# Patient Record
Sex: FEMALE | Race: BLACK OR AFRICAN AMERICAN | NOT HISPANIC OR LATINO | Employment: FULL TIME | URBAN - METROPOLITAN AREA
[De-identification: names, ages, dates, MRNs, and addresses within clinical notes are randomized per-mention and may not be internally consistent; named-entity substitution may affect disease eponyms.]

---

## 2017-04-05 DIAGNOSIS — I47.10 PSVT (PAROXYSMAL SUPRAVENTRICULAR TACHYCARDIA): Primary | ICD-10-CM

## 2017-04-06 ENCOUNTER — OFFICE VISIT (OUTPATIENT)
Dept: CARDIOLOGY | Facility: CLINIC | Age: 67
End: 2017-04-06
Payer: COMMERCIAL

## 2017-04-06 ENCOUNTER — CLINICAL SUPPORT (OUTPATIENT)
Dept: CARDIOLOGY | Facility: CLINIC | Age: 67
End: 2017-04-06
Payer: COMMERCIAL

## 2017-04-06 VITALS
SYSTOLIC BLOOD PRESSURE: 128 MMHG | WEIGHT: 165.56 LBS | DIASTOLIC BLOOD PRESSURE: 74 MMHG | HEART RATE: 89 BPM | BODY MASS INDEX: 28.27 KG/M2 | HEIGHT: 64 IN

## 2017-04-06 DIAGNOSIS — R07.9 CHEST PAIN, UNSPECIFIED TYPE: ICD-10-CM

## 2017-04-06 DIAGNOSIS — I47.10 SVT (SUPRAVENTRICULAR TACHYCARDIA): ICD-10-CM

## 2017-04-06 DIAGNOSIS — I47.10 PSVT (PAROXYSMAL SUPRAVENTRICULAR TACHYCARDIA): ICD-10-CM

## 2017-04-06 PROCEDURE — 99999 PR PBB SHADOW E&M-EST. PATIENT-LVL III: CPT | Mod: PBBFAC,,, | Performed by: INTERNAL MEDICINE

## 2017-04-06 PROCEDURE — 99204 OFFICE O/P NEW MOD 45 MIN: CPT | Mod: S$GLB,,, | Performed by: INTERNAL MEDICINE

## 2017-04-06 PROCEDURE — 93000 ELECTROCARDIOGRAM COMPLETE: CPT | Mod: S$GLB,,, | Performed by: NUCLEAR MEDICINE

## 2017-04-06 RX ORDER — METOPROLOL SUCCINATE 50 MG/1
50 TABLET, EXTENDED RELEASE ORAL DAILY
COMMUNITY
End: 2018-10-31

## 2017-04-06 RX ORDER — DIGOXIN 0.12 MG/1
0.12 TABLET ORAL DAILY
Qty: 30 TABLET | Refills: 11 | Status: SHIPPED | OUTPATIENT
Start: 2017-04-06 | End: 2018-05-17 | Stop reason: SDUPTHER

## 2017-04-06 NOTE — PROGRESS NOTES
Subjective:    Patient ID:  Liat Gilmore is a 66 y.o. female who presents for evaluation of SVT      HPI Comments: 66 yoF here for evaluation of SVT. She has had SVT since she was a teenager. She had rapid palpitations at the time. She was diagnosed with SVT and was placed on phenobarbitol. She has episodes of SVT rarely, last ER visit 10 years. Her SVT is terminated by adenosine. She understands how to perform vagal maneuvers and break her arrhythmia. She has been on digoxin for SVT by Dr Tijerina. She saw Dr Holden, who advised metoprolol and stopping digoxin. She is taking metoprolol as needed. She has noticed that her heart rate is lower and she has side effects.     Past Medical History:  No date: Supraventricular tachycardia    History reviewed. No pertinent surgical history.    Social History    Marital status:             Spouse name:                       Years of education:                 Number of children:               Occupational History    None on file    Social History Main Topics    Smoking status: Unknown If Ever Smoked                                                         Smokeless status: Not on file                       Alcohol use: Not on file     Drug use: Not on file     Sexual activity: Not on file          Other Topics            Concern    None on file    Social History Narrative    None on file    History reviewed.  No pertinent family history.        Review of Systems   Constitution: Negative.   HENT: Negative.    Eyes: Negative.    Cardiovascular: Positive for irregular heartbeat and palpitations. Negative for chest pain, dyspnea on exertion, leg swelling, near-syncope and syncope.   Respiratory: Negative.  Negative for shortness of breath.    Endocrine: Negative.    Hematologic/Lymphatic: Negative.    Skin: Negative.    Musculoskeletal: Negative.    Gastrointestinal: Negative.    Genitourinary: Negative.    Neurological: Negative.  Negative for dizziness and  light-headedness.   Psychiatric/Behavioral: Negative.    Allergic/Immunologic: Negative.         Objective:    Physical Exam   Constitutional: She is oriented to person, place, and time. She appears well-developed and well-nourished. No distress.   HENT:   Head: Normocephalic and atraumatic.   Eyes: Conjunctivae and EOM are normal. Pupils are equal, round, and reactive to light. Right eye exhibits no discharge. Left eye exhibits no discharge.   Neck: Normal range of motion. No JVD present. No thyromegaly present.   Cardiovascular: Normal rate, regular rhythm, S1 normal, S2 normal and normal heart sounds.  PMI is not displaced.  Exam reveals no gallop and no friction rub.    No murmur heard.  Pulmonary/Chest: Effort normal and breath sounds normal. No respiratory distress. She has no wheezes. She has no rales.   Abdominal: Soft. Bowel sounds are normal. She exhibits no distension. There is no tenderness. There is no rebound and no guarding.   Musculoskeletal: Normal range of motion. She exhibits no edema or tenderness.   Neurological: She is alert and oriented to person, place, and time. No cranial nerve deficit.   Skin: Skin is warm and dry. No rash noted. She is not diaphoretic. No erythema.   Psychiatric: She has a normal mood and affect. Her behavior is normal. Judgment and thought content normal.     ECG: NSR nl DE, QRS, QTc        Assessment:       1. SVT (supraventricular tachycardia)         Plan:       66 yoF SVT here for evaluation. She has stable, controlled SVT on digoxin. I discussed options including ablation, medical therapy. Extensive discussion regarding risks, benefits, and alternative approaches to the procedure was had with the patient.  The patient voices understanding and all questions have been answered.  The patient would like to consider her options. If she has recurrence, I would recommend EPS/RFA. RTC 1y or as needed

## 2018-05-17 ENCOUNTER — TELEPHONE (OUTPATIENT)
Dept: ELECTROPHYSIOLOGY | Facility: CLINIC | Age: 68
End: 2018-05-17

## 2018-05-17 DIAGNOSIS — I47.10 SVT (SUPRAVENTRICULAR TACHYCARDIA): ICD-10-CM

## 2018-05-17 RX ORDER — DIGOXIN 0.12 MG/1
0.12 TABLET ORAL DAILY
Qty: 30 TABLET | Refills: 11 | Status: SHIPPED | OUTPATIENT
Start: 2018-05-17 | End: 2018-05-18

## 2018-05-17 NOTE — TELEPHONE ENCOUNTER
----- Message from Marlo Donovan sent at 5/17/2018  1:58 PM CDT -----  Contact: patient  Please call pt at [phone] if any questions. Refill request for Digoxin 0.125 mg called into [facility] at [phone]. Out of medication    Thank you

## 2018-05-17 NOTE — TELEPHONE ENCOUNTER
----- Message from Court León RN sent at 5/16/2018  3:45 PM CDT -----  Contact: Patient  Please send refill for pt  ----- Message -----  From: Shabana Adhikari  Sent: 5/16/2018  11:01 AM  To: Court León RN    The Pt is calling for a refill on her LANOXIN 125 mcg tablet she is out of medications. Please call her back @ [phone]. Thanks, Shabana

## 2018-05-18 ENCOUNTER — TELEPHONE (OUTPATIENT)
Dept: ELECTROPHYSIOLOGY | Facility: CLINIC | Age: 68
End: 2018-05-18

## 2018-05-18 DIAGNOSIS — I47.10 SVT (SUPRAVENTRICULAR TACHYCARDIA): Primary | ICD-10-CM

## 2018-05-18 RX ORDER — DIGOXIN 125 MCG
125 TABLET ORAL DAILY
Qty: 30 TABLET | Refills: 6 | Status: SHIPPED | OUTPATIENT
Start: 2018-05-18 | End: 2018-10-31

## 2018-05-18 NOTE — TELEPHONE ENCOUNTER
Returned call to Pt. She nneds digoxin called to [facility] because its much cheaper than lanoxin for her. Rx sent.    ----- Message from Rosiat Jacobo sent at 5/18/2018  4:49 PM CDT -----  Contact: pt  Pls call pt at [phone].  She say a rx for LANOXIN 125 mcg tablet was called in to her pharmacy and it should have been for Digoxin.  They did give her a emergency 3 day supply.    Thank you

## 2018-06-27 ENCOUNTER — TELEPHONE (OUTPATIENT)
Dept: ELECTROPHYSIOLOGY | Facility: CLINIC | Age: 68
End: 2018-06-27

## 2018-06-27 NOTE — TELEPHONE ENCOUNTER
Called pt back regarding her concerns with clinic appointment 7/24/18. Pt states she has been experiencing mild ankle edema and SOB/CRUZ for a few days. She takes HCTZ 25mg daily, admits to taking additional 12.5mg PRN. Fluid intake approx. 32oz daily and reports low sodium intake ~800mg. Advised pt try to drink additional 8-16oz of fluid daily and keep salt intake below 1500mg daily to maintain healthy balance -- will check in with pt on 6/28. Instructed pt to go to ER for eval if BLE edema does not resolve overnight / with elevating her feet and if SOB worsens / happens at rest. Pt verbalized understanding of all discussed.

## 2018-06-28 ENCOUNTER — TELEPHONE (OUTPATIENT)
Dept: ELECTROPHYSIOLOGY | Facility: CLINIC | Age: 68
End: 2018-06-28

## 2018-06-28 NOTE — TELEPHONE ENCOUNTER
Called patient to check on status on BLE edema. Pt reports improvement, denies current BLE edema. She increased her fluid intake today and is aiming for 1200mg salt intake. Advised she reach out to PCP for further instruction if symptoms return / worsen. Pt verbalized understanding of all discussed.

## 2018-10-27 ENCOUNTER — OFFICE VISIT (OUTPATIENT)
Dept: URGENT CARE | Facility: CLINIC | Age: 68
End: 2018-10-27
Payer: COMMERCIAL

## 2018-10-27 VITALS
WEIGHT: 165 LBS | SYSTOLIC BLOOD PRESSURE: 149 MMHG | RESPIRATION RATE: 18 BRPM | BODY MASS INDEX: 28.17 KG/M2 | HEART RATE: 104 BPM | DIASTOLIC BLOOD PRESSURE: 72 MMHG | TEMPERATURE: 98 F | OXYGEN SATURATION: 96 % | HEIGHT: 64 IN

## 2018-10-27 DIAGNOSIS — J32.9 VIRAL SINUSITIS: Primary | ICD-10-CM

## 2018-10-27 DIAGNOSIS — M25.50 ARTHRALGIA, UNSPECIFIED JOINT: ICD-10-CM

## 2018-10-27 DIAGNOSIS — B97.89 VIRAL SINUSITIS: Primary | ICD-10-CM

## 2018-10-27 PROCEDURE — 99203 OFFICE O/P NEW LOW 30 MIN: CPT | Mod: S$GLB,,, | Performed by: NURSE PRACTITIONER

## 2018-10-27 RX ORDER — FLUTICASONE PROPIONATE 50 MCG
2 SPRAY, SUSPENSION (ML) NASAL DAILY
Qty: 1 BOTTLE | Refills: 0 | Status: ON HOLD | OUTPATIENT
Start: 2018-10-27 | End: 2023-04-12 | Stop reason: CLARIF

## 2018-10-27 RX ORDER — PREDNISONE 20 MG/1
40 TABLET ORAL DAILY
Qty: 6 TABLET | Refills: 0 | Status: SHIPPED | OUTPATIENT
Start: 2018-10-27 | End: 2018-10-30

## 2018-10-27 NOTE — PATIENT INSTRUCTIONS
Continue with 3 more days of Prednisone.  Zyrtec or Allegra or Claritin as directed.  Flonase.  Nasal saline rinses or spray.  Follow up closely with your PCP.  Go to the ER for any worsening symptoms.  Self-Care for Sinusitis     Drinking plenty of water can help sinuses drain.   Sinusitis can often be managed with self-care. Self-care can keep sinuses moist and make you feel more comfortable. Remember to follow your doctor's instructions closely. This can make a big difference in getting your sinus problem under control.  Drink fluids  Drinking extra fluids helps thin your mucus. This lets it drain from your sinuses more easily. Have a glass of water every hour or two. A humidifier helps in much the same way. Fluids can also offset the drying effects of certain medicines. If you use a humidifier, follow the product maker's instructions on how to use it. Clean it on a regular schedule.  Use saltwater rinses  Rinses help keep your sinuses and nose moist. Mix a teaspoon of salt in 8 ounces of fresh, warm water. Use a bulb syringe to gently squirt the water into your nose a few times a day. You can also buy ready-made saline nasal sprays.  Apply hot or cold packs  Applying heat to the area surrounding your sinuses may make you feel more comfortable. Use a hot water bottle or a hand towel dipped in hot water. Some people also find ice packs effective for relieving pain.  Medicines  Your doctor may prescribe medications to help treat your sinusitis. If you have an infection, antibiotics can help clear it up. If you are prescribed antibiotics, take all pills on schedule until they are gone, even if you feel better. Decongestants help relieve swelling. Use decongestant sprays for short periods only under the direction of your doctor. If you have allergies, your doctor may prescribe medications to help relieve them.   Date Last Reviewed: 10/1/2016  © 9194-6766 [employer]. [address] [place] [address], [place]  [address]. All rights reserved. This information is not intended as a substitute for professional medical care. Always follow your healthcare professional's instructions.        Sinusitis (No Antibiotics)    The sinuses are air-filled spaces within the bones of the face. They connect to the inside of the nose. Sinusitis is an inflammation of the tissue lining the sinus cavity. Sinus inflammation can occur during a cold. It can also be due to allergies to pollens and other particles in the air. It can cause symptoms such as sinus congestion, headache, sore throat, facial swelling and fullness. It may also cause a low-grade fever. No infection is present, and no antibiotic treatment is needed.  Home care  · Drink plenty of water, hot tea, and other liquids. This may help thin mucus. It also may promote sinus drainage.  · Heat may help soothe painful areas of the face. Use a towel soaked in hot water. Or,  the shower and direct the hot spray onto your face. Using a vaporizer along with a menthol rub at night may also help.   · An expectorant containing guaifenesin may help thin the mucus and promote drainage from the sinuses.  · Over-the-counter decongestants may be used unless a similar medicine was prescribed. Nasal sprays work the fastest. Use one that contains phenylephrine or oxymetazoline. First blow the nose gently. Then use the spray. Do not use these medicines more often than directed on the label or symptoms may get worse. You may also use tablets containing pseudoephedrine. Avoid products that combine ingredients, because side effects may be increased. Read labels. You can also ask the pharmacist for help. (NOTE: Persons with high blood pressure should not use decongestants. They can raise blood pressure.)  · Over-the-counter antihistamines may help if allergies contributed to your sinusitis.    · Use acetaminophen or ibuprofen to control pain, unless another pain medicine was prescribed. (If you have  chronic liver or kidney disease or ever had a stomach ulcer, talk with your doctor before using these medicines. Aspirin should never be used in anyone under 18 years of age who is ill with a fever. It may cause severe liver damage.)  · Use nasal rinses or irrigation as instructed by your health care provider.  · Don't smoke. This can worsen symptoms.  Follow-up care  Follow up with your healthcare provider or our staff if you are not improving within the next week.  When to seek medical advice  Call your healthcare provider if any of these occur:  · Green or yellow discharge from the nose or into the throat  · Facial pain or headache becoming more severe  · Stiff neck  · Unusual drowsiness or confusion  · Swelling of the forehead or eyelids  · Vision problems, including blurred or double vision  · Fever of 100.4ºF (38ºC) or higher, or as directed by your healthcare provider  · Seizure  · Breathing problems  · Symptoms not resolving within 10 days  Date Last Reviewed: 4/13/2015  © 7891-6117 [employer]. [address] [place] [address]. All rights reserved. This information is not intended as a substitute for professional medical care. Always follow your healthcare professional's instructions.        Arthralgia    Arthralgia is the term for pain in or around the joint. It is a symptom, not a disease. This pain may involve one or more joints. In some cases, the pain moves from joint to joint.  There are many causes for joint pain. These include:  · Injury  · Osteoarthritis (wearing out of the joint surface)  · Gout (inflammation of the joint due to crystals in the joint fluid)  · Infection inside the joint    · Bursitis (inflammation of the fluid-filled sacs around the joint)  · Autoimmune disorders such as rheumatoid arthritis or lupus  · Tendonitis (inflammation of chords that attach muscle to bone)  Home care  · Rest the involved joint(s) until your symptoms improve.   · You may be  prescribed pain medicine. If none is prescribed, you may use acetaminophen or ibuprofen to control pain and inflammation.  Follow-up care  Follow up with your healthcare provider or as advised.  When to seek medical advice  Contact your healthcare provider right away if any of the following occurs:  · Pain, swelling, or redness of joint increases  · Pain worsens or recurs after a period of improvement  · Pain moves to other joints  · You cannot bear weight on the affected joint   · You cannot move the affected joint  · Joint appears deformed  · New rash appears  · Fever of 100.4ºF (38ºC) or higher, or as directed by your healthcare provider  Date Last Reviewed: 3/1/2017  © 5477-2965 [employer]. [address] [place] [address], [place] [address]. All rights reserved. This information is not intended as a substitute for professional medical care. Always follow your healthcare professional's instructions.

## 2018-10-27 NOTE — PROGRESS NOTES
"Subjective:       Patient ID: Liat Gilmore is a 68 y.o. female.    Vitals:  height is 5' 4" (1.626 m) and weight is 74.8 kg (165 lb). Her temperature is 97.9 °F (36.6 °C). Her blood pressure is 149/72 (abnormal) and her pulse is 104. Her respiration is 18 and oxygen saturation is 96%.     Chief Complaint: Sinus Problem and Knee Pain    Patient presents with yellow drainage from her sinuses when sneezing x3 days.  No fever.  She is having joint aches for "all the time" without any swelling or redness or warmth.  She has a history of Fibromyalgia.  She states that more so recently her knees are hurting her in the mornings.  She also suspects Arthritis.  She states that she has appointment with her PCP in November.  She takes Zyrtec daily for allergies but states that she must have sinus infection due to color.  She does not have facial pain or sinus pain.  She has no injury.  She reports that joint pain is mainly in the morning.  She is also having some associated stiffness.  She has Prednisone at home 40mg x2 days.  She has tried this with relief of symptoms, but only has two days.  She has a chronic cough that she associates with her Asthma.  No increased inhaler use at present.      Sinus Problem   This is a new problem. The current episode started in the past 7 days. The problem is unchanged. There has been no fever. Her pain is at a severity of 0/10. She is experiencing no pain. Associated symptoms include congestion, coughing and sneezing. Pertinent negatives include no chills, diaphoresis, ear pain, headaches, hoarse voice, neck pain, shortness of breath, sinus pressure, sore throat or swollen glands. Treatments tried: prednisone 20mg. The treatment provided mild relief.   Knee Pain    The incident occurred more than 1 week ago. The incident occurred at home. There was no injury mechanism. The pain is present in the left knee, right knee, left ankle and right ankle (left shoulder, right shoulder, left elbow, " right elbow, left wrist, right wrist). The quality of the pain is described as aching and shooting. The pain is at a severity of 8/10. The pain is moderate. The pain has been constant since onset. Pertinent negatives include no inability to bear weight, loss of motion, loss of sensation, muscle weakness, numbness or tingling. She reports no foreign bodies present. The symptoms are aggravated by movement and weight bearing. Treatments tried: prednisone  The treatment provided mild relief.     Review of Systems   Constitution: Negative for chills, diaphoresis and fever.   HENT: Positive for congestion and sneezing. Negative for ear pain, hoarse voice, sinus pressure and sore throat.    Eyes: Negative for blurred vision.   Cardiovascular: Negative for chest pain.   Respiratory: Positive for cough. Negative for shortness of breath.    Skin: Negative for rash.   Musculoskeletal: Positive for joint pain. Negative for back pain and neck pain.   Gastrointestinal: Negative for abdominal pain, diarrhea, nausea and vomiting.   Neurological: Negative for headaches, numbness and tingling.   Psychiatric/Behavioral: The patient is not nervous/anxious.        Objective:      Physical Exam   Constitutional: She is oriented to person, place, and time. She appears well-developed and well-nourished. She is cooperative.  Non-toxic appearance. She does not appear ill. No distress.   HENT:   Head: Normocephalic and atraumatic.   Right Ear: Hearing, tympanic membrane, external ear and ear canal normal.   Left Ear: Hearing, tympanic membrane, external ear and ear canal normal.   Nose: Mucosal edema and rhinorrhea present. No sinus tenderness or nasal deformity. No epistaxis. Right sinus exhibits no maxillary sinus tenderness and no frontal sinus tenderness. Left sinus exhibits no maxillary sinus tenderness and no frontal sinus tenderness.   Mouth/Throat: Uvula is midline, oropharynx is clear and moist and mucous membranes are normal. No  trismus in the jaw. Normal dentition. No uvula swelling. No oropharyngeal exudate, posterior oropharyngeal edema or posterior oropharyngeal erythema.   Eyes: Conjunctivae and lids are normal. No scleral icterus.   Sclera clear bilat   Neck: Trachea normal, full passive range of motion without pain and phonation normal. Neck supple.   Cardiovascular: Normal rate, regular rhythm, normal heart sounds, intact distal pulses and normal pulses.   Pulmonary/Chest: Effort normal and breath sounds normal. No respiratory distress. She has no wheezes.   Abdominal: Soft. Normal appearance and bowel sounds are normal. She exhibits no distension. There is no tenderness.   Musculoskeletal: Normal range of motion. She exhibits no edema or deformity.        Right shoulder: Normal.        Left shoulder: Normal.        Right elbow: Normal.       Left elbow: Normal.        Right wrist: Normal.        Left wrist: Normal.        Right hip: Normal.        Left hip: Normal.        Right knee: Normal.        Left knee: Normal.        Right ankle: Normal.        Left ankle: Normal.   No warmth, redness, or tenderness over joints.  Full ROM.   Lymphadenopathy:     She has no cervical adenopathy.   Neurological: She is alert and oriented to person, place, and time. She exhibits normal muscle tone. Coordination normal.   Skin: Skin is warm, dry and intact. She is not diaphoretic. No pallor.   Psychiatric: She has a normal mood and affect. Her speech is normal and behavior is normal. Judgment and thought content normal. Cognition and memory are normal.   Nursing note and vitals reviewed.      Assessment:       1. Viral sinusitis    2. Arthralgia, unspecified joint        Plan:       Patient non tender over her sinuses with clear drainage noted on exam.  No wheezing at present.  Will extend Prednisone with history of Asthma.  Offered patient a referral to Rheumatology for chronic joint pain.  She deferred this and states that she likes to let her  PCP make her referrals.  Advised to keep follow up and to follow up sooner if symptoms persist or change.  She agrees to this.  Symptomatic care of viral illness discussed.  Viral sinusitis  -     fluticasone (FLONASE) 50 mcg/actuation nasal spray; 2 sprays (100 mcg total) by Each Nare route once daily.  Dispense: 1 Bottle; Refill: 0  -     predniSONE (DELTASONE) 20 MG tablet; Take 2 tablets (40 mg total) by mouth once daily. for 3 days  Dispense: 6 tablet; Refill: 0    Arthralgia, unspecified joint      Patient Instructions     Continue with 3 more days of Prednisone.  Zyrtec or Allegra or Claritin as directed.  Flonase.  Nasal saline rinses or spray.  Follow up closely with your PCP.  Go to the ER for any worsening symptoms.  Self-Care for Sinusitis     Drinking plenty of water can help sinuses drain.   Sinusitis can often be managed with self-care. Self-care can keep sinuses moist and make you feel more comfortable. Remember to follow your doctor's instructions closely. This can make a big difference in getting your sinus problem under control.  Drink fluids  Drinking extra fluids helps thin your mucus. This lets it drain from your sinuses more easily. Have a glass of water every hour or two. A humidifier helps in much the same way. Fluids can also offset the drying effects of certain medicines. If you use a humidifier, follow the product maker's instructions on how to use it. Clean it on a regular schedule.  Use saltwater rinses  Rinses help keep your sinuses and nose moist. Mix a teaspoon of salt in 8 ounces of fresh, warm water. Use a bulb syringe to gently squirt the water into your nose a few times a day. You can also buy ready-made saline nasal sprays.  Apply hot or cold packs  Applying heat to the area surrounding your sinuses may make you feel more comfortable. Use a hot water bottle or a hand towel dipped in hot water. Some people also find ice packs effective for relieving pain.  Medicines  Your doctor  may prescribe medications to help treat your sinusitis. If you have an infection, antibiotics can help clear it up. If you are prescribed antibiotics, take all pills on schedule until they are gone, even if you feel better. Decongestants help relieve swelling. Use decongestant sprays for short periods only under the direction of your doctor. If you have allergies, your doctor may prescribe medications to help relieve them.   Date Last Reviewed: 10/1/2016  © 6910-6310 [employer]. [address] [place] [address]. All rights reserved. This information is not intended as a substitute for professional medical care. Always follow your healthcare professional's instructions.        Sinusitis (No Antibiotics)    The sinuses are air-filled spaces within the bones of the face. They connect to the inside of the nose. Sinusitis is an inflammation of the tissue lining the sinus cavity. Sinus inflammation can occur during a cold. It can also be due to allergies to pollens and other particles in the air. It can cause symptoms such as sinus congestion, headache, sore throat, facial swelling and fullness. It may also cause a low-grade fever. No infection is present, and no antibiotic treatment is needed.  Home care  · Drink plenty of water, hot tea, and other liquids. This may help thin mucus. It also may promote sinus drainage.  · Heat may help soothe painful areas of the face. Use a towel soaked in hot water. Or,  the shower and direct the hot spray onto your face. Using a vaporizer along with a menthol rub at night may also help.   · An expectorant containing guaifenesin may help thin the mucus and promote drainage from the sinuses.  · Over-the-counter decongestants may be used unless a similar medicine was prescribed. Nasal sprays work the fastest. Use one that contains phenylephrine or oxymetazoline. First blow the nose gently. Then use the spray. Do not use these medicines more often than  directed on the label or symptoms may get worse. You may also use tablets containing pseudoephedrine. Avoid products that combine ingredients, because side effects may be increased. Read labels. You can also ask the pharmacist for help. (NOTE: Persons with high blood pressure should not use decongestants. They can raise blood pressure.)  · Over-the-counter antihistamines may help if allergies contributed to your sinusitis.    · Use acetaminophen or ibuprofen to control pain, unless another pain medicine was prescribed. (If you have chronic liver or kidney disease or ever had a stomach ulcer, talk with your doctor before using these medicines. Aspirin should never be used in anyone under 18 years of age who is ill with a fever. It may cause severe liver damage.)  · Use nasal rinses or irrigation as instructed by your health care provider.  · Don't smoke. This can worsen symptoms.  Follow-up care  Follow up with your healthcare provider or our staff if you are not improving within the next week.  When to seek medical advice  Call your healthcare provider if any of these occur:  · Green or yellow discharge from the nose or into the throat  · Facial pain or headache becoming more severe  · Stiff neck  · Unusual drowsiness or confusion  · Swelling of the forehead or eyelids  · Vision problems, including blurred or double vision  · Fever of 100.4ºF (38ºC) or higher, or as directed by your healthcare provider  · Seizure  · Breathing problems  · Symptoms not resolving within 10 days  Date Last Reviewed: 4/13/2015  © 5269-8644 [employer]. [address] [place] [address], [place] [address]. All rights reserved. This information is not intended as a substitute for professional medical care. Always follow your healthcare professional's instructions.        Arthralgia    Arthralgia is the term for pain in or around the joint. It is a symptom, not a disease. This pain may involve one or more joints. In some cases, the pain  moves from joint to joint.  There are many causes for joint pain. These include:  · Injury  · Osteoarthritis (wearing out of the joint surface)  · Gout (inflammation of the joint due to crystals in the joint fluid)  · Infection inside the joint    · Bursitis (inflammation of the fluid-filled sacs around the joint)  · Autoimmune disorders such as rheumatoid arthritis or lupus  · Tendonitis (inflammation of chords that attach muscle to bone)  Home care  · Rest the involved joint(s) until your symptoms improve.   · You may be prescribed pain medicine. If none is prescribed, you may use acetaminophen or ibuprofen to control pain and inflammation.  Follow-up care  Follow up with your healthcare provider or as advised.  When to seek medical advice  Contact your healthcare provider right away if any of the following occurs:  · Pain, swelling, or redness of joint increases  · Pain worsens or recurs after a period of improvement  · Pain moves to other joints  · You cannot bear weight on the affected joint   · You cannot move the affected joint  · Joint appears deformed  · New rash appears  · Fever of 100.4ºF (38ºC) or higher, or as directed by your healthcare provider  Date Last Reviewed: 3/1/2017  © 0809-3089 [employer]. [address] [place] [address], [place] [address]. All rights reserved. This information is not intended as a substitute for professional medical care. Always follow your healthcare professional's instructions.

## 2018-10-31 ENCOUNTER — OFFICE VISIT (OUTPATIENT)
Dept: CARDIOLOGY | Facility: CLINIC | Age: 68
End: 2018-10-31
Attending: INTERNAL MEDICINE
Payer: COMMERCIAL

## 2018-10-31 VITALS
HEART RATE: 70 BPM | DIASTOLIC BLOOD PRESSURE: 65 MMHG | BODY MASS INDEX: 29.37 KG/M2 | SYSTOLIC BLOOD PRESSURE: 118 MMHG | WEIGHT: 172 LBS | HEIGHT: 64 IN

## 2018-10-31 DIAGNOSIS — E78.00 HYPERCHOLESTEROLEMIA: ICD-10-CM

## 2018-10-31 DIAGNOSIS — I10 ESSENTIAL HYPERTENSION: ICD-10-CM

## 2018-10-31 DIAGNOSIS — J45.20 MILD INTERMITTENT ASTHMA WITHOUT COMPLICATION: ICD-10-CM

## 2018-10-31 DIAGNOSIS — I47.10 SUPRAVENTRICULAR TACHYCARDIA: ICD-10-CM

## 2018-10-31 DIAGNOSIS — E66.3 OVERWEIGHT: ICD-10-CM

## 2018-10-31 DIAGNOSIS — M79.7 FIBROMYALGIA: ICD-10-CM

## 2018-10-31 PROBLEM — J45.909 ASTHMA: Status: ACTIVE | Noted: 2018-10-31

## 2018-10-31 PROCEDURE — 93000 ELECTROCARDIOGRAM COMPLETE: CPT | Mod: S$GLB,,, | Performed by: INTERNAL MEDICINE

## 2018-10-31 PROCEDURE — 99205 OFFICE O/P NEW HI 60 MIN: CPT | Mod: S$GLB,,, | Performed by: INTERNAL MEDICINE

## 2018-10-31 RX ORDER — DILTIAZEM HYDROCHLORIDE 240 MG/1
240 CAPSULE, EXTENDED RELEASE ORAL DAILY
Qty: 90 CAPSULE | Refills: 3 | Status: SHIPPED | OUTPATIENT
Start: 2018-10-31 | End: 2019-10-25 | Stop reason: SDUPTHER

## 2018-10-31 NOTE — PROGRESS NOTES
Subjective:     Liat Gilmore is a 68 y.o. female with hypertension and hypercholesterolemia. She is overweight. She has had issues with supraventricular tachycardia since age 16. She last had to go to an ER with SVT in about 2003. In addition she has fibromyalgia and mild asthma. No exertional chest pain or exertional dyspnea. Occasional palpitations that responds to vagal maneuvers. No weak spells. Feeling well overall.       Palpitations    This is a chronic problem. The current episode started more than 1 year ago. The problem occurs rarely. The problem has been unchanged. Pertinent negatives include no anxiety, chest fullness, chest pain, coughing, diaphoresis, dizziness, fever, irregular heartbeat, malaise/fatigue, nausea, near-syncope, numbness, shortness of breath, syncope, vomiting or weakness.   Hypertension   This is a chronic problem. The current episode started more than 1 year ago. The problem is unchanged. The problem is controlled (usually 120-125/60-70 mmHg at home). Associated symptoms include palpitations. Pertinent negatives include no anxiety, blurred vision, chest pain, headaches, malaise/fatigue, neck pain, orthopnea, peripheral edema, PND, shortness of breath or sweats. There is no history of chronic renal disease.   Hyperlipidemia   This is a chronic problem. The current episode started more than 1 year ago. The problem is controlled. Recent lipid tests were reviewed and are normal. She has no history of chronic renal disease, diabetes, hypothyroidism, liver disease, obesity or nephrotic syndrome. Pertinent negatives include no chest pain, focal sensory loss, focal weakness, leg pain, myalgias or shortness of breath.       Review of Systems   Constitution: Negative for chills, diaphoresis, fever, weakness and malaise/fatigue.   HENT: Negative for nosebleeds.    Eyes: Negative for blurred vision, double vision, vision loss in left eye and vision loss in right eye.   Cardiovascular: Positive  for palpitations. Negative for chest pain, claudication, dyspnea on exertion, irregular heartbeat, leg swelling, near-syncope, orthopnea, paroxysmal nocturnal dyspnea and syncope.   Respiratory: Negative for cough, hemoptysis, shortness of breath and wheezing.    Endocrine: Negative for cold intolerance and heat intolerance.   Hematologic/Lymphatic: Negative for bleeding problem. Does not bruise/bleed easily.   Skin: Negative for color change and rash.   Musculoskeletal: Positive for arthritis. Negative for back pain, falls, muscle weakness, myalgias and neck pain.   Gastrointestinal: Negative for heartburn, hematemesis, hematochezia, hemorrhoids, jaundice, melena, nausea and vomiting.   Genitourinary: Negative for dysuria and hematuria.   Neurological: Negative for dizziness, focal weakness, headaches, light-headedness, loss of balance, numbness and vertigo.   Psychiatric/Behavioral: Negative for altered mental status, depression and memory loss. The patient is not nervous/anxious.    Allergic/Immunologic: Negative for hives and persistent infections.       Current Outpatient Medications on File Prior to Visit   Medication Sig Dispense Refill    amlodipine (NORVASC) 10 MG tablet Take 1 tablet (10 mg total) by mouth once daily. 30 tablet 11    digoxin (LANOXIN) 125 mcg tablet Take 1 tablet (125 mcg total) by mouth once daily. 30 tablet 6    fish oil-omega-3 fatty acids 300-1,000 mg capsule Take 2 g by mouth.      hydrochlorothiazide (HYDRODIURIL) 25 MG tablet Take 25 mg by mouth once daily.      metoprolol succinate (TOPROL-XL) 50 MG 24 hr tablet Take 50 mg by mouth once daily. Takes one-half tablet by mouth once a day as needed      pitavastatin (LIVALO) 2 mg Tab tablet Take 2 mg by mouth.      zafirlukast (ACCOLATE) 20 MG tablet Take 20 mg by mouth once daily.       albuterol 90 mcg/actuation inhaler INHALE 2 PUFFS INTO THE LUNGS EVERY 6 (SIX) HOURS AS NEEDED FOR WHEEZING.      ALBUTEROL INHL Inhale into  "the lungs.      alprazolam (XANAX) 0.25 MG tablet Take 0.25 mg by mouth 2 (two) times daily.  0    ascorbic acid (VITAMIN C) 1000 MG tablet Take 1,000 mg by mouth.      b complex vitamins capsule Take 1 capsule by mouth.      calcium-vitamin D3 (CALCIUM 500 + D) 500 mg(1,250mg) -200 unit per tablet Take 1 tablet by mouth.      clotrimazole-betamethasone 1-0.05% (LOTRISONE) cream   3    cycloSPORINE (RESTASIS) 0.05 % ophthalmic emulsion       flunisolide 80 mcg/actuation HFAA Inhale 160 mcg into the lungs.      fluticasone (FLONASE) 50 mcg/actuation nasal spray 1 SPRAY BY NASAL ROUTE DAILY.      fluticasone (FLONASE) 50 mcg/actuation nasal spray 2 sprays (100 mcg total) by Each Nare route once daily. 1 Bottle 0    fluticasone (FLOVENT HFA) 220 mcg/actuation inhaler Inhale 2 puffs into the lungs.      hydrocodone-acetaminophen 10-325mg (NORCO)  mg Tab   0    hydrocortisone valerate (WESTCORT) 0.2 % ointment Apply topically.      paroxetine (PAXIL) 10 mg/5 mL Susp Take by mouth once daily.      pirbuterol (MAXAIR) 200 mcg/Inhalation inhaler Inhale 2 puffs into the lungs.      [] predniSONE (DELTASONE) 20 MG tablet Take 2 tablets (40 mg total) by mouth once daily. for 3 days 6 tablet 0    valacyclovir (VALTREX) 1000 MG tablet 1,000 mg once daily.       vitamin E 1000 UNIT capsule Take 1,000 Units by mouth.      [DISCONTINUED] lisinopril 10 MG tablet Take 10 mg by mouth once daily.       No current facility-administered medications on file prior to visit.        /65   Pulse 70   Ht 5' 4" (1.626 m)   Wt 78 kg (172 lb)   BMI 29.52 kg/m²       Objective:     Physical Exam   Constitutional: She is oriented to person, place, and time. She appears well-developed and well-nourished.  Non-toxic appearance. No distress.   HENT:   Head: Normocephalic and atraumatic.   Nose: Nose normal.   Eyes: Right eye exhibits no discharge. Left eye exhibits no discharge. Right conjunctiva is not " injected. Left conjunctiva is not injected. Right pupil is round. Left pupil is round. Pupils are equal.   Neck: Neck supple. No JVD present. Carotid bruit is not present. No thyromegaly present.   Cardiovascular: Normal rate, regular rhythm, S1 normal and S2 normal.  No extrasystoles are present. PMI is not displaced. Exam reveals gallop and S4.   No murmur heard.  Pulses:       Radial pulses are 2+ on the right side, and 2+ on the left side.        Femoral pulses are 2+ on the right side, and 2+ on the left side.       Dorsalis pedis pulses are 2+ on the right side, and 2+ on the left side.        Posterior tibial pulses are 2+ on the right side, and 2+ on the left side.   Pulmonary/Chest: Effort normal and breath sounds normal.   Abdominal: Soft. Normal appearance. There is no hepatosplenomegaly. There is no tenderness.   Musculoskeletal:        Right ankle: She exhibits no swelling, no ecchymosis and no deformity.        Left ankle: She exhibits no swelling, no ecchymosis and no deformity.   Lymphadenopathy:        Head (right side): No submandibular adenopathy present.        Head (left side): No submandibular adenopathy present.     She has no cervical adenopathy.   Neurological: She is alert and oriented to person, place, and time. She is not disoriented. No cranial nerve deficit.   Skin: Skin is warm, dry and intact. No rash noted. She is not diaphoretic. Nails show no clubbing.   Psychiatric: She has a normal mood and affect. Her speech is normal and behavior is normal. Judgment and thought content normal. Cognition and memory are normal.       Assessment:     1. Supraventricular tachycardia    2. Essential hypertension    3. Hypercholesterolemia    4. Overweight    5. Fibromyalgia    6. Mild intermittent asthma without complication        Plan:     1. Supraventricular Tachycardia   1966: First episode.   2003: To ER with SVT.   Mild palpitations a few times per year that responds to vagal maneuvers.   On  "metoprolol 25 mg Q24 and digoxin 0.125 mg Q24.   Reasonably well controlled.   10/31/2018: Change to diltiazem 240 mg Q24.    2. Hypertension   2005: Diagnosed.   On amlodipine 10 mg Q24, metoprolol 25 mg Q24 and hctz 25 mg Q24.   Keeping log at home.   Well controlled.   10/31/2018: As above.   Plan ARB instead of hctz longterm.     3. Hypercholesterolemia   2010: Began statin.   Tried several "regular statins" and had muscle pains.   On pitavastatin 2 mg Q24.     4. Overweight   10/31/2018: Weight 78 kg. BMI 29.   Encouraged to lose weight.    5. Fibromyalgia   2006: Diagnosed.    6. Asthma   1985: Diagnosed.   On zafirlukast and inhalers.    7. Primary Care   Dr. Shabana Dunbar.    F/u 6 weeks.    Morelia Ponce M.D.              "

## 2018-11-16 ENCOUNTER — HOSPITAL ENCOUNTER (EMERGENCY)
Facility: OTHER | Age: 68
Discharge: HOME | End: 2018-11-17
Attending: EMERGENCY MEDICINE
Payer: COMMERCIAL

## 2018-11-16 DIAGNOSIS — F41.9 ANXIETY: ICD-10-CM

## 2018-11-16 DIAGNOSIS — R00.2 PALPITATION: ICD-10-CM

## 2018-11-16 DIAGNOSIS — R00.2 PALPITATIONS: Primary | ICD-10-CM

## 2018-11-16 PROCEDURE — 99284 EMERGENCY DEPT VISIT MOD MDM: CPT | Mod: 25

## 2018-11-16 PROCEDURE — 93010 ELECTROCARDIOGRAM REPORT: CPT | Mod: ,,, | Performed by: INTERNAL MEDICINE

## 2018-11-16 PROCEDURE — 85025 COMPLETE CBC W/AUTO DIFF WBC: CPT

## 2018-11-16 PROCEDURE — 80048 BASIC METABOLIC PNL TOTAL CA: CPT

## 2018-11-16 PROCEDURE — 93005 ELECTROCARDIOGRAM TRACING: CPT

## 2018-11-16 PROCEDURE — 96360 HYDRATION IV INFUSION INIT: CPT

## 2018-11-16 RX ORDER — HYDROCODONE BITARTRATE AND ACETAMINOPHEN 5; 325 MG/1; MG/1
1 TABLET ORAL EVERY 6 HOURS PRN
COMMUNITY
End: 2020-09-02 | Stop reason: SDUPTHER

## 2018-11-17 VITALS
HEART RATE: 74 BPM | WEIGHT: 177.25 LBS | TEMPERATURE: 98 F | SYSTOLIC BLOOD PRESSURE: 146 MMHG | BODY MASS INDEX: 30.26 KG/M2 | HEIGHT: 64 IN | OXYGEN SATURATION: 97 % | RESPIRATION RATE: 18 BRPM | DIASTOLIC BLOOD PRESSURE: 78 MMHG

## 2018-11-17 LAB
ANION GAP SERPL CALC-SCNC: 12 MMOL/L
BASOPHILS # BLD AUTO: 0.06 K/UL
BASOPHILS NFR BLD: 1 %
BUN SERPL-MCNC: 18 MG/DL
CALCIUM SERPL-MCNC: 10.2 MG/DL
CHLORIDE SERPL-SCNC: 102 MMOL/L
CO2 SERPL-SCNC: 28 MMOL/L
CREAT SERPL-MCNC: 1.1 MG/DL
DIFFERENTIAL METHOD: ABNORMAL
EOSINOPHIL # BLD AUTO: 0.3 K/UL
EOSINOPHIL NFR BLD: 5.3 %
ERYTHROCYTE [DISTWIDTH] IN BLOOD BY AUTOMATED COUNT: 14.6 %
EST. GFR  (AFRICAN AMERICAN): >60 ML/MIN/1.73 M^2
EST. GFR  (NON AFRICAN AMERICAN): 52 ML/MIN/1.73 M^2
GLUCOSE SERPL-MCNC: 99 MG/DL
HCT VFR BLD AUTO: 39.2 %
HGB BLD-MCNC: 12.7 G/DL
LYMPHOCYTES # BLD AUTO: 1.8 K/UL
LYMPHOCYTES NFR BLD: 31 %
MCH RBC QN AUTO: 28.7 PG
MCHC RBC AUTO-ENTMCNC: 32.4 G/DL
MCV RBC AUTO: 89 FL
MONOCYTES # BLD AUTO: 0.3 K/UL
MONOCYTES NFR BLD: 5.3 %
NEUTROPHILS # BLD AUTO: 3.4 K/UL
NEUTROPHILS NFR BLD: 57.2 %
PLATELET # BLD AUTO: 296 K/UL
PMV BLD AUTO: 9.2 FL
POTASSIUM SERPL-SCNC: 3.7 MMOL/L
RBC # BLD AUTO: 4.43 M/UL
SODIUM SERPL-SCNC: 142 MMOL/L
WBC # BLD AUTO: 5.88 K/UL

## 2018-11-17 PROCEDURE — 25000003 PHARM REV CODE 250: Performed by: EMERGENCY MEDICINE

## 2018-11-17 RX ORDER — ALPRAZOLAM 0.25 MG/1
0.25 TABLET ORAL 2 TIMES DAILY PRN
Qty: 10 TABLET | Refills: 0 | Status: SHIPPED | OUTPATIENT
Start: 2018-11-17 | End: 2020-08-07 | Stop reason: SDUPTHER

## 2018-11-17 RX ADMIN — SODIUM CHLORIDE 1000 ML: 0.9 INJECTION, SOLUTION INTRAVENOUS at 12:11

## 2018-11-17 NOTE — ED NOTES
Pt complains of irregular heart beat today while at work. Pt complains that it is different than when she had it before. Denies N/V/D, chest pain, shortness of breath.  LOC: Pt is awake alert and aware of environment, oriented X3 and speaking appropriately  Appearance: Pt is in no acute distress, Pt is well groomed and clean  Skin: skin is warm and dry with normal turgor, mucus membranes are moist and pink, skin is intact with no bruising or breakdown  Muskuloskeletal: Pt moves all extremities well, there is no obvious swelling or deformities noted, pulses are intact.  Respiratory: Airway is open and patent, respirations are spontaneous and even.  Cardiac:no edema and cap refill is <3sec  Abdomen: soft, non-tender and non-distended  Neuro: Pt follows commands easily and has no obvious deficits

## 2018-11-17 NOTE — ED PROVIDER NOTES
"Encounter Date: 2018    SCRIBE #1 NOTE: I, Betty Raymond, am scribing for, and in the presence of, Dr. Bradford.       History     Chief Complaint   Patient presents with    irregular heartbeats     since this AM, Dr. Ponce changed her med 2 wks      Time seen by provider: 11:24 PM    This is a 68 y.o. Female, with history of PSVT, who presents with complaint of palpitations that began today. The patient states "it feels like my heart is skipping beats." She reports her cardiologist, Dr. Ponce, recently changed her medications two weeks ago. She also notes she recently started a new job in April and has been under a lot of pressure to do well. Patient states onset is inconsistent with previous PSVT episodes. She denies chest pain, nausea, vomiting, SOB, or abdominal pain. Patient reports history of HTN, and states it is well controlled. She denies history of heart blockages. She states it has been a long time since she had a stress test. She admits to occasional use of alcohol, but denies use of tobacco.       The history is provided by the patient.     Review of patient's allergies indicates:   Allergen Reactions    Erythromycin     Macrolide antibiotics Other (See Comments)     Stomach Cramps     Past Medical History:   Diagnosis Date    Asthma 10/31/2018    1985: Diagnosed.    Fibromyalgia 10/31/2018    2006: Diagnosed.    Hypercholesterolemia 10/31/2018    2010: Began statin.    Hyperlipidemia     Hypertension     Overweight 10/31/2018    10/31/2018: Weight 75 kg. BMI 28.    Supraventricular tachycardia     Tachycardia      Past Surgical History:   Procedure Laterality Date     SECTION      TONSILLECTOMY      TUMOR REMOVAL       Family History   Problem Relation Age of Onset    Angina Mother     Stroke Father     Colon cancer Sister     Colon cancer Brother     Heart disease Brother      Social History     Tobacco Use    Smoking status: Never Smoker    Smokeless tobacco: " Never Used   Substance Use Topics    Alcohol use: No     Frequency: Never    Drug use: No     Review of Systems   Constitutional: Negative for chills and fever.   HENT: Negative for congestion, rhinorrhea, sore throat and trouble swallowing.    Eyes: Negative for visual disturbance.   Respiratory: Negative for shortness of breath.    Cardiovascular: Positive for palpitations. Negative for chest pain.   Gastrointestinal: Negative for abdominal pain, nausea and vomiting.   Endocrine: Negative.    Genitourinary: Negative for dysuria.   Musculoskeletal: Negative for back pain and neck pain.   Skin: Negative for rash and wound.   Neurological: Negative for headaches.   Psychiatric/Behavioral: Negative for confusion.       Physical Exam     Initial Vitals [11/16/18 2301]   BP Pulse Resp Temp SpO2   (!) 191/86 91 20 97.9 °F (36.6 °C) 96 %      MAP       --         Physical Exam    Nursing note and vitals reviewed.  Constitutional: She appears well-developed and well-nourished. She is not diaphoretic. No distress.   Anxious appearing.   HENT:   Head: Normocephalic and atraumatic.   Right Ear: External ear normal.   Left Ear: External ear normal.   Eyes: EOM are normal. Pupils are equal, round, and reactive to light. Right eye exhibits no discharge. Left eye exhibits no discharge.   Neck: Normal range of motion.   Cardiovascular: Normal rate, regular rhythm and normal heart sounds. Exam reveals no gallop and no friction rub.    No murmur heard.  Single PAC seen on cardiac monitor during interview, no other arrhythmias.    Pulmonary/Chest: Breath sounds normal. No respiratory distress. She has no wheezes. She has no rhonchi. She has no rales.   Abdominal: Soft. There is no tenderness. There is no rebound and no guarding.   Musculoskeletal: Normal range of motion. She exhibits no edema or tenderness.   Neurological: She is alert and oriented to person, place, and time. She has normal strength. No sensory deficit.   Skin:  Skin is warm and dry. No rash and no abscess noted. No erythema. No pallor.   Psychiatric: She has a normal mood and affect. Her behavior is normal. Judgment and thought content normal.         ED Course   Procedures  Labs Reviewed   CBC W/ AUTO DIFFERENTIAL - Abnormal; Notable for the following components:       Result Value    RDW 14.6 (*)     All other components within normal limits   BASIC METABOLIC PANEL - Abnormal; Notable for the following components:    eGFR if non  52 (*)     All other components within normal limits     EKG Readings: (Independently Interpreted)   Initial Reading: No STEMI.   Normal sinus rhythm at a rate of 90 bpm. No irregular beats. No ischemia.       Imaging Results    None          Medical Decision Making:   ED Management:  1:01 AM- Patient feels better after fluids. Palpitations resolved. Pt request small prescription for Xanax due to increased stress and anxiety.             Scribe Attestation:   Scribe #1: I performed the above scribed service and the documentation accurately describes the services I performed. I attest to the accuracy of the note.    Attending Attestation:           Physician Attestation for Scribe:  Physician Attestation Statement for Scribe #1: I, Dr. Bardford, reviewed documentation, as scribed by Betty Raymond in my presence, and it is both accurate and complete.          Patient with a history of PSVT, presents with palpitations over the past few days, worse today.  She denies caffeine other than some chocolate.  She does relate being under lot of stress due to upcoming projects at her new job. the palpitations seem to be frequent but only 1 or 2 beats.  She has not had persistent runs of fast heart rate that seem similar to for PSVT she is compliant with her new cardiac medications, started 2 weeks ago by her cardiologist during her observation here I saw nothing more than occasional PACs which may well be the cause of her palpitations she was  given fluids, observed and is feeling better notes the palpitations are much less frequent.  I discussed with the patient adequate fluids, avoidance of chocolate, caffeine, other stimulants she reports a lot of stress, anxiety and is requesting a limited number of Xanax which she has taken in the past.  Will be provided prescription for small amount of these.  Discussed return precautions.  Encouraged follow-up with her cardiologist, especially symptoms persist           Clinical Impression:     1. Palpitations    2. Palpitation    3. Anxiety                                 Patrick Bradford II, MD  11/17/18 4469

## 2018-11-21 ENCOUNTER — TELEPHONE (OUTPATIENT)
Dept: CARDIOLOGY | Facility: CLINIC | Age: 68
End: 2018-11-21

## 2018-11-21 NOTE — TELEPHONE ENCOUNTER
----- Message from Jesus Akers sent at 11/21/2018  9:55 AM CST -----  Calling to let dr spence know was in [facility] on Friday for palpatations [phone]

## 2019-08-01 ENCOUNTER — HOSPITAL ENCOUNTER (EMERGENCY)
Facility: OTHER | Age: 69
Discharge: HOME | End: 2019-08-01
Attending: EMERGENCY MEDICINE
Payer: COMMERCIAL

## 2019-08-01 VITALS
HEIGHT: 64 IN | WEIGHT: 183 LBS | SYSTOLIC BLOOD PRESSURE: 125 MMHG | OXYGEN SATURATION: 96 % | BODY MASS INDEX: 31.24 KG/M2 | TEMPERATURE: 98 F | DIASTOLIC BLOOD PRESSURE: 67 MMHG | HEART RATE: 90 BPM | RESPIRATION RATE: 18 BRPM

## 2019-08-01 DIAGNOSIS — E87.6 HYPOKALEMIA: ICD-10-CM

## 2019-08-01 DIAGNOSIS — R19.7 DIARRHEA, UNSPECIFIED TYPE: ICD-10-CM

## 2019-08-01 DIAGNOSIS — E83.52 HYPERCALCEMIA: ICD-10-CM

## 2019-08-01 DIAGNOSIS — R10.32 ABDOMINAL PAIN, LEFT LOWER QUADRANT: Primary | ICD-10-CM

## 2019-08-01 DIAGNOSIS — N28.9 RENAL INSUFFICIENCY: ICD-10-CM

## 2019-08-01 LAB
ALBUMIN SERPL BCP-MCNC: 4.1 G/DL (ref 3.5–5.2)
ALP SERPL-CCNC: 89 U/L (ref 55–135)
ALT SERPL W/O P-5'-P-CCNC: 17 U/L (ref 10–44)
ANION GAP SERPL CALC-SCNC: 12 MMOL/L (ref 8–16)
AST SERPL-CCNC: 19 U/L (ref 10–40)
BASOPHILS # BLD AUTO: 0.04 K/UL (ref 0–0.2)
BASOPHILS NFR BLD: 0.7 % (ref 0–1.9)
BILIRUB SERPL-MCNC: 0.3 MG/DL (ref 0.1–1)
BUN SERPL-MCNC: 23 MG/DL (ref 8–23)
CALCIUM SERPL-MCNC: 10.6 MG/DL (ref 8.7–10.5)
CHLORIDE SERPL-SCNC: 100 MMOL/L (ref 95–110)
CO2 SERPL-SCNC: 29 MMOL/L (ref 23–29)
CREAT SERPL-MCNC: 1.2 MG/DL (ref 0.5–1.4)
DIFFERENTIAL METHOD: ABNORMAL
EOSINOPHIL # BLD AUTO: 0.1 K/UL (ref 0–0.5)
EOSINOPHIL NFR BLD: 1.5 % (ref 0–8)
ERYTHROCYTE [DISTWIDTH] IN BLOOD BY AUTOMATED COUNT: 13.4 % (ref 11.5–14.5)
EST. GFR  (AFRICAN AMERICAN): 54 ML/MIN/1.73 M^2
EST. GFR  (NON AFRICAN AMERICAN): 47 ML/MIN/1.73 M^2
GLUCOSE SERPL-MCNC: 125 MG/DL (ref 70–110)
HCT VFR BLD AUTO: 44.6 % (ref 37–48.5)
HGB BLD-MCNC: 14.2 G/DL (ref 12–16)
IMM GRANULOCYTES # BLD AUTO: 0.02 K/UL (ref 0–0.04)
IMM GRANULOCYTES NFR BLD AUTO: 0.3 % (ref 0–0.5)
LIPASE SERPL-CCNC: 16 U/L (ref 4–60)
LYMPHOCYTES # BLD AUTO: 0.9 K/UL (ref 1–4.8)
LYMPHOCYTES NFR BLD: 15.6 % (ref 18–48)
MCH RBC QN AUTO: 28.7 PG (ref 27–31)
MCHC RBC AUTO-ENTMCNC: 31.8 G/DL (ref 32–36)
MCV RBC AUTO: 90 FL (ref 82–98)
MONOCYTES # BLD AUTO: 0.1 K/UL (ref 0.3–1)
MONOCYTES NFR BLD: 1.7 % (ref 4–15)
NEUTROPHILS # BLD AUTO: 4.7 K/UL (ref 1.8–7.7)
NEUTROPHILS NFR BLD: 80.2 % (ref 38–73)
NRBC BLD-RTO: 0 /100 WBC
PLATELET # BLD AUTO: 295 K/UL (ref 150–350)
PMV BLD AUTO: 9.4 FL (ref 9.2–12.9)
POTASSIUM SERPL-SCNC: 3.4 MMOL/L (ref 3.5–5.1)
PROT SERPL-MCNC: 7.9 G/DL (ref 6–8.4)
RBC # BLD AUTO: 4.94 M/UL (ref 4–5.4)
SODIUM SERPL-SCNC: 141 MMOL/L (ref 136–145)
WBC # BLD AUTO: 5.82 K/UL (ref 3.9–12.7)

## 2019-08-01 PROCEDURE — 85025 COMPLETE CBC W/AUTO DIFF WBC: CPT

## 2019-08-01 PROCEDURE — 83690 ASSAY OF LIPASE: CPT

## 2019-08-01 PROCEDURE — 36415 COLL VENOUS BLD VENIPUNCTURE: CPT

## 2019-08-01 PROCEDURE — 80053 COMPREHEN METABOLIC PANEL: CPT

## 2019-08-01 PROCEDURE — 99284 EMERGENCY DEPT VISIT MOD MDM: CPT | Mod: 25

## 2019-08-01 PROCEDURE — 63600175 PHARM REV CODE 636 W HCPCS: Performed by: EMERGENCY MEDICINE

## 2019-08-01 PROCEDURE — 96372 THER/PROPH/DIAG INJ SC/IM: CPT | Mod: 59

## 2019-08-01 PROCEDURE — 96374 THER/PROPH/DIAG INJ IV PUSH: CPT

## 2019-08-01 RX ORDER — MORPHINE SULFATE 2 MG/ML
2 INJECTION, SOLUTION INTRAMUSCULAR; INTRAVENOUS
Status: COMPLETED | OUTPATIENT
Start: 2019-08-01 | End: 2019-08-01

## 2019-08-01 RX ORDER — DICYCLOMINE HYDROCHLORIDE 20 MG/1
20 TABLET ORAL 2 TIMES DAILY PRN
Qty: 10 TABLET | Refills: 0 | Status: SHIPPED | OUTPATIENT
Start: 2019-08-01 | End: 2019-08-31

## 2019-08-01 RX ORDER — DICYCLOMINE HYDROCHLORIDE 10 MG/ML
20 INJECTION INTRAMUSCULAR
Status: COMPLETED | OUTPATIENT
Start: 2019-08-01 | End: 2019-08-01

## 2019-08-01 RX ADMIN — DICYCLOMINE HYDROCHLORIDE 20 MG: 20 INJECTION, SOLUTION INTRAMUSCULAR at 05:08

## 2019-08-01 RX ADMIN — SODIUM CHLORIDE 1000 ML: 0.9 INJECTION, SOLUTION INTRAVENOUS at 05:08

## 2019-08-01 RX ADMIN — MORPHINE SULFATE 2 MG: 2 INJECTION, SOLUTION INTRAMUSCULAR; INTRAVENOUS at 05:08

## 2019-08-01 NOTE — ED TRIAGE NOTES
Assumed care of pt from CIRO Neal. Pt resting in stretcher in NAD with even, unlabored respirations. Reports 2/10 pain at this time. NS infusing to right AC. VSS at this time. Pt still refusing CT. Pt also reports episode of loose diarrhea.

## 2019-08-01 NOTE — ED PROVIDER NOTES
Encounter Date: 2019       History     Chief Complaint   Patient presents with    Abdominal Pain     starting at 0230 this morning      Patient is a 69-year-old female who presents to the emergency department with chief complaint of left lower quadrant abdominal pain and cramping with associated loose stool that began a few hours prior to arrival to the emergency department.  No known sick contacts.  No recent travel outside the country.  Patient denies any nausea, vomiting, constipation, burning on urination, urinary frequency or urgency.  There are no other complaints.        Review of patient's allergies indicates:   Allergen Reactions    Erythromycin     Macrolide antibiotics Other (See Comments)     Stomach Cramps     Past Medical History:   Diagnosis Date    Asthma 10/31/2018    1985: Diagnosed.    Fibromyalgia 10/31/2018    2006: Diagnosed.    Hypercholesterolemia 10/31/2018    2010: Began statin.    Hyperlipidemia     Hypertension     Overweight 10/31/2018    10/31/2018: Weight 75 kg. BMI 28.    Supraventricular tachycardia     Tachycardia      Past Surgical History:   Procedure Laterality Date     SECTION      TONSILLECTOMY      TUMOR REMOVAL       Family History   Problem Relation Age of Onset    Angina Mother     Stroke Father     Colon cancer Sister     Colon cancer Brother     Heart disease Brother      Social History     Tobacco Use    Smoking status: Never Smoker    Smokeless tobacco: Never Used   Substance Use Topics    Alcohol use: No     Frequency: Never    Drug use: No     Review of Systems   Constitutional: Negative for activity change, chills, fatigue and fever.   HENT: Negative for tinnitus.    Eyes: Negative for redness.   Respiratory: Negative for cough, chest tightness, shortness of breath, wheezing and stridor.    Cardiovascular: Negative for chest pain, palpitations and leg swelling.   Gastrointestinal: Positive for abdominal pain. Negative for  constipation, nausea and vomiting.        Positive loose stool.   Genitourinary: Negative for flank pain, frequency, hematuria and urgency.   Musculoskeletal: Negative for arthralgias, back pain, joint swelling, neck pain and neck stiffness.   Skin: Negative for color change, pallor and rash.   Neurological: Negative for weakness, light-headedness, numbness and headaches.   Hematological: Negative for adenopathy. Does not bruise/bleed easily.   Psychiatric/Behavioral: Negative for agitation, behavioral problems and confusion.       Physical Exam     Initial Vitals   BP Pulse Resp Temp SpO2   08/01/19 0448 08/01/19 0448 08/01/19 0448 08/01/19 0448 08/01/19 0520   133/66 95 18 97.5 °F (36.4 °C) 98 %      MAP       --                Physical Exam    Constitutional: She appears well-developed and well-nourished. She is not diaphoretic. No distress.   HENT:   Head: Normocephalic and atraumatic.   Mouth/Throat: Oropharynx is clear and moist.   Eyes: Conjunctivae and EOM are normal. Pupils are equal, round, and reactive to light.   Neck: Normal range of motion. Neck supple.   Cardiovascular: Normal rate, regular rhythm and normal heart sounds. Exam reveals no gallop and no friction rub.    No murmur heard.  Pulmonary/Chest: Breath sounds normal. No respiratory distress. She has no wheezes.   Abdominal: Soft. Bowel sounds are normal. She exhibits no distension and no mass. There is no rebound and no guarding.   Positive left lower quadrant tenderness to palpation. No flank tenderness to palpation.   Musculoskeletal: Normal range of motion. She exhibits no edema.   Lymphadenopathy:     She has no cervical adenopathy.   Neurological: She is alert and oriented to person, place, and time. She has normal strength and normal reflexes.   Skin: Skin is warm and dry. No rash noted.   Psychiatric: She has a normal mood and affect.         ED Course   Procedures  Labs Reviewed   CBC W/ AUTO DIFFERENTIAL - Abnormal; Notable for the  following components:       Result Value    Mean Corpuscular Hemoglobin Conc 31.8 (*)     Lymph # 0.9 (*)     Mono # 0.1 (*)     Gran% 80.2 (*)     Lymph% 15.6 (*)     Mono% 1.7 (*)     All other components within normal limits   COMPREHENSIVE METABOLIC PANEL - Abnormal; Notable for the following components:    Potassium 3.4 (*)     Glucose 125 (*)     Calcium 10.6 (*)     eGFR if  54 (*)     eGFR if non  47 (*)     All other components within normal limits   LIPASE   URINALYSIS, REFLEX TO URINE CULTURE          Imaging Results    None                       Attending Attestation:             Attending ED Notes:   Emergent evaluation a 61-year-old female with left lower quadrant abdominal pain with associated loose stool.  Patient is afebrile, nontoxic appearing with stable vital signs. Patient has no elevation white blood cell count.  H&H within normal limits. On CMP potassium is 3.4.  Calcium is 10.6.  Patient has some mild renal insufficiency.  Lipase is 16.  Patient is adamant on not getting a CAT scan.  Patient states she has had too much radiation.  The patient is counseled on the reasoning for a CT scan and understands the risk of not performing a CT scan including but not limited to worsening abdominal pain, nausea, vomiting, fever, chills, pathology requiring surgical intervention, diverticulitis and appendicitis.  The patient is extensively counseled her diagnosis and treatment including all laboratory and physical exam findings.  The patient discharged good condition and directed to follow up with her PCP in the next 24-48 hours.  The patient is welcome back to the emergency department at any point time for further evaluation and treatment if he does choose to do so.             Clinical Impression:     1. Abdominal pain, left lower quadrant    2. Diarrhea, unspecified type    3. Hypokalemia    4. Renal insufficiency    5. Hypercalcemia                                    Manjit Hanson MD  08/01/19 0706

## 2019-08-01 NOTE — ED TRIAGE NOTES
"Pt reports to ED via EMS with c/o of generalized abdominal cramping.  Pt reports abdominal pain woke her up around 2:30 this morning.  Pt reports passing 3 solid pellets before EMS arrived.  Pt reports feeling stomach "rumbling like I have to go diarrhea."  LLQ pain upon palpation. Pt denies urinary symptoms, fever, n/v, diarrhea, and constipation. Pt lying in stretcher, respirations even and unlabored, eyes open spontaneously.  NAD noted, AAOx4, answering questions appropriately.  Pt placed on BP cycling and pulse ox monitoring q30min.  Bed locked and in lowest position, SRx2 up, call light within reach.    Patient identifiers for Liat Gilmore checked and correct   LOC: Patient is awake, alert, and aware of environment with an appropriate affect.  Patient is oriented x4 and speaking appropriately.  APPEARANCE: Patient resting comfortably and in no acute distress.  Patient is clean and well groomed, patient's clothing is properly fastened.  SKIN: The skin is warm and dry.  Patient has normal skin turgor and moist mucus membranes.  Skin is intact: no bruising or breakdown noted.  MUSCULOSKELETAL: Patient is moving all extremities well, no obvious swelling or deformities noted. Pulses intact.  RESPIRATORY: Airway is open and patent.  Respirations are spontaneous, even and unlabored.  Normal effort and rate noted.  CARDIAC: Patient has a normal rate and rhythm.  No peripheral edema noted.  Capillary refill < 3 seconds.  ABDOMEN: No abd distention noted.  Bowel sounds active in all 4 quadrants.  Soft and non-tender upon palpation. See HPI  NEUROLOGICAL: PERRLA.  Facial expression is symmetrical.  Hand grasps are equal bilaterally.  Normal sensation in all extremities when touched with finger.  Following commands appropriately.    "

## 2019-08-01 NOTE — ED NOTES
Pt willing to have blood work drawn and pain medication administered.  However, pt unwilling to have CT scan done at this time.

## 2019-10-25 DIAGNOSIS — I47.10 SUPRAVENTRICULAR TACHYCARDIA: ICD-10-CM

## 2019-10-25 RX ORDER — DILTIAZEM HYDROCHLORIDE 240 MG/1
CAPSULE, EXTENDED RELEASE ORAL
Qty: 90 CAPSULE | Refills: 0 | Status: SHIPPED | OUTPATIENT
Start: 2019-10-25 | End: 2020-01-24 | Stop reason: SDUPTHER

## 2020-01-17 DIAGNOSIS — I47.10 SUPRAVENTRICULAR TACHYCARDIA: ICD-10-CM

## 2020-01-24 DIAGNOSIS — I47.10 SUPRAVENTRICULAR TACHYCARDIA: ICD-10-CM

## 2020-01-24 RX ORDER — DILTIAZEM HYDROCHLORIDE 240 MG/1
CAPSULE, EXTENDED RELEASE ORAL
Qty: 90 CAPSULE | Refills: 3 | Status: SHIPPED | OUTPATIENT
Start: 2020-01-24 | End: 2020-06-08 | Stop reason: SDUPTHER

## 2020-01-24 RX ORDER — DILTIAZEM HYDROCHLORIDE 240 MG/1
CAPSULE, EXTENDED RELEASE ORAL
Qty: 90 CAPSULE | Refills: 3 | Status: SHIPPED | OUTPATIENT
Start: 2020-01-24 | End: 2020-01-24 | Stop reason: SDUPTHER

## 2020-02-02 ENCOUNTER — OFFICE VISIT (OUTPATIENT)
Dept: URGENT CARE | Facility: CLINIC | Age: 70
End: 2020-02-02
Payer: MEDICARE

## 2020-02-02 VITALS
HEART RATE: 72 BPM | BODY MASS INDEX: 31.24 KG/M2 | HEIGHT: 64 IN | WEIGHT: 183 LBS | TEMPERATURE: 98 F | SYSTOLIC BLOOD PRESSURE: 130 MMHG | DIASTOLIC BLOOD PRESSURE: 81 MMHG | RESPIRATION RATE: 16 BRPM | OXYGEN SATURATION: 95 %

## 2020-02-02 DIAGNOSIS — H92.01 ACUTE OTALGIA, RIGHT: Primary | ICD-10-CM

## 2020-02-02 PROCEDURE — 99214 OFFICE O/P EST MOD 30 MIN: CPT | Mod: S$GLB,,, | Performed by: FAMILY MEDICINE

## 2020-02-02 PROCEDURE — 99214 PR OFFICE/OUTPT VISIT, EST, LEVL IV, 30-39 MIN: ICD-10-PCS | Mod: S$GLB,,, | Performed by: FAMILY MEDICINE

## 2020-02-02 RX ORDER — ACYCLOVIR 800 MG/1
TABLET ORAL
COMMUNITY
Start: 2020-01-16 | End: 2020-12-28 | Stop reason: SDUPTHER

## 2020-02-02 RX ORDER — ACETAMINOPHEN 500 MG
1000 TABLET ORAL
COMMUNITY
End: 2021-03-12

## 2020-02-02 NOTE — PATIENT INSTRUCTIONS
PLEASE READ YOUR DISCHARGE INSTRUCTIONS ENTIRELY AS IT CONTAINS IMPORTANT INFORMATION.    Continue Flonase and antihistamines    If you find you are grinding teeth at night or clinching can try a mouth guard or ibuprofen, eat soft foods avoid excessive chewing    Please return or see your primary care doctor if you develop new or worsening symptoms (fever worsening pain drainage).     You must understand that you have received an Urgent Care treatment only and that you may be released before all of your medical problems are known or treated.        Earache, No Infection (Adult)  Earaches can happen without an infection. This occurs when air and fluid build up behind the eardrum causing a feeling of fullness and discomfort and reduced hearing. This is called otitis media with effusion (OME) or serous otitis media. It means there is fluid in the middle ear. It is not the same as acute otitis media, which is typically from infection.  OME can happen when you have a cold if congestion blocks the passage that drains the middle ear. This passage is called the eustachian tube. OME may also occur with nasal allergies or after a bacterial middle ear infection.    The pain or discomfort may come and go. You may hear clicking or popping sounds when you chew or swallow. You may feel that your balance is off. Or you may hear ringing in the ear.  It often takes from several weeks up to 3 months for the fluid to clear on its own. Oral pain relievers and ear drops help if there is pain. Decongestants and antihistamines sometimes help. Antibiotics don't help since there is no infection. Your doctor may prescribe a nasal spray to help reduce swelling in the nose and eustachian tube. This can allow the ear to drain.  If your OME doesn't improve after 3 months, surgery may be used to drain the fluid and insert a small tube in the eardrum to allow continued drainage.  Because the middle ear fluid can become infected, it is important to  watch for signs of an ear infection which may develop later. These signs include increased ear pain, fever, or drainage from the ear.  Home care  The following guidelines will help you care for yourself at home:  · You may use over-the-counter medicine as directed to control pain, unless another medicine was prescribed. If you have chronic liver or kidney disease or ever had a stomach ulcer or GI bleeding, talk with your doctor before using these medicines. Aspirin should never be used in anyone under 18 years of age who is ill with a fever. It may cause severe liver damage.  · You may use over-the-counter decongestants such as phenylephrine or pseudoephedrine. But they are not always helpful. Don't use nasal spray decongestants more than 3 days. Longer use can make congestion worse. Prescription nasal sprays from your doctor don't typically have those restrictions.  · Antihistamines may help if you are also having allergy symptoms.  · You may use medicines such as guaifenesin to thin mucus and promote drainage.  Follow-up care  Follow up with your healthcare provider or as advised if you are not feeling better after 3 days.  When to seek medical advice  Call your healthcare provider right away if any of the following occur:  · Your ear pain gets worse or does not start to improve   · Fever of 100.4°F (38°C) or higher, or as directed by your healthcare provider  · Fluid or blood draining from the ear  · Headache or sinus pain  · Stiff neck  · Unusual drowsiness or confusion  Date Last Reviewed: 10/1/2016  © 2378-2076 [employer]. [address] [place] [address], [place] [address]. All rights reserved. This information is not intended as a substitute for professional medical care. Always follow your healthcare professional's instructions.        Pain Relief Methods for Temporomandibular Disorders (TMD)  You have been diagnosed with temporomandibular disorder (TMD). This term describes a group of problems related  to the temporomandibular joint (TMJ)and nearby muscles. The TMJ is located where the upper and lower jaws meet. TMD can cause painful and frustrating symptoms. But your health care provider can recommend various pain relief methods as part of your treatment. These may include medicines and certain types of therapy, like massage or gentle exercise.  Using medicines    Medicines may be prescribed to treat TMD. Others may be available over-the-counter. The medicine type and dosage will depend on the problem you have. For your safety, tell your health care provider if you are currently taking any medicines. Also mention any herbs or supplements you are using. Common medicines used to treat TMD include:  · Anti-inflammatories and analgesics. These treat pain, inflammation, osteoarthritis, and rheumatoid arthritis. Anti-inflammatories reduce swelling, heat, redness, and pain. They also help restore function. Analgesics reduce pain. Nonsteroidal anti-inflammatories (NSAIDs) relieve inflammation as well as pain.  · Muscle relaxants. These treat myofascial pain. This is pain that occurs in the soft tissues or muscles around the TMJ. Muscle relaxants help ease muscle tension. This reduces pressure on the TMJ from tight jaw muscles.  · Antidepressants. These can be used to reduce pain or bruxism (teeth grinding). At higher dosages, these medicines are used to treat depression. Given at low dosages, antidepressants help relieve TMD symptoms. They can reduce muscle pain. They also raise the level of serotonin, a body chemical that improves sleep. This in turn can decrease bruxism during the night.  Treating painful muscles  A trigger point is a painful spot in a tight muscle. It is often painful to the touch and may refer pain to other places. Your health care provider can focus on trigger points using:  · Massage, both inside and outside the mouth. This relaxes muscles and improves circulation.  · Palpation, which is applying  pressure to points of the jaw and face with the fingers.  · Cold spray and stretching of the muscles to relax them.  · An anesthetic for pain relief. This may be given as an injection by your dentist.  Treating the joint  Therapy may focus directly on the TMJ. There are different ways to treat the joint:  · A self-care program helps you treat and manage symptoms on your own. Your program may include exercises. It may also include using ice and heat to relieve pain.  · Gentle manipulation reduces pain and restores range of motion. The health care provider uses his or her hands to relax muscles and ligaments around the joint.  · Exercises strengthen muscles in the jaw and face.  · Ultrasound uses sound waves to reduce pain and swelling. It also improves pain and swelling.  Treating inflammation  When the joint is inflamed, movement becomes difficult -- even impossible at times. Your health care provider can help. Treatment may include:  · Rest and gentle exercise to increase range of motion. One common exercise is to apply pressure to the jaw and resist the movement (isometric exercise).  · A gel pack or ice wrapped in a towel applied for 10 to 20 minutes repeated 3 or 4 times a day. This eases swelling and reduces pain.  · Massage and gentle manipulation as described above.  Date Last Reviewed: 7/13/2015  © 7751-8474 [employer]. [address] [place] [address], [place] [address]. All rights reserved. This information is not intended as a substitute for professional medical care. Always follow your healthcare professional's instructions.

## 2020-02-02 NOTE — PROGRESS NOTES
"Subjective:       Patient ID: Liat Gilmore is a 69 y.o. female.    Vitals:  height is 5' 4" (1.626 m) and weight is 83 kg (183 lb). Her oral temperature is 97.5 °F (36.4 °C). Her blood pressure is 130/81 and her pulse is 72. Her respiration is 16 and oxygen saturation is 95%.     Chief Complaint: Otalgia (Right Ear)    Patient reports right ear pain since yesterday.  No fever no drainage she took ibuprofen and a narcotic medicine which helps some but the pain returned tonight.  She has a cough which is normal for her with asthma mild postnasal drip starting 2 days ago she takes Flonase and antihistamine.  States the pain was sometimes slightly worse with chewing.  She has dealt with teeth grinding before not for years    Otalgia    There is pain in the right ear. This is a new problem. The current episode started yesterday. The problem occurs constantly. The problem has been gradually worsening. The pain is at a severity of 7/10. Associated symptoms include coughing. Pertinent negatives include no headaches, rash, rhinorrhea, sore throat or vomiting. Treatments tried: Ibuprofen prescriped cough syrup; The treatment provided mild relief. There is no history of a chronic ear infection.       Constitution: Negative for chills, sweating, fatigue and fever.   HENT: Positive for ear pain (right ear pain) and postnasal drip. Negative for congestion, sinus pain, sinus pressure, sore throat and voice change.    Neck: Negative for painful lymph nodes.   Eyes: Negative for eye redness.   Respiratory: Positive for cough and asthma. Negative for chest tightness, sputum production, bloody sputum, COPD, shortness of breath, stridor and wheezing.    Gastrointestinal: Negative for nausea and vomiting.   Musculoskeletal: Negative for muscle ache.   Skin: Negative for rash.   Allergic/Immunologic: Positive for asthma. Negative for seasonal allergies.   Neurological: Negative for headaches.   Hematologic/Lymphatic: Negative for " swollen lymph nodes.       Objective:      Physical Exam   Constitutional: She is oriented to person, place, and time. She appears well-developed and well-nourished. She is cooperative.  Non-toxic appearance. She does not have a sickly appearance. She does not appear ill. No distress.   HENT:   Head: Normocephalic and atraumatic.   Right Ear: Hearing, external ear and ear canal normal. Tympanic membrane is not erythematous. A middle ear effusion (Very very mild clear) is present.   Left Ear: Hearing, external ear and ear canal normal. Tympanic membrane is not erythematous. A middle ear effusion ( very very mild clear) is present.   Nose: Mucosal edema present. No rhinorrhea or nasal deformity. No epistaxis. Right sinus exhibits no maxillary sinus tenderness and no frontal sinus tenderness. Left sinus exhibits no maxillary sinus tenderness and no frontal sinus tenderness.   Mouth/Throat: Uvula is midline, oropharynx is clear and moist and mucous membranes are normal. No trismus in the jaw. Normal dentition. No uvula swelling. No oropharyngeal exudate, posterior oropharyngeal edema or posterior oropharyngeal erythema.   No crepitus clicking popping at the TMJ   Eyes: Conjunctivae and lids are normal. No scleral icterus.   Neck: Trachea normal, full passive range of motion without pain and phonation normal. Neck supple. No neck rigidity. No edema and no erythema present.   Cardiovascular: Normal rate, regular rhythm, normal heart sounds, intact distal pulses and normal pulses.   Pulmonary/Chest: Effort normal and breath sounds normal. No respiratory distress. She has no decreased breath sounds. She has no rhonchi.   Abdominal: Normal appearance.   Musculoskeletal: Normal range of motion. She exhibits no edema or deformity.   Neurological: She is alert and oriented to person, place, and time. She exhibits normal muscle tone. Coordination normal.   Skin: Skin is warm, dry, intact, not diaphoretic and not pale.    Psychiatric: She has a normal mood and affect. Her speech is normal and behavior is normal. Judgment and thought content normal. Cognition and memory are normal.   Nursing note and vitals reviewed.        Assessment:       1. Acute otalgia, right        Plan:         Acute otalgia, right    discussed treatment for HARRIETT and TMJ  Discussed return precautions    Patient Instructions   PLEASE READ YOUR DISCHARGE INSTRUCTIONS ENTIRELY AS IT CONTAINS IMPORTANT INFORMATION.    Continue Flonase and antihistamines    If you find you are grinding teeth at night or clinching can try a mouth guard or ibuprofen, eat soft foods avoid excessive chewing    Please return or see your primary care doctor if you develop new or worsening symptoms (fever worsening pain drainage).     You must understand that you have received an Urgent Care treatment only and that you may be released before all of your medical problems are known or treated.        Earache, No Infection (Adult)  Earaches can happen without an infection. This occurs when air and fluid build up behind the eardrum causing a feeling of fullness and discomfort and reduced hearing. This is called otitis media with effusion (OME) or serous otitis media. It means there is fluid in the middle ear. It is not the same as acute otitis media, which is typically from infection.  OME can happen when you have a cold if congestion blocks the passage that drains the middle ear. This passage is called the eustachian tube. OME may also occur with nasal allergies or after a bacterial middle ear infection.    The pain or discomfort may come and go. You may hear clicking or popping sounds when you chew or swallow. You may feel that your balance is off. Or you may hear ringing in the ear.  It often takes from several weeks up to 3 months for the fluid to clear on its own. Oral pain relievers and ear drops help if there is pain. Decongestants and antihistamines sometimes help. Antibiotics don't  help since there is no infection. Your doctor may prescribe a nasal spray to help reduce swelling in the nose and eustachian tube. This can allow the ear to drain.  If your OME doesn't improve after 3 months, surgery may be used to drain the fluid and insert a small tube in the eardrum to allow continued drainage.  Because the middle ear fluid can become infected, it is important to watch for signs of an ear infection which may develop later. These signs include increased ear pain, fever, or drainage from the ear.  Home care  The following guidelines will help you care for yourself at home:  · You may use over-the-counter medicine as directed to control pain, unless another medicine was prescribed. If you have chronic liver or kidney disease or ever had a stomach ulcer or GI bleeding, talk with your doctor before using these medicines. Aspirin should never be used in anyone under 18 years of age who is ill with a fever. It may cause severe liver damage.  · You may use over-the-counter decongestants such as phenylephrine or pseudoephedrine. But they are not always helpful. Don't use nasal spray decongestants more than 3 days. Longer use can make congestion worse. Prescription nasal sprays from your doctor don't typically have those restrictions.  · Antihistamines may help if you are also having allergy symptoms.  · You may use medicines such as guaifenesin to thin mucus and promote drainage.  Follow-up care  Follow up with your healthcare provider or as advised if you are not feeling better after 3 days.  When to seek medical advice  Call your healthcare provider right away if any of the following occur:  · Your ear pain gets worse or does not start to improve   · Fever of 100.4°F (38°C) or higher, or as directed by your healthcare provider  · Fluid or blood draining from the ear  · Headache or sinus pain  · Stiff neck  · Unusual drowsiness or confusion  Date Last Reviewed: 10/1/2016  © 9819-5460 [employer]. [address] [place] [address], [place] [address]. All rights reserved. This information is not intended as a substitute for professional medical care. Always follow your healthcare professional's instructions.        Pain Relief Methods for Temporomandibular Disorders (TMD)  You have been diagnosed with temporomandibular disorder (TMD). This term describes a group of problems related to the temporomandibular joint (TMJ)and nearby muscles. The TMJ is located where the upper and lower jaws meet. TMD can cause painful and frustrating symptoms. But your health care provider can recommend various pain relief methods as part of your treatment. These may include medicines and certain types of therapy, like massage or gentle exercise.  Using medicines    Medicines may be prescribed to treat TMD. Others may be available over-the-counter. The medicine type and dosage will depend on the problem you have. For your safety, tell your health care provider if you are currently taking any medicines. Also mention any herbs or supplements you are using. Common medicines used to treat TMD include:  · Anti-inflammatories and analgesics. These treat pain, inflammation, osteoarthritis, and rheumatoid arthritis. Anti-inflammatories reduce swelling, heat, redness, and pain. They also help restore function. Analgesics reduce pain. Nonsteroidal anti-inflammatories (NSAIDs) relieve inflammation as well as pain.  · Muscle relaxants. These treat myofascial pain. This is pain that occurs in the soft tissues or muscles around the TMJ. Muscle relaxants help ease muscle tension. This reduces pressure on the TMJ from tight jaw muscles.  · Antidepressants. These can be used to reduce pain or bruxism (teeth grinding). At higher dosages, these medicines are used to treat depression. Given at low dosages, antidepressants help relieve TMD symptoms. They can reduce muscle pain. They also raise the level of serotonin, a body chemical that improves  sleep. This in turn can decrease bruxism during the night.  Treating painful muscles  A trigger point is a painful spot in a tight muscle. It is often painful to the touch and may refer pain to other places. Your health care provider can focus on trigger points using:  · Massage, both inside and outside the mouth. This relaxes muscles and improves circulation.  · Palpation, which is applying pressure to points of the jaw and face with the fingers.  · Cold spray and stretching of the muscles to relax them.  · An anesthetic for pain relief. This may be given as an injection by your dentist.  Treating the joint  Therapy may focus directly on the TMJ. There are different ways to treat the joint:  · A self-care program helps you treat and manage symptoms on your own. Your program may include exercises. It may also include using ice and heat to relieve pain.  · Gentle manipulation reduces pain and restores range of motion. The health care provider uses his or her hands to relax muscles and ligaments around the joint.  · Exercises strengthen muscles in the jaw and face.  · Ultrasound uses sound waves to reduce pain and swelling. It also improves pain and swelling.  Treating inflammation  When the joint is inflamed, movement becomes difficult -- even impossible at times. Your health care provider can help. Treatment may include:  · Rest and gentle exercise to increase range of motion. One common exercise is to apply pressure to the jaw and resist the movement (isometric exercise).  · A gel pack or ice wrapped in a towel applied for 10 to 20 minutes repeated 3 or 4 times a day. This eases swelling and reduces pain.  · Massage and gentle manipulation as described above.  Date Last Reviewed: 7/13/2015 © 2000-2017 [employer]. [address] [place] [address], [place] [address]. All rights reserved. This information is not intended as a substitute for professional medical care. Always follow your healthcare professional's  instructions.

## 2020-05-13 ENCOUNTER — TELEPHONE (OUTPATIENT)
Dept: ADMINISTRATIVE | Facility: OTHER | Age: 70
End: 2020-05-13

## 2020-06-08 ENCOUNTER — OFFICE VISIT (OUTPATIENT)
Dept: CARDIOLOGY | Facility: CLINIC | Age: 70
End: 2020-06-08
Attending: INTERNAL MEDICINE
Payer: MEDICARE

## 2020-06-08 VITALS
SYSTOLIC BLOOD PRESSURE: 124 MMHG | BODY MASS INDEX: 30.93 KG/M2 | HEART RATE: 70 BPM | HEIGHT: 64 IN | WEIGHT: 181.19 LBS | DIASTOLIC BLOOD PRESSURE: 65 MMHG

## 2020-06-08 DIAGNOSIS — M79.7 FIBROMYALGIA: ICD-10-CM

## 2020-06-08 DIAGNOSIS — J45.20 MILD INTERMITTENT ASTHMA WITHOUT COMPLICATION: ICD-10-CM

## 2020-06-08 DIAGNOSIS — E66.3 OVERWEIGHT: ICD-10-CM

## 2020-06-08 DIAGNOSIS — E78.00 HYPERCHOLESTEROLEMIA: ICD-10-CM

## 2020-06-08 DIAGNOSIS — I47.10 SUPRAVENTRICULAR TACHYCARDIA: ICD-10-CM

## 2020-06-08 DIAGNOSIS — Z01.810 PRE-OPERATIVE CARDIOVASCULAR EXAMINATION: ICD-10-CM

## 2020-06-08 DIAGNOSIS — I10 ESSENTIAL HYPERTENSION: ICD-10-CM

## 2020-06-08 PROCEDURE — 99214 OFFICE O/P EST MOD 30 MIN: CPT | Mod: 25,S$GLB,, | Performed by: INTERNAL MEDICINE

## 2020-06-08 PROCEDURE — 1159F MED LIST DOCD IN RCRD: CPT | Mod: S$GLB,,, | Performed by: INTERNAL MEDICINE

## 2020-06-08 PROCEDURE — 93010 EKG 12-LEAD: ICD-10-PCS | Mod: S$GLB,,, | Performed by: INTERNAL MEDICINE

## 2020-06-08 PROCEDURE — 93005 ELECTROCARDIOGRAM TRACING: CPT

## 2020-06-08 PROCEDURE — 3074F SYST BP LT 130 MM HG: CPT | Mod: CPTII,S$GLB,, | Performed by: INTERNAL MEDICINE

## 2020-06-08 PROCEDURE — 93010 ELECTROCARDIOGRAM REPORT: CPT | Mod: S$GLB,,, | Performed by: INTERNAL MEDICINE

## 2020-06-08 PROCEDURE — 93005 PR ELECTROCARDIOGRAM, TRACING: ICD-10-PCS | Mod: S$GLB,,, | Performed by: INTERNAL MEDICINE

## 2020-06-08 PROCEDURE — 99214 PR OFFICE/OUTPT VISIT, EST, LEVL IV, 30-39 MIN: ICD-10-PCS | Mod: 25,S$GLB,, | Performed by: INTERNAL MEDICINE

## 2020-06-08 PROCEDURE — 93005 ELECTROCARDIOGRAM TRACING: CPT | Mod: S$GLB,,, | Performed by: INTERNAL MEDICINE

## 2020-06-08 PROCEDURE — 99999 PR PBB SHADOW E&M-EST. PATIENT-LVL III: CPT | Mod: PBBFAC,,, | Performed by: INTERNAL MEDICINE

## 2020-06-08 PROCEDURE — 99999 PR PBB SHADOW E&M-EST. PATIENT-LVL III: ICD-10-PCS | Mod: PBBFAC,,, | Performed by: INTERNAL MEDICINE

## 2020-06-08 PROCEDURE — 3078F DIAST BP <80 MM HG: CPT | Mod: CPTII,S$GLB,, | Performed by: INTERNAL MEDICINE

## 2020-06-08 PROCEDURE — 3074F PR MOST RECENT SYSTOLIC BLOOD PRESSURE < 130 MM HG: ICD-10-PCS | Mod: CPTII,S$GLB,, | Performed by: INTERNAL MEDICINE

## 2020-06-08 PROCEDURE — 3078F PR MOST RECENT DIASTOLIC BLOOD PRESSURE < 80 MM HG: ICD-10-PCS | Mod: CPTII,S$GLB,, | Performed by: INTERNAL MEDICINE

## 2020-06-08 PROCEDURE — 1159F PR MEDICATION LIST DOCUMENTED IN MEDICAL RECORD: ICD-10-PCS | Mod: S$GLB,,, | Performed by: INTERNAL MEDICINE

## 2020-06-08 RX ORDER — DILTIAZEM HYDROCHLORIDE 240 MG/1
CAPSULE, EXTENDED RELEASE ORAL
Qty: 90 CAPSULE | Refills: 3 | Status: SHIPPED | OUTPATIENT
Start: 2020-06-08 | End: 2020-09-17 | Stop reason: SDUPTHER

## 2020-06-08 RX ORDER — LOSARTAN POTASSIUM 100 MG/1
100 TABLET ORAL DAILY
Qty: 90 TABLET | Refills: 3 | Status: SHIPPED | OUTPATIENT
Start: 2020-06-08 | End: 2020-09-17 | Stop reason: SDUPTHER

## 2020-06-08 NOTE — PROGRESS NOTES
Subjective:     Liat Gilmore is a 70 y.o. female with hypertension and hypercholesterolemia. She is overweight. She has had issues with supraventricular tachycardia since age 16. She last had to go to an ER with SVT in about 2003. In addition she has fibromyalgia and mild asthma. No exertional chest pain or exertional dyspnea. Occasional palpitations that responds to vagal maneuvers. No weak spells. In 5/2020 she was diagnosed with an ovarian mass and the plan is surgery on 6/22/2020. Feeling fair overall.       Palpitations    This is a chronic problem. The current episode started more than 1 year ago. The problem occurs rarely. The problem has been resolved. Pertinent negatives include no anxiety, chest fullness, chest pain, coughing, diaphoresis, dizziness, fever, irregular heartbeat, malaise/fatigue, nausea, near-syncope, numbness, shortness of breath, syncope, vomiting or weakness.   Hypertension   This is a chronic problem. The current episode started more than 1 year ago. The problem is unchanged. The problem is controlled (usually 120-125/60-70 mmHg at home). Pertinent negatives include no anxiety, blurred vision, chest pain, headaches, malaise/fatigue, neck pain, orthopnea, palpitations, peripheral edema, PND, shortness of breath or sweats. There is no history of chronic renal disease.   Hyperlipidemia   This is a chronic problem. The current episode started more than 1 year ago. The problem is controlled. Recent lipid tests were reviewed and are normal. She has no history of chronic renal disease, diabetes, hypothyroidism, liver disease, obesity or nephrotic syndrome. Pertinent negatives include no chest pain, focal sensory loss, focal weakness, leg pain, myalgias or shortness of breath.       Review of Systems   Constitution: Negative for chills, diaphoresis, fever and malaise/fatigue.   HENT: Negative for nosebleeds.    Eyes: Negative for blurred vision, double vision, vision loss in left eye and  vision loss in right eye.   Cardiovascular: Negative for chest pain, claudication, dyspnea on exertion, irregular heartbeat, leg swelling, near-syncope, orthopnea, palpitations, paroxysmal nocturnal dyspnea and syncope.   Respiratory: Negative for cough, hemoptysis, shortness of breath and wheezing.    Endocrine: Negative for cold intolerance and heat intolerance.   Hematologic/Lymphatic: Negative for bleeding problem. Does not bruise/bleed easily.   Skin: Negative for color change and rash.   Musculoskeletal: Positive for arthritis. Negative for back pain, falls, muscle weakness, myalgias and neck pain.   Gastrointestinal: Positive for constipation. Negative for heartburn, hematemesis, hematochezia, hemorrhoids, jaundice, melena, nausea and vomiting.   Genitourinary: Positive for pelvic pain (left side). Negative for dysuria, hematuria, menorrhagia and urgency.   Neurological: Negative for dizziness, focal weakness, headaches, light-headedness, loss of balance, numbness, vertigo and weakness.   Psychiatric/Behavioral: Negative for altered mental status, depression and memory loss. The patient is not nervous/anxious.    Allergic/Immunologic: Negative for hives and persistent infections.       Current Outpatient Medications on File Prior to Visit   Medication Sig Dispense Refill    acyclovir (ZOVIRAX) 800 MG Tab       albuterol 90 mcg/actuation inhaler INHALE 2 PUFFS INTO THE LUNGS EVERY 6 (SIX) HOURS AS NEEDED FOR WHEEZING.      ALBUTEROL INHL Inhale into the lungs.      ascorbic acid (VITAMIN C) 1000 MG tablet Take 1,000 mg by mouth.      b complex vitamins capsule Take 1 capsule by mouth.      cholecalciferol, vitamin D3, (VITAMIN D3) 50 mcg (2,000 unit) Cap Take 1,000 Units by mouth.      diltiaZEM (TIAZAC) 240 MG Cs24 TAKE 1 CAPSULE(240 MG) BY MOUTH EVERY DAY 90 capsule 3    fish oil-omega-3 fatty acids 300-1,000 mg capsule Take 2 g by mouth.      fluticasone (FLONASE) 50 mcg/actuation nasal spray 1  "SPRAY BY NASAL ROUTE DAILY.      fluticasone (FLONASE) 50 mcg/actuation nasal spray 2 sprays (100 mcg total) by Each Nare route once daily. 1 Bottle 0    hydrochlorothiazide (HYDRODIURIL) 25 MG tablet Take 25 mg by mouth once daily.      HYDROcodone-acetaminophen (NORCO) 5-325 mg per tablet Take 1 tablet by mouth every 6 (six) hours as needed for Pain.      paroxetine (PAXIL) 10 mg/5 mL Susp Take by mouth once daily.      pitavastatin (LIVALO) 2 mg Tab tablet Take 2 mg by mouth.      vitamin E 1000 UNIT capsule Take 1,000 Units by mouth.      zafirlukast (ACCOLATE) 20 MG tablet Take 20 mg by mouth once daily.       ALPRAZolam (XANAX) 0.25 MG tablet Take 1 tablet (0.25 mg total) by mouth 2 (two) times daily as needed for Anxiety. (Patient not taking: Reported on 2/2/2020) 10 tablet 0    calcium-vitamin D3 (CALCIUM 500 + D) 500 mg(1,250mg) -200 unit per tablet Take 1 tablet by mouth.      clotrimazole-betamethasone 1-0.05% (LOTRISONE) cream   3    cycloSPORINE (RESTASIS) 0.05 % ophthalmic emulsion       flunisolide 80 mcg/actuation HFAA Inhale 160 mcg into the lungs.      fluticasone (FLOVENT HFA) 220 mcg/actuation inhaler Inhale 2 puffs into the lungs.      hydrocortisone valerate (WESTCORT) 0.2 % ointment Apply topically.      pirbuterol (MAXAIR) 200 mcg/Inhalation inhaler Inhale 2 puffs into the lungs.      valacyclovir (VALTREX) 1000 MG tablet 1,000 mg once daily.        No current facility-administered medications on file prior to visit.        /65 (BP Location: Right arm, Patient Position: Sitting)   Pulse 70   Ht 5' 4" (1.626 m)   Wt 82.2 kg (181 lb 3.5 oz)   BMI 31.11 kg/m²       Objective:     Physical Exam   Constitutional: She is oriented to person, place, and time. She appears well-developed and well-nourished.  Non-toxic appearance. No distress.   HENT:   Head: Normocephalic and atraumatic.   Nose: Nose normal.   Eyes: Right eye exhibits no discharge. Left eye exhibits no " discharge. Right conjunctiva is not injected. Left conjunctiva is not injected. Right pupil is round. Left pupil is round. Pupils are equal.   Neck: Neck supple. No JVD present. Carotid bruit is not present. No thyromegaly present.   Cardiovascular: Normal rate, regular rhythm, S1 normal and S2 normal.  No extrasystoles are present. PMI is not displaced. Exam reveals gallop and S4.   No murmur heard.  Pulses:       Radial pulses are 2+ on the right side, and 2+ on the left side.        Femoral pulses are 2+ on the right side, and 2+ on the left side.       Dorsalis pedis pulses are 2+ on the right side, and 2+ on the left side.        Posterior tibial pulses are 2+ on the right side, and 2+ on the left side.   Pulmonary/Chest: Effort normal and breath sounds normal.   Abdominal: Soft. Normal appearance. There is no hepatosplenomegaly. There is no tenderness.   Musculoskeletal:        Right ankle: She exhibits no swelling, no ecchymosis and no deformity.        Left ankle: She exhibits no swelling, no ecchymosis and no deformity.   Lymphadenopathy:        Head (right side): No submandibular adenopathy present.        Head (left side): No submandibular adenopathy present.     She has no cervical adenopathy.   Neurological: She is alert and oriented to person, place, and time. She is not disoriented.   Skin: Skin is warm, dry and intact. No rash noted. She is not diaphoretic.   Psychiatric: She has a normal mood and affect. Her speech is normal and behavior is normal. Judgment and thought content normal. Cognition and memory are normal.       Assessment:     1. Supraventricular tachycardia    2. Essential hypertension    3. Hypercholesterolemia    4. Overweight    5. Fibromyalgia    6. Mild intermittent asthma without complication    7. Pre-operative cardiovascular examination        Plan:     1. Supraventricular Tachycardia   1966: First episode.   2003: To ER with SVT.   Used to have mild palpitations a few times per  "year that responds to vagal maneuvers.   Used to be on metoprolol 25 mg Q24 and digoxin 0.125 mg Q24.   10/31/2018: Diltiazem 240 mg Q24 was begun.   On diltiazem 240 mg Q24.   No clinical recurrence.     2. Hypertension   2005: Diagnosed.   2018: Used to be on amlodipine 10 mg Q24, metoprolol 25 mg Q24 and hctz 25 mg Q24.   On diltiazem 240 mg Q24 and hctz 25 mg Q24.   Keeping log at home.   Well controlled.   6/8/2020: Will change hctz to losartan 100 mg Q24.     3. Hypercholesterolemia   2010: Began statin.   Tried several "regular statins" and had muscle pains.   On pitavastatin 2 mg Q24.     4. Overweight   10/31/2018: Weight 78 kg. BMI 29.   Encouraged to lose weight.    5. Fibromyalgia   2006: Diagnosed.    6. Asthma   1985: Diagnosed.   On zafirlukast and inhalers.    7. Pre Operative Cardiovascular Evaluation   6/22/2020: Plan is hysterectomy and oophorectomy.   Low cardiac risk.   Dr. Michael Ratliff.    8. Primary Care   Dr. Shabana Dunbar.    F/u 2 months.    Morelia Ponce M.D.      Copy:    Dr. Michael Ratliff.                "

## 2020-06-17 ENCOUNTER — TELEPHONE (OUTPATIENT)
Dept: CARDIOLOGY | Facility: CLINIC | Age: 70
End: 2020-06-17

## 2020-07-27 ENCOUNTER — TELEPHONE (OUTPATIENT)
Dept: ADMINISTRATIVE | Facility: OTHER | Age: 70
End: 2020-07-27

## 2020-08-06 DIAGNOSIS — C56.3 MALIGNANT NEOPLASM OF BOTH OVARIES: ICD-10-CM

## 2020-08-06 NOTE — PROGRESS NOTES
Referring physician:  Reason for referral:  Ovarian cancer    06/22/2020:  Patient underwent surgery.  Had AMRIT BSO and tumor debulking performed.  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    Status post 1 cycle chemotherapy with Taxol and carboplatin on  .    Next chemotherapy is scheduled for August 13, 2020.    Patient has requested a transfer of care to [facility].  Patient was seeing Dr. Мария Ratliff.  Patient has concerns about [place] [facility] a [facility].  [facility]  Patient received her other care at [facility].    Renata Rivera: [id number] 3:  VUS.

## 2020-08-07 ENCOUNTER — TELEPHONE (OUTPATIENT)
Dept: GYNECOLOGIC ONCOLOGY | Facility: CLINIC | Age: 70
End: 2020-08-07

## 2020-08-07 ENCOUNTER — INITIAL CONSULT (OUTPATIENT)
Dept: GYNECOLOGIC ONCOLOGY | Facility: CLINIC | Age: 70
End: 2020-08-07
Payer: MEDICARE

## 2020-08-07 VITALS
DIASTOLIC BLOOD PRESSURE: 79 MMHG | WEIGHT: 169 LBS | HEART RATE: 77 BPM | SYSTOLIC BLOOD PRESSURE: 178 MMHG | BODY MASS INDEX: 29.01 KG/M2

## 2020-08-07 DIAGNOSIS — C56.3 MALIGNANT NEOPLASM OF BOTH OVARIES: Primary | ICD-10-CM

## 2020-08-07 DIAGNOSIS — Z01.818 PRE-OP TESTING: ICD-10-CM

## 2020-08-07 DIAGNOSIS — F41.9 ANXIETY: ICD-10-CM

## 2020-08-07 PROCEDURE — 3078F DIAST BP <80 MM HG: CPT | Mod: CPTII,S$GLB,, | Performed by: OBSTETRICS & GYNECOLOGY

## 2020-08-07 PROCEDURE — 99205 PR OFFICE/OUTPT VISIT, NEW, LEVL V, 60-74 MIN: ICD-10-PCS | Mod: S$GLB,,, | Performed by: OBSTETRICS & GYNECOLOGY

## 2020-08-07 PROCEDURE — 99205 OFFICE O/P NEW HI 60 MIN: CPT | Mod: S$GLB,,, | Performed by: OBSTETRICS & GYNECOLOGY

## 2020-08-07 PROCEDURE — 1126F AMNT PAIN NOTED NONE PRSNT: CPT | Mod: S$GLB,,, | Performed by: OBSTETRICS & GYNECOLOGY

## 2020-08-07 PROCEDURE — 3078F PR MOST RECENT DIASTOLIC BLOOD PRESSURE < 80 MM HG: ICD-10-PCS | Mod: CPTII,S$GLB,, | Performed by: OBSTETRICS & GYNECOLOGY

## 2020-08-07 PROCEDURE — 3008F PR BODY MASS INDEX (BMI) DOCUMENTED: ICD-10-PCS | Mod: CPTII,S$GLB,, | Performed by: OBSTETRICS & GYNECOLOGY

## 2020-08-07 PROCEDURE — 99999 PR PBB SHADOW E&M-EST. PATIENT-LVL III: CPT | Mod: PBBFAC,,, | Performed by: OBSTETRICS & GYNECOLOGY

## 2020-08-07 PROCEDURE — 99999 PR PBB SHADOW E&M-EST. PATIENT-LVL III: ICD-10-PCS | Mod: PBBFAC,,, | Performed by: OBSTETRICS & GYNECOLOGY

## 2020-08-07 PROCEDURE — 1101F PR PT FALLS ASSESS DOC 0-1 FALLS W/OUT INJ PAST YR: ICD-10-PCS | Mod: CPTII,S$GLB,, | Performed by: OBSTETRICS & GYNECOLOGY

## 2020-08-07 PROCEDURE — 3077F SYST BP >= 140 MM HG: CPT | Mod: CPTII,S$GLB,, | Performed by: OBSTETRICS & GYNECOLOGY

## 2020-08-07 PROCEDURE — 1159F PR MEDICATION LIST DOCUMENTED IN MEDICAL RECORD: ICD-10-PCS | Mod: S$GLB,,, | Performed by: OBSTETRICS & GYNECOLOGY

## 2020-08-07 PROCEDURE — 1159F MED LIST DOCD IN RCRD: CPT | Mod: S$GLB,,, | Performed by: OBSTETRICS & GYNECOLOGY

## 2020-08-07 PROCEDURE — 3077F PR MOST RECENT SYSTOLIC BLOOD PRESSURE >= 140 MM HG: ICD-10-PCS | Mod: CPTII,S$GLB,, | Performed by: OBSTETRICS & GYNECOLOGY

## 2020-08-07 PROCEDURE — 1126F PR PAIN SEVERITY QUANTIFIED, NO PAIN PRESENT: ICD-10-PCS | Mod: S$GLB,,, | Performed by: OBSTETRICS & GYNECOLOGY

## 2020-08-07 PROCEDURE — 1101F PT FALLS ASSESS-DOCD LE1/YR: CPT | Mod: CPTII,S$GLB,, | Performed by: OBSTETRICS & GYNECOLOGY

## 2020-08-07 PROCEDURE — 3008F BODY MASS INDEX DOCD: CPT | Mod: CPTII,S$GLB,, | Performed by: OBSTETRICS & GYNECOLOGY

## 2020-08-07 RX ORDER — ONDANSETRON HYDROCHLORIDE 8 MG/1
TABLET, FILM COATED ORAL EVERY 8 HOURS PRN
COMMUNITY
End: 2021-11-15 | Stop reason: SDUPTHER

## 2020-08-07 RX ORDER — AMOXICILLIN 250 MG
1 CAPSULE ORAL DAILY
COMMUNITY
End: 2021-09-14

## 2020-08-07 RX ORDER — MORPHINE SULFATE 15 MG/1
15 TABLET ORAL EVERY 4 HOURS PRN
COMMUNITY
End: 2020-09-23

## 2020-08-07 RX ORDER — ALPRAZOLAM 0.25 MG/1
0.25 TABLET ORAL 2 TIMES DAILY PRN
Qty: 30 TABLET | Refills: 2 | Status: SHIPPED | OUTPATIENT
Start: 2020-08-07 | End: 2021-05-05 | Stop reason: SDUPTHER

## 2020-08-07 RX ORDER — FLUTICASONE FUROATE AND VILANTEROL TRIFENATATE 100; 25 UG/1; UG/1
1 POWDER RESPIRATORY (INHALATION) DAILY
COMMUNITY
End: 2021-09-14 | Stop reason: SDUPTHER

## 2020-08-07 NOTE — TELEPHONE ENCOUNTER
----- Message from Rickey Lee sent at 8/7/2020  2:05 PM CDT -----   Name of Who is Calling:     What is the request in detail: request call back in reference to  pre-authorization  questions/concerns  Please contact to further discuss and advise      Can the clinic reply by [facility]: no     What Number to Call Back if not in [facility]:  [phone]

## 2020-08-07 NOTE — PROGRESS NOTES
"Subjective:       Patient ID: Liat Gilmore is a 70 y.o. female.    Chief Complaint: Ovarian Cancer (est care)    HPI     Referring physician: self referral  Reason for referral:  Ovarian cancer     March 2019 patient began to notice some lower abdominal discomfort.  This was at the height of COVID-19 pandemic.  She was referred to a urologist who did a tele health visit.  An ultrasound in May 2020 showed a complex cyst in the middle pole of the kidney but a pelvic cyst as well.  The patient then return to see her gynecologist Dr. Marily Alston in [place].  An ultrasound showed a complex multi-cystic mass posterior to the uterus; left adnexa 57 x 36 mm cyst and a 30 x 23 x 23 cm complex cyst; right adnexa with a 37 x 31 cm cyst; endometrium not clearly seen.  At that time she was having difficulty with bowel movements.    06/22/2020:  Patient underwent surgery wi Dr. Мария Ratlfif.  Had AMRIT BSO and tumor debulking performed.  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  Optimal tumor debulking.  Per review of operative note she is R0.       Status post 1 cycle chemotherapy with Taxol and carboplatin on  7/16/2020.     Next chemotherapy is scheduled for August 13, 2020.     Patient has requested a transfer of care to [facility].  Patient was seeing Dr. Мария Ratliff.  Patient has concerns about [facility] as a hospital.  This is not related to physcian.  Patient received her other care at [facility].  Pain control was an issue post operatively. Patient cries when she speaks about this. Told she needs a lung biopsy but she is "too terrified" to return to that hospital.  Daughter believes she has PTSD.  Patient is requesting prescription for  alprazolam.     Aug 5, 2020: : 25     [facility] myRisk: RNF4 3:  VUS.      Review of Systems   Constitutional: Negative for chills, fatigue and fever.   Respiratory: Negative for cough, shortness of breath and wheezing.  "   Cardiovascular: Negative for chest pain, palpitations and leg swelling.   Gastrointestinal: Negative for abdominal pain, constipation, diarrhea, nausea and vomiting.   Genitourinary: Negative for difficulty urinating, dysuria, frequency, genital sores, hematuria, urgency, vaginal bleeding, vaginal discharge and vaginal pain.   Musculoskeletal: Negative for gait problem.   Neurological: Negative for weakness and numbness.   Hematological: Negative for adenopathy. Does not bruise/bleed easily.   Psychiatric/Behavioral: The patient is not nervous/anxious.        Objective:   BP (!) 178/79   Pulse 77   Wt 76.7 kg (169 lb)   BMI 29.01 kg/m²      Physical Exam  Constitutional:       Appearance: She is well-developed.   HENT:      Head: Normocephalic and atraumatic.   Eyes:      General: No scleral icterus.  Neck:      Musculoskeletal: Neck supple.      Thyroid: No thyroid mass or thyromegaly.      Trachea: No tracheal deviation.   Cardiovascular:      Rate and Rhythm: Normal rate and regular rhythm.   Pulmonary:      Effort: Pulmonary effort is normal.      Breath sounds: Normal breath sounds. No wheezing.   Abdominal:      General: There is no distension.      Palpations: Abdomen is soft. There is no mass.      Tenderness: There is no abdominal tenderness. There is no guarding or rebound.   Genitourinary:     Comments: Bimanual exam:  Vulva: no lesions. Normal appearance  Urethra: Normal size and location. No lesions  Bladder: No masses or tenderness.  Vagina: normal mucosa. No lesion. Healing vaginal cuff.   Cervix: absent.   Uterus: absent.  Adnexa: no masses.  Rectovaginal: No posterior cul de sac thickening or nodularity.  Rectal: no masses. Nontender. Normal tone.     Musculoskeletal:         General: No tenderness.   Lymphadenopathy:      Cervical: No cervical adenopathy.      Upper Body:      Right upper body: No supraclavicular adenopathy.      Left upper body: No supraclavicular adenopathy.   Skin:      General: Skin is warm and dry.   Neurological:      Mental Status: She is alert and oriented to person, place, and time.   Psychiatric:         Behavior: Behavior normal.         Thought Content: Thought content normal.         Judgment: Judgment normal.         Assessment:       1. Malignant neoplasm of both ovaries    2. Anxiety        Plan:   Malignant neoplasm of both ovaries  -     CT Chest Abdomen Pelvis With Contrast; Future; Expected date: 08/07/2020  -     CBC auto differential; Future; Expected date: 08/07/2020  -     Comprehensive metabolic panel; Future; Expected date: 08/07/2020    Anxiety  -     ALPRAZolam (XANAX) 0.25 MG tablet; Take 1 tablet (0.25 mg total) by mouth 2 (two) times daily as needed for Anxiety.  Dispense: 30 tablet; Refill: 2        I explained to the patient that she has received excellent care with Dr. Ratliff.  She acknowledges that her care has been very good with Dr. Ratliff, however, the patient becomes very emotional when speaking about her experience at [facility].    She has been told that she needs to have a lung biopsy but is too frightened to return there for fear of having to be hospitalized for a post treatment complication.  She has requested that she receive her care at [facility].     The patient brought her medical records to me on a flash drive.  I have reviewed approximately 1400 pages in preparation of the visit with me.    Total encounter time of 90 min including 60 min of review of records.    Treatment plan placed for taxol and carboplatin.   Message left for Dr. Ratliff to call me.

## 2020-08-07 NOTE — TELEPHONE ENCOUNTER
FYI: pt is waiting on Dr. Ratliff's office to to call Kristy and cancel PA for chemo at [facility] so our PA dept can move forward with getting pt approved for chemo here. Pt will call back on Monday with an update. BANDAR/MA

## 2020-08-10 ENCOUNTER — TELEPHONE (OUTPATIENT)
Dept: GYNECOLOGIC ONCOLOGY | Facility: CLINIC | Age: 70
End: 2020-08-10

## 2020-08-10 NOTE — TELEPHONE ENCOUNTER
rec'd pc from pt stating outside auth canceled for chemo. I sent a message to Esther Manuel to initiate a new auth for chemo at [facility]. Will wait on approval. BANDAR/MA

## 2020-08-10 NOTE — TELEPHONE ENCOUNTER
----- Message from Trinh Sarkar sent at 8/10/2020 10:38 AM CDT -----  Name of Who is Calling: REYES FERREIRA [[id number]]    What is the request in detail: Would like to speak with staff in regards to previous oncologist and upcoming CT scan. Please contact to further discuss and advise      Can the clinic reply by [facility]: no    What Number to Call Back if not in [facility]: [phone]

## 2020-08-11 ENCOUNTER — LAB VISIT (OUTPATIENT)
Dept: LAB | Facility: OTHER | Age: 70
End: 2020-08-11
Attending: OBSTETRICS & GYNECOLOGY
Payer: MEDICARE

## 2020-08-11 DIAGNOSIS — C56.3 MALIGNANT NEOPLASM OF BOTH OVARIES: ICD-10-CM

## 2020-08-11 LAB
ALBUMIN SERPL BCP-MCNC: 3.8 G/DL (ref 3.5–5.2)
ALP SERPL-CCNC: 93 U/L (ref 55–135)
ALT SERPL W/O P-5'-P-CCNC: 9 U/L (ref 10–44)
ANION GAP SERPL CALC-SCNC: 13 MMOL/L (ref 8–16)
AST SERPL-CCNC: 13 U/L (ref 10–40)
BASOPHILS # BLD AUTO: 0.04 K/UL (ref 0–0.2)
BASOPHILS NFR BLD: 0.7 % (ref 0–1.9)
BILIRUB SERPL-MCNC: 0.3 MG/DL (ref 0.1–1)
BUN SERPL-MCNC: 11 MG/DL (ref 8–23)
CALCIUM SERPL-MCNC: 9.9 MG/DL (ref 8.7–10.5)
CHLORIDE SERPL-SCNC: 106 MMOL/L (ref 95–110)
CO2 SERPL-SCNC: 24 MMOL/L (ref 23–29)
CREAT SERPL-MCNC: 1.2 MG/DL (ref 0.5–1.4)
DIFFERENTIAL METHOD: ABNORMAL
EOSINOPHIL # BLD AUTO: 0.3 K/UL (ref 0–0.5)
EOSINOPHIL NFR BLD: 4.8 % (ref 0–8)
ERYTHROCYTE [DISTWIDTH] IN BLOOD BY AUTOMATED COUNT: 16.3 % (ref 11.5–14.5)
EST. GFR  (AFRICAN AMERICAN): 53 ML/MIN/1.73 M^2
EST. GFR  (NON AFRICAN AMERICAN): 46 ML/MIN/1.73 M^2
GLUCOSE SERPL-MCNC: 95 MG/DL (ref 70–110)
HCT VFR BLD AUTO: 37 % (ref 37–48.5)
HGB BLD-MCNC: 11.7 G/DL (ref 12–16)
IMM GRANULOCYTES # BLD AUTO: 0.01 K/UL (ref 0–0.04)
IMM GRANULOCYTES NFR BLD AUTO: 0.2 % (ref 0–0.5)
LYMPHOCYTES # BLD AUTO: 1.5 K/UL (ref 1–4.8)
LYMPHOCYTES NFR BLD: 27.3 % (ref 18–48)
MCH RBC QN AUTO: 28.4 PG (ref 27–31)
MCHC RBC AUTO-ENTMCNC: 31.6 G/DL (ref 32–36)
MCV RBC AUTO: 90 FL (ref 82–98)
MONOCYTES # BLD AUTO: 0.3 K/UL (ref 0.3–1)
MONOCYTES NFR BLD: 5.7 % (ref 4–15)
NEUTROPHILS # BLD AUTO: 3.3 K/UL (ref 1.8–7.7)
NEUTROPHILS NFR BLD: 61.3 % (ref 38–73)
NRBC BLD-RTO: 0 /100 WBC
PLATELET # BLD AUTO: 213 K/UL (ref 150–350)
PMV BLD AUTO: 8.8 FL (ref 9.2–12.9)
POTASSIUM SERPL-SCNC: 4.1 MMOL/L (ref 3.5–5.1)
PROT SERPL-MCNC: 7.5 G/DL (ref 6–8.4)
RBC # BLD AUTO: 4.12 M/UL (ref 4–5.4)
SODIUM SERPL-SCNC: 143 MMOL/L (ref 136–145)
WBC # BLD AUTO: 5.43 K/UL (ref 3.9–12.7)

## 2020-08-11 PROCEDURE — 36415 COLL VENOUS BLD VENIPUNCTURE: CPT

## 2020-08-11 PROCEDURE — 80053 COMPREHEN METABOLIC PANEL: CPT

## 2020-08-11 PROCEDURE — 85025 COMPLETE CBC W/AUTO DIFF WBC: CPT

## 2020-08-12 ENCOUNTER — LAB VISIT (OUTPATIENT)
Dept: SURGERY | Facility: CLINIC | Age: 70
End: 2020-08-12
Payer: MEDICARE

## 2020-08-12 DIAGNOSIS — C56.3 MALIGNANT NEOPLASM OF BOTH OVARIES: ICD-10-CM

## 2020-08-12 DIAGNOSIS — Z01.818 PRE-OP TESTING: ICD-10-CM

## 2020-08-12 PROCEDURE — U0003 INFECTIOUS AGENT DETECTION BY NUCLEIC ACID (DNA OR RNA); SEVERE ACUTE RESPIRATORY SYNDROME CORONAVIRUS 2 (SARS-COV-2) (CORONAVIRUS DISEASE [COVID-19]), AMPLIFIED PROBE TECHNIQUE, MAKING USE OF HIGH THROUGHPUT TECHNOLOGIES AS DESCRIBED BY CMS-2020-01-R: HCPCS

## 2020-08-12 RX ORDER — EPINEPHRINE 0.3 MG/.3ML
0.3 INJECTION SUBCUTANEOUS ONCE AS NEEDED
Status: CANCELLED | OUTPATIENT
Start: 2020-08-14

## 2020-08-12 RX ORDER — FAMOTIDINE 10 MG/ML
20 INJECTION INTRAVENOUS
Status: CANCELLED | OUTPATIENT
Start: 2020-08-14

## 2020-08-12 RX ORDER — HEPARIN 100 UNIT/ML
500 SYRINGE INTRAVENOUS
Status: CANCELLED | OUTPATIENT
Start: 2020-08-14

## 2020-08-12 RX ORDER — SODIUM CHLORIDE 0.9 % (FLUSH) 0.9 %
10 SYRINGE (ML) INJECTION
Status: CANCELLED | OUTPATIENT
Start: 2020-08-14

## 2020-08-12 RX ORDER — DIPHENHYDRAMINE HYDROCHLORIDE 50 MG/ML
50 INJECTION INTRAMUSCULAR; INTRAVENOUS ONCE AS NEEDED
Status: CANCELLED | OUTPATIENT
Start: 2020-08-14

## 2020-08-13 LAB — SARS-COV-2 RNA RESP QL NAA+PROBE: NOT DETECTED

## 2020-08-14 ENCOUNTER — INFUSION (OUTPATIENT)
Dept: INFUSION THERAPY | Facility: HOSPITAL | Age: 70
End: 2020-08-14
Payer: MEDICARE

## 2020-08-14 VITALS
SYSTOLIC BLOOD PRESSURE: 132 MMHG | BODY MASS INDEX: 28.6 KG/M2 | DIASTOLIC BLOOD PRESSURE: 68 MMHG | RESPIRATION RATE: 18 BRPM | TEMPERATURE: 98 F | HEIGHT: 64 IN | WEIGHT: 167.56 LBS | HEART RATE: 80 BPM

## 2020-08-14 DIAGNOSIS — C56.3 MALIGNANT NEOPLASM OF BOTH OVARIES: Primary | ICD-10-CM

## 2020-08-14 PROCEDURE — A4216 STERILE WATER/SALINE, 10 ML: HCPCS | Performed by: OBSTETRICS & GYNECOLOGY

## 2020-08-14 PROCEDURE — 96367 TX/PROPH/DG ADDL SEQ IV INF: CPT

## 2020-08-14 PROCEDURE — 63600175 PHARM REV CODE 636 W HCPCS: Performed by: OBSTETRICS & GYNECOLOGY

## 2020-08-14 PROCEDURE — 96361 HYDRATE IV INFUSION ADD-ON: CPT

## 2020-08-14 PROCEDURE — 96413 CHEMO IV INFUSION 1 HR: CPT

## 2020-08-14 PROCEDURE — 96415 CHEMO IV INFUSION ADDL HR: CPT

## 2020-08-14 PROCEDURE — 25000003 PHARM REV CODE 250: Performed by: OBSTETRICS & GYNECOLOGY

## 2020-08-14 PROCEDURE — 96417 CHEMO IV INFUS EACH ADDL SEQ: CPT

## 2020-08-14 PROCEDURE — 96375 TX/PRO/DX INJ NEW DRUG ADDON: CPT

## 2020-08-14 PROCEDURE — S0028 INJECTION, FAMOTIDINE, 20 MG: HCPCS | Performed by: OBSTETRICS & GYNECOLOGY

## 2020-08-14 RX ORDER — HEPARIN 100 UNIT/ML
500 SYRINGE INTRAVENOUS
Status: DISCONTINUED | OUTPATIENT
Start: 2020-08-14 | End: 2020-08-14 | Stop reason: HOSPADM

## 2020-08-14 RX ORDER — FAMOTIDINE 10 MG/ML
20 INJECTION INTRAVENOUS
Status: COMPLETED | OUTPATIENT
Start: 2020-08-14 | End: 2020-08-14

## 2020-08-14 RX ORDER — DIPHENHYDRAMINE HYDROCHLORIDE 50 MG/ML
50 INJECTION INTRAMUSCULAR; INTRAVENOUS ONCE AS NEEDED
Status: DISCONTINUED | OUTPATIENT
Start: 2020-08-14 | End: 2020-08-14 | Stop reason: HOSPADM

## 2020-08-14 RX ORDER — SODIUM CHLORIDE 0.9 % (FLUSH) 0.9 %
10 SYRINGE (ML) INJECTION
Status: DISCONTINUED | OUTPATIENT
Start: 2020-08-14 | End: 2020-08-14 | Stop reason: HOSPADM

## 2020-08-14 RX ORDER — EPINEPHRINE 0.3 MG/.3ML
0.3 INJECTION SUBCUTANEOUS ONCE AS NEEDED
Status: DISCONTINUED | OUTPATIENT
Start: 2020-08-14 | End: 2020-08-14 | Stop reason: HOSPADM

## 2020-08-14 RX ADMIN — Medication 10 ML: at 03:08

## 2020-08-14 RX ADMIN — FAMOTIDINE 20 MG: 10 INJECTION INTRAVENOUS at 09:08

## 2020-08-14 RX ADMIN — PACLITAXEL 336 MG: 6 INJECTION, SOLUTION INTRAVENOUS at 10:08

## 2020-08-14 RX ADMIN — CARBOPLATIN 495 MG: 10 INJECTION INTRAVENOUS at 01:08

## 2020-08-14 RX ADMIN — HEPARIN 500 UNITS: 100 SYRINGE at 03:08

## 2020-08-14 RX ADMIN — DIPHENHYDRAMINE HYDROCHLORIDE 50 MG: 50 INJECTION, SOLUTION INTRAMUSCULAR; INTRAVENOUS at 09:08

## 2020-08-14 RX ADMIN — DEXAMETHASONE SODIUM PHOSPHATE: 4 INJECTION, SOLUTION INTRA-ARTICULAR; INTRALESIONAL; INTRAMUSCULAR; INTRAVENOUS; SOFT TISSUE at 09:08

## 2020-08-14 NOTE — PLAN OF CARE
0940 pt here for taxol/carbo D1C1(received 1st cycle at another hospital), labs, hx, meds, allergies reviewed, pt with no complaints at this time, oriented to unit , reclined in chair, continue to monitor

## 2020-08-14 NOTE — PLAN OF CARE
1530 pt tolerated taxol/carbo and ivf without issue, pt to rtc 9/3/20, no distress noted upon d/c to home with daughter

## 2020-08-31 ENCOUNTER — HOSPITAL ENCOUNTER (OUTPATIENT)
Dept: RADIOLOGY | Facility: OTHER | Age: 70
Discharge: HOME | End: 2020-08-31
Attending: OBSTETRICS & GYNECOLOGY
Payer: MEDICARE

## 2020-08-31 DIAGNOSIS — C56.3 MALIGNANT NEOPLASM OF BOTH OVARIES: ICD-10-CM

## 2020-08-31 PROCEDURE — A9698 NON-RAD CONTRAST MATERIALNOC: HCPCS | Performed by: OBSTETRICS & GYNECOLOGY

## 2020-08-31 PROCEDURE — 74177 CT ABD & PELVIS W/CONTRAST: CPT | Mod: TC

## 2020-08-31 PROCEDURE — 71260 CT THORAX DX C+: CPT | Mod: 26,,, | Performed by: STUDENT IN AN ORGANIZED HEALTH CARE EDUCATION/TRAINING PROGRAM

## 2020-08-31 PROCEDURE — 25500020 PHARM REV CODE 255: Performed by: OBSTETRICS & GYNECOLOGY

## 2020-08-31 PROCEDURE — 74177 CT CHEST ABDOMEN PELVIS WITH CONTRAST (XPD): ICD-10-PCS | Mod: 26,,, | Performed by: STUDENT IN AN ORGANIZED HEALTH CARE EDUCATION/TRAINING PROGRAM

## 2020-08-31 PROCEDURE — 74177 CT ABD & PELVIS W/CONTRAST: CPT | Mod: 26,,, | Performed by: STUDENT IN AN ORGANIZED HEALTH CARE EDUCATION/TRAINING PROGRAM

## 2020-08-31 PROCEDURE — 71260 CT CHEST ABDOMEN PELVIS WITH CONTRAST (XPD): ICD-10-PCS | Mod: 26,,, | Performed by: STUDENT IN AN ORGANIZED HEALTH CARE EDUCATION/TRAINING PROGRAM

## 2020-08-31 RX ADMIN — IOHEXOL 75 ML: 350 INJECTION, SOLUTION INTRAVENOUS at 10:08

## 2020-08-31 RX ADMIN — IOHEXOL 1000 ML: 9 SOLUTION ORAL at 08:08

## 2020-08-31 NOTE — PROGRESS NOTES
Subjective:       Patient ID: Liat Gilmore is a 70 y.o. female.    Chief Complaint: Chemotherapy (c3 Carbo/Taxol)    HPI     Patient comes in today for cycle 3 of Taxol and carboplatin for stage IIIC high-grade serous carcinoma of the ovary.  Possible stage IVB based upon multiple pulmonary nodules, however, she has a history pulmonary nodules that predated the diagnosis of ovarian cancer.    Nodules have not been biopsied.     Patient her with her daughter today.     Chemotherapy labs have been reviewed and are appropriate for treatment.     Her oncologic history is:    Referring physician: self referral  Reason for referral:  Ovarian cancer     March 2019 patient began to notice some lower abdominal discomfort.  This was at the height of COVID-19 pandemic.  She was referred to a urologist who did a tele health visit.  An ultrasound in May 2020 showed a complex cyst in the middle pole of the kidney but a pelvic cyst as well.  The patient then return to see her gynecologist Dr. Marily Alston in [place].  An ultrasound showed a complex multi-cystic mass posterior to the uterus; left adnexa 57 x 36 mm cyst and a 30 x 23 x 23 cm complex cyst; right adnexa with a 37 x 31 cm cyst; endometrium not clearly seen.  At that time she was having difficulty with bowel movements.     06/22/2020:  Patient underwent surgery wi Dr. Мария Ratliff.  Had AMRIT BSO and tumor debulking performed.  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  Optimal tumor debulking.  Per review of operative note she is R0.        Status post 1 cycle chemotherapy with Taxol and carboplatin on  7/16/2020.     Next chemotherapy is scheduled for August 13, 2020.     Patient has requested a transfer of care to [facility].  Patient was seeing Dr. Мария Ratliff.  Patient has concerns [place] a [place].  This is not related to physcian.  Patient received her other care at [facility].  Pain control  "was an issue post operatively. Patient cries when she speaks about this. Told she needs a lung biopsy but she is "too terrified" to return to that hospital.  Daughter believes she has PTSD.  Patient is requesting prescription for  alprazolam.      Aug 5, 2020: : 25     Renata Rivera: RNF4 3:  VUS.        Review of Systems   Constitutional: Negative for chills, fatigue and fever.   Respiratory: Positive for cough (chronic). Negative for shortness of breath and wheezing.    Cardiovascular: Negative for chest pain, palpitations and leg swelling.   Gastrointestinal: Negative for abdominal pain, constipation, diarrhea, nausea and vomiting.   Genitourinary: Negative for difficulty urinating, dysuria, frequency, genital sores, hematuria, urgency, vaginal bleeding, vaginal discharge and vaginal pain.   Musculoskeletal: Negative for gait problem.   Neurological: Negative for weakness and numbness.   Hematological: Negative for adenopathy. Does not bruise/bleed easily.   Psychiatric/Behavioral: The patient is nervous/anxious (chronic).        Objective:   /78   Pulse 84   Wt 74.8 kg (165 lb)   BMI 28.32 kg/m²      Physical Exam  Constitutional:       Appearance: She is well-developed.   HENT:      Head: Normocephalic and atraumatic.   Eyes:      General: No scleral icterus.  Neck:      Thyroid: No thyromegaly.      Trachea: No tracheal deviation.   Cardiovascular:      Rate and Rhythm: Normal rate and regular rhythm.   Pulmonary:      Effort: Pulmonary effort is normal.      Breath sounds: Normal breath sounds.   Abdominal:      General: There is no distension.      Palpations: Abdomen is soft. There is no mass.      Tenderness: There is no abdominal tenderness. There is no guarding or rebound.      Hernia: No hernia is present.   Genitourinary:     Comments: Not performed.   Musculoskeletal:         General: No tenderness.   Lymphadenopathy:      Cervical: No cervical adenopathy.   Skin:     General: Skin is " warm and dry.   Neurological:      Mental Status: She is alert and oriented to person, place, and time.   Psychiatric:         Behavior: Behavior normal.         Thought Content: Thought content normal.         Judgment: Judgment normal.         Assessment:       1. Malignant neoplasm of both ovaries    2. Lung mass    3. Chemotherapy management, encounter for    4. Reactive depression    5. Fibromyalgia        Plan:   Malignant neoplasm of both ovaries    Lung mass    Chemotherapy management, encounter for    Reactive depression    Fibromyalgia      Proceed with C3  Patient does want lung biopsy at this time. She prefers to have 2 more cycles of chemotherapy and then reimage. If nodules are getting smaller she may consider Avastin. She does not want bevacizumab at this time. But would consider it if next CT scan shows an improvement.

## 2020-09-01 DIAGNOSIS — R91.8 LUNG MASS: ICD-10-CM

## 2020-09-02 ENCOUNTER — INFUSION (OUTPATIENT)
Dept: INFUSION THERAPY | Facility: HOSPITAL | Age: 70
End: 2020-09-02
Payer: MEDICARE

## 2020-09-02 ENCOUNTER — TELEPHONE (OUTPATIENT)
Dept: INTERVENTIONAL RADIOLOGY/VASCULAR | Facility: CLINIC | Age: 70
End: 2020-09-02

## 2020-09-02 ENCOUNTER — OFFICE VISIT (OUTPATIENT)
Dept: GYNECOLOGIC ONCOLOGY | Facility: CLINIC | Age: 70
End: 2020-09-02
Payer: MEDICARE

## 2020-09-02 VITALS
BODY MASS INDEX: 28.32 KG/M2 | SYSTOLIC BLOOD PRESSURE: 127 MMHG | DIASTOLIC BLOOD PRESSURE: 78 MMHG | HEART RATE: 84 BPM | WEIGHT: 165 LBS

## 2020-09-02 DIAGNOSIS — R91.8 LUNG MASS: ICD-10-CM

## 2020-09-02 DIAGNOSIS — C56.3 MALIGNANT NEOPLASM OF BOTH OVARIES: Primary | ICD-10-CM

## 2020-09-02 DIAGNOSIS — F32.9 REACTIVE DEPRESSION: ICD-10-CM

## 2020-09-02 DIAGNOSIS — M79.7 FIBROMYALGIA: ICD-10-CM

## 2020-09-02 DIAGNOSIS — Z51.11 CHEMOTHERAPY MANAGEMENT, ENCOUNTER FOR: ICD-10-CM

## 2020-09-02 LAB
ANION GAP SERPL CALC-SCNC: 10 MMOL/L (ref 8–16)
BUN SERPL-MCNC: 11 MG/DL (ref 8–23)
CALCIUM SERPL-MCNC: 10 MG/DL (ref 8.7–10.5)
CANCER AG125 SERPL-ACNC: 12 U/ML (ref 0–30)
CHLORIDE SERPL-SCNC: 103 MMOL/L (ref 95–110)
CO2 SERPL-SCNC: 24 MMOL/L (ref 23–29)
CREAT SERPL-MCNC: 1 MG/DL (ref 0.5–1.4)
ERYTHROCYTE [DISTWIDTH] IN BLOOD BY AUTOMATED COUNT: 16.9 % (ref 11.5–14.5)
EST. GFR  (AFRICAN AMERICAN): >60 ML/MIN/1.73 M^2
EST. GFR  (NON AFRICAN AMERICAN): 57.6 ML/MIN/1.73 M^2
GLUCOSE SERPL-MCNC: 114 MG/DL (ref 70–110)
HCT VFR BLD AUTO: 36.5 % (ref 37–48.5)
HGB BLD-MCNC: 11.2 G/DL (ref 12–16)
IMM GRANULOCYTES # BLD AUTO: 0.01 K/UL (ref 0–0.04)
MCH RBC QN AUTO: 28.3 PG (ref 27–31)
MCHC RBC AUTO-ENTMCNC: 30.7 G/DL (ref 32–36)
MCV RBC AUTO: 92 FL (ref 82–98)
NEUTROPHILS # BLD AUTO: 1.8 K/UL (ref 1.8–7.7)
PLATELET # BLD AUTO: 209 K/UL (ref 150–350)
PMV BLD AUTO: 8.8 FL (ref 9.2–12.9)
POTASSIUM SERPL-SCNC: 3.9 MMOL/L (ref 3.5–5.1)
RBC # BLD AUTO: 3.96 M/UL (ref 4–5.4)
SODIUM SERPL-SCNC: 137 MMOL/L (ref 136–145)
WBC # BLD AUTO: 3.33 K/UL (ref 3.9–12.7)

## 2020-09-02 PROCEDURE — 25000003 PHARM REV CODE 250: Performed by: OBSTETRICS & GYNECOLOGY

## 2020-09-02 PROCEDURE — 1101F PR PT FALLS ASSESS DOC 0-1 FALLS W/OUT INJ PAST YR: ICD-10-PCS | Mod: CPTII,S$GLB,, | Performed by: OBSTETRICS & GYNECOLOGY

## 2020-09-02 PROCEDURE — 1126F PR PAIN SEVERITY QUANTIFIED, NO PAIN PRESENT: ICD-10-PCS | Mod: S$GLB,,, | Performed by: OBSTETRICS & GYNECOLOGY

## 2020-09-02 PROCEDURE — 1159F MED LIST DOCD IN RCRD: CPT | Mod: S$GLB,,, | Performed by: OBSTETRICS & GYNECOLOGY

## 2020-09-02 PROCEDURE — 1126F AMNT PAIN NOTED NONE PRSNT: CPT | Mod: S$GLB,,, | Performed by: OBSTETRICS & GYNECOLOGY

## 2020-09-02 PROCEDURE — 86304 IMMUNOASSAY TUMOR CA 125: CPT

## 2020-09-02 PROCEDURE — 3074F SYST BP LT 130 MM HG: CPT | Mod: CPTII,S$GLB,, | Performed by: OBSTETRICS & GYNECOLOGY

## 2020-09-02 PROCEDURE — 3008F PR BODY MASS INDEX (BMI) DOCUMENTED: ICD-10-PCS | Mod: CPTII,S$GLB,, | Performed by: OBSTETRICS & GYNECOLOGY

## 2020-09-02 PROCEDURE — 63600175 PHARM REV CODE 636 W HCPCS: Performed by: OBSTETRICS & GYNECOLOGY

## 2020-09-02 PROCEDURE — A4216 STERILE WATER/SALINE, 10 ML: HCPCS | Performed by: OBSTETRICS & GYNECOLOGY

## 2020-09-02 PROCEDURE — 3008F BODY MASS INDEX DOCD: CPT | Mod: CPTII,S$GLB,, | Performed by: OBSTETRICS & GYNECOLOGY

## 2020-09-02 PROCEDURE — 99214 OFFICE O/P EST MOD 30 MIN: CPT | Mod: S$GLB,,, | Performed by: OBSTETRICS & GYNECOLOGY

## 2020-09-02 PROCEDURE — 99999 PR PBB SHADOW E&M-EST. PATIENT-LVL IV: ICD-10-PCS | Mod: PBBFAC,,, | Performed by: OBSTETRICS & GYNECOLOGY

## 2020-09-02 PROCEDURE — 36415 COLL VENOUS BLD VENIPUNCTURE: CPT

## 2020-09-02 PROCEDURE — 3078F DIAST BP <80 MM HG: CPT | Mod: CPTII,S$GLB,, | Performed by: OBSTETRICS & GYNECOLOGY

## 2020-09-02 PROCEDURE — 99214 PR OFFICE/OUTPT VISIT, EST, LEVL IV, 30-39 MIN: ICD-10-PCS | Mod: S$GLB,,, | Performed by: OBSTETRICS & GYNECOLOGY

## 2020-09-02 PROCEDURE — 3078F PR MOST RECENT DIASTOLIC BLOOD PRESSURE < 80 MM HG: ICD-10-PCS | Mod: CPTII,S$GLB,, | Performed by: OBSTETRICS & GYNECOLOGY

## 2020-09-02 PROCEDURE — 85027 COMPLETE CBC AUTOMATED: CPT

## 2020-09-02 PROCEDURE — 99999 PR PBB SHADOW E&M-EST. PATIENT-LVL IV: CPT | Mod: PBBFAC,,, | Performed by: OBSTETRICS & GYNECOLOGY

## 2020-09-02 PROCEDURE — 3074F PR MOST RECENT SYSTOLIC BLOOD PRESSURE < 130 MM HG: ICD-10-PCS | Mod: CPTII,S$GLB,, | Performed by: OBSTETRICS & GYNECOLOGY

## 2020-09-02 PROCEDURE — 80048 BASIC METABOLIC PNL TOTAL CA: CPT

## 2020-09-02 PROCEDURE — 36591 DRAW BLOOD OFF VENOUS DEVICE: CPT

## 2020-09-02 PROCEDURE — 1159F PR MEDICATION LIST DOCUMENTED IN MEDICAL RECORD: ICD-10-PCS | Mod: S$GLB,,, | Performed by: OBSTETRICS & GYNECOLOGY

## 2020-09-02 PROCEDURE — 1101F PT FALLS ASSESS-DOCD LE1/YR: CPT | Mod: CPTII,S$GLB,, | Performed by: OBSTETRICS & GYNECOLOGY

## 2020-09-02 RX ORDER — DIPHENHYDRAMINE HYDROCHLORIDE 50 MG/ML
50 INJECTION INTRAMUSCULAR; INTRAVENOUS ONCE AS NEEDED
Status: CANCELLED | OUTPATIENT
Start: 2020-09-03

## 2020-09-02 RX ORDER — FAMOTIDINE 10 MG/ML
20 INJECTION INTRAVENOUS
Status: CANCELLED | OUTPATIENT
Start: 2020-09-03

## 2020-09-02 RX ORDER — HYDROCODONE BITARTRATE AND ACETAMINOPHEN 5; 325 MG/1; MG/1
1 TABLET ORAL EVERY 6 HOURS PRN
Qty: 60 TABLET | Refills: 0 | Status: SHIPPED | OUTPATIENT
Start: 2020-09-02 | End: 2020-10-08 | Stop reason: SDUPTHER

## 2020-09-02 RX ORDER — HEPARIN 100 UNIT/ML
500 SYRINGE INTRAVENOUS
Status: DISCONTINUED | OUTPATIENT
Start: 2020-09-02 | End: 2020-09-02 | Stop reason: HOSPADM

## 2020-09-02 RX ORDER — SODIUM CHLORIDE 0.9 % (FLUSH) 0.9 %
10 SYRINGE (ML) INJECTION
Status: CANCELLED | OUTPATIENT
Start: 2020-09-03

## 2020-09-02 RX ORDER — HEPARIN 100 UNIT/ML
500 SYRINGE INTRAVENOUS
Status: CANCELLED | OUTPATIENT
Start: 2020-09-02

## 2020-09-02 RX ORDER — EPINEPHRINE 0.3 MG/.3ML
0.3 INJECTION SUBCUTANEOUS ONCE AS NEEDED
Status: CANCELLED | OUTPATIENT
Start: 2020-09-03

## 2020-09-02 RX ORDER — HEPARIN 100 UNIT/ML
500 SYRINGE INTRAVENOUS
Status: CANCELLED | OUTPATIENT
Start: 2020-09-03

## 2020-09-02 RX ORDER — SODIUM CHLORIDE 0.9 % (FLUSH) 0.9 %
10 SYRINGE (ML) INJECTION
Status: CANCELLED | OUTPATIENT
Start: 2020-09-02

## 2020-09-02 RX ORDER — SODIUM CHLORIDE 0.9 % (FLUSH) 0.9 %
10 SYRINGE (ML) INJECTION
Status: DISCONTINUED | OUTPATIENT
Start: 2020-09-02 | End: 2020-09-02 | Stop reason: HOSPADM

## 2020-09-02 RX ADMIN — Medication 10 ML: at 10:09

## 2020-09-02 RX ADMIN — HEPARIN 500 UNITS: 100 SYRINGE at 10:09

## 2020-09-02 NOTE — TELEPHONE ENCOUNTER
Spoke to pt to scheduled consult for IR procedure, pt stated that she spoke to Dr Foster and would like to Hold off for now. Told pt to call when she is ready to schedule. Thanks

## 2020-09-02 NOTE — NURSING
Pt arrived for labs from port. Port flushes easily with good blood return, labs sent.  Port de accessed, pt does not want to leave in for chemo infusion tomorrow.  Pt discharged to MD appt with daughter.

## 2020-09-03 ENCOUNTER — INFUSION (OUTPATIENT)
Dept: INFUSION THERAPY | Facility: HOSPITAL | Age: 70
End: 2020-09-03
Payer: MEDICARE

## 2020-09-03 VITALS
DIASTOLIC BLOOD PRESSURE: 80 MMHG | RESPIRATION RATE: 18 BRPM | WEIGHT: 164.88 LBS | BODY MASS INDEX: 28.15 KG/M2 | SYSTOLIC BLOOD PRESSURE: 126 MMHG | TEMPERATURE: 99 F | HEART RATE: 102 BPM | HEIGHT: 64 IN

## 2020-09-03 DIAGNOSIS — C56.3 MALIGNANT NEOPLASM OF BOTH OVARIES: Primary | ICD-10-CM

## 2020-09-03 PROCEDURE — 96417 CHEMO IV INFUS EACH ADDL SEQ: CPT

## 2020-09-03 PROCEDURE — 96415 CHEMO IV INFUSION ADDL HR: CPT

## 2020-09-03 PROCEDURE — 63600175 PHARM REV CODE 636 W HCPCS: Performed by: OBSTETRICS & GYNECOLOGY

## 2020-09-03 PROCEDURE — A4216 STERILE WATER/SALINE, 10 ML: HCPCS | Performed by: OBSTETRICS & GYNECOLOGY

## 2020-09-03 PROCEDURE — 96375 TX/PRO/DX INJ NEW DRUG ADDON: CPT

## 2020-09-03 PROCEDURE — S0028 INJECTION, FAMOTIDINE, 20 MG: HCPCS | Performed by: OBSTETRICS & GYNECOLOGY

## 2020-09-03 PROCEDURE — 25000003 PHARM REV CODE 250: Performed by: OBSTETRICS & GYNECOLOGY

## 2020-09-03 PROCEDURE — 96413 CHEMO IV INFUSION 1 HR: CPT

## 2020-09-03 PROCEDURE — 96367 TX/PROPH/DG ADDL SEQ IV INF: CPT

## 2020-09-03 RX ORDER — DIPHENHYDRAMINE HYDROCHLORIDE 50 MG/ML
50 INJECTION INTRAMUSCULAR; INTRAVENOUS ONCE AS NEEDED
Status: DISCONTINUED | OUTPATIENT
Start: 2020-09-03 | End: 2020-09-03 | Stop reason: HOSPADM

## 2020-09-03 RX ORDER — HEPARIN 100 UNIT/ML
500 SYRINGE INTRAVENOUS
Status: DISCONTINUED | OUTPATIENT
Start: 2020-09-03 | End: 2020-09-03 | Stop reason: HOSPADM

## 2020-09-03 RX ORDER — FAMOTIDINE 10 MG/ML
20 INJECTION INTRAVENOUS
Status: COMPLETED | OUTPATIENT
Start: 2020-09-03 | End: 2020-09-03

## 2020-09-03 RX ORDER — EPINEPHRINE 0.3 MG/.3ML
0.3 INJECTION SUBCUTANEOUS ONCE AS NEEDED
Status: DISCONTINUED | OUTPATIENT
Start: 2020-09-03 | End: 2020-09-03 | Stop reason: HOSPADM

## 2020-09-03 RX ORDER — SODIUM CHLORIDE 0.9 % (FLUSH) 0.9 %
10 SYRINGE (ML) INJECTION
Status: DISCONTINUED | OUTPATIENT
Start: 2020-09-03 | End: 2020-09-03 | Stop reason: HOSPADM

## 2020-09-03 RX ADMIN — Medication 10 ML: at 04:09

## 2020-09-03 RX ADMIN — SODIUM CHLORIDE 500 ML: 0.9 INJECTION, SOLUTION INTRAVENOUS at 02:09

## 2020-09-03 RX ADMIN — HEPARIN 500 UNITS: 100 SYRINGE at 04:09

## 2020-09-03 RX ADMIN — DEXAMETHASONE SODIUM PHOSPHATE: 4 INJECTION, SOLUTION INTRA-ARTICULAR; INTRALESIONAL; INTRAMUSCULAR; INTRAVENOUS; SOFT TISSUE at 10:09

## 2020-09-03 RX ADMIN — FAMOTIDINE 20 MG: 10 INJECTION INTRAVENOUS at 11:09

## 2020-09-03 RX ADMIN — PACLITAXEL 336 MG: 6 INJECTION, SOLUTION INTRAVENOUS at 11:09

## 2020-09-03 RX ADMIN — SODIUM CHLORIDE: 0.9 INJECTION, SOLUTION INTRAVENOUS at 10:09

## 2020-09-03 RX ADMIN — DIPHENHYDRAMINE HYDROCHLORIDE 50 MG: 50 INJECTION INTRAMUSCULAR; INTRAVENOUS at 10:09

## 2020-09-03 RX ADMIN — CARBOPLATIN 565 MG: 10 INJECTION INTRAVENOUS at 02:09

## 2020-09-03 NOTE — NURSING
1009  Pt here for Taxol, Carbo, IVFs, accompanied by daughter, no new complaints or concerns at present, reports today is C3 (not C2), had C1 at [facility]; prior to C2 pt reports episode of hives x 5 days and since resolved; discussed treatment plan for today, all questions answered and pt agrees to proceed

## 2020-09-03 NOTE — PLAN OF CARE
1607  Infusion completed, pt tolerated well; pt instructed to increase hydration of water based fluids r/t Carbo; discussed when to contact MD, when to report to ER; AVS given to and reviewed with pt, pt verbalized understanding of all discussed and when to report next; pt seated, awaiting arrival of daughter

## 2020-09-17 ENCOUNTER — OFFICE VISIT (OUTPATIENT)
Dept: CARDIOLOGY | Facility: CLINIC | Age: 70
End: 2020-09-17
Attending: INTERNAL MEDICINE
Payer: MEDICARE

## 2020-09-17 VITALS
DIASTOLIC BLOOD PRESSURE: 80 MMHG | HEIGHT: 64 IN | WEIGHT: 166.88 LBS | SYSTOLIC BLOOD PRESSURE: 130 MMHG | BODY MASS INDEX: 28.49 KG/M2 | HEART RATE: 93 BPM

## 2020-09-17 DIAGNOSIS — C56.3 MALIGNANT NEOPLASM OF BOTH OVARIES: ICD-10-CM

## 2020-09-17 DIAGNOSIS — I10 ESSENTIAL HYPERTENSION: ICD-10-CM

## 2020-09-17 DIAGNOSIS — E66.3 OVERWEIGHT: ICD-10-CM

## 2020-09-17 DIAGNOSIS — M79.7 FIBROMYALGIA: ICD-10-CM

## 2020-09-17 DIAGNOSIS — I47.10 SUPRAVENTRICULAR TACHYCARDIA: ICD-10-CM

## 2020-09-17 DIAGNOSIS — E78.00 HYPERCHOLESTEROLEMIA: ICD-10-CM

## 2020-09-17 DIAGNOSIS — J45.20 MILD INTERMITTENT ASTHMA WITHOUT COMPLICATION: ICD-10-CM

## 2020-09-17 PROCEDURE — 3075F PR MOST RECENT SYSTOLIC BLOOD PRESS GE 130-139MM HG: ICD-10-PCS | Mod: CPTII,S$GLB,, | Performed by: INTERNAL MEDICINE

## 2020-09-17 PROCEDURE — 99214 PR OFFICE/OUTPT VISIT, EST, LEVL IV, 30-39 MIN: ICD-10-PCS | Mod: S$GLB,,, | Performed by: INTERNAL MEDICINE

## 2020-09-17 PROCEDURE — 1159F PR MEDICATION LIST DOCUMENTED IN MEDICAL RECORD: ICD-10-PCS | Mod: S$GLB,,, | Performed by: INTERNAL MEDICINE

## 2020-09-17 PROCEDURE — 1159F MED LIST DOCD IN RCRD: CPT | Mod: S$GLB,,, | Performed by: INTERNAL MEDICINE

## 2020-09-17 PROCEDURE — 3075F SYST BP GE 130 - 139MM HG: CPT | Mod: CPTII,S$GLB,, | Performed by: INTERNAL MEDICINE

## 2020-09-17 PROCEDURE — 99214 OFFICE O/P EST MOD 30 MIN: CPT | Mod: S$GLB,,, | Performed by: INTERNAL MEDICINE

## 2020-09-17 PROCEDURE — 99999 PR PBB SHADOW E&M-EST. PATIENT-LVL IV: ICD-10-PCS | Mod: PBBFAC,,, | Performed by: INTERNAL MEDICINE

## 2020-09-17 PROCEDURE — 1101F PR PT FALLS ASSESS DOC 0-1 FALLS W/OUT INJ PAST YR: ICD-10-PCS | Mod: CPTII,S$GLB,, | Performed by: INTERNAL MEDICINE

## 2020-09-17 PROCEDURE — 3079F DIAST BP 80-89 MM HG: CPT | Mod: CPTII,S$GLB,, | Performed by: INTERNAL MEDICINE

## 2020-09-17 PROCEDURE — 1101F PT FALLS ASSESS-DOCD LE1/YR: CPT | Mod: CPTII,S$GLB,, | Performed by: INTERNAL MEDICINE

## 2020-09-17 PROCEDURE — 3008F BODY MASS INDEX DOCD: CPT | Mod: CPTII,S$GLB,, | Performed by: INTERNAL MEDICINE

## 2020-09-17 PROCEDURE — 99999 PR PBB SHADOW E&M-EST. PATIENT-LVL IV: CPT | Mod: PBBFAC,,, | Performed by: INTERNAL MEDICINE

## 2020-09-17 PROCEDURE — 3008F PR BODY MASS INDEX (BMI) DOCUMENTED: ICD-10-PCS | Mod: CPTII,S$GLB,, | Performed by: INTERNAL MEDICINE

## 2020-09-17 PROCEDURE — 3079F PR MOST RECENT DIASTOLIC BLOOD PRESSURE 80-89 MM HG: ICD-10-PCS | Mod: CPTII,S$GLB,, | Performed by: INTERNAL MEDICINE

## 2020-09-17 RX ORDER — DILTIAZEM HYDROCHLORIDE 240 MG/1
CAPSULE, EXTENDED RELEASE ORAL
Qty: 90 CAPSULE | Refills: 3 | Status: SHIPPED | OUTPATIENT
Start: 2020-09-17 | End: 2021-03-18 | Stop reason: SDUPTHER

## 2020-09-17 RX ORDER — PITAVASTATIN CALCIUM 2.09 MG/1
2 TABLET, FILM COATED ORAL DAILY
Qty: 90 TABLET | Refills: 3 | Status: SHIPPED | OUTPATIENT
Start: 2020-09-17 | End: 2021-03-18 | Stop reason: SDUPTHER

## 2020-09-17 RX ORDER — LOSARTAN POTASSIUM 100 MG/1
100 TABLET ORAL DAILY
Qty: 90 TABLET | Refills: 3 | Status: SHIPPED | OUTPATIENT
Start: 2020-09-17 | End: 2021-03-18 | Stop reason: SDUPTHER

## 2020-09-17 NOTE — PROGRESS NOTES
Subjective:     Liat Gilmore is a 70 y.o. female with hypertension and hypercholesterolemia. She is overweight. She has had issues with supraventricular tachycardia since age 16. She last had to go to an ER with SVT in about 2003. In addition she has fibromyalgia and mild asthma. In 5/2020 she was diagnosed with an ovarian mass. She underwent surgery on 6/22/2020 and then began chemotherapy. No exertional chest pain or exertional dyspnea. Occasional palpitations that responds to vagal maneuvers. No weak spells. Feeling fair overall.       Palpitations   This is a chronic problem. The current episode started more than 1 year ago. The problem occurs rarely. The problem has been resolved. Pertinent negatives include no anxiety, chest fullness, chest pain, coughing, diaphoresis, dizziness, fever, irregular heartbeat, malaise/fatigue, nausea, near-syncope, numbness, shortness of breath, syncope, vomiting or weakness.   Hypertension  This is a chronic problem. The current episode started more than 1 year ago. The problem is unchanged. The problem is controlled (usually 120-125/60-70 mmHg at home). Pertinent negatives include no anxiety, blurred vision, chest pain, headaches, malaise/fatigue, neck pain, orthopnea, palpitations, peripheral edema, PND, shortness of breath or sweats. There is no history of chronic renal disease.   Hyperlipidemia  This is a chronic problem. The current episode started more than 1 year ago. The problem is controlled. Recent lipid tests were reviewed and are normal. She has no history of chronic renal disease, diabetes, hypothyroidism, liver disease, obesity or nephrotic syndrome. Pertinent negatives include no chest pain, focal sensory loss, focal weakness, leg pain, myalgias or shortness of breath.       Review of Systems   Constitution: Negative for chills, diaphoresis, fever and malaise/fatigue.   HENT: Negative for nosebleeds.    Eyes: Negative for blurred vision, double vision, vision  loss in left eye and vision loss in right eye.   Cardiovascular: Negative for chest pain, claudication, dyspnea on exertion, irregular heartbeat, leg swelling, near-syncope, orthopnea, palpitations, paroxysmal nocturnal dyspnea and syncope.   Respiratory: Negative for cough, hemoptysis, shortness of breath and wheezing.    Endocrine: Negative for cold intolerance and heat intolerance.   Hematologic/Lymphatic: Negative for bleeding problem. Does not bruise/bleed easily.   Skin: Negative for color change and rash.   Musculoskeletal: Positive for arthritis. Negative for back pain, falls, muscle weakness, myalgias and neck pain.   Gastrointestinal: Positive for constipation. Negative for heartburn, hematemesis, hematochezia, hemorrhoids, jaundice, melena, nausea and vomiting.   Genitourinary: Positive for pelvic pain (left side). Negative for dysuria, hematuria, menorrhagia and urgency.   Neurological: Negative for dizziness, focal weakness, headaches, light-headedness, loss of balance, numbness, vertigo and weakness.   Psychiatric/Behavioral: Negative for altered mental status, depression and memory loss. The patient is not nervous/anxious.    Allergic/Immunologic: Negative for hives and persistent infections.       Current Outpatient Medications on File Prior to Visit   Medication Sig Dispense Refill    acyclovir (ZOVIRAX) 800 MG Tab       albuterol 90 mcg/actuation inhaler INHALE 2 PUFFS INTO THE LUNGS EVERY 6 (SIX) HOURS AS NEEDED FOR WHEEZING.      ALBUTEROL INHL Inhale into the lungs.      ALPRAZolam (XANAX) 0.25 MG tablet Take 1 tablet (0.25 mg total) by mouth 2 (two) times daily as needed for Anxiety. 30 tablet 2    ascorbic acid (VITAMIN C) 1000 MG tablet Take 1,000 mg by mouth.      b complex vitamins capsule Take 1 capsule by mouth.      cholecalciferol, vitamin D3, (VITAMIN D3) 50 mcg (2,000 unit) Cap Take 1,000 Units by mouth.      clotrimazole-betamethasone 1-0.05% (LOTRISONE) cream   3     cycloSPORINE (RESTASIS) 0.05 % ophthalmic emulsion       diltiaZEM (TIAZAC) 240 MG Cs24 TAKE 1 CAPSULE(240 MG) BY MOUTH EVERY DAY 90 capsule 3    docusate sodium (COLACE ORAL) Take by mouth.      flunisolide 80 mcg/actuation HFAA Inhale 160 mcg into the lungs.      fluticasone (FLONASE) 50 mcg/actuation nasal spray 1 SPRAY BY NASAL ROUTE DAILY.      fluticasone (FLONASE) 50 mcg/actuation nasal spray 2 sprays (100 mcg total) by Each Nare route once daily. 1 Bottle 0    fluticasone (FLOVENT HFA) 220 mcg/actuation inhaler Inhale 2 puffs into the lungs.      fluticasone furoate-vilanteroL (BREO ELLIPTA) 100-25 mcg/dose diskus inhaler Inhale 1 puff into the lungs once daily. Controller      HYDROcodone-acetaminophen (NORCO) 5-325 mg per tablet Take 1 tablet by mouth every 6 (six) hours as needed for Pain. 60 tablet 0    losartan (COZAAR) 100 MG tablet Take 1 tablet (100 mg total) by mouth once daily. 90 tablet 3    ondansetron (ZOFRAN) 8 MG tablet Take by mouth every 8 (eight) hours as needed for Nausea.      paroxetine (PAXIL) 10 mg/5 mL Susp Take by mouth once daily.      pitavastatin (LIVALO) 2 mg Tab tablet Take 2 mg by mouth.      polyethylene glycol 3350 (MIRALAX ORAL) Take by mouth.      senna-docusate 8.6-50 mg (PERICOLACE) 8.6-50 mg per tablet Take 1 tablet by mouth once daily.      vitamin E 1000 UNIT capsule Take 1,000 Units by mouth.      zafirlukast (ACCOLATE) 20 MG tablet Take 20 mg by mouth once daily.       fish oil-omega-3 fatty acids 300-1,000 mg capsule Take 2 g by mouth.      hydrocortisone valerate (WESTCORT) 0.2 % ointment Apply topically.      morphine (MSIR) 15 MG tablet Take 15 mg by mouth every 4 (four) hours as needed for Pain.      pirbuterol (MAXAIR) 200 mcg/Inhalation inhaler Inhale 2 puffs into the lungs.      valacyclovir (VALTREX) 1000 MG tablet 1,000 mg once daily.        No current facility-administered medications on file prior to visit.        /80 Comment:  "Average at home.  Pulse 93   Ht 5' 4" (1.626 m)   Wt 75.7 kg (166 lb 14.2 oz)   BMI 28.65 kg/m²       Objective:     Physical Exam   Constitutional: She is oriented to person, place, and time. She appears well-developed and well-nourished.  Non-toxic appearance. No distress.   HENT:   Head: Normocephalic and atraumatic.   Nose: Nose normal.   Eyes: Right eye exhibits no discharge. Left eye exhibits no discharge. Right conjunctiva is not injected. Left conjunctiva is not injected. Right pupil is round. Left pupil is round. Pupils are equal.   Neck: Neck supple. No JVD present. Carotid bruit is not present. No thyromegaly present.   Cardiovascular: Normal rate, regular rhythm, S1 normal and S2 normal.  No extrasystoles are present. PMI is not displaced. Exam reveals gallop and S4.   No murmur heard.  Pulses:       Radial pulses are 2+ on the right side and 2+ on the left side.        Femoral pulses are 2+ on the right side and 2+ on the left side.       Dorsalis pedis pulses are 2+ on the right side and 2+ on the left side.        Posterior tibial pulses are 2+ on the right side and 2+ on the left side.   Pulmonary/Chest: Effort normal and breath sounds normal.   Abdominal: Soft. Normal appearance. There is no hepatosplenomegaly. There is no abdominal tenderness.   Musculoskeletal:      Right ankle: She exhibits no swelling, no ecchymosis and no deformity.      Left ankle: She exhibits no swelling, no ecchymosis and no deformity.   Lymphadenopathy:        Head (right side): No submandibular adenopathy present.        Head (left side): No submandibular adenopathy present.     She has no cervical adenopathy.   Neurological: She is alert and oriented to person, place, and time. She is not disoriented.   Skin: Skin is warm, dry and intact. No rash noted. She is not diaphoretic.   Psychiatric: She has a normal mood and affect. Her speech is normal and behavior is normal. Judgment and thought content normal. Cognition " "and memory are normal.       Assessment:     1. Supraventricular tachycardia    2. Essential hypertension    3. Hypercholesterolemia    4. Overweight    5. Fibromyalgia    6. Mild intermittent asthma without complication    7. Malignant neoplasm of both ovaries        Plan:     1. Supraventricular Tachycardia   1966: First episode.   2003: To ER with SVT.   Used to have mild palpitations a few times per year that responded to vagal maneuvers.   Used to be on metoprolol 25 mg Q24 and digoxin 0.125 mg Q24.   10/31/2018: Diltiazem 240 mg Q24 was begun.   On diltiazem 240 mg Q24.   No clinical recurrence.     2. Hypertension   2005: Diagnosed.   2018: Used to be on amlodipine 10 mg Q24, metoprolol 25 mg Q24 and hctz 25 mg Q24.   6/8/2020: Hctz 25 mg Q24 was discontinued and losartan 100 mg Q24 was begun.    On diltiazem 240 mg Q24 and losartan 100 mg Q24.   Keeping log at home.   Well controlled.     3. Hypercholesterolemia   2010: Began statin.   Tried several "regular statins" and had muscle pains.   On pitavastatin 2 mg Q24.     4. Overweight   10/31/2018: Weight 78 kg. BMI 29.   Encouraged to lose weight.    5. Fibromyalgia   2006: Diagnosed.    6. Asthma   1985: Diagnosed.   On zafirlukast and inhalers.    7. Ovarian Carcinoma   6/22/2020: Hysterectomy and oophorectomy. Dr. Michael Ratliff.   Low cardiac risk.   Dr. Francisco Foster.    8. Primary Care   Dr. Shabana Dunbar.    F/u 4 months.    Morelia Ponce M.D.                  "

## 2020-09-20 ENCOUNTER — PATIENT MESSAGE (OUTPATIENT)
Dept: CARDIOLOGY | Facility: CLINIC | Age: 70
End: 2020-09-20

## 2020-09-23 ENCOUNTER — INFUSION (OUTPATIENT)
Dept: INFUSION THERAPY | Facility: HOSPITAL | Age: 70
End: 2020-09-23
Payer: MEDICARE

## 2020-09-23 ENCOUNTER — OFFICE VISIT (OUTPATIENT)
Dept: GYNECOLOGIC ONCOLOGY | Facility: CLINIC | Age: 70
End: 2020-09-23
Payer: MEDICARE

## 2020-09-23 VITALS
BODY MASS INDEX: 29.33 KG/M2 | SYSTOLIC BLOOD PRESSURE: 140 MMHG | DIASTOLIC BLOOD PRESSURE: 70 MMHG | HEART RATE: 87 BPM | WEIGHT: 170.88 LBS

## 2020-09-23 DIAGNOSIS — R91.8 LUNG MASS: ICD-10-CM

## 2020-09-23 DIAGNOSIS — C56.3 MALIGNANT NEOPLASM OF BOTH OVARIES: Primary | ICD-10-CM

## 2020-09-23 DIAGNOSIS — Z51.11 CHEMOTHERAPY MANAGEMENT, ENCOUNTER FOR: ICD-10-CM

## 2020-09-23 LAB
ANION GAP SERPL CALC-SCNC: 7 MMOL/L (ref 8–16)
BUN SERPL-MCNC: 18 MG/DL (ref 8–23)
CALCIUM SERPL-MCNC: 9.4 MG/DL (ref 8.7–10.5)
CANCER AG125 SERPL-ACNC: 10 U/ML (ref 0–30)
CHLORIDE SERPL-SCNC: 105 MMOL/L (ref 95–110)
CO2 SERPL-SCNC: 29 MMOL/L (ref 23–29)
CREAT SERPL-MCNC: 1.1 MG/DL (ref 0.5–1.4)
ERYTHROCYTE [DISTWIDTH] IN BLOOD BY AUTOMATED COUNT: 18 % (ref 11.5–14.5)
EST. GFR  (AFRICAN AMERICAN): 59.2 ML/MIN/1.73 M^2
EST. GFR  (NON AFRICAN AMERICAN): 51.3 ML/MIN/1.73 M^2
GLUCOSE SERPL-MCNC: 117 MG/DL (ref 70–110)
HCT VFR BLD AUTO: 32.4 % (ref 37–48.5)
HGB BLD-MCNC: 10.3 G/DL (ref 12–16)
IMM GRANULOCYTES # BLD AUTO: 0.02 K/UL (ref 0–0.04)
MCH RBC QN AUTO: 29.3 PG (ref 27–31)
MCHC RBC AUTO-ENTMCNC: 31.8 G/DL (ref 32–36)
MCV RBC AUTO: 92 FL (ref 82–98)
NEUTROPHILS # BLD AUTO: 1.2 K/UL (ref 1.8–7.7)
PLATELET # BLD AUTO: 253 K/UL (ref 150–350)
PMV BLD AUTO: 8.6 FL (ref 9.2–12.9)
POTASSIUM SERPL-SCNC: 4.4 MMOL/L (ref 3.5–5.1)
RBC # BLD AUTO: 3.51 M/UL (ref 4–5.4)
SODIUM SERPL-SCNC: 141 MMOL/L (ref 136–145)
WBC # BLD AUTO: 3.9 K/UL (ref 3.9–12.7)

## 2020-09-23 PROCEDURE — 1159F MED LIST DOCD IN RCRD: CPT | Mod: S$GLB,,, | Performed by: OBSTETRICS & GYNECOLOGY

## 2020-09-23 PROCEDURE — 1126F PR PAIN SEVERITY QUANTIFIED, NO PAIN PRESENT: ICD-10-PCS | Mod: S$GLB,,, | Performed by: OBSTETRICS & GYNECOLOGY

## 2020-09-23 PROCEDURE — 3077F SYST BP >= 140 MM HG: CPT | Mod: CPTII,S$GLB,, | Performed by: OBSTETRICS & GYNECOLOGY

## 2020-09-23 PROCEDURE — 3008F BODY MASS INDEX DOCD: CPT | Mod: CPTII,S$GLB,, | Performed by: OBSTETRICS & GYNECOLOGY

## 2020-09-23 PROCEDURE — 3078F DIAST BP <80 MM HG: CPT | Mod: CPTII,S$GLB,, | Performed by: OBSTETRICS & GYNECOLOGY

## 2020-09-23 PROCEDURE — 1101F PT FALLS ASSESS-DOCD LE1/YR: CPT | Mod: CPTII,S$GLB,, | Performed by: OBSTETRICS & GYNECOLOGY

## 2020-09-23 PROCEDURE — A4216 STERILE WATER/SALINE, 10 ML: HCPCS | Performed by: OBSTETRICS & GYNECOLOGY

## 2020-09-23 PROCEDURE — 99214 OFFICE O/P EST MOD 30 MIN: CPT | Mod: S$GLB,,, | Performed by: OBSTETRICS & GYNECOLOGY

## 2020-09-23 PROCEDURE — 1159F PR MEDICATION LIST DOCUMENTED IN MEDICAL RECORD: ICD-10-PCS | Mod: S$GLB,,, | Performed by: OBSTETRICS & GYNECOLOGY

## 2020-09-23 PROCEDURE — 63600175 PHARM REV CODE 636 W HCPCS: Performed by: OBSTETRICS & GYNECOLOGY

## 2020-09-23 PROCEDURE — 36591 DRAW BLOOD OFF VENOUS DEVICE: CPT

## 2020-09-23 PROCEDURE — 99999 PR PBB SHADOW E&M-EST. PATIENT-LVL IV: CPT | Mod: PBBFAC,,, | Performed by: OBSTETRICS & GYNECOLOGY

## 2020-09-23 PROCEDURE — 1126F AMNT PAIN NOTED NONE PRSNT: CPT | Mod: S$GLB,,, | Performed by: OBSTETRICS & GYNECOLOGY

## 2020-09-23 PROCEDURE — 80048 BASIC METABOLIC PNL TOTAL CA: CPT

## 2020-09-23 PROCEDURE — 3077F PR MOST RECENT SYSTOLIC BLOOD PRESSURE >= 140 MM HG: ICD-10-PCS | Mod: CPTII,S$GLB,, | Performed by: OBSTETRICS & GYNECOLOGY

## 2020-09-23 PROCEDURE — 99999 PR PBB SHADOW E&M-EST. PATIENT-LVL IV: ICD-10-PCS | Mod: PBBFAC,,, | Performed by: OBSTETRICS & GYNECOLOGY

## 2020-09-23 PROCEDURE — 85027 COMPLETE CBC AUTOMATED: CPT

## 2020-09-23 PROCEDURE — 3008F PR BODY MASS INDEX (BMI) DOCUMENTED: ICD-10-PCS | Mod: CPTII,S$GLB,, | Performed by: OBSTETRICS & GYNECOLOGY

## 2020-09-23 PROCEDURE — 99214 PR OFFICE/OUTPT VISIT, EST, LEVL IV, 30-39 MIN: ICD-10-PCS | Mod: S$GLB,,, | Performed by: OBSTETRICS & GYNECOLOGY

## 2020-09-23 PROCEDURE — 3078F PR MOST RECENT DIASTOLIC BLOOD PRESSURE < 80 MM HG: ICD-10-PCS | Mod: CPTII,S$GLB,, | Performed by: OBSTETRICS & GYNECOLOGY

## 2020-09-23 PROCEDURE — 86304 IMMUNOASSAY TUMOR CA 125: CPT

## 2020-09-23 PROCEDURE — 25000003 PHARM REV CODE 250: Performed by: OBSTETRICS & GYNECOLOGY

## 2020-09-23 PROCEDURE — 1101F PR PT FALLS ASSESS DOC 0-1 FALLS W/OUT INJ PAST YR: ICD-10-PCS | Mod: CPTII,S$GLB,, | Performed by: OBSTETRICS & GYNECOLOGY

## 2020-09-23 RX ORDER — SODIUM CHLORIDE 0.9 % (FLUSH) 0.9 %
10 SYRINGE (ML) INJECTION
Status: CANCELLED | OUTPATIENT
Start: 2020-09-23

## 2020-09-23 RX ORDER — SODIUM CHLORIDE 0.9 % (FLUSH) 0.9 %
10 SYRINGE (ML) INJECTION
Status: DISCONTINUED | OUTPATIENT
Start: 2020-09-23 | End: 2020-09-23 | Stop reason: HOSPADM

## 2020-09-23 RX ORDER — HEPARIN 100 UNIT/ML
500 SYRINGE INTRAVENOUS
Status: DISCONTINUED | OUTPATIENT
Start: 2020-09-23 | End: 2020-09-23 | Stop reason: HOSPADM

## 2020-09-23 RX ORDER — HEPARIN 100 UNIT/ML
500 SYRINGE INTRAVENOUS
Status: CANCELLED | OUTPATIENT
Start: 2020-09-23

## 2020-09-23 RX ADMIN — Medication 10 ML: at 02:09

## 2020-09-23 RX ADMIN — HEPARIN 500 UNITS: 100 SYRINGE at 02:09

## 2020-09-23 NOTE — PROGRESS NOTES
Subjective:       Patient ID: Liat Gilmore is a 70 y.o. female.    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  Possible stage IVB based upon multiple pulmonary nodules, however, she has a history pulmonary nodules that predated the diagnosis of ovarian cancer.    Nodules have not been biopsies.         Chemotherapy labs have been reviewed. ANC: 1200.     Her oncologic history is:    Referring physician: self referral  Reason for referral:  Ovarian cancer     March 2019 patient began to notice some lower abdominal discomfort.  This was at the height of COVID-19 pandemic.  She was referred to a urologist who did a tele health visit.  An ultrasound in May 2020 showed a complex cyst in the middle pole of the kidney but a pelvic cyst as well.  The patient then return to see her gynecologist Dr. Marily Alston in [place].  An ultrasound showed a complex multi-cystic mass posterior to the uterus; left adnexa 57 x 36 mm cyst and a 30 x 23 x 23 cm complex cyst; right adnexa with a 37 x 31 cm cyst; endometrium not clearly seen.  At that time she was having difficulty with bowel movements.     06/22/2020:  Patient underwent surgery wi Dr. Мария Ratliff.  Had AMRIT BSO and tumor debulking performed.  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  Optimal tumor debulking.  Per review of operative note she is R0.        Status post 1 cycle chemotherapy with Taxol and carboplatin on  7/16/2020.     Next chemotherapy is scheduled for August 13, 2020.     Patient has requested a transfer of care to [facility].  Patient was seeing Dr. Мария Ratliff.  Patient has concerns about [facility] as a hospital.  This is not related to physcian.  Patient received her other care at [facility] [place] [facility].  Pain control was an issue post operatively. Patient cries when she speaks about this.  "Told she needs a lung biopsy but she is "too terrified" to return to that hospital.  Daughter believes she has PTSD.  Patient is requesting prescription for  alprazolam.      Aug 5, 2020: : 25     Renata Rivera: RNF4 3:  VUS.            Review of Systems   Constitutional: Positive for fatigue. Negative for chills and fever.   Respiratory: Negative for cough, shortness of breath and wheezing.    Cardiovascular: Negative for chest pain, palpitations and leg swelling.   Gastrointestinal: Positive for abdominal pain and constipation. Negative for diarrhea, nausea and vomiting.   Genitourinary: Negative for difficulty urinating, dysuria, frequency, genital sores, hematuria, urgency, vaginal bleeding, vaginal discharge and vaginal pain.   Musculoskeletal: Positive for arthralgias. Negative for gait problem.   Neurological: Positive for numbness (Grade 1 finger tips and toes. ). Negative for weakness.   Hematological: Negative for adenopathy. Does not bruise/bleed easily.   Psychiatric/Behavioral: The patient is not nervous/anxious.        Objective:   BP (!) 140/70   Pulse 87   Wt 77.5 kg (170 lb 13.7 oz)   BMI 29.33 kg/m²      Physical Exam  Constitutional:       Appearance: She is well-developed.   HENT:      Head: Normocephalic and atraumatic.   Eyes:      General: No scleral icterus.  Neck:      Thyroid: No thyromegaly.      Trachea: No tracheal deviation.   Cardiovascular:      Rate and Rhythm: Normal rate and regular rhythm.   Pulmonary:      Effort: Pulmonary effort is normal.      Breath sounds: Normal breath sounds.   Abdominal:      General: There is no distension.      Palpations: Abdomen is soft. There is no mass.      Tenderness: There is no abdominal tenderness. There is no guarding or rebound.      Hernia: No hernia is present.   Genitourinary:     Comments: Not performed.   Musculoskeletal:         General: No tenderness.   Lymphadenopathy:      Cervical: No cervical adenopathy.   Skin:     " General: Skin is warm and dry.   Neurological:      Mental Status: She is alert and oriented to person, place, and time.   Psychiatric:         Behavior: Behavior normal.         Thought Content: Thought content normal.         Judgment: Judgment normal.         Assessment:       1. Malignant neoplasm of both ovaries    2. Lung mass    3. Chemotherapy management, encounter for        Plan:   Malignant neoplasm of both ovaries    Lung mass    Chemotherapy management, encounter for      Check CBC in AM and if appropriate proceed with C4.     Will reimage before next cycle to assess for response. And to determine about addition of Avastin. She has not wanted to start Avastin at this time. Nor did she want lung nodule biopsied.

## 2020-09-24 ENCOUNTER — INFUSION (OUTPATIENT)
Dept: INFUSION THERAPY | Facility: HOSPITAL | Age: 70
End: 2020-09-24
Payer: MEDICARE

## 2020-09-24 VITALS
BODY MASS INDEX: 29.17 KG/M2 | HEART RATE: 88 BPM | DIASTOLIC BLOOD PRESSURE: 76 MMHG | RESPIRATION RATE: 18 BRPM | WEIGHT: 170.88 LBS | HEIGHT: 64 IN | SYSTOLIC BLOOD PRESSURE: 135 MMHG | TEMPERATURE: 98 F

## 2020-09-24 DIAGNOSIS — C56.3 MALIGNANT NEOPLASM OF BOTH OVARIES: Primary | ICD-10-CM

## 2020-09-24 PROCEDURE — 96415 CHEMO IV INFUSION ADDL HR: CPT

## 2020-09-24 PROCEDURE — 96367 TX/PROPH/DG ADDL SEQ IV INF: CPT

## 2020-09-24 PROCEDURE — 96361 HYDRATE IV INFUSION ADD-ON: CPT

## 2020-09-24 PROCEDURE — 96375 TX/PRO/DX INJ NEW DRUG ADDON: CPT

## 2020-09-24 PROCEDURE — 96413 CHEMO IV INFUSION 1 HR: CPT

## 2020-09-24 PROCEDURE — 96417 CHEMO IV INFUS EACH ADDL SEQ: CPT

## 2020-09-24 PROCEDURE — 63600175 PHARM REV CODE 636 W HCPCS: Performed by: OBSTETRICS & GYNECOLOGY

## 2020-09-24 PROCEDURE — S0028 INJECTION, FAMOTIDINE, 20 MG: HCPCS | Performed by: OBSTETRICS & GYNECOLOGY

## 2020-09-24 PROCEDURE — 25000003 PHARM REV CODE 250: Performed by: OBSTETRICS & GYNECOLOGY

## 2020-09-24 RX ORDER — SODIUM CHLORIDE 0.9 % (FLUSH) 0.9 %
10 SYRINGE (ML) INJECTION
Status: CANCELLED | OUTPATIENT
Start: 2020-09-24

## 2020-09-24 RX ORDER — DIPHENHYDRAMINE HYDROCHLORIDE 50 MG/ML
50 INJECTION INTRAMUSCULAR; INTRAVENOUS ONCE AS NEEDED
Status: DISCONTINUED | OUTPATIENT
Start: 2020-09-24 | End: 2020-09-24 | Stop reason: HOSPADM

## 2020-09-24 RX ORDER — EPINEPHRINE 0.3 MG/.3ML
0.3 INJECTION SUBCUTANEOUS ONCE AS NEEDED
Status: CANCELLED | OUTPATIENT
Start: 2020-09-24

## 2020-09-24 RX ORDER — EPINEPHRINE 0.3 MG/.3ML
0.3 INJECTION SUBCUTANEOUS ONCE AS NEEDED
Status: DISCONTINUED | OUTPATIENT
Start: 2020-09-24 | End: 2020-09-24 | Stop reason: HOSPADM

## 2020-09-24 RX ORDER — FAMOTIDINE 10 MG/ML
20 INJECTION INTRAVENOUS
Status: COMPLETED | OUTPATIENT
Start: 2020-09-24 | End: 2020-09-24

## 2020-09-24 RX ORDER — HEPARIN 100 UNIT/ML
500 SYRINGE INTRAVENOUS
Status: CANCELLED | OUTPATIENT
Start: 2020-09-24

## 2020-09-24 RX ORDER — SODIUM CHLORIDE 0.9 % (FLUSH) 0.9 %
10 SYRINGE (ML) INJECTION
Status: DISCONTINUED | OUTPATIENT
Start: 2020-09-24 | End: 2020-09-24 | Stop reason: HOSPADM

## 2020-09-24 RX ORDER — FAMOTIDINE 10 MG/ML
20 INJECTION INTRAVENOUS
Status: CANCELLED | OUTPATIENT
Start: 2020-09-24

## 2020-09-24 RX ORDER — HEPARIN 100 UNIT/ML
500 SYRINGE INTRAVENOUS
Status: DISCONTINUED | OUTPATIENT
Start: 2020-09-24 | End: 2020-09-24 | Stop reason: HOSPADM

## 2020-09-24 RX ORDER — DIPHENHYDRAMINE HYDROCHLORIDE 50 MG/ML
50 INJECTION INTRAMUSCULAR; INTRAVENOUS ONCE AS NEEDED
Status: CANCELLED | OUTPATIENT
Start: 2020-09-24

## 2020-09-24 RX ADMIN — HEPARIN 500 UNITS: 100 SYRINGE at 04:09

## 2020-09-24 RX ADMIN — SODIUM CHLORIDE 500 ML: 9 INJECTION, SOLUTION INTRAVENOUS at 03:09

## 2020-09-24 RX ADMIN — DEXAMETHASONE SODIUM PHOSPHATE: 4 INJECTION, SOLUTION INTRA-ARTICULAR; INTRALESIONAL; INTRAMUSCULAR; INTRAVENOUS; SOFT TISSUE at 10:09

## 2020-09-24 RX ADMIN — CARBOPLATIN 525 MG: 10 INJECTION INTRAVENOUS at 02:09

## 2020-09-24 RX ADMIN — PACLITAXEL 258 MG: 6 INJECTION, SOLUTION INTRAVENOUS at 11:09

## 2020-09-24 RX ADMIN — FAMOTIDINE 20 MG: 10 INJECTION INTRAVENOUS at 10:09

## 2020-09-24 RX ADMIN — DIPHENHYDRAMINE HYDROCHLORIDE 50 MG: 50 INJECTION INTRAMUSCULAR; INTRAVENOUS at 10:09

## 2020-09-24 NOTE — PLAN OF CARE
Patient tolerated Taxol, Carbo, and IV fluids with no complications. VSS. Pt instructed to call MD with any problems. NAD. Pt discharged home independently.

## 2020-09-25 ENCOUNTER — INFUSION (OUTPATIENT)
Dept: INFUSION THERAPY | Facility: HOSPITAL | Age: 70
End: 2020-09-25
Payer: MEDICARE

## 2020-09-25 DIAGNOSIS — C56.3 MALIGNANT NEOPLASM OF BOTH OVARIES: Primary | ICD-10-CM

## 2020-09-25 PROCEDURE — 96372 THER/PROPH/DIAG INJ SC/IM: CPT

## 2020-09-25 PROCEDURE — 63600175 PHARM REV CODE 636 W HCPCS: Mod: TB | Performed by: OBSTETRICS & GYNECOLOGY

## 2020-09-25 RX ADMIN — PEGFILGRASTIM-CBQV 6 MG: 6 INJECTION, SOLUTION SUBCUTANEOUS at 03:09

## 2020-10-13 ENCOUNTER — HOSPITAL ENCOUNTER (OUTPATIENT)
Dept: RADIOLOGY | Facility: HOSPITAL | Age: 70
Discharge: HOME | End: 2020-10-13
Attending: OBSTETRICS & GYNECOLOGY
Payer: MEDICARE

## 2020-10-13 DIAGNOSIS — C56.3 MALIGNANT NEOPLASM OF BOTH OVARIES: ICD-10-CM

## 2020-10-13 PROCEDURE — 71260 CT THORAX DX C+: CPT | Mod: 26,,, | Performed by: RADIOLOGY

## 2020-10-13 PROCEDURE — 71260 CT CHEST ABDOMEN PELVIS WITH CONTRAST (XPD): ICD-10-PCS | Mod: 26,,, | Performed by: RADIOLOGY

## 2020-10-13 PROCEDURE — 74177 CT ABD & PELVIS W/CONTRAST: CPT | Mod: TC

## 2020-10-13 PROCEDURE — 74177 CT CHEST ABDOMEN PELVIS WITH CONTRAST (XPD): ICD-10-PCS | Mod: 26,,, | Performed by: RADIOLOGY

## 2020-10-13 PROCEDURE — 74177 CT ABD & PELVIS W/CONTRAST: CPT | Mod: 26,,, | Performed by: RADIOLOGY

## 2020-10-13 PROCEDURE — 25500020 PHARM REV CODE 255: Performed by: OBSTETRICS & GYNECOLOGY

## 2020-10-13 RX ADMIN — IOHEXOL 75 ML: 350 INJECTION, SOLUTION INTRAVENOUS at 12:10

## 2020-10-14 ENCOUNTER — OFFICE VISIT (OUTPATIENT)
Dept: GYNECOLOGIC ONCOLOGY | Facility: CLINIC | Age: 70
End: 2020-10-14
Payer: MEDICARE

## 2020-10-14 ENCOUNTER — INFUSION (OUTPATIENT)
Dept: INFUSION THERAPY | Facility: HOSPITAL | Age: 70
End: 2020-10-14
Payer: MEDICARE

## 2020-10-14 ENCOUNTER — PATIENT MESSAGE (OUTPATIENT)
Dept: GYNECOLOGIC ONCOLOGY | Facility: CLINIC | Age: 70
End: 2020-10-14

## 2020-10-14 VITALS
HEART RATE: 98 BPM | WEIGHT: 169 LBS | SYSTOLIC BLOOD PRESSURE: 154 MMHG | DIASTOLIC BLOOD PRESSURE: 72 MMHG | BODY MASS INDEX: 29.01 KG/M2

## 2020-10-14 DIAGNOSIS — C56.3 MALIGNANT NEOPLASM OF BOTH OVARIES: Primary | ICD-10-CM

## 2020-10-14 DIAGNOSIS — Z51.11 CHEMOTHERAPY MANAGEMENT, ENCOUNTER FOR: ICD-10-CM

## 2020-10-14 LAB
ANION GAP SERPL CALC-SCNC: 10 MMOL/L (ref 8–16)
BUN SERPL-MCNC: 11 MG/DL (ref 8–23)
CALCIUM SERPL-MCNC: 9.6 MG/DL (ref 8.7–10.5)
CHLORIDE SERPL-SCNC: 107 MMOL/L (ref 95–110)
CO2 SERPL-SCNC: 27 MMOL/L (ref 23–29)
CREAT SERPL-MCNC: 0.9 MG/DL (ref 0.5–1.4)
ERYTHROCYTE [DISTWIDTH] IN BLOOD BY AUTOMATED COUNT: 19.7 % (ref 11.5–14.5)
EST. GFR  (AFRICAN AMERICAN): >60 ML/MIN/1.73 M^2
EST. GFR  (NON AFRICAN AMERICAN): >60 ML/MIN/1.73 M^2
GLUCOSE SERPL-MCNC: 116 MG/DL (ref 70–110)
HCT VFR BLD AUTO: 31.9 % (ref 37–48.5)
HGB BLD-MCNC: 10 G/DL (ref 12–16)
IMM GRANULOCYTES # BLD AUTO: 0.02 K/UL (ref 0–0.04)
MCH RBC QN AUTO: 30 PG (ref 27–31)
MCHC RBC AUTO-ENTMCNC: 31.3 G/DL (ref 32–36)
MCV RBC AUTO: 96 FL (ref 82–98)
NEUTROPHILS # BLD AUTO: 2.9 K/UL (ref 1.8–7.7)
PLATELET # BLD AUTO: 209 K/UL (ref 150–350)
PMV BLD AUTO: 8.2 FL (ref 9.2–12.9)
POTASSIUM SERPL-SCNC: 4.3 MMOL/L (ref 3.5–5.1)
RBC # BLD AUTO: 3.33 M/UL (ref 4–5.4)
SODIUM SERPL-SCNC: 144 MMOL/L (ref 136–145)
WBC # BLD AUTO: 5.13 K/UL (ref 3.9–12.7)

## 2020-10-14 PROCEDURE — 1101F PR PT FALLS ASSESS DOC 0-1 FALLS W/OUT INJ PAST YR: ICD-10-PCS | Mod: CPTII,S$GLB,, | Performed by: OBSTETRICS & GYNECOLOGY

## 2020-10-14 PROCEDURE — 99214 PR OFFICE/OUTPT VISIT, EST, LEVL IV, 30-39 MIN: ICD-10-PCS | Mod: S$GLB,,, | Performed by: OBSTETRICS & GYNECOLOGY

## 2020-10-14 PROCEDURE — 1159F PR MEDICATION LIST DOCUMENTED IN MEDICAL RECORD: ICD-10-PCS | Mod: S$GLB,,, | Performed by: OBSTETRICS & GYNECOLOGY

## 2020-10-14 PROCEDURE — 1126F PR PAIN SEVERITY QUANTIFIED, NO PAIN PRESENT: ICD-10-PCS | Mod: S$GLB,,, | Performed by: OBSTETRICS & GYNECOLOGY

## 2020-10-14 PROCEDURE — 25000003 PHARM REV CODE 250: Performed by: OBSTETRICS & GYNECOLOGY

## 2020-10-14 PROCEDURE — 3008F PR BODY MASS INDEX (BMI) DOCUMENTED: ICD-10-PCS | Mod: CPTII,S$GLB,, | Performed by: OBSTETRICS & GYNECOLOGY

## 2020-10-14 PROCEDURE — 99999 PR PBB SHADOW E&M-EST. PATIENT-LVL IV: ICD-10-PCS | Mod: PBBFAC,,, | Performed by: OBSTETRICS & GYNECOLOGY

## 2020-10-14 PROCEDURE — 3008F BODY MASS INDEX DOCD: CPT | Mod: CPTII,S$GLB,, | Performed by: OBSTETRICS & GYNECOLOGY

## 2020-10-14 PROCEDURE — 3078F PR MOST RECENT DIASTOLIC BLOOD PRESSURE < 80 MM HG: ICD-10-PCS | Mod: CPTII,S$GLB,, | Performed by: OBSTETRICS & GYNECOLOGY

## 2020-10-14 PROCEDURE — 1126F AMNT PAIN NOTED NONE PRSNT: CPT | Mod: S$GLB,,, | Performed by: OBSTETRICS & GYNECOLOGY

## 2020-10-14 PROCEDURE — 1159F MED LIST DOCD IN RCRD: CPT | Mod: S$GLB,,, | Performed by: OBSTETRICS & GYNECOLOGY

## 2020-10-14 PROCEDURE — 3077F PR MOST RECENT SYSTOLIC BLOOD PRESSURE >= 140 MM HG: ICD-10-PCS | Mod: CPTII,S$GLB,, | Performed by: OBSTETRICS & GYNECOLOGY

## 2020-10-14 PROCEDURE — 99999 PR PBB SHADOW E&M-EST. PATIENT-LVL IV: CPT | Mod: PBBFAC,,, | Performed by: OBSTETRICS & GYNECOLOGY

## 2020-10-14 PROCEDURE — 85027 COMPLETE CBC AUTOMATED: CPT

## 2020-10-14 PROCEDURE — A4216 STERILE WATER/SALINE, 10 ML: HCPCS | Performed by: OBSTETRICS & GYNECOLOGY

## 2020-10-14 PROCEDURE — 3077F SYST BP >= 140 MM HG: CPT | Mod: CPTII,S$GLB,, | Performed by: OBSTETRICS & GYNECOLOGY

## 2020-10-14 PROCEDURE — 80048 BASIC METABOLIC PNL TOTAL CA: CPT

## 2020-10-14 PROCEDURE — 36591 DRAW BLOOD OFF VENOUS DEVICE: CPT

## 2020-10-14 PROCEDURE — 1101F PT FALLS ASSESS-DOCD LE1/YR: CPT | Mod: CPTII,S$GLB,, | Performed by: OBSTETRICS & GYNECOLOGY

## 2020-10-14 PROCEDURE — 3078F DIAST BP <80 MM HG: CPT | Mod: CPTII,S$GLB,, | Performed by: OBSTETRICS & GYNECOLOGY

## 2020-10-14 PROCEDURE — 63600175 PHARM REV CODE 636 W HCPCS: Performed by: OBSTETRICS & GYNECOLOGY

## 2020-10-14 PROCEDURE — 99214 OFFICE O/P EST MOD 30 MIN: CPT | Mod: S$GLB,,, | Performed by: OBSTETRICS & GYNECOLOGY

## 2020-10-14 RX ORDER — SODIUM CHLORIDE 0.9 % (FLUSH) 0.9 %
10 SYRINGE (ML) INJECTION
Status: CANCELLED | OUTPATIENT
Start: 2020-10-14

## 2020-10-14 RX ORDER — HEPARIN 100 UNIT/ML
500 SYRINGE INTRAVENOUS
Status: DISCONTINUED | OUTPATIENT
Start: 2020-10-14 | End: 2020-10-14 | Stop reason: HOSPADM

## 2020-10-14 RX ORDER — HEPARIN 100 UNIT/ML
500 SYRINGE INTRAVENOUS
Status: CANCELLED | OUTPATIENT
Start: 2020-10-14

## 2020-10-14 RX ORDER — SODIUM CHLORIDE 0.9 % (FLUSH) 0.9 %
10 SYRINGE (ML) INJECTION
Status: DISCONTINUED | OUTPATIENT
Start: 2020-10-14 | End: 2020-10-14 | Stop reason: HOSPADM

## 2020-10-14 RX ADMIN — HEPARIN 500 UNITS: 100 SYRINGE at 03:10

## 2020-10-14 RX ADMIN — Medication 10 ML: at 03:10

## 2020-10-14 NOTE — PROGRESS NOTES
Subjective:       Patient ID: Liat Gilmore is a 70 y.o. female.    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  Possible stage IVB based upon multiple pulmonary nodules, however, she has a history pulmonary nodules that predated the diagnosis of ovarian cancer.     Nodules have not been biopsied.     Oct 2020: CT scan CAP after 4 cycles of taxol and carboplatin shows:  1. History ovarian cancer, status post AMRIT/BSO.  No local recurrence identified.  2. Multiple pulmonary nodules/opacities without significant interval change.  Random nodules could indicate metastatic disease but are nonspecific.  Several of the larger opacities have configuration suggestive of endobronchial material in dilated bronchi from airway infection/inflammation.  Also, clustered micro nodules are consistent with small airway infection/inflammation.  Correlate for bronchitis.  Follow with expected oncology surveillance.  3. A 1.2 cm hypodense lesion in left lobe of liver, unchanged and nonspecific.       : 25(Aug 2020)>12( sep 2020)>10(Sept 2020)        Chemotherapy labs have been reviewed. ANC: 1200.      Her oncologic history is:     Referring physician: self referral  Reason for referral:  Ovarian cancer     March 2020 patient began to notice some lower abdominal discomfort.  This was at the height of COVID-19 pandemic.  She was referred to a urologist who did a tele health visit.  An ultrasound in May 2020 showed a complex cyst in the middle pole of the kidney but a pelvic cyst as well.  The patient then return to see her gynecologist Dr. Marily Alston in [place].  An ultrasound showed a complex multi-cystic mass posterior to the uterus; left adnexa 57 x 36 mm cyst and a 30 x 23 x 23 cm complex cyst; right adnexa with a 37 x 31 cm cyst; endometrium not clearly seen.  At that time she was having  "difficulty with bowel movements.  : 177     06/22/2020:  Patient underwent surgery [facility] Dr. Мария Ratliff.  Had AMRIT BSO and tumor debulking performed.  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  Optimal tumor debulking.  Per review of operative note she is R0.    Post op : July 2020: 114.         Status post 1 cycle chemotherapy with Taxol and carboplatin on  7/16/2020.     Next chemotherapy is scheduled for August 13, 2020.     Patient has requested a transfer of care to [facility].  Patient was seeing Dr. Мария Ratliff.  Patient has concerns about [facility] as a hospital.  This is not related to physcian.  Patient received her other care at [facility] [place].  Pain control was an issue post operatively. Patient cries when she speaks about this. Told she needs a lung biopsy but she is "too terrified" to return to that hospital.  Daughter believes she has PTSD.  Patient is requesting prescription for  alprazolam.      Aug 5, 2020: : 25     Myriad myRisk: RNF4 3:  VUS.        Review of Systems   Constitutional: Negative for chills, fatigue and fever.   Respiratory: Positive for cough (chronic) and shortness of breath (beasley when rushing or anxious). Negative for wheezing.    Cardiovascular: Negative for chest pain, palpitations and leg swelling.   Gastrointestinal: Negative for abdominal pain, constipation, diarrhea, nausea and vomiting.   Genitourinary: Negative for difficulty urinating, dysuria, frequency, genital sores, hematuria, urgency, vaginal bleeding, vaginal discharge and vaginal pain.   Musculoskeletal: Negative for gait problem.   Neurological: Positive for numbness (numbness in 2 toes. grade 1. ). Negative for weakness.   Hematological: Negative for adenopathy. Does not bruise/bleed easily.   Psychiatric/Behavioral: The patient is not nervous/anxious.        Objective:   BP (!) 154/72   Pulse 98   Wt 76.7 kg (169 lb)   BMI 29.01 kg/m²    "   Physical Exam  Constitutional:       Appearance: She is well-developed.   HENT:      Head: Normocephalic and atraumatic.   Eyes:      General: No scleral icterus.  Neck:      Thyroid: No thyromegaly.      Trachea: No tracheal deviation.   Cardiovascular:      Rate and Rhythm: Normal rate and regular rhythm.   Pulmonary:      Effort: Pulmonary effort is normal.      Breath sounds: Normal breath sounds.   Abdominal:      General: There is no distension.      Palpations: Abdomen is soft. There is no mass.      Tenderness: There is no abdominal tenderness. There is no guarding or rebound.      Hernia: No hernia is present.   Genitourinary:     Comments: Not performed.   Musculoskeletal:         General: No tenderness.   Lymphadenopathy:      Cervical: No cervical adenopathy.   Skin:     General: Skin is warm and dry.   Neurological:      Mental Status: She is alert and oriented to person, place, and time.   Psychiatric:         Behavior: Behavior normal.         Thought Content: Thought content normal.         Judgment: Judgment normal.         Assessment:       1. Malignant neoplasm of both ovaries    2. Chemotherapy management, encounter for        Plan:   Malignant neoplasm of both ovaries  Will plan to complete C5 and 6 of taxol and carboplatin.   Then reimage  Discussed used of PARPi maintenance post 6 cycles of chemotherapy.   Multiple questions answered.   Chemotherapy management, encounter for

## 2020-10-15 ENCOUNTER — INFUSION (OUTPATIENT)
Dept: INFUSION THERAPY | Facility: HOSPITAL | Age: 70
End: 2020-10-15
Payer: MEDICARE

## 2020-10-15 VITALS
HEIGHT: 64 IN | HEART RATE: 84 BPM | WEIGHT: 169 LBS | SYSTOLIC BLOOD PRESSURE: 146 MMHG | RESPIRATION RATE: 18 BRPM | BODY MASS INDEX: 28.85 KG/M2 | DIASTOLIC BLOOD PRESSURE: 73 MMHG | TEMPERATURE: 98 F

## 2020-10-15 DIAGNOSIS — C56.3 MALIGNANT NEOPLASM OF BOTH OVARIES: Primary | ICD-10-CM

## 2020-10-15 PROCEDURE — 25000003 PHARM REV CODE 250: Performed by: OBSTETRICS & GYNECOLOGY

## 2020-10-15 PROCEDURE — 96361 HYDRATE IV INFUSION ADD-ON: CPT

## 2020-10-15 PROCEDURE — 96413 CHEMO IV INFUSION 1 HR: CPT

## 2020-10-15 PROCEDURE — 96417 CHEMO IV INFUS EACH ADDL SEQ: CPT

## 2020-10-15 PROCEDURE — 63600175 PHARM REV CODE 636 W HCPCS: Performed by: OBSTETRICS & GYNECOLOGY

## 2020-10-15 PROCEDURE — 96415 CHEMO IV INFUSION ADDL HR: CPT

## 2020-10-15 PROCEDURE — 96375 TX/PRO/DX INJ NEW DRUG ADDON: CPT

## 2020-10-15 PROCEDURE — 96367 TX/PROPH/DG ADDL SEQ IV INF: CPT

## 2020-10-15 RX ORDER — FAMOTIDINE 10 MG/ML
20 INJECTION INTRAVENOUS
Status: CANCELLED | OUTPATIENT
Start: 2020-10-15

## 2020-10-15 RX ORDER — EPINEPHRINE 0.3 MG/.3ML
0.3 INJECTION SUBCUTANEOUS ONCE AS NEEDED
Status: DISCONTINUED | OUTPATIENT
Start: 2020-10-15 | End: 2020-10-15 | Stop reason: HOSPADM

## 2020-10-15 RX ORDER — HEPARIN 100 UNIT/ML
500 SYRINGE INTRAVENOUS
Status: CANCELLED | OUTPATIENT
Start: 2020-10-15

## 2020-10-15 RX ORDER — FAMOTIDINE 10 MG/ML
20 INJECTION INTRAVENOUS
Status: COMPLETED | OUTPATIENT
Start: 2020-10-15 | End: 2020-10-15

## 2020-10-15 RX ORDER — EPINEPHRINE 0.3 MG/.3ML
0.3 INJECTION SUBCUTANEOUS ONCE AS NEEDED
Status: CANCELLED | OUTPATIENT
Start: 2020-10-15

## 2020-10-15 RX ORDER — SODIUM CHLORIDE 0.9 % (FLUSH) 0.9 %
10 SYRINGE (ML) INJECTION
Status: DISCONTINUED | OUTPATIENT
Start: 2020-10-15 | End: 2020-10-15 | Stop reason: HOSPADM

## 2020-10-15 RX ORDER — DIPHENHYDRAMINE HYDROCHLORIDE 50 MG/ML
50 INJECTION INTRAMUSCULAR; INTRAVENOUS ONCE AS NEEDED
Status: DISCONTINUED | OUTPATIENT
Start: 2020-10-15 | End: 2020-10-15 | Stop reason: HOSPADM

## 2020-10-15 RX ORDER — DIPHENHYDRAMINE HYDROCHLORIDE 50 MG/ML
50 INJECTION INTRAMUSCULAR; INTRAVENOUS ONCE AS NEEDED
Status: CANCELLED | OUTPATIENT
Start: 2020-10-15

## 2020-10-15 RX ORDER — HEPARIN 100 UNIT/ML
500 SYRINGE INTRAVENOUS
Status: DISCONTINUED | OUTPATIENT
Start: 2020-10-15 | End: 2020-10-15 | Stop reason: HOSPADM

## 2020-10-15 RX ORDER — SODIUM CHLORIDE 0.9 % (FLUSH) 0.9 %
10 SYRINGE (ML) INJECTION
Status: CANCELLED | OUTPATIENT
Start: 2020-10-15

## 2020-10-15 RX ADMIN — DEXAMETHASONE SODIUM PHOSPHATE: 4 INJECTION, SOLUTION INTRA-ARTICULAR; INTRALESIONAL; INTRAMUSCULAR; INTRAVENOUS; SOFT TISSUE at 10:10

## 2020-10-15 RX ADMIN — DIPHENHYDRAMINE HYDROCHLORIDE 50 MG: 50 INJECTION, SOLUTION INTRAMUSCULAR; INTRAVENOUS at 11:10

## 2020-10-15 RX ADMIN — FAMOTIDINE 20 MG: 10 INJECTION INTRAVENOUS at 10:10

## 2020-10-15 RX ADMIN — HEPARIN 500 UNITS: 100 SYRINGE at 03:10

## 2020-10-15 RX ADMIN — CARBOPLATIN 610 MG: 10 INJECTION, SOLUTION INTRAVENOUS at 02:10

## 2020-10-15 RX ADMIN — HEPARIN 500 UNITS: 100 SYRINGE at 04:10

## 2020-10-15 RX ADMIN — PACLITAXEL 258 MG: 6 INJECTION, SOLUTION INTRAVENOUS at 11:10

## 2020-10-15 RX ADMIN — SODIUM CHLORIDE 500 ML: 0.9 INJECTION, SOLUTION INTRAVENOUS at 03:10

## 2020-10-15 NOTE — PLAN OF CARE
Patient tolerated Taxol, Carbo, IV fluids with no complications. VSS. Pt instructed to call MD with any problems. NAD. Pt discharged home independently.

## 2020-10-16 ENCOUNTER — INFUSION (OUTPATIENT)
Dept: INFUSION THERAPY | Facility: HOSPITAL | Age: 70
End: 2020-10-16
Payer: MEDICARE

## 2020-10-16 DIAGNOSIS — C56.3 MALIGNANT NEOPLASM OF BOTH OVARIES: Primary | ICD-10-CM

## 2020-10-16 PROCEDURE — 63600175 PHARM REV CODE 636 W HCPCS: Mod: TB | Performed by: OBSTETRICS & GYNECOLOGY

## 2020-10-16 PROCEDURE — 96372 THER/PROPH/DIAG INJ SC/IM: CPT

## 2020-10-16 RX ADMIN — PEGFILGRASTIM-CBQV 6 MG: 6 INJECTION, SOLUTION SUBCUTANEOUS at 03:10

## 2020-10-16 NOTE — NURSING
1540 pt tolerated udencya to abd. Pt voiced no new complaints or concerns at this time. NAD noted. Pt d/c home.

## 2020-10-20 ENCOUNTER — PATIENT MESSAGE (OUTPATIENT)
Dept: GYNECOLOGIC ONCOLOGY | Facility: CLINIC | Age: 70
End: 2020-10-20

## 2020-10-21 ENCOUNTER — TELEPHONE (OUTPATIENT)
Dept: GYNECOLOGIC ONCOLOGY | Facility: CLINIC | Age: 70
End: 2020-10-21

## 2020-10-21 DIAGNOSIS — C56.3 MALIGNANT NEOPLASM OF BOTH OVARIES: Primary | ICD-10-CM

## 2020-11-04 ENCOUNTER — OFFICE VISIT (OUTPATIENT)
Dept: GYNECOLOGIC ONCOLOGY | Facility: CLINIC | Age: 70
End: 2020-11-04
Payer: MEDICARE

## 2020-11-04 ENCOUNTER — INFUSION (OUTPATIENT)
Dept: INFUSION THERAPY | Facility: HOSPITAL | Age: 70
End: 2020-11-04
Attending: OBSTETRICS & GYNECOLOGY
Payer: MEDICARE

## 2020-11-04 VITALS
SYSTOLIC BLOOD PRESSURE: 161 MMHG | WEIGHT: 174 LBS | DIASTOLIC BLOOD PRESSURE: 75 MMHG | HEART RATE: 91 BPM | BODY MASS INDEX: 29.87 KG/M2

## 2020-11-04 DIAGNOSIS — Z51.11 CHEMOTHERAPY MANAGEMENT, ENCOUNTER FOR: ICD-10-CM

## 2020-11-04 DIAGNOSIS — C56.3 MALIGNANT NEOPLASM OF BOTH OVARIES: Primary | ICD-10-CM

## 2020-11-04 DIAGNOSIS — R91.8 LUNG MASS: ICD-10-CM

## 2020-11-04 LAB
ANION GAP SERPL CALC-SCNC: 9 MMOL/L (ref 8–16)
BUN SERPL-MCNC: 13 MG/DL (ref 8–23)
CALCIUM SERPL-MCNC: 9.7 MG/DL (ref 8.7–10.5)
CHLORIDE SERPL-SCNC: 106 MMOL/L (ref 95–110)
CO2 SERPL-SCNC: 28 MMOL/L (ref 23–29)
CREAT SERPL-MCNC: 0.9 MG/DL (ref 0.5–1.4)
ERYTHROCYTE [DISTWIDTH] IN BLOOD BY AUTOMATED COUNT: 20.9 % (ref 11.5–14.5)
EST. GFR  (AFRICAN AMERICAN): >60 ML/MIN/1.73 M^2
EST. GFR  (NON AFRICAN AMERICAN): >60 ML/MIN/1.73 M^2
GLUCOSE SERPL-MCNC: 101 MG/DL (ref 70–110)
HCT VFR BLD AUTO: 29.7 % (ref 37–48.5)
HGB BLD-MCNC: 9.5 G/DL (ref 12–16)
IMM GRANULOCYTES # BLD AUTO: 0.01 K/UL (ref 0–0.04)
MCH RBC QN AUTO: 32.3 PG (ref 27–31)
MCHC RBC AUTO-ENTMCNC: 32 G/DL (ref 32–36)
MCV RBC AUTO: 101 FL (ref 82–98)
NEUTROPHILS # BLD AUTO: 1.6 K/UL (ref 1.8–7.7)
PLATELET # BLD AUTO: 98 K/UL (ref 150–350)
PMV BLD AUTO: 8.3 FL (ref 9.2–12.9)
POTASSIUM SERPL-SCNC: 4.2 MMOL/L (ref 3.5–5.1)
RBC # BLD AUTO: 2.94 M/UL (ref 4–5.4)
SODIUM SERPL-SCNC: 143 MMOL/L (ref 136–145)
WBC # BLD AUTO: 2.95 K/UL (ref 3.9–12.7)

## 2020-11-04 PROCEDURE — 99999 PR PBB SHADOW E&M-EST. PATIENT-LVL IV: ICD-10-PCS | Mod: PBBFAC,,, | Performed by: OBSTETRICS & GYNECOLOGY

## 2020-11-04 PROCEDURE — 1126F PR PAIN SEVERITY QUANTIFIED, NO PAIN PRESENT: ICD-10-PCS | Mod: S$GLB,,, | Performed by: OBSTETRICS & GYNECOLOGY

## 2020-11-04 PROCEDURE — 3078F PR MOST RECENT DIASTOLIC BLOOD PRESSURE < 80 MM HG: ICD-10-PCS | Mod: CPTII,S$GLB,, | Performed by: OBSTETRICS & GYNECOLOGY

## 2020-11-04 PROCEDURE — 1159F MED LIST DOCD IN RCRD: CPT | Mod: S$GLB,,, | Performed by: OBSTETRICS & GYNECOLOGY

## 2020-11-04 PROCEDURE — 1159F PR MEDICATION LIST DOCUMENTED IN MEDICAL RECORD: ICD-10-PCS | Mod: S$GLB,,, | Performed by: OBSTETRICS & GYNECOLOGY

## 2020-11-04 PROCEDURE — 99214 PR OFFICE/OUTPT VISIT, EST, LEVL IV, 30-39 MIN: ICD-10-PCS | Mod: S$GLB,,, | Performed by: OBSTETRICS & GYNECOLOGY

## 2020-11-04 PROCEDURE — 99214 OFFICE O/P EST MOD 30 MIN: CPT | Mod: S$GLB,,, | Performed by: OBSTETRICS & GYNECOLOGY

## 2020-11-04 PROCEDURE — 3077F SYST BP >= 140 MM HG: CPT | Mod: CPTII,S$GLB,, | Performed by: OBSTETRICS & GYNECOLOGY

## 2020-11-04 PROCEDURE — 3077F PR MOST RECENT SYSTOLIC BLOOD PRESSURE >= 140 MM HG: ICD-10-PCS | Mod: CPTII,S$GLB,, | Performed by: OBSTETRICS & GYNECOLOGY

## 2020-11-04 PROCEDURE — 99999 PR PBB SHADOW E&M-EST. PATIENT-LVL IV: CPT | Mod: PBBFAC,,, | Performed by: OBSTETRICS & GYNECOLOGY

## 2020-11-04 PROCEDURE — 25000003 PHARM REV CODE 250: Performed by: OBSTETRICS & GYNECOLOGY

## 2020-11-04 PROCEDURE — 1101F PT FALLS ASSESS-DOCD LE1/YR: CPT | Mod: CPTII,S$GLB,, | Performed by: OBSTETRICS & GYNECOLOGY

## 2020-11-04 PROCEDURE — 85027 COMPLETE CBC AUTOMATED: CPT

## 2020-11-04 PROCEDURE — A4216 STERILE WATER/SALINE, 10 ML: HCPCS | Performed by: OBSTETRICS & GYNECOLOGY

## 2020-11-04 PROCEDURE — 1126F AMNT PAIN NOTED NONE PRSNT: CPT | Mod: S$GLB,,, | Performed by: OBSTETRICS & GYNECOLOGY

## 2020-11-04 PROCEDURE — 36591 DRAW BLOOD OFF VENOUS DEVICE: CPT

## 2020-11-04 PROCEDURE — 80048 BASIC METABOLIC PNL TOTAL CA: CPT

## 2020-11-04 PROCEDURE — 3078F DIAST BP <80 MM HG: CPT | Mod: CPTII,S$GLB,, | Performed by: OBSTETRICS & GYNECOLOGY

## 2020-11-04 PROCEDURE — 63600175 PHARM REV CODE 636 W HCPCS: Performed by: OBSTETRICS & GYNECOLOGY

## 2020-11-04 PROCEDURE — 1101F PR PT FALLS ASSESS DOC 0-1 FALLS W/OUT INJ PAST YR: ICD-10-PCS | Mod: CPTII,S$GLB,, | Performed by: OBSTETRICS & GYNECOLOGY

## 2020-11-04 PROCEDURE — 3008F BODY MASS INDEX DOCD: CPT | Mod: CPTII,S$GLB,, | Performed by: OBSTETRICS & GYNECOLOGY

## 2020-11-04 PROCEDURE — 3008F PR BODY MASS INDEX (BMI) DOCUMENTED: ICD-10-PCS | Mod: CPTII,S$GLB,, | Performed by: OBSTETRICS & GYNECOLOGY

## 2020-11-04 RX ORDER — SODIUM CHLORIDE 0.9 % (FLUSH) 0.9 %
10 SYRINGE (ML) INJECTION
Status: DISCONTINUED | OUTPATIENT
Start: 2020-11-04 | End: 2020-11-04 | Stop reason: HOSPADM

## 2020-11-04 RX ORDER — HEPARIN 100 UNIT/ML
500 SYRINGE INTRAVENOUS
Status: CANCELLED | OUTPATIENT
Start: 2020-11-04

## 2020-11-04 RX ORDER — SODIUM CHLORIDE 0.9 % (FLUSH) 0.9 %
10 SYRINGE (ML) INJECTION
Status: CANCELLED | OUTPATIENT
Start: 2020-11-04

## 2020-11-04 RX ORDER — HEPARIN 100 UNIT/ML
500 SYRINGE INTRAVENOUS
Status: DISCONTINUED | OUTPATIENT
Start: 2020-11-04 | End: 2020-11-04 | Stop reason: HOSPADM

## 2020-11-04 RX ADMIN — HEPARIN 500 UNITS: 100 SYRINGE at 03:11

## 2020-11-04 RX ADMIN — Medication 10 ML: at 03:11

## 2020-11-04 NOTE — PROGRESS NOTES
Subjective:       Patient ID: Liat Gilmore is a 70 y.o. female.    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  Possible stage IVB based upon multiple pulmonary nodules, however, she has a history pulmonary nodules that predated the diagnosis of ovarian cancer.     Nodules have not been biopsied.      Oct 2020: CT scan CAP after 4 cycles of taxol and carboplatin shows:  1. History ovarian cancer, status post AMRIT/BSO.  No local recurrence identified.  2. Multiple pulmonary nodules/opacities without significant interval change.  Random nodules could indicate metastatic disease but are nonspecific.  Several of the larger opacities have configuration suggestive of endobronchial material in dilated bronchi from airway infection/inflammation.  Also, clustered micro nodules are consistent with small airway infection/inflammation.  Correlate for bronchitis.  Follow with expected oncology surveillance.  3. A 1.2 cm hypodense lesion in left lobe of liver, unchanged and nonspecific.        : 25(Aug 2020)>12( sep 2020)>10(Sept 2020)        Chemotherapy labs have been reviewed. ANC: 1200.      Her oncologic history is:     Referring physician: self referral  Reason for referral:  Ovarian cancer     March 2020 patient began to notice some lower abdominal discomfort.  This was at the height of COVID-19 pandemic.  She was referred to a urologist who did a tele health visit.  An ultrasound in May 2020 showed a complex cyst in the middle pole of the kidney but a pelvic cyst as well.  The patient then return to see her gynecologist Dr. Marily Alston in [place].  An ultrasound showed a complex multi-cystic mass posterior to the uterus; left adnexa 57 x 36 mm cyst and a 30 x 23 x 23 cm complex cyst; right adnexa with a 37 x 31 cm cyst; endometrium not clearly seen.  At that time she was having  "difficulty with bowel movements.  : 177     06/22/2020:  [facility] Мария Ratliff.  Had AMRIT BSO and tumor debulking performed.  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  Optimal tumor debulking.  Per review of operative note she is R0.     Post op : July 2020: 114.         Status post 1 cycle chemotherapy with Taxol and carboplatin on  7/16/2020.     Next chemotherapy is scheduled for August 13, 2020.     Patient has requested a transfer of care to [facility].  Patient was seeing Dr. Мария Ratliff.  Patient has concerns about [facility] as a hospital.  [facility]  Patient received her other care at [facility].  Pain control was an issue post operatively. Patient cries when she speaks about this. Told she needs a lung biopsy but she is "too terrified" to return to that hospital.  Daughter believes she has PTSD.  Patient is requesting prescription for  alprazolam.      Aug 5, 2020: : 25     Myriad myRisk: RNF4 3:  VUS.        Review of Systems   Constitutional: Negative for chills, fatigue and fever.   Respiratory: Negative for cough, shortness of breath and wheezing.    Cardiovascular: Negative for chest pain, palpitations and leg swelling.   Gastrointestinal: Negative for abdominal pain, constipation, diarrhea, nausea and vomiting.   Genitourinary: Negative for difficulty urinating, dysuria, frequency, genital sores, hematuria, urgency, vaginal bleeding, vaginal discharge and vaginal pain.   Musculoskeletal: Negative for gait problem.   Neurological: Negative for weakness and numbness.   Hematological: Negative for adenopathy. Does not bruise/bleed easily.   Psychiatric/Behavioral: The patient is not nervous/anxious.        Objective:   BP (!) 161/75   Pulse 91   Wt 78.9 kg (174 lb)   BMI 29.87 kg/m²      Physical Exam  Constitutional:       Appearance: She is well-developed.   HENT:      Head: " Normocephalic and atraumatic.   Eyes:      General: No scleral icterus.  Neck:      Thyroid: No thyromegaly.      Trachea: No tracheal deviation.   Cardiovascular:      Rate and Rhythm: Normal rate and regular rhythm.   Pulmonary:      Effort: Pulmonary effort is normal.      Breath sounds: Normal breath sounds.   Abdominal:      General: There is no distension.      Palpations: Abdomen is soft. There is no mass.      Tenderness: There is no abdominal tenderness. There is no guarding or rebound.      Hernia: No hernia is present.   Genitourinary:     Comments: Not performed.   Musculoskeletal:         General: No tenderness.   Lymphadenopathy:      Cervical: No cervical adenopathy.   Skin:     General: Skin is warm and dry.   Neurological:      Mental Status: She is alert and oriented to person, place, and time.   Psychiatric:         Behavior: Behavior normal.         Thought Content: Thought content normal.         Judgment: Judgment normal.         Assessment:       1. Malignant neoplasm of both ovaries    2. Chemotherapy management, encounter for    3. Lung mass        Plan:   Malignant neoplasm of both ovaries  Proceed with C6  RTC in 3 weeks with labs and imaging.   Discussed starting Parpi if appropriate.     -     CT Chest Abdomen Pelvis With Contrast; Future; Expected date: 11/04/2020  -     ; Future; Expected date: 11/04/2020  -     CBC Auto Differential; Future; Expected date: 11/04/2020  -     Comprehensive Metabolic Panel; Future; Expected date: 11/04/2020    Chemotherapy management, encounter for    Lung mass

## 2020-11-05 ENCOUNTER — INFUSION (OUTPATIENT)
Dept: INFUSION THERAPY | Facility: HOSPITAL | Age: 70
End: 2020-11-05
Attending: OBSTETRICS & GYNECOLOGY
Payer: MEDICARE

## 2020-11-05 VITALS
TEMPERATURE: 100 F | HEIGHT: 64 IN | RESPIRATION RATE: 18 BRPM | HEART RATE: 91 BPM | WEIGHT: 175.5 LBS | DIASTOLIC BLOOD PRESSURE: 79 MMHG | SYSTOLIC BLOOD PRESSURE: 144 MMHG | BODY MASS INDEX: 29.96 KG/M2

## 2020-11-05 DIAGNOSIS — C56.3 MALIGNANT NEOPLASM OF BOTH OVARIES: Primary | ICD-10-CM

## 2020-11-05 PROCEDURE — 96417 CHEMO IV INFUS EACH ADDL SEQ: CPT

## 2020-11-05 PROCEDURE — 96376 TX/PRO/DX INJ SAME DRUG ADON: CPT

## 2020-11-05 PROCEDURE — 96367 TX/PROPH/DG ADDL SEQ IV INF: CPT

## 2020-11-05 PROCEDURE — 63600175 PHARM REV CODE 636 W HCPCS: Performed by: OBSTETRICS & GYNECOLOGY

## 2020-11-05 PROCEDURE — A4216 STERILE WATER/SALINE, 10 ML: HCPCS | Performed by: OBSTETRICS & GYNECOLOGY

## 2020-11-05 PROCEDURE — 25000003 PHARM REV CODE 250: Performed by: OBSTETRICS & GYNECOLOGY

## 2020-11-05 PROCEDURE — 96413 CHEMO IV INFUSION 1 HR: CPT

## 2020-11-05 PROCEDURE — 96415 CHEMO IV INFUSION ADDL HR: CPT

## 2020-11-05 PROCEDURE — 96365 THER/PROPH/DIAG IV INF INIT: CPT

## 2020-11-05 RX ORDER — FAMOTIDINE 10 MG/ML
20 INJECTION INTRAVENOUS
Status: CANCELLED | OUTPATIENT
Start: 2020-11-05

## 2020-11-05 RX ORDER — HEPARIN 100 UNIT/ML
500 SYRINGE INTRAVENOUS
Status: CANCELLED | OUTPATIENT
Start: 2020-11-05

## 2020-11-05 RX ORDER — SODIUM CHLORIDE 0.9 % (FLUSH) 0.9 %
10 SYRINGE (ML) INJECTION
Status: CANCELLED | OUTPATIENT
Start: 2020-11-05

## 2020-11-05 RX ORDER — DIPHENHYDRAMINE HYDROCHLORIDE 50 MG/ML
50 INJECTION INTRAMUSCULAR; INTRAVENOUS ONCE AS NEEDED
Status: CANCELLED | OUTPATIENT
Start: 2020-11-05

## 2020-11-05 RX ORDER — DIPHENHYDRAMINE HYDROCHLORIDE 50 MG/ML
50 INJECTION INTRAMUSCULAR; INTRAVENOUS ONCE AS NEEDED
Status: DISCONTINUED | OUTPATIENT
Start: 2020-11-05 | End: 2020-11-05 | Stop reason: HOSPADM

## 2020-11-05 RX ORDER — EPINEPHRINE 0.3 MG/.3ML
0.3 INJECTION SUBCUTANEOUS ONCE AS NEEDED
Status: CANCELLED | OUTPATIENT
Start: 2020-11-05

## 2020-11-05 RX ORDER — EPINEPHRINE 0.3 MG/.3ML
0.3 INJECTION SUBCUTANEOUS ONCE AS NEEDED
Status: DISCONTINUED | OUTPATIENT
Start: 2020-11-05 | End: 2020-11-05 | Stop reason: HOSPADM

## 2020-11-05 RX ORDER — HEPARIN 100 UNIT/ML
500 SYRINGE INTRAVENOUS
Status: DISCONTINUED | OUTPATIENT
Start: 2020-11-05 | End: 2020-11-05 | Stop reason: HOSPADM

## 2020-11-05 RX ORDER — SODIUM CHLORIDE 0.9 % (FLUSH) 0.9 %
10 SYRINGE (ML) INJECTION
Status: DISCONTINUED | OUTPATIENT
Start: 2020-11-05 | End: 2020-11-05 | Stop reason: HOSPADM

## 2020-11-05 RX ORDER — FAMOTIDINE 10 MG/ML
20 INJECTION INTRAVENOUS
Status: COMPLETED | OUTPATIENT
Start: 2020-11-05 | End: 2020-11-05

## 2020-11-05 RX ADMIN — DIPHENHYDRAMINE HYDROCHLORIDE 50 MG: 50 INJECTION, SOLUTION INTRAMUSCULAR; INTRAVENOUS at 10:11

## 2020-11-05 RX ADMIN — SODIUM CHLORIDE 500 ML: 0.9 INJECTION, SOLUTION INTRAVENOUS at 02:11

## 2020-11-05 RX ADMIN — CARBOPLATIN 610 MG: 10 INJECTION INTRAVENOUS at 02:11

## 2020-11-05 RX ADMIN — FAMOTIDINE 20 MG: 10 INJECTION INTRAVENOUS at 11:11

## 2020-11-05 RX ADMIN — HEPARIN 500 UNITS: 100 SYRINGE at 04:11

## 2020-11-05 RX ADMIN — SODIUM CHLORIDE: 0.9 INJECTION, SOLUTION INTRAVENOUS at 10:11

## 2020-11-05 RX ADMIN — PACLITAXEL 258 MG: 6 INJECTION, SOLUTION INTRAVENOUS at 11:11

## 2020-11-05 RX ADMIN — DEXAMETHASONE SODIUM PHOSPHATE: 4 INJECTION, SOLUTION INTRA-ARTICULAR; INTRALESIONAL; INTRAMUSCULAR; INTRAVENOUS; SOFT TISSUE at 11:11

## 2020-11-05 RX ADMIN — Medication 10 ML: at 04:11

## 2020-11-05 NOTE — PLAN OF CARE
1610  Infusion completed, pt tolerated well; pt instructed to increase water hydration r/t Carboplatin; discussed when to contact MD, when to report to ER; AVS declined, pt verbalized understanding of all discussed and to report for Udenyca injection tomorrow, pt will arrive at 1530.

## 2020-11-05 NOTE — NURSING
1015  Pt here for Taxol, Carbo, IVFs, accompanied by daughter, no new complaints or concerns at present; discussed treatment plan for today, all questions answered and pt agrees to proceed

## 2020-11-06 ENCOUNTER — INFUSION (OUTPATIENT)
Dept: INFUSION THERAPY | Facility: HOSPITAL | Age: 70
End: 2020-11-06
Attending: OBSTETRICS & GYNECOLOGY
Payer: MEDICARE

## 2020-11-06 DIAGNOSIS — C56.3 MALIGNANT NEOPLASM OF BOTH OVARIES: Primary | ICD-10-CM

## 2020-11-06 PROCEDURE — 96372 THER/PROPH/DIAG INJ SC/IM: CPT

## 2020-11-06 PROCEDURE — 63600175 PHARM REV CODE 636 W HCPCS: Mod: TB | Performed by: OBSTETRICS & GYNECOLOGY

## 2020-11-06 RX ADMIN — PEGFILGRASTIM-CBQV 6 MG: 6 INJECTION, SOLUTION SUBCUTANEOUS at 03:11

## 2020-11-17 ENCOUNTER — NURSE TRIAGE (OUTPATIENT)
Dept: ADMINISTRATIVE | Facility: CLINIC | Age: 70
End: 2020-11-17

## 2020-11-17 ENCOUNTER — TELEPHONE (OUTPATIENT)
Dept: GYNECOLOGIC ONCOLOGY | Facility: CLINIC | Age: 70
End: 2020-11-17

## 2020-11-17 NOTE — TELEPHONE ENCOUNTER
"Pt c/o SOB that has worsen today, pt also c/o fatigue. Pt states that SOB is worse with exertion. Pt states that she wanted to inform Dr. Foster of condition. UC suggested to pt. Pt refused.  Pt advised per protocol and verbalized understanding.    Reason for Disposition   MILD difficulty breathing (e.g., minimal/no SOB at rest, SOB with walking, pulse < 100) of new onset or worse than normal    Additional Information   Negative: Breathing stopped and hasn't returned   Negative: Choking on something   Negative: SEVERE difficulty breathing (e.g., struggling for each breath, speaks in single words, pulse > 120)   Negative: Bluish (or gray) lips or face   Negative: Difficult to awaken or acting confused (e.g., disoriented, slurred speech)   Negative: Passed out (i.e., fainted, collapsed and was not responding)   Negative: Wheezing started suddenly after medicine, an allergic food, or bee sting   Negative: Stridor   Negative: Slow, shallow and weak breathing   Negative: Sounds like a life-threatening emergency to the triager   Negative: MODERATE difficulty breathing (e.g., speaks in phrases, SOB even at rest, pulse 100-120) of new onset or worse than normal   Negative: Wheezing can be heard across the room   Negative: Drooling or spitting out saliva (because can't swallow)   Negative: Any history of prior "blood clot" in leg or lungs   Negative: Illness requiring prolonged bedrest in past month (e.g., immobilization, long hospital stay)   Negative: Hip or leg fracture (broken bone) in past month (or had cast on leg or ankle in past month)   Negative: Major surgery in the past month   Negative: Long-distance travel in past month (e.g., car, bus, train, plane; with trip lasting 6 or more hours)   Negative: Extra heart beats OR irregular heart beating   (i.e., "palpitations")   Negative: Periods where breathing stops and then resumes normally and bedridden (e.g., nursing home patient, CVA)   Negative: " Pregnant or postpartum (from 0 to 6 weeks after delivery)   Negative: Patient sounds very sick or weak to the triager   Negative: Fever > 103 F (39.4 C)   Negative: Fever > 101 F (38.3 C) and over 60 years of age   Negative: Fever > 100.0 F (37.8 C) and bedridden (e.g., nursing home patient, stroke, chronic illness, recovering from surgery)   Negative: Fever > 100.0 F (37.8 C) and diabetes mellitus or weak immune system (e.g., HIV positive, cancer chemo, splenectomy, organ transplant, chronic steroids)    Protocols used: BREATHING DIFFICULTY-A-OH

## 2020-11-17 NOTE — TELEPHONE ENCOUNTER
rec'd pc from pt with c/o CRUZ times one week and pain in her chest when she takes a deep breath. Pt has a pulse oximeter and stats range from 93% -96% RA at rest and on exertion. Pt denies and vomiting or other sx. Advised pt go to the ED for further evaluation. Pt verbalized understanding. BANDAR/MA

## 2020-11-23 ENCOUNTER — TELEPHONE (OUTPATIENT)
Dept: GYNECOLOGIC ONCOLOGY | Facility: CLINIC | Age: 70
End: 2020-11-23

## 2020-11-23 ENCOUNTER — PATIENT MESSAGE (OUTPATIENT)
Dept: GYNECOLOGIC ONCOLOGY | Facility: CLINIC | Age: 70
End: 2020-11-23

## 2020-11-23 DIAGNOSIS — C56.3 MALIGNANT NEOPLASM OF BOTH OVARIES: ICD-10-CM

## 2020-11-23 RX ORDER — HYDROCODONE BITARTRATE AND ACETAMINOPHEN 5; 325 MG/1; MG/1
1 TABLET ORAL EVERY 6 HOURS PRN
Qty: 60 TABLET | Refills: 0 | Status: SHIPPED | OUTPATIENT
Start: 2020-11-23 | End: 2020-12-29 | Stop reason: SDUPTHER

## 2020-11-23 NOTE — PROGRESS NOTES
Subjective:       Patient ID: Liat Gilmore is a 70 y.o. female.    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  Possible stage IVB based upon multiple pulmonary nodules, however, she has a history pulmonary nodules that predated the diagnosis of ovarian cancer.     Nodules have not been biopsied as she declined biopsy.      Oct 2020: CT scan CAP after 4 cycles of taxol and carboplatin shows:  1. History ovarian cancer, status post AMRIT/BSO.  No local recurrence identified.  2. Multiple pulmonary nodules/opacities without significant interval change.  Random nodules could indicate metastatic disease but are nonspecific.  Several of the larger opacities have configuration suggestive of endobronchial material in dilated bronchi from airway infection/inflammation.  Also, clustered micro nodules are consistent with small airway infection/inflammation.  Correlate for bronchitis.  Follow with expected oncology surveillance.  3. A 1.2 cm hypodense lesion in left lobe of liver, unchanged and nonspecific.    Nov 2020: CT scan CAP: p 6 cycles of taxol and carboplatin:    In this patient with history of ovarian cancer status post hysterectomy and bilateral salpingo-oophorectomies, there is no evidence of local disease recurrence. Stable indeterminate 1.2 cm hypodensity within the caudate lobe.     There are multiple pulmonary nodules and irregular opacities scattered throughout both lungs, unchanged in appearance from prior exam 08/31/2020.  While some solid nodules may reflect metastatic disease, majority of the pulmonary opacities demonstrate tubular and/or branching configurations, which can be seen with mucoid impaction, and tree-in-bud configuration, suggesting small airways infection/inflammation (for example MAC infection, given the pattern).      : 11.         : 25(Aug 2020)>12(  "sep 2020)>10(Sept 2020)>11(Nov 2020)     Nov 2020: CBC: WBC:2440. H&H:8.4/27.2. Plt: 89,000. ANC: 1600.   Her oncologic history is:     Referring physician: self referral  Reason for referral:  Ovarian cancer     March 2020 patient began to notice some lower abdominal discomfort.  This was at the height of COVID-19 pandemic.  She was referred to a urologist who did a tele health visit.  An ultrasound in May 2020 showed a complex cyst in the middle pole of the kidney but a pelvic cyst as well.  The patient then return to see her gynecologist Dr. Marily Alston in [place].  An ultrasound showed a complex multi-cystic mass posterior to the uterus; left adnexa 57 x 36 mm cyst and a 30 x 23 x 23 cm complex cyst; right adnexa with a 37 x 31 cm cyst; endometrium not clearly seen.  At that time she was having difficulty with bowel movements.  : 177     06/22/2020:  Patient underwent surgery with Dr. Мария Ratliff.  Had AMRIT BSO and tumor debulking performed.  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  Optimal tumor debulking.  Per review of operative note she is R0.     Post op : July 2020: 114.         Status post 1 cycle chemotherapy with Taxol and carboplatin on  7/16/2020.     Patient has requested a transfer of care to [facility].  Patient was seeing Dr. Мария Ratliff.  Patient has concerns about [facility] as a hospital.  This is not related to physician.  Patient received her other care at [facility].  Pain control was an issue post operatively. Patient cries when she speaks about this. Told she needs a lung biopsy but she is "too terrified" to return to that hospital.  Daughter believes she has PTSD.  Patient is requesting prescription for  alprazolam.      Aug 5, 2020: : 25    Oct 2020: CT scan CAP after 4 cycles of taxol and carboplatin shows:  1. History ovarian cancer, status post AMRIT/BSO.  No local recurrence identified.  2. Multiple pulmonary " nodules/opacities without significant interval change.  Random nodules could indicate metastatic disease but are nonspecific.  Several of the larger opacities have configuration suggestive of endobronchial material in dilated bronchi from airway infection/inflammation.  Also, clustered micro nodules are consistent with small airway infection/inflammation.  Correlate for bronchitis.  Follow with expected oncology surveillance.  3. A 1.2 cm hypodense lesion in left lobe of liver, unchanged and nonspecific.    Nov 2020: CT scan CAP: p 6 cycles of taxol and carboplatin:    In this patient with history of ovarian cancer status post hysterectomy and bilateral salpingo-oophorectomies, there is no evidence of local disease recurrence. Stable indeterminate 1.2 cm hypodensity within the caudate lobe.     There are multiple pulmonary nodules and irregular opacities scattered throughout both lungs, unchanged in appearance from prior exam 08/31/2020.  While some solid nodules may reflect metastatic disease, majority of the pulmonary opacities demonstrate tubular and/or branching configurations, which can be seen with mucoid impaction, and tree-in-bud configuration, suggesting small airways infection/inflammation (for example MAC infection, given the pattern).      : 11.           Myriad myRisk: RNF4 3:  VUS.        Review of Systems   Constitutional: Negative for chills, fatigue and fever.   Respiratory: Negative for cough, shortness of breath and wheezing.    Cardiovascular: Negative for chest pain, palpitations and leg swelling.   Gastrointestinal: Negative for abdominal pain, constipation, diarrhea, nausea and vomiting.   Genitourinary: Negative for difficulty urinating, dysuria, frequency, genital sores, hematuria, urgency, vaginal bleeding, vaginal discharge and vaginal pain.   Musculoskeletal: Negative for gait problem.   Neurological: Negative for weakness and numbness.   Hematological: Negative for adenopathy. Does  not bruise/bleed easily.   Psychiatric/Behavioral: The patient is not nervous/anxious.        Objective:   BP (!) 142/69   Pulse 94   Wt 78.8 kg (173 lb 11.6 oz)   BMI 29.82 kg/m²      Physical Exam  Constitutional:       Appearance: She is well-developed.   HENT:      Head: Normocephalic and atraumatic.   Eyes:      General: No scleral icterus.  Neck:      Thyroid: No thyromegaly.      Trachea: No tracheal deviation.   Cardiovascular:      Rate and Rhythm: Normal rate and regular rhythm.   Pulmonary:      Effort: Pulmonary effort is normal.      Breath sounds: Normal breath sounds.   Abdominal:      General: There is no distension.      Palpations: Abdomen is soft. There is no mass.      Tenderness: There is no abdominal tenderness. There is no guarding or rebound.      Hernia: No hernia is present.   Genitourinary:     Comments: Not performed.   Musculoskeletal:         General: No tenderness.   Lymphadenopathy:      Cervical: No cervical adenopathy.   Skin:     General: Skin is warm and dry.   Neurological:      Mental Status: She is alert and oriented to person, place, and time.   Psychiatric:         Behavior: Behavior normal.         Thought Content: Thought content normal.         Judgment: Judgment normal.         Assessment:       1. Malignant neoplasm of both ovaries    2. Lung mass        Plan:   Malignant neoplasm of both ovaries  S/P 6 cycles Taxol and carboplatin.   Pulmonary disease is thought to be due to prior infection.   She has an upcoming appt with her pulmonologist at [facility]  Will get PET scan to rule out metastatic diease to lungs    Discussed role for PARPi  Hematologic toxicities will need to resolve before starting this.   Patient has done a significant amount of research re: toxicity/SE of PARPi. She is willing to try this. But wants to start at 200 mg daily and not 300 mg daily. Her body weight is 78 kg so she is not much above the baseline of 77 kg to start at 200 mg daily.     Will  send rx to [facility].   Will repeat CBC to see if pancytopenia is resolving.     -     NM PET CT Routine Skull to Mid Thigh; Future; Expected date: 11/25/2020    Lung mass  -     NM PET CT Routine Skull to Mid Thigh; Future; Expected date: 11/25/2020

## 2020-11-23 NOTE — TELEPHONE ENCOUNTER
----- Message from Reyes Jha sent at 11/23/2020  2:55 PM CST -----  Who Called: REYES FERREIRA is the reqeust in detail:Patient would like to reschedule blood work to the 5th floor labs, where she stated the nurses are certified. She has appt scheduled 11/24 so she can have labs taken then. Please adviseCan the clinic reply by [facility]?: YesBest Call Back Number: [phone]

## 2020-11-24 ENCOUNTER — HOSPITAL ENCOUNTER (OUTPATIENT)
Dept: RADIOLOGY | Facility: HOSPITAL | Age: 70
Discharge: HOME | End: 2020-11-24
Attending: OBSTETRICS & GYNECOLOGY
Payer: MEDICARE

## 2020-11-24 ENCOUNTER — INFUSION (OUTPATIENT)
Dept: INFUSION THERAPY | Facility: HOSPITAL | Age: 70
End: 2020-11-24
Attending: OBSTETRICS & GYNECOLOGY
Payer: MEDICARE

## 2020-11-24 DIAGNOSIS — C56.3 MALIGNANT NEOPLASM OF BOTH OVARIES: ICD-10-CM

## 2020-11-24 DIAGNOSIS — C56.3 MALIGNANT NEOPLASM OF BOTH OVARIES: Primary | ICD-10-CM

## 2020-11-24 LAB
ANION GAP SERPL CALC-SCNC: 6 MMOL/L (ref 8–16)
BUN SERPL-MCNC: 13 MG/DL (ref 8–23)
CALCIUM SERPL-MCNC: 9.3 MG/DL (ref 8.7–10.5)
CANCER AG125 SERPL-ACNC: 11 U/ML (ref 0–30)
CHLORIDE SERPL-SCNC: 106 MMOL/L (ref 95–110)
CO2 SERPL-SCNC: 29 MMOL/L (ref 23–29)
CREAT SERPL-MCNC: 0.9 MG/DL (ref 0.5–1.4)
ERYTHROCYTE [DISTWIDTH] IN BLOOD BY AUTOMATED COUNT: 18.5 % (ref 11.5–14.5)
EST. GFR  (AFRICAN AMERICAN): >60 ML/MIN/1.73 M^2
EST. GFR  (NON AFRICAN AMERICAN): >60 ML/MIN/1.73 M^2
GLUCOSE SERPL-MCNC: 100 MG/DL (ref 70–110)
HCT VFR BLD AUTO: 27.2 % (ref 37–48.5)
HGB BLD-MCNC: 8.4 G/DL (ref 12–16)
IMM GRANULOCYTES # BLD AUTO: 0.01 K/UL (ref 0–0.04)
MCH RBC QN AUTO: 32.7 PG (ref 27–31)
MCHC RBC AUTO-ENTMCNC: 30.9 G/DL (ref 32–36)
MCV RBC AUTO: 106 FL (ref 82–98)
NEUTROPHILS # BLD AUTO: 0.9 K/UL (ref 1.8–7.7)
PLATELET # BLD AUTO: 89 K/UL (ref 150–350)
PMV BLD AUTO: 9.6 FL (ref 9.2–12.9)
POTASSIUM SERPL-SCNC: 4.1 MMOL/L (ref 3.5–5.1)
RBC # BLD AUTO: 2.57 M/UL (ref 4–5.4)
SODIUM SERPL-SCNC: 141 MMOL/L (ref 136–145)
WBC # BLD AUTO: 2.44 K/UL (ref 3.9–12.7)

## 2020-11-24 PROCEDURE — 25000003 PHARM REV CODE 250: Performed by: OBSTETRICS & GYNECOLOGY

## 2020-11-24 PROCEDURE — 74177 CT ABD & PELVIS W/CONTRAST: CPT | Mod: 26,,, | Performed by: RADIOLOGY

## 2020-11-24 PROCEDURE — 71260 CT CHEST ABDOMEN PELVIS WITH CONTRAST (XPD): ICD-10-PCS | Mod: 26,,, | Performed by: RADIOLOGY

## 2020-11-24 PROCEDURE — 25500020 PHARM REV CODE 255: Performed by: OBSTETRICS & GYNECOLOGY

## 2020-11-24 PROCEDURE — 85027 COMPLETE CBC AUTOMATED: CPT

## 2020-11-24 PROCEDURE — 80048 BASIC METABOLIC PNL TOTAL CA: CPT

## 2020-11-24 PROCEDURE — 74177 CT CHEST ABDOMEN PELVIS WITH CONTRAST (XPD): ICD-10-PCS | Mod: 26,,, | Performed by: RADIOLOGY

## 2020-11-24 PROCEDURE — 86304 IMMUNOASSAY TUMOR CA 125: CPT

## 2020-11-24 PROCEDURE — 36591 DRAW BLOOD OFF VENOUS DEVICE: CPT

## 2020-11-24 PROCEDURE — A4216 STERILE WATER/SALINE, 10 ML: HCPCS | Performed by: OBSTETRICS & GYNECOLOGY

## 2020-11-24 PROCEDURE — 63600175 PHARM REV CODE 636 W HCPCS: Performed by: OBSTETRICS & GYNECOLOGY

## 2020-11-24 PROCEDURE — 74177 CT ABD & PELVIS W/CONTRAST: CPT | Mod: TC

## 2020-11-24 PROCEDURE — 71260 CT THORAX DX C+: CPT | Mod: 26,,, | Performed by: RADIOLOGY

## 2020-11-24 RX ORDER — HEPARIN 100 UNIT/ML
500 SYRINGE INTRAVENOUS
Status: DISCONTINUED | OUTPATIENT
Start: 2020-11-24 | End: 2020-11-24 | Stop reason: HOSPADM

## 2020-11-24 RX ORDER — SODIUM CHLORIDE 0.9 % (FLUSH) 0.9 %
10 SYRINGE (ML) INJECTION
Status: DISCONTINUED | OUTPATIENT
Start: 2020-11-24 | End: 2020-11-24 | Stop reason: HOSPADM

## 2020-11-24 RX ORDER — SODIUM CHLORIDE 0.9 % (FLUSH) 0.9 %
10 SYRINGE (ML) INJECTION
Status: CANCELLED | OUTPATIENT
Start: 2020-11-24

## 2020-11-24 RX ORDER — HEPARIN 100 UNIT/ML
500 SYRINGE INTRAVENOUS
Status: CANCELLED | OUTPATIENT
Start: 2020-11-24

## 2020-11-24 RX ADMIN — HEPARIN 500 UNITS: 100 SYRINGE at 08:11

## 2020-11-24 RX ADMIN — IOHEXOL 75 ML: 350 INJECTION, SOLUTION INTRAVENOUS at 10:11

## 2020-11-24 RX ADMIN — IOHEXOL 15 ML: 350 INJECTION, SOLUTION INTRAVENOUS at 10:11

## 2020-11-24 RX ADMIN — Medication 10 ML: at 08:11

## 2020-11-24 NOTE — PROGRESS NOTES
Received patient in [facility] CT-Scan room with single lumen port a cath  to right upper chest,  previously accessed, allergies reviewed, two ID verified, assessed port,  blood return noted, flushed with 10ml saline, post CT-Scan procedure, saline flushed 10ml, aseptic technique maintained. CT scan exam D/C instructions given to patient, patient report back to Chemo Infusion for port de-access.

## 2020-11-24 NOTE — NURSING
Pt tolerated Lab draw today. NAD. Port flushed + blood return present, flushed. Hep lock , left in place.pt has CT scan. Pt returned for port deaccessed flushed and bandaid applied. declined AVS. Uses my Ochn[facility]. Discharged home. Ambulated independently.

## 2020-11-25 ENCOUNTER — OFFICE VISIT (OUTPATIENT)
Dept: GYNECOLOGIC ONCOLOGY | Facility: CLINIC | Age: 70
End: 2020-11-25
Payer: MEDICARE

## 2020-11-25 VITALS
HEART RATE: 94 BPM | WEIGHT: 173.75 LBS | DIASTOLIC BLOOD PRESSURE: 69 MMHG | BODY MASS INDEX: 29.82 KG/M2 | SYSTOLIC BLOOD PRESSURE: 142 MMHG

## 2020-11-25 DIAGNOSIS — C56.3 MALIGNANT NEOPLASM OF BOTH OVARIES: Primary | ICD-10-CM

## 2020-11-25 DIAGNOSIS — R91.8 LUNG MASS: ICD-10-CM

## 2020-11-25 PROCEDURE — 3008F BODY MASS INDEX DOCD: CPT | Mod: CPTII,S$GLB,, | Performed by: OBSTETRICS & GYNECOLOGY

## 2020-11-25 PROCEDURE — 1125F PR PAIN SEVERITY QUANTIFIED, PAIN PRESENT: ICD-10-PCS | Mod: S$GLB,,, | Performed by: OBSTETRICS & GYNECOLOGY

## 2020-11-25 PROCEDURE — 1125F AMNT PAIN NOTED PAIN PRSNT: CPT | Mod: S$GLB,,, | Performed by: OBSTETRICS & GYNECOLOGY

## 2020-11-25 PROCEDURE — 99999 PR PBB SHADOW E&M-EST. PATIENT-LVL IV: CPT | Mod: PBBFAC,,, | Performed by: OBSTETRICS & GYNECOLOGY

## 2020-11-25 PROCEDURE — 3288F PR FALLS RISK ASSESSMENT DOCUMENTED: ICD-10-PCS | Mod: CPTII,S$GLB,, | Performed by: OBSTETRICS & GYNECOLOGY

## 2020-11-25 PROCEDURE — 3077F PR MOST RECENT SYSTOLIC BLOOD PRESSURE >= 140 MM HG: ICD-10-PCS | Mod: CPTII,S$GLB,, | Performed by: OBSTETRICS & GYNECOLOGY

## 2020-11-25 PROCEDURE — 1159F PR MEDICATION LIST DOCUMENTED IN MEDICAL RECORD: ICD-10-PCS | Mod: S$GLB,,, | Performed by: OBSTETRICS & GYNECOLOGY

## 2020-11-25 PROCEDURE — 3008F PR BODY MASS INDEX (BMI) DOCUMENTED: ICD-10-PCS | Mod: CPTII,S$GLB,, | Performed by: OBSTETRICS & GYNECOLOGY

## 2020-11-25 PROCEDURE — 99214 PR OFFICE/OUTPT VISIT, EST, LEVL IV, 30-39 MIN: ICD-10-PCS | Mod: S$GLB,,, | Performed by: OBSTETRICS & GYNECOLOGY

## 2020-11-25 PROCEDURE — 99214 OFFICE O/P EST MOD 30 MIN: CPT | Mod: S$GLB,,, | Performed by: OBSTETRICS & GYNECOLOGY

## 2020-11-25 PROCEDURE — 1101F PR PT FALLS ASSESS DOC 0-1 FALLS W/OUT INJ PAST YR: ICD-10-PCS | Mod: CPTII,S$GLB,, | Performed by: OBSTETRICS & GYNECOLOGY

## 2020-11-25 PROCEDURE — 3078F PR MOST RECENT DIASTOLIC BLOOD PRESSURE < 80 MM HG: ICD-10-PCS | Mod: CPTII,S$GLB,, | Performed by: OBSTETRICS & GYNECOLOGY

## 2020-11-25 PROCEDURE — 99999 PR PBB SHADOW E&M-EST. PATIENT-LVL IV: ICD-10-PCS | Mod: PBBFAC,,, | Performed by: OBSTETRICS & GYNECOLOGY

## 2020-11-25 PROCEDURE — 3288F FALL RISK ASSESSMENT DOCD: CPT | Mod: CPTII,S$GLB,, | Performed by: OBSTETRICS & GYNECOLOGY

## 2020-11-25 PROCEDURE — 1101F PT FALLS ASSESS-DOCD LE1/YR: CPT | Mod: CPTII,S$GLB,, | Performed by: OBSTETRICS & GYNECOLOGY

## 2020-11-25 PROCEDURE — 3077F SYST BP >= 140 MM HG: CPT | Mod: CPTII,S$GLB,, | Performed by: OBSTETRICS & GYNECOLOGY

## 2020-11-25 PROCEDURE — 1159F MED LIST DOCD IN RCRD: CPT | Mod: S$GLB,,, | Performed by: OBSTETRICS & GYNECOLOGY

## 2020-11-25 PROCEDURE — 3078F DIAST BP <80 MM HG: CPT | Mod: CPTII,S$GLB,, | Performed by: OBSTETRICS & GYNECOLOGY

## 2020-12-01 ENCOUNTER — INFUSION (OUTPATIENT)
Dept: INFUSION THERAPY | Facility: HOSPITAL | Age: 70
End: 2020-12-01
Attending: OBSTETRICS & GYNECOLOGY
Payer: MEDICARE

## 2020-12-01 DIAGNOSIS — C56.3 MALIGNANT NEOPLASM OF BOTH OVARIES: Primary | ICD-10-CM

## 2020-12-01 LAB
ERYTHROCYTE [DISTWIDTH] IN BLOOD BY AUTOMATED COUNT: 18.6 % (ref 11.5–14.5)
HCT VFR BLD AUTO: 29.8 % (ref 37–48.5)
HGB BLD-MCNC: 9.3 G/DL (ref 12–16)
IMM GRANULOCYTES # BLD AUTO: 0.01 K/UL (ref 0–0.04)
MCH RBC QN AUTO: 32.6 PG (ref 27–31)
MCHC RBC AUTO-ENTMCNC: 31.2 G/DL (ref 32–36)
MCV RBC AUTO: 105 FL (ref 82–98)
NEUTROPHILS # BLD AUTO: 1.5 K/UL (ref 1.8–7.7)
PLATELET # BLD AUTO: 162 K/UL (ref 150–350)
PMV BLD AUTO: 8.4 FL (ref 9.2–12.9)
RBC # BLD AUTO: 2.85 M/UL (ref 4–5.4)
WBC # BLD AUTO: 3 K/UL (ref 3.9–12.7)

## 2020-12-01 PROCEDURE — 36591 DRAW BLOOD OFF VENOUS DEVICE: CPT

## 2020-12-01 PROCEDURE — A4216 STERILE WATER/SALINE, 10 ML: HCPCS | Performed by: OBSTETRICS & GYNECOLOGY

## 2020-12-01 PROCEDURE — 63600175 PHARM REV CODE 636 W HCPCS: Performed by: OBSTETRICS & GYNECOLOGY

## 2020-12-01 PROCEDURE — 85027 COMPLETE CBC AUTOMATED: CPT

## 2020-12-01 PROCEDURE — 25000003 PHARM REV CODE 250: Performed by: OBSTETRICS & GYNECOLOGY

## 2020-12-01 RX ORDER — SODIUM CHLORIDE 0.9 % (FLUSH) 0.9 %
10 SYRINGE (ML) INJECTION
Status: DISCONTINUED | OUTPATIENT
Start: 2020-12-01 | End: 2020-12-01 | Stop reason: HOSPADM

## 2020-12-01 RX ORDER — HEPARIN 100 UNIT/ML
500 SYRINGE INTRAVENOUS
Status: CANCELLED | OUTPATIENT
Start: 2020-12-01

## 2020-12-01 RX ORDER — HEPARIN 100 UNIT/ML
500 SYRINGE INTRAVENOUS
Status: DISCONTINUED | OUTPATIENT
Start: 2020-12-01 | End: 2020-12-01 | Stop reason: HOSPADM

## 2020-12-01 RX ORDER — SODIUM CHLORIDE 0.9 % (FLUSH) 0.9 %
10 SYRINGE (ML) INJECTION
Status: CANCELLED | OUTPATIENT
Start: 2020-12-01

## 2020-12-01 RX ADMIN — Medication 10 ML: at 02:12

## 2020-12-01 RX ADMIN — HEPARIN 500 UNITS: 100 SYRINGE at 02:12

## 2020-12-01 NOTE — NURSING
1400 pt arrived for port collect labs. Ambulatory to infusion chair. Pt doing well, s/p completion of chemo. Port accessed maintaining sterility. Flushed easil, +blood return. Labs obtained. Sent down for evaluation. Line flushed, hep locked and de-accessed. Pt ambulatory out independently. To return next week for port access prior to imaging.

## 2020-12-02 ENCOUNTER — TELEPHONE (OUTPATIENT)
Dept: GYNECOLOGIC ONCOLOGY | Facility: CLINIC | Age: 70
End: 2020-12-02

## 2020-12-02 DIAGNOSIS — C56.3 MALIGNANT NEOPLASM OF BOTH OVARIES: Primary | ICD-10-CM

## 2020-12-02 NOTE — TELEPHONE ENCOUNTER
"Patient has decided against starting a PARP inhibitor.    I discussed with her that her CBC from 12/01/2020 shows improvement in her hematologic toxicities from her chemotherapy.    Patient has a PET scan scheduled for December 9, 2020.  I will call her with the results of that.  This is being obtained because her CT of the chest abdomen and pelvis shows pulmonary nodules which are chronic in nature.    Plan at this time will be for me to call her with this PET scan result and then see her in 3 months with a  min less otherwise indicated by the PET scan.    ----- Message from Quique Reyes MA sent at 12/2/2020  2:34 PM CST -----  Pt would like to know why an appt was schd this am for her to repeat labs. Pt is leaning towards not starting a PARP Inhibitor and "Living and Enjoying life for now". Please call pt.     Quique  ----- Message -----  From: Octavia Forde, Patient Care Assistant  Sent: 12/2/2020  10:51 AM CST  To: Kristin GUERRERO Staff    Name of Who is Calling: REYES FERREIRA [[id number]]    What is the request in detail: Requesting a call back in regards of blood work. Please contact to further discuss and advise      Can the clinic reply by [facility]: No    What [facility]:  [phone]            "

## 2020-12-02 NOTE — TELEPHONE ENCOUNTER
"rec'd pc from pt wanting  to know why an appt was schd this am for her to repeat labs. Pt is leaning towards not starting a PARP Inhibitor and "Living and Enjoying life for now". Message sent to Dr. Foster to address. TA/MA    "

## 2020-12-02 NOTE — TELEPHONE ENCOUNTER
----- Message from Octavia Forde, Patient Care Assistant sent at 12/2/2020 10:51 AM CST -----  Name of Who is Calling: REYES FERREIRA [[id number]]    What is the request in detail: Requesting a call back in regards of blood work. Please contact to further discuss and advise      Can the clinic reply by [facility]: No    What [facility]:  [phone]

## 2020-12-09 ENCOUNTER — INFUSION (OUTPATIENT)
Dept: INFUSION THERAPY | Facility: HOSPITAL | Age: 70
End: 2020-12-09
Attending: OBSTETRICS & GYNECOLOGY
Payer: MEDICARE

## 2020-12-09 ENCOUNTER — HOSPITAL ENCOUNTER (OUTPATIENT)
Dept: RADIOLOGY | Facility: HOSPITAL | Age: 70
Discharge: HOME | End: 2020-12-09
Attending: OBSTETRICS & GYNECOLOGY
Payer: MEDICARE

## 2020-12-09 DIAGNOSIS — C56.3 MALIGNANT NEOPLASM OF BOTH OVARIES: ICD-10-CM

## 2020-12-09 DIAGNOSIS — R91.8 LUNG MASS: ICD-10-CM

## 2020-12-09 DIAGNOSIS — C56.3 MALIGNANT NEOPLASM OF BOTH OVARIES: Primary | ICD-10-CM

## 2020-12-09 LAB — POCT GLUCOSE: 101 MG/DL (ref 70–110)

## 2020-12-09 PROCEDURE — 78815 NM PET CT ROUTINE: ICD-10-PCS | Mod: 26,PS,, | Performed by: RADIOLOGY

## 2020-12-09 PROCEDURE — A4216 STERILE WATER/SALINE, 10 ML: HCPCS | Performed by: OBSTETRICS & GYNECOLOGY

## 2020-12-09 PROCEDURE — 63600175 PHARM REV CODE 636 W HCPCS: Performed by: OBSTETRICS & GYNECOLOGY

## 2020-12-09 PROCEDURE — 78815 PET IMAGE W/CT SKULL-THIGH: CPT | Mod: 26,PS,, | Performed by: RADIOLOGY

## 2020-12-09 PROCEDURE — A9552 F18 FDG: HCPCS

## 2020-12-09 PROCEDURE — 78815 PET IMAGE W/CT SKULL-THIGH: CPT | Mod: TC

## 2020-12-09 PROCEDURE — 96523 IRRIG DRUG DELIVERY DEVICE: CPT

## 2020-12-09 PROCEDURE — 25000003 PHARM REV CODE 250: Performed by: OBSTETRICS & GYNECOLOGY

## 2020-12-09 RX ORDER — HEPARIN 100 UNIT/ML
500 SYRINGE INTRAVENOUS
Status: CANCELLED | OUTPATIENT
Start: 2020-12-09

## 2020-12-09 RX ORDER — SODIUM CHLORIDE 0.9 % (FLUSH) 0.9 %
10 SYRINGE (ML) INJECTION
Status: DISCONTINUED | OUTPATIENT
Start: 2020-12-09 | End: 2020-12-09 | Stop reason: HOSPADM

## 2020-12-09 RX ORDER — HEPARIN 100 UNIT/ML
500 SYRINGE INTRAVENOUS
Status: DISCONTINUED | OUTPATIENT
Start: 2020-12-09 | End: 2020-12-09 | Stop reason: HOSPADM

## 2020-12-09 RX ORDER — SODIUM CHLORIDE 0.9 % (FLUSH) 0.9 %
10 SYRINGE (ML) INJECTION
Status: CANCELLED | OUTPATIENT
Start: 2020-12-09

## 2020-12-09 RX ADMIN — Medication 10 ML: at 08:12

## 2020-12-09 RX ADMIN — HEPARIN 500 UNITS: 100 SYRINGE at 08:12

## 2020-12-09 NOTE — NURSING
Pt tolerated Port Access for PET scan today. NAD. Port flushed + blood return present, flushed. Hep lock capped and clamped. declined AVS. Uses my O[facility]. Discharged PET scan, pt to RTC to get deaccessed after appt. Pt RTC for port deaccessed. Ambulated independently with daughter.

## 2020-12-10 ENCOUNTER — TELEPHONE (OUTPATIENT)
Dept: GYNECOLOGIC ONCOLOGY | Facility: CLINIC | Age: 70
End: 2020-12-10

## 2020-12-10 DIAGNOSIS — R91.8 PULMONARY NODULES: ICD-10-CM

## 2020-12-10 DIAGNOSIS — C56.3 MALIGNANT NEOPLASM OF BOTH OVARIES: Primary | ICD-10-CM

## 2020-12-10 NOTE — TELEPHONE ENCOUNTER
Altered gave the patient the PET scan result.  I told her that on concerned progression verses worsening of her chronic pulmonary disease.    She sees Dr. Fernando Fuchs at [facility].  I have recommended that she see a pulmonologist at [facility] to help coordinate care.    I recommended a lung biopsy to her but she is hesitant to undergo this.  She has refused a lung biopsy previously when she was receiving her care at [facility].    Patient voiced understanding.

## 2020-12-11 ENCOUNTER — TELEPHONE (OUTPATIENT)
Dept: PULMONOLOGY | Facility: CLINIC | Age: 70
End: 2020-12-11

## 2020-12-11 NOTE — TELEPHONE ENCOUNTER
----- Message from Elena Campbell MD sent at 12/10/2020  1:54 PM CST -----  Can we put her on for Monday afternoon clinic at [facility]?  Thanks,  Elena  ----- Message -----  From: Francisco Foster MD  Sent: 12/10/2020  12:08 PM CST  To: MD Elena Zeng,   Could you or one of your partners see this patient. Ovarian cancer. Has completed treatment. History of pulmonary disease in the past. Sees Dr. Fernando Fuchs at [facility]. She transferred her care to me mid way thru her chemotherapy .     I am concerned if the changes on PET are related to chronic lung disease vs progression of cancer.     I called her and told her I want her to see a Pulmonologist here to help sort thru this. A lung biopsy has been recommended in the past and she declined.     She and her daughter are very detailed oriented.     Thank you,   Ralph

## 2020-12-11 NOTE — TELEPHONE ENCOUNTER
I spoke to patient. I offered an appointment with Dr Campbell on 12/14/20. Patient declined and stated she has an appointment with her pulmonologist on 12/21/20 Dr Fernando Fuchs at [facility]. Patient would like to have one more visit with Dr Fuchs and stated if she could follow up with Dr Campbell in January, 2021. I let Ms Gilmore know I would send a message to Dr Campbell. Patient verbalized understanding.

## 2020-12-12 ENCOUNTER — PATIENT MESSAGE (OUTPATIENT)
Dept: GYNECOLOGIC ONCOLOGY | Facility: CLINIC | Age: 70
End: 2020-12-12

## 2020-12-16 ENCOUNTER — INFUSION (OUTPATIENT)
Dept: INFUSION THERAPY | Facility: HOSPITAL | Age: 70
End: 2020-12-16
Attending: OBSTETRICS & GYNECOLOGY
Payer: MEDICARE

## 2020-12-16 DIAGNOSIS — C56.3 MALIGNANT NEOPLASM OF BOTH OVARIES: Primary | ICD-10-CM

## 2020-12-16 LAB
ALBUMIN SERPL BCP-MCNC: 4 G/DL (ref 3.5–5.2)
ALP SERPL-CCNC: 96 U/L (ref 55–135)
ALT SERPL W/O P-5'-P-CCNC: 13 U/L (ref 10–44)
ANION GAP SERPL CALC-SCNC: 10 MMOL/L (ref 8–16)
AST SERPL-CCNC: 16 U/L (ref 10–40)
BILIRUB SERPL-MCNC: 0.3 MG/DL (ref 0.1–1)
BUN SERPL-MCNC: 16 MG/DL (ref 8–23)
CALCIUM SERPL-MCNC: 9.6 MG/DL (ref 8.7–10.5)
CHLORIDE SERPL-SCNC: 105 MMOL/L (ref 95–110)
CO2 SERPL-SCNC: 27 MMOL/L (ref 23–29)
CREAT SERPL-MCNC: 1.1 MG/DL (ref 0.5–1.4)
ERYTHROCYTE [DISTWIDTH] IN BLOOD BY AUTOMATED COUNT: 16 % (ref 11.5–14.5)
EST. GFR  (AFRICAN AMERICAN): 59.2 ML/MIN/1.73 M^2
EST. GFR  (NON AFRICAN AMERICAN): 51.3 ML/MIN/1.73 M^2
GLUCOSE SERPL-MCNC: 115 MG/DL (ref 70–110)
HCT VFR BLD AUTO: 33.1 % (ref 37–48.5)
HGB BLD-MCNC: 10.5 G/DL (ref 12–16)
IMM GRANULOCYTES # BLD AUTO: 0.01 K/UL (ref 0–0.04)
MCH RBC QN AUTO: 34 PG (ref 27–31)
MCHC RBC AUTO-ENTMCNC: 31.7 G/DL (ref 32–36)
MCV RBC AUTO: 107 FL (ref 82–98)
NEUTROPHILS # BLD AUTO: 2.2 K/UL (ref 1.8–7.7)
PLATELET # BLD AUTO: 333 K/UL (ref 150–350)
PMV BLD AUTO: 8.6 FL (ref 9.2–12.9)
POTASSIUM SERPL-SCNC: 4.4 MMOL/L (ref 3.5–5.1)
PROT SERPL-MCNC: 7.6 G/DL (ref 6–8.4)
RBC # BLD AUTO: 3.09 M/UL (ref 4–5.4)
SODIUM SERPL-SCNC: 142 MMOL/L (ref 136–145)
WBC # BLD AUTO: 3.63 K/UL (ref 3.9–12.7)

## 2020-12-16 PROCEDURE — 63600175 PHARM REV CODE 636 W HCPCS: Performed by: OBSTETRICS & GYNECOLOGY

## 2020-12-16 PROCEDURE — 80053 COMPREHEN METABOLIC PANEL: CPT

## 2020-12-16 PROCEDURE — 25000003 PHARM REV CODE 250: Performed by: OBSTETRICS & GYNECOLOGY

## 2020-12-16 PROCEDURE — 85027 COMPLETE CBC AUTOMATED: CPT

## 2020-12-16 PROCEDURE — 36591 DRAW BLOOD OFF VENOUS DEVICE: CPT

## 2020-12-16 PROCEDURE — A4216 STERILE WATER/SALINE, 10 ML: HCPCS | Performed by: OBSTETRICS & GYNECOLOGY

## 2020-12-16 RX ORDER — HEPARIN 100 UNIT/ML
500 SYRINGE INTRAVENOUS
Status: CANCELLED | OUTPATIENT
Start: 2020-12-16

## 2020-12-16 RX ORDER — SODIUM CHLORIDE 0.9 % (FLUSH) 0.9 %
10 SYRINGE (ML) INJECTION
Status: DISCONTINUED | OUTPATIENT
Start: 2020-12-16 | End: 2020-12-16 | Stop reason: HOSPADM

## 2020-12-16 RX ORDER — SODIUM CHLORIDE 0.9 % (FLUSH) 0.9 %
10 SYRINGE (ML) INJECTION
Status: CANCELLED | OUTPATIENT
Start: 2020-12-16

## 2020-12-16 RX ORDER — HEPARIN 100 UNIT/ML
500 SYRINGE INTRAVENOUS
Status: DISCONTINUED | OUTPATIENT
Start: 2020-12-16 | End: 2020-12-16 | Stop reason: HOSPADM

## 2020-12-16 RX ADMIN — Medication 10 ML: at 03:12

## 2020-12-16 RX ADMIN — HEPARIN 500 UNITS: 100 SYRINGE at 03:12

## 2020-12-16 NOTE — NURSING
PAC accessed with blood return noted. Labs obtained and sent off. PAC de-accessed- no new complaints.

## 2020-12-23 ENCOUNTER — PATIENT MESSAGE (OUTPATIENT)
Dept: GYNECOLOGIC ONCOLOGY | Facility: CLINIC | Age: 70
End: 2020-12-23

## 2020-12-28 ENCOUNTER — PATIENT MESSAGE (OUTPATIENT)
Dept: GYNECOLOGIC ONCOLOGY | Facility: CLINIC | Age: 70
End: 2020-12-28

## 2020-12-28 DIAGNOSIS — A60.04 HERPES SIMPLEX VULVOVAGINITIS: Primary | ICD-10-CM

## 2020-12-28 RX ORDER — ACYCLOVIR 800 MG/1
800 TABLET ORAL DAILY
Qty: 30 TABLET | Refills: 3 | Status: SHIPPED | OUTPATIENT
Start: 2020-12-28 | End: 2021-06-09 | Stop reason: DRUGHIGH

## 2021-01-12 DIAGNOSIS — C56.3 MALIGNANT NEOPLASM OF BOTH OVARIES: Primary | ICD-10-CM

## 2021-01-13 ENCOUNTER — OFFICE VISIT (OUTPATIENT)
Dept: PULMONOLOGY | Facility: CLINIC | Age: 71
End: 2021-01-13
Payer: MEDICARE

## 2021-01-13 VITALS
DIASTOLIC BLOOD PRESSURE: 75 MMHG | BODY MASS INDEX: 30.93 KG/M2 | HEART RATE: 83 BPM | OXYGEN SATURATION: 96 % | HEIGHT: 64 IN | SYSTOLIC BLOOD PRESSURE: 132 MMHG | WEIGHT: 181.19 LBS

## 2021-01-13 DIAGNOSIS — R91.8 MULTIPLE LUNG NODULES ON CT: Primary | ICD-10-CM

## 2021-01-13 DIAGNOSIS — J45.991 COUGH VARIANT ASTHMA: ICD-10-CM

## 2021-01-13 DIAGNOSIS — C56.9 MALIGNANT NEOPLASM OF OVARY, UNSPECIFIED LATERALITY: ICD-10-CM

## 2021-01-13 PROCEDURE — 1101F PT FALLS ASSESS-DOCD LE1/YR: CPT | Mod: CPTII,S$GLB,, | Performed by: INTERNAL MEDICINE

## 2021-01-13 PROCEDURE — 1126F AMNT PAIN NOTED NONE PRSNT: CPT | Mod: S$GLB,,, | Performed by: INTERNAL MEDICINE

## 2021-01-13 PROCEDURE — 3078F DIAST BP <80 MM HG: CPT | Mod: CPTII,S$GLB,, | Performed by: INTERNAL MEDICINE

## 2021-01-13 PROCEDURE — 3008F PR BODY MASS INDEX (BMI) DOCUMENTED: ICD-10-PCS | Mod: CPTII,S$GLB,, | Performed by: INTERNAL MEDICINE

## 2021-01-13 PROCEDURE — 3075F PR MOST RECENT SYSTOLIC BLOOD PRESS GE 130-139MM HG: ICD-10-PCS | Mod: CPTII,S$GLB,, | Performed by: INTERNAL MEDICINE

## 2021-01-13 PROCEDURE — 1159F MED LIST DOCD IN RCRD: CPT | Mod: S$GLB,,, | Performed by: INTERNAL MEDICINE

## 2021-01-13 PROCEDURE — 99999 PR PBB SHADOW E&M-EST. PATIENT-LVL V: ICD-10-PCS | Mod: PBBFAC,,, | Performed by: INTERNAL MEDICINE

## 2021-01-13 PROCEDURE — 3288F PR FALLS RISK ASSESSMENT DOCUMENTED: ICD-10-PCS | Mod: CPTII,S$GLB,, | Performed by: INTERNAL MEDICINE

## 2021-01-13 PROCEDURE — 3078F PR MOST RECENT DIASTOLIC BLOOD PRESSURE < 80 MM HG: ICD-10-PCS | Mod: CPTII,S$GLB,, | Performed by: INTERNAL MEDICINE

## 2021-01-13 PROCEDURE — 99999 PR PBB SHADOW E&M-EST. PATIENT-LVL V: CPT | Mod: PBBFAC,,, | Performed by: INTERNAL MEDICINE

## 2021-01-13 PROCEDURE — 1126F PR PAIN SEVERITY QUANTIFIED, NO PAIN PRESENT: ICD-10-PCS | Mod: S$GLB,,, | Performed by: INTERNAL MEDICINE

## 2021-01-13 PROCEDURE — 3008F BODY MASS INDEX DOCD: CPT | Mod: CPTII,S$GLB,, | Performed by: INTERNAL MEDICINE

## 2021-01-13 PROCEDURE — 3075F SYST BP GE 130 - 139MM HG: CPT | Mod: CPTII,S$GLB,, | Performed by: INTERNAL MEDICINE

## 2021-01-13 PROCEDURE — 99205 OFFICE O/P NEW HI 60 MIN: CPT | Mod: S$GLB,,, | Performed by: INTERNAL MEDICINE

## 2021-01-13 PROCEDURE — 3288F FALL RISK ASSESSMENT DOCD: CPT | Mod: CPTII,S$GLB,, | Performed by: INTERNAL MEDICINE

## 2021-01-13 PROCEDURE — 1159F PR MEDICATION LIST DOCUMENTED IN MEDICAL RECORD: ICD-10-PCS | Mod: S$GLB,,, | Performed by: INTERNAL MEDICINE

## 2021-01-13 PROCEDURE — 99205 PR OFFICE/OUTPT VISIT, NEW, LEVL V, 60-74 MIN: ICD-10-PCS | Mod: S$GLB,,, | Performed by: INTERNAL MEDICINE

## 2021-01-13 PROCEDURE — 1101F PR PT FALLS ASSESS DOC 0-1 FALLS W/OUT INJ PAST YR: ICD-10-PCS | Mod: CPTII,S$GLB,, | Performed by: INTERNAL MEDICINE

## 2021-01-17 PROBLEM — J45.991 COUGH VARIANT ASTHMA: Status: ACTIVE | Noted: 2018-10-31

## 2021-01-26 ENCOUNTER — OFFICE VISIT (OUTPATIENT)
Dept: INTERVENTIONAL RADIOLOGY/VASCULAR | Facility: CLINIC | Age: 71
End: 2021-01-26
Payer: MEDICARE

## 2021-01-26 ENCOUNTER — INFUSION (OUTPATIENT)
Dept: INFUSION THERAPY | Facility: HOSPITAL | Age: 71
End: 2021-01-26
Attending: OBSTETRICS & GYNECOLOGY
Payer: MEDICARE

## 2021-01-26 VITALS
WEIGHT: 182.75 LBS | HEART RATE: 100 BPM | SYSTOLIC BLOOD PRESSURE: 158 MMHG | BODY MASS INDEX: 31.2 KG/M2 | HEIGHT: 64 IN | DIASTOLIC BLOOD PRESSURE: 85 MMHG

## 2021-01-26 DIAGNOSIS — C56.3 MALIGNANT NEOPLASM OF BOTH OVARIES: Primary | ICD-10-CM

## 2021-01-26 DIAGNOSIS — Z01.812 PRE-PROCEDURE LAB EXAM: ICD-10-CM

## 2021-01-26 DIAGNOSIS — R91.8 MULTIPLE LUNG NODULES ON CT: ICD-10-CM

## 2021-01-26 DIAGNOSIS — R91.8 LUNG MASS: Primary | ICD-10-CM

## 2021-01-26 LAB — CANCER AG125 SERPL-ACNC: 12 U/ML (ref 0–30)

## 2021-01-26 PROCEDURE — 99999 PR PBB SHADOW E&M-EST. PATIENT-LVL IV: CPT | Mod: PBBFAC,,, | Performed by: FAMILY MEDICINE

## 2021-01-26 PROCEDURE — 3077F PR MOST RECENT SYSTOLIC BLOOD PRESSURE >= 140 MM HG: ICD-10-PCS | Mod: CPTII,S$GLB,, | Performed by: FAMILY MEDICINE

## 2021-01-26 PROCEDURE — 3077F SYST BP >= 140 MM HG: CPT | Mod: CPTII,S$GLB,, | Performed by: FAMILY MEDICINE

## 2021-01-26 PROCEDURE — A4216 STERILE WATER/SALINE, 10 ML: HCPCS | Performed by: OBSTETRICS & GYNECOLOGY

## 2021-01-26 PROCEDURE — 3008F PR BODY MASS INDEX (BMI) DOCUMENTED: ICD-10-PCS | Mod: CPTII,S$GLB,, | Performed by: FAMILY MEDICINE

## 2021-01-26 PROCEDURE — 3079F PR MOST RECENT DIASTOLIC BLOOD PRESSURE 80-89 MM HG: ICD-10-PCS | Mod: CPTII,S$GLB,, | Performed by: FAMILY MEDICINE

## 2021-01-26 PROCEDURE — 99214 OFFICE O/P EST MOD 30 MIN: CPT | Mod: S$GLB,,, | Performed by: FAMILY MEDICINE

## 2021-01-26 PROCEDURE — 25000003 PHARM REV CODE 250: Performed by: OBSTETRICS & GYNECOLOGY

## 2021-01-26 PROCEDURE — 99999 PR PBB SHADOW E&M-EST. PATIENT-LVL IV: ICD-10-PCS | Mod: PBBFAC,,, | Performed by: FAMILY MEDICINE

## 2021-01-26 PROCEDURE — 99214 PR OFFICE/OUTPT VISIT, EST, LEVL IV, 30-39 MIN: ICD-10-PCS | Mod: S$GLB,,, | Performed by: FAMILY MEDICINE

## 2021-01-26 PROCEDURE — 3079F DIAST BP 80-89 MM HG: CPT | Mod: CPTII,S$GLB,, | Performed by: FAMILY MEDICINE

## 2021-01-26 PROCEDURE — 63600175 PHARM REV CODE 636 W HCPCS: Performed by: OBSTETRICS & GYNECOLOGY

## 2021-01-26 PROCEDURE — 3008F BODY MASS INDEX DOCD: CPT | Mod: CPTII,S$GLB,, | Performed by: FAMILY MEDICINE

## 2021-01-26 PROCEDURE — 1159F MED LIST DOCD IN RCRD: CPT | Mod: S$GLB,,, | Performed by: FAMILY MEDICINE

## 2021-01-26 PROCEDURE — 1159F PR MEDICATION LIST DOCUMENTED IN MEDICAL RECORD: ICD-10-PCS | Mod: S$GLB,,, | Performed by: FAMILY MEDICINE

## 2021-01-26 PROCEDURE — 36591 DRAW BLOOD OFF VENOUS DEVICE: CPT

## 2021-01-26 PROCEDURE — 86304 IMMUNOASSAY TUMOR CA 125: CPT

## 2021-01-26 RX ORDER — DILTIAZEM HYDROCHLORIDE 240 MG/1
240 CAPSULE, EXTENDED RELEASE ORAL
COMMUNITY
End: 2021-03-12

## 2021-01-26 RX ORDER — HEPARIN 100 UNIT/ML
500 SYRINGE INTRAVENOUS
Status: DISCONTINUED | OUTPATIENT
Start: 2021-01-26 | End: 2021-01-26 | Stop reason: HOSPADM

## 2021-01-26 RX ORDER — SODIUM CHLORIDE 0.9 % (FLUSH) 0.9 %
10 SYRINGE (ML) INJECTION
Status: CANCELLED | OUTPATIENT
Start: 2021-01-26

## 2021-01-26 RX ORDER — SODIUM CHLORIDE 0.9 % (FLUSH) 0.9 %
10 SYRINGE (ML) INJECTION
Status: DISCONTINUED | OUTPATIENT
Start: 2021-01-26 | End: 2021-01-26 | Stop reason: HOSPADM

## 2021-01-26 RX ORDER — SENNOSIDES 8.6 MG/1
4 TABLET ORAL DAILY
Status: ON HOLD | COMMUNITY
End: 2023-04-12 | Stop reason: CLARIF

## 2021-01-26 RX ORDER — DILTIAZEM HYDROCHLORIDE 180 MG/1
180 CAPSULE, EXTENDED RELEASE ORAL
COMMUNITY
End: 2021-03-12

## 2021-01-26 RX ORDER — HEPARIN 100 UNIT/ML
500 SYRINGE INTRAVENOUS
Status: CANCELLED | OUTPATIENT
Start: 2021-01-26

## 2021-01-26 RX ORDER — PROMETHAZINE HYDROCHLORIDE 6.25 MG/5ML
SYRUP ORAL EVERY 6 HOURS PRN
Status: ON HOLD | COMMUNITY
End: 2023-04-12 | Stop reason: CLARIF

## 2021-01-26 RX ORDER — PROMETHAZINE HYDROCHLORIDE AND CODEINE PHOSPHATE 6.25; 1 MG/5ML; MG/5ML
SOLUTION ORAL
COMMUNITY
Start: 2021-01-20 | End: 2021-09-14 | Stop reason: ALTCHOICE

## 2021-01-26 RX ADMIN — Medication 10 ML: at 02:01

## 2021-01-26 RX ADMIN — HEPARIN 500 UNITS: 100 SYRINGE at 02:01

## 2021-02-03 ENCOUNTER — TELEPHONE (OUTPATIENT)
Dept: INTERVENTIONAL RADIOLOGY/VASCULAR | Facility: HOSPITAL | Age: 71
End: 2021-02-03

## 2021-02-04 ENCOUNTER — PATIENT MESSAGE (OUTPATIENT)
Dept: PULMONOLOGY | Facility: CLINIC | Age: 71
End: 2021-02-04

## 2021-02-08 ENCOUNTER — TELEPHONE (OUTPATIENT)
Dept: INTERVENTIONAL RADIOLOGY/VASCULAR | Facility: CLINIC | Age: 71
End: 2021-02-08

## 2021-02-12 ENCOUNTER — TELEPHONE (OUTPATIENT)
Dept: INTERVENTIONAL RADIOLOGY/VASCULAR | Facility: HOSPITAL | Age: 71
End: 2021-02-12

## 2021-02-19 ENCOUNTER — TELEPHONE (OUTPATIENT)
Dept: INTERVENTIONAL RADIOLOGY/VASCULAR | Facility: HOSPITAL | Age: 71
End: 2021-02-19

## 2021-03-09 ENCOUNTER — OFFICE VISIT (OUTPATIENT)
Dept: GYNECOLOGIC ONCOLOGY | Facility: CLINIC | Age: 71
End: 2021-03-09
Payer: MEDICARE

## 2021-03-09 VITALS
BODY MASS INDEX: 31.03 KG/M2 | HEART RATE: 86 BPM | SYSTOLIC BLOOD PRESSURE: 139 MMHG | WEIGHT: 180.75 LBS | DIASTOLIC BLOOD PRESSURE: 65 MMHG

## 2021-03-09 DIAGNOSIS — Z12.31 SCREENING MAMMOGRAM, ENCOUNTER FOR: ICD-10-CM

## 2021-03-09 DIAGNOSIS — C56.3 MALIGNANT NEOPLASM OF BOTH OVARIES: Primary | ICD-10-CM

## 2021-03-09 DIAGNOSIS — R91.8 MULTIPLE LUNG NODULES ON CT: ICD-10-CM

## 2021-03-09 PROCEDURE — 3288F PR FALLS RISK ASSESSMENT DOCUMENTED: ICD-10-PCS | Mod: CPTII,S$GLB,, | Performed by: OBSTETRICS & GYNECOLOGY

## 2021-03-09 PROCEDURE — 99214 PR OFFICE/OUTPT VISIT, EST, LEVL IV, 30-39 MIN: ICD-10-PCS | Mod: S$GLB,,, | Performed by: OBSTETRICS & GYNECOLOGY

## 2021-03-09 PROCEDURE — 3078F PR MOST RECENT DIASTOLIC BLOOD PRESSURE < 80 MM HG: ICD-10-PCS | Mod: CPTII,S$GLB,, | Performed by: OBSTETRICS & GYNECOLOGY

## 2021-03-09 PROCEDURE — 3008F BODY MASS INDEX DOCD: CPT | Mod: CPTII,S$GLB,, | Performed by: OBSTETRICS & GYNECOLOGY

## 2021-03-09 PROCEDURE — 3008F PR BODY MASS INDEX (BMI) DOCUMENTED: ICD-10-PCS | Mod: CPTII,S$GLB,, | Performed by: OBSTETRICS & GYNECOLOGY

## 2021-03-09 PROCEDURE — 1125F PR PAIN SEVERITY QUANTIFIED, PAIN PRESENT: ICD-10-PCS | Mod: S$GLB,,, | Performed by: OBSTETRICS & GYNECOLOGY

## 2021-03-09 PROCEDURE — 3078F DIAST BP <80 MM HG: CPT | Mod: CPTII,S$GLB,, | Performed by: OBSTETRICS & GYNECOLOGY

## 2021-03-09 PROCEDURE — 3075F PR MOST RECENT SYSTOLIC BLOOD PRESS GE 130-139MM HG: ICD-10-PCS | Mod: CPTII,S$GLB,, | Performed by: OBSTETRICS & GYNECOLOGY

## 2021-03-09 PROCEDURE — 1101F PT FALLS ASSESS-DOCD LE1/YR: CPT | Mod: CPTII,S$GLB,, | Performed by: OBSTETRICS & GYNECOLOGY

## 2021-03-09 PROCEDURE — 1125F AMNT PAIN NOTED PAIN PRSNT: CPT | Mod: S$GLB,,, | Performed by: OBSTETRICS & GYNECOLOGY

## 2021-03-09 PROCEDURE — 1101F PR PT FALLS ASSESS DOC 0-1 FALLS W/OUT INJ PAST YR: ICD-10-PCS | Mod: CPTII,S$GLB,, | Performed by: OBSTETRICS & GYNECOLOGY

## 2021-03-09 PROCEDURE — 3075F SYST BP GE 130 - 139MM HG: CPT | Mod: CPTII,S$GLB,, | Performed by: OBSTETRICS & GYNECOLOGY

## 2021-03-09 PROCEDURE — 99999 PR PBB SHADOW E&M-EST. PATIENT-LVL II: ICD-10-PCS | Mod: PBBFAC,,, | Performed by: OBSTETRICS & GYNECOLOGY

## 2021-03-09 PROCEDURE — 1159F PR MEDICATION LIST DOCUMENTED IN MEDICAL RECORD: ICD-10-PCS | Mod: S$GLB,,, | Performed by: OBSTETRICS & GYNECOLOGY

## 2021-03-09 PROCEDURE — 99214 OFFICE O/P EST MOD 30 MIN: CPT | Mod: S$GLB,,, | Performed by: OBSTETRICS & GYNECOLOGY

## 2021-03-09 PROCEDURE — 1159F MED LIST DOCD IN RCRD: CPT | Mod: S$GLB,,, | Performed by: OBSTETRICS & GYNECOLOGY

## 2021-03-09 PROCEDURE — 3288F FALL RISK ASSESSMENT DOCD: CPT | Mod: CPTII,S$GLB,, | Performed by: OBSTETRICS & GYNECOLOGY

## 2021-03-09 PROCEDURE — 99999 PR PBB SHADOW E&M-EST. PATIENT-LVL II: CPT | Mod: PBBFAC,,, | Performed by: OBSTETRICS & GYNECOLOGY

## 2021-03-09 RX ORDER — FLUTICASONE FUROATE AND VILANTEROL TRIFENATATE 200; 25 UG/1; UG/1
POWDER RESPIRATORY (INHALATION)
COMMUNITY
Start: 2021-02-17 | End: 2021-03-19

## 2021-03-16 ENCOUNTER — TELEPHONE (OUTPATIENT)
Dept: GYNECOLOGIC ONCOLOGY | Facility: CLINIC | Age: 71
End: 2021-03-16

## 2021-03-16 ENCOUNTER — NURSE TRIAGE (OUTPATIENT)
Dept: ADMINISTRATIVE | Facility: CLINIC | Age: 71
End: 2021-03-16

## 2021-03-18 ENCOUNTER — OFFICE VISIT (OUTPATIENT)
Dept: CARDIOLOGY | Facility: CLINIC | Age: 71
End: 2021-03-18
Attending: INTERNAL MEDICINE
Payer: MEDICARE

## 2021-03-18 VITALS
HEART RATE: 86 BPM | DIASTOLIC BLOOD PRESSURE: 68 MMHG | HEIGHT: 64 IN | WEIGHT: 196.63 LBS | BODY MASS INDEX: 33.57 KG/M2 | SYSTOLIC BLOOD PRESSURE: 118 MMHG

## 2021-03-18 DIAGNOSIS — J45.991 COUGH VARIANT ASTHMA: ICD-10-CM

## 2021-03-18 DIAGNOSIS — C56.3 MALIGNANT NEOPLASM OF BOTH OVARIES: ICD-10-CM

## 2021-03-18 DIAGNOSIS — E78.00 HYPERCHOLESTEROLEMIA: ICD-10-CM

## 2021-03-18 DIAGNOSIS — I10 ESSENTIAL HYPERTENSION: ICD-10-CM

## 2021-03-18 DIAGNOSIS — E66.9 MILD OBESITY: ICD-10-CM

## 2021-03-18 DIAGNOSIS — M79.7 FIBROMYALGIA: ICD-10-CM

## 2021-03-18 DIAGNOSIS — I47.10 SUPRAVENTRICULAR TACHYCARDIA: ICD-10-CM

## 2021-03-18 PROCEDURE — 99214 OFFICE O/P EST MOD 30 MIN: CPT | Mod: S$GLB,,, | Performed by: INTERNAL MEDICINE

## 2021-03-18 PROCEDURE — 3078F PR MOST RECENT DIASTOLIC BLOOD PRESSURE < 80 MM HG: ICD-10-PCS | Mod: CPTII,S$GLB,, | Performed by: INTERNAL MEDICINE

## 2021-03-18 PROCEDURE — 99214 PR OFFICE/OUTPT VISIT, EST, LEVL IV, 30-39 MIN: ICD-10-PCS | Mod: S$GLB,,, | Performed by: INTERNAL MEDICINE

## 2021-03-18 PROCEDURE — 99999 PR PBB SHADOW E&M-EST. PATIENT-LVL IV: CPT | Mod: PBBFAC,,, | Performed by: INTERNAL MEDICINE

## 2021-03-18 PROCEDURE — 99999 PR PBB SHADOW E&M-EST. PATIENT-LVL IV: ICD-10-PCS | Mod: PBBFAC,,, | Performed by: INTERNAL MEDICINE

## 2021-03-18 PROCEDURE — 3008F BODY MASS INDEX DOCD: CPT | Mod: CPTII,S$GLB,, | Performed by: INTERNAL MEDICINE

## 2021-03-18 PROCEDURE — 3008F PR BODY MASS INDEX (BMI) DOCUMENTED: ICD-10-PCS | Mod: CPTII,S$GLB,, | Performed by: INTERNAL MEDICINE

## 2021-03-18 PROCEDURE — 1159F PR MEDICATION LIST DOCUMENTED IN MEDICAL RECORD: ICD-10-PCS | Mod: S$GLB,,, | Performed by: INTERNAL MEDICINE

## 2021-03-18 PROCEDURE — 3078F DIAST BP <80 MM HG: CPT | Mod: CPTII,S$GLB,, | Performed by: INTERNAL MEDICINE

## 2021-03-18 PROCEDURE — 1159F MED LIST DOCD IN RCRD: CPT | Mod: S$GLB,,, | Performed by: INTERNAL MEDICINE

## 2021-03-18 PROCEDURE — 3074F PR MOST RECENT SYSTOLIC BLOOD PRESSURE < 130 MM HG: ICD-10-PCS | Mod: CPTII,S$GLB,, | Performed by: INTERNAL MEDICINE

## 2021-03-18 PROCEDURE — 3074F SYST BP LT 130 MM HG: CPT | Mod: CPTII,S$GLB,, | Performed by: INTERNAL MEDICINE

## 2021-03-18 RX ORDER — LOSARTAN POTASSIUM 100 MG/1
100 TABLET ORAL DAILY
Qty: 90 TABLET | Refills: 3 | Status: SHIPPED | OUTPATIENT
Start: 2021-03-18 | End: 2021-04-05

## 2021-03-18 RX ORDER — DILTIAZEM HYDROCHLORIDE 240 MG/1
CAPSULE, EXTENDED RELEASE ORAL
Qty: 90 CAPSULE | Refills: 3 | Status: SHIPPED | OUTPATIENT
Start: 2021-03-18 | End: 2021-06-01

## 2021-03-18 RX ORDER — PITAVASTATIN CALCIUM 2.09 MG/1
2 TABLET, FILM COATED ORAL DAILY
Qty: 90 TABLET | Refills: 3 | Status: SHIPPED | OUTPATIENT
Start: 2021-03-18 | End: 2021-09-22 | Stop reason: SDUPTHER

## 2021-04-14 ENCOUNTER — PATIENT MESSAGE (OUTPATIENT)
Dept: CARDIOLOGY | Facility: CLINIC | Age: 71
End: 2021-04-14

## 2021-04-15 ENCOUNTER — CLINICAL SUPPORT (OUTPATIENT)
Dept: URGENT CARE | Facility: CLINIC | Age: 71
End: 2021-04-15
Payer: MEDICARE

## 2021-04-15 DIAGNOSIS — Z11.59 ENCOUNTER FOR SCREENING FOR OTHER VIRAL DISEASES: Primary | ICD-10-CM

## 2021-04-15 LAB
CTP QC/QA: YES
SARS-COV-2 RDRP RESP QL NAA+PROBE: NEGATIVE

## 2021-04-15 PROCEDURE — 99211 PR OFFICE/OUTPT VISIT, EST, LEVL I: ICD-10-PCS | Mod: S$GLB,CS,, | Performed by: FAMILY MEDICINE

## 2021-04-15 PROCEDURE — U0002 COVID-19 LAB TEST NON-CDC: HCPCS | Mod: QW,S$GLB,, | Performed by: FAMILY MEDICINE

## 2021-04-15 PROCEDURE — U0002: ICD-10-PCS | Mod: QW,S$GLB,, | Performed by: FAMILY MEDICINE

## 2021-04-15 PROCEDURE — 99211 OFF/OP EST MAY X REQ PHY/QHP: CPT | Mod: S$GLB,CS,, | Performed by: FAMILY MEDICINE

## 2021-04-26 ENCOUNTER — PATIENT MESSAGE (OUTPATIENT)
Dept: RESEARCH | Facility: HOSPITAL | Age: 71
End: 2021-04-26

## 2021-05-10 ENCOUNTER — INFUSION (OUTPATIENT)
Dept: INFUSION THERAPY | Facility: HOSPITAL | Age: 71
End: 2021-05-10
Attending: OBSTETRICS & GYNECOLOGY
Payer: MEDICARE

## 2021-05-10 DIAGNOSIS — C56.3 MALIGNANT NEOPLASM OF BOTH OVARIES: Primary | ICD-10-CM

## 2021-05-10 LAB — CANCER AG125 SERPL-ACNC: 11 U/ML (ref 0–30)

## 2021-05-10 PROCEDURE — 63600175 PHARM REV CODE 636 W HCPCS: Performed by: OBSTETRICS & GYNECOLOGY

## 2021-05-10 PROCEDURE — 36591 DRAW BLOOD OFF VENOUS DEVICE: CPT

## 2021-05-10 PROCEDURE — A4216 STERILE WATER/SALINE, 10 ML: HCPCS | Performed by: OBSTETRICS & GYNECOLOGY

## 2021-05-10 PROCEDURE — 25000003 PHARM REV CODE 250: Performed by: OBSTETRICS & GYNECOLOGY

## 2021-05-10 PROCEDURE — 86304 IMMUNOASSAY TUMOR CA 125: CPT | Performed by: OBSTETRICS & GYNECOLOGY

## 2021-05-10 RX ORDER — SODIUM CHLORIDE 0.9 % (FLUSH) 0.9 %
10 SYRINGE (ML) INJECTION
Status: CANCELLED | OUTPATIENT
Start: 2021-05-10

## 2021-05-10 RX ORDER — HEPARIN 100 UNIT/ML
500 SYRINGE INTRAVENOUS
Status: DISCONTINUED | OUTPATIENT
Start: 2021-05-10 | End: 2021-05-10 | Stop reason: HOSPADM

## 2021-05-10 RX ORDER — HEPARIN 100 UNIT/ML
500 SYRINGE INTRAVENOUS
Status: CANCELLED | OUTPATIENT
Start: 2021-05-10

## 2021-05-10 RX ORDER — SODIUM CHLORIDE 0.9 % (FLUSH) 0.9 %
10 SYRINGE (ML) INJECTION
Status: DISCONTINUED | OUTPATIENT
Start: 2021-05-10 | End: 2021-05-10 | Stop reason: HOSPADM

## 2021-05-10 RX ADMIN — HEPARIN 500 UNITS: 100 SYRINGE at 02:05

## 2021-05-10 RX ADMIN — Medication 10 ML: at 02:05

## 2021-05-31 ENCOUNTER — PATIENT MESSAGE (OUTPATIENT)
Dept: GYNECOLOGIC ONCOLOGY | Facility: CLINIC | Age: 71
End: 2021-05-31

## 2021-06-01 DIAGNOSIS — C56.3 MALIGNANT NEOPLASM OF BOTH OVARIES: Primary | ICD-10-CM

## 2021-06-08 ENCOUNTER — INFUSION (OUTPATIENT)
Dept: INFUSION THERAPY | Facility: HOSPITAL | Age: 71
End: 2021-06-08
Attending: OBSTETRICS & GYNECOLOGY
Payer: MEDICARE

## 2021-06-08 DIAGNOSIS — C56.3 MALIGNANT NEOPLASM OF BOTH OVARIES: Primary | ICD-10-CM

## 2021-06-08 LAB
ANION GAP SERPL CALC-SCNC: 12 MMOL/L (ref 8–16)
BUN SERPL-MCNC: 17 MG/DL (ref 8–23)
CALCIUM SERPL-MCNC: 10 MG/DL (ref 8.7–10.5)
CANCER AG125 SERPL-ACNC: 11 U/ML (ref 0–30)
CHLORIDE SERPL-SCNC: 107 MMOL/L (ref 95–110)
CO2 SERPL-SCNC: 23 MMOL/L (ref 23–29)
CREAT SERPL-MCNC: 1.3 MG/DL (ref 0.5–1.4)
ERYTHROCYTE [DISTWIDTH] IN BLOOD BY AUTOMATED COUNT: 15.4 % (ref 11.5–14.5)
EST. GFR  (AFRICAN AMERICAN): 48 ML/MIN/1.73 M^2
EST. GFR  (NON AFRICAN AMERICAN): 41.7 ML/MIN/1.73 M^2
GLUCOSE SERPL-MCNC: 108 MG/DL (ref 70–110)
HCT VFR BLD AUTO: 36.6 % (ref 37–48.5)
HGB BLD-MCNC: 11.6 G/DL (ref 12–16)
IMM GRANULOCYTES # BLD AUTO: 0.01 K/UL (ref 0–0.04)
MCH RBC QN AUTO: 30 PG (ref 27–31)
MCHC RBC AUTO-ENTMCNC: 31.7 G/DL (ref 32–36)
MCV RBC AUTO: 95 FL (ref 82–98)
NEUTROPHILS # BLD AUTO: 3.4 K/UL (ref 1.8–7.7)
PLATELET # BLD AUTO: 267 K/UL (ref 150–450)
PMV BLD AUTO: 8.7 FL (ref 9.2–12.9)
POTASSIUM SERPL-SCNC: 4.4 MMOL/L (ref 3.5–5.1)
RBC # BLD AUTO: 3.87 M/UL (ref 4–5.4)
SODIUM SERPL-SCNC: 142 MMOL/L (ref 136–145)
WBC # BLD AUTO: 5.3 K/UL (ref 3.9–12.7)

## 2021-06-08 PROCEDURE — 85027 COMPLETE CBC AUTOMATED: CPT | Performed by: OBSTETRICS & GYNECOLOGY

## 2021-06-08 PROCEDURE — 36415 COLL VENOUS BLD VENIPUNCTURE: CPT | Performed by: OBSTETRICS & GYNECOLOGY

## 2021-06-08 PROCEDURE — 86304 IMMUNOASSAY TUMOR CA 125: CPT | Performed by: OBSTETRICS & GYNECOLOGY

## 2021-06-08 PROCEDURE — 80048 BASIC METABOLIC PNL TOTAL CA: CPT | Performed by: OBSTETRICS & GYNECOLOGY

## 2021-06-08 PROCEDURE — 25000003 PHARM REV CODE 250: Performed by: OBSTETRICS & GYNECOLOGY

## 2021-06-08 PROCEDURE — A4216 STERILE WATER/SALINE, 10 ML: HCPCS | Performed by: OBSTETRICS & GYNECOLOGY

## 2021-06-08 PROCEDURE — 36591 DRAW BLOOD OFF VENOUS DEVICE: CPT

## 2021-06-08 PROCEDURE — 63600175 PHARM REV CODE 636 W HCPCS: Performed by: OBSTETRICS & GYNECOLOGY

## 2021-06-08 RX ORDER — SODIUM CHLORIDE 0.9 % (FLUSH) 0.9 %
10 SYRINGE (ML) INJECTION
Status: CANCELLED | OUTPATIENT
Start: 2021-06-08

## 2021-06-08 RX ORDER — SODIUM CHLORIDE 0.9 % (FLUSH) 0.9 %
10 SYRINGE (ML) INJECTION
Status: DISCONTINUED | OUTPATIENT
Start: 2021-06-08 | End: 2021-06-08 | Stop reason: HOSPADM

## 2021-06-08 RX ORDER — HEPARIN 100 UNIT/ML
500 SYRINGE INTRAVENOUS
Status: DISCONTINUED | OUTPATIENT
Start: 2021-06-08 | End: 2021-06-08 | Stop reason: HOSPADM

## 2021-06-08 RX ORDER — HEPARIN 100 UNIT/ML
500 SYRINGE INTRAVENOUS
Status: CANCELLED | OUTPATIENT
Start: 2021-06-08

## 2021-06-08 RX ADMIN — Medication 10 ML: at 02:06

## 2021-06-08 RX ADMIN — HEPARIN 500 UNITS: 100 SYRINGE at 02:06

## 2021-06-09 ENCOUNTER — OFFICE VISIT (OUTPATIENT)
Dept: GYNECOLOGIC ONCOLOGY | Facility: CLINIC | Age: 71
End: 2021-06-09
Payer: MEDICARE

## 2021-06-09 VITALS
SYSTOLIC BLOOD PRESSURE: 119 MMHG | WEIGHT: 194 LBS | BODY MASS INDEX: 33.3 KG/M2 | HEART RATE: 69 BPM | DIASTOLIC BLOOD PRESSURE: 56 MMHG

## 2021-06-09 DIAGNOSIS — F32.9 REACTIVE DEPRESSION: ICD-10-CM

## 2021-06-09 DIAGNOSIS — A60.04 HERPES SIMPLEX VULVOVAGINITIS: ICD-10-CM

## 2021-06-09 DIAGNOSIS — C56.3 MALIGNANT NEOPLASM OF BOTH OVARIES: Primary | ICD-10-CM

## 2021-06-09 PROCEDURE — 99214 PR OFFICE/OUTPT VISIT, EST, LEVL IV, 30-39 MIN: ICD-10-PCS | Mod: S$GLB,,, | Performed by: OBSTETRICS & GYNECOLOGY

## 2021-06-09 PROCEDURE — 3288F FALL RISK ASSESSMENT DOCD: CPT | Mod: CPTII,S$GLB,, | Performed by: OBSTETRICS & GYNECOLOGY

## 2021-06-09 PROCEDURE — 99999 PR PBB SHADOW E&M-EST. PATIENT-LVL III: CPT | Mod: PBBFAC,,, | Performed by: OBSTETRICS & GYNECOLOGY

## 2021-06-09 PROCEDURE — 3288F PR FALLS RISK ASSESSMENT DOCUMENTED: ICD-10-PCS | Mod: CPTII,S$GLB,, | Performed by: OBSTETRICS & GYNECOLOGY

## 2021-06-09 PROCEDURE — 1159F PR MEDICATION LIST DOCUMENTED IN MEDICAL RECORD: ICD-10-PCS | Mod: S$GLB,,, | Performed by: OBSTETRICS & GYNECOLOGY

## 2021-06-09 PROCEDURE — 1101F PR PT FALLS ASSESS DOC 0-1 FALLS W/OUT INJ PAST YR: ICD-10-PCS | Mod: CPTII,S$GLB,, | Performed by: OBSTETRICS & GYNECOLOGY

## 2021-06-09 PROCEDURE — 99214 OFFICE O/P EST MOD 30 MIN: CPT | Mod: S$GLB,,, | Performed by: OBSTETRICS & GYNECOLOGY

## 2021-06-09 PROCEDURE — 1101F PT FALLS ASSESS-DOCD LE1/YR: CPT | Mod: CPTII,S$GLB,, | Performed by: OBSTETRICS & GYNECOLOGY

## 2021-06-09 PROCEDURE — 3008F BODY MASS INDEX DOCD: CPT | Mod: CPTII,S$GLB,, | Performed by: OBSTETRICS & GYNECOLOGY

## 2021-06-09 PROCEDURE — 1125F AMNT PAIN NOTED PAIN PRSNT: CPT | Mod: S$GLB,,, | Performed by: OBSTETRICS & GYNECOLOGY

## 2021-06-09 PROCEDURE — 99999 PR PBB SHADOW E&M-EST. PATIENT-LVL III: ICD-10-PCS | Mod: PBBFAC,,, | Performed by: OBSTETRICS & GYNECOLOGY

## 2021-06-09 PROCEDURE — 3008F PR BODY MASS INDEX (BMI) DOCUMENTED: ICD-10-PCS | Mod: CPTII,S$GLB,, | Performed by: OBSTETRICS & GYNECOLOGY

## 2021-06-09 PROCEDURE — 1159F MED LIST DOCD IN RCRD: CPT | Mod: S$GLB,,, | Performed by: OBSTETRICS & GYNECOLOGY

## 2021-06-09 PROCEDURE — 1125F PR PAIN SEVERITY QUANTIFIED, PAIN PRESENT: ICD-10-PCS | Mod: S$GLB,,, | Performed by: OBSTETRICS & GYNECOLOGY

## 2021-06-09 RX ORDER — METFORMIN HYDROCHLORIDE 500 MG/1
500 TABLET ORAL
COMMUNITY
Start: 2021-04-29 | End: 2022-05-04

## 2021-06-09 RX ORDER — FLUTICASONE FUROATE AND VILANTEROL TRIFENATATE 200; 25 UG/1; UG/1
1 POWDER RESPIRATORY (INHALATION) DAILY
COMMUNITY
Start: 2021-05-14

## 2021-06-09 RX ORDER — ACYCLOVIR 400 MG/1
400 TABLET ORAL DAILY
Qty: 30 TABLET | Refills: 11 | Status: SHIPPED | OUTPATIENT
Start: 2021-06-09 | End: 2023-03-09

## 2021-06-29 ENCOUNTER — TELEPHONE (OUTPATIENT)
Dept: HEMATOLOGY/ONCOLOGY | Facility: CLINIC | Age: 71
End: 2021-06-29

## 2021-07-14 ENCOUNTER — PATIENT MESSAGE (OUTPATIENT)
Dept: GYNECOLOGIC ONCOLOGY | Facility: CLINIC | Age: 71
End: 2021-07-14

## 2021-07-14 ENCOUNTER — TELEPHONE (OUTPATIENT)
Dept: GYNECOLOGIC ONCOLOGY | Facility: CLINIC | Age: 71
End: 2021-07-14

## 2021-07-19 ENCOUNTER — PATIENT MESSAGE (OUTPATIENT)
Dept: GYNECOLOGIC ONCOLOGY | Facility: CLINIC | Age: 71
End: 2021-07-19

## 2021-08-06 ENCOUNTER — PATIENT MESSAGE (OUTPATIENT)
Dept: GYNECOLOGIC ONCOLOGY | Facility: CLINIC | Age: 71
End: 2021-08-06

## 2021-08-09 ENCOUNTER — PATIENT MESSAGE (OUTPATIENT)
Dept: GYNECOLOGIC ONCOLOGY | Facility: CLINIC | Age: 71
End: 2021-08-09

## 2021-09-13 ENCOUNTER — INFUSION (OUTPATIENT)
Dept: INFUSION THERAPY | Facility: HOSPITAL | Age: 71
End: 2021-09-13
Attending: OBSTETRICS & GYNECOLOGY
Payer: MEDICARE

## 2021-09-13 DIAGNOSIS — C56.3 MALIGNANT NEOPLASM OF BOTH OVARIES: Primary | ICD-10-CM

## 2021-09-13 LAB — CANCER AG125 SERPL-ACNC: 55 U/ML (ref 0–30)

## 2021-09-13 PROCEDURE — 25000003 PHARM REV CODE 250: Performed by: OBSTETRICS & GYNECOLOGY

## 2021-09-13 PROCEDURE — A4216 STERILE WATER/SALINE, 10 ML: HCPCS | Performed by: OBSTETRICS & GYNECOLOGY

## 2021-09-13 PROCEDURE — 36591 DRAW BLOOD OFF VENOUS DEVICE: CPT

## 2021-09-13 PROCEDURE — 86304 IMMUNOASSAY TUMOR CA 125: CPT | Performed by: OBSTETRICS & GYNECOLOGY

## 2021-09-13 PROCEDURE — 63600175 PHARM REV CODE 636 W HCPCS: Performed by: OBSTETRICS & GYNECOLOGY

## 2021-09-13 RX ORDER — HEPARIN 100 UNIT/ML
500 SYRINGE INTRAVENOUS
Status: CANCELLED | OUTPATIENT
Start: 2021-09-13

## 2021-09-13 RX ORDER — HEPARIN 100 UNIT/ML
500 SYRINGE INTRAVENOUS
Status: DISCONTINUED | OUTPATIENT
Start: 2021-09-13 | End: 2021-09-13 | Stop reason: HOSPADM

## 2021-09-13 RX ORDER — SODIUM CHLORIDE 0.9 % (FLUSH) 0.9 %
10 SYRINGE (ML) INJECTION
Status: CANCELLED | OUTPATIENT
Start: 2021-09-13

## 2021-09-13 RX ORDER — SODIUM CHLORIDE 0.9 % (FLUSH) 0.9 %
10 SYRINGE (ML) INJECTION
Status: DISCONTINUED | OUTPATIENT
Start: 2021-09-13 | End: 2021-09-13 | Stop reason: HOSPADM

## 2021-09-13 RX ADMIN — Medication 10 ML: at 01:09

## 2021-09-13 RX ADMIN — HEPARIN 500 UNITS: 100 SYRINGE at 01:09

## 2021-09-14 ENCOUNTER — OFFICE VISIT (OUTPATIENT)
Dept: GYNECOLOGIC ONCOLOGY | Facility: CLINIC | Age: 71
End: 2021-09-14
Payer: MEDICARE

## 2021-09-14 VITALS
SYSTOLIC BLOOD PRESSURE: 131 MMHG | HEART RATE: 83 BPM | DIASTOLIC BLOOD PRESSURE: 74 MMHG | BODY MASS INDEX: 31.79 KG/M2 | WEIGHT: 185.19 LBS

## 2021-09-14 DIAGNOSIS — R97.1 ELEVATED CANCER ANTIGEN 125 (CA-125): ICD-10-CM

## 2021-09-14 DIAGNOSIS — C56.3 MALIGNANT NEOPLASM OF BOTH OVARIES: Primary | ICD-10-CM

## 2021-09-14 PROCEDURE — 3078F PR MOST RECENT DIASTOLIC BLOOD PRESSURE < 80 MM HG: ICD-10-PCS | Mod: CPTII,S$GLB,, | Performed by: OBSTETRICS & GYNECOLOGY

## 2021-09-14 PROCEDURE — 1125F PR PAIN SEVERITY QUANTIFIED, PAIN PRESENT: ICD-10-PCS | Mod: CPTII,S$GLB,, | Performed by: OBSTETRICS & GYNECOLOGY

## 2021-09-14 PROCEDURE — 4010F ACE/ARB THERAPY RXD/TAKEN: CPT | Mod: CPTII,S$GLB,, | Performed by: OBSTETRICS & GYNECOLOGY

## 2021-09-14 PROCEDURE — 3008F BODY MASS INDEX DOCD: CPT | Mod: CPTII,S$GLB,, | Performed by: OBSTETRICS & GYNECOLOGY

## 2021-09-14 PROCEDURE — 3075F SYST BP GE 130 - 139MM HG: CPT | Mod: CPTII,S$GLB,, | Performed by: OBSTETRICS & GYNECOLOGY

## 2021-09-14 PROCEDURE — 3288F FALL RISK ASSESSMENT DOCD: CPT | Mod: CPTII,S$GLB,, | Performed by: OBSTETRICS & GYNECOLOGY

## 2021-09-14 PROCEDURE — 1101F PT FALLS ASSESS-DOCD LE1/YR: CPT | Mod: CPTII,S$GLB,, | Performed by: OBSTETRICS & GYNECOLOGY

## 2021-09-14 PROCEDURE — 3008F PR BODY MASS INDEX (BMI) DOCUMENTED: ICD-10-PCS | Mod: CPTII,S$GLB,, | Performed by: OBSTETRICS & GYNECOLOGY

## 2021-09-14 PROCEDURE — 99999 PR PBB SHADOW E&M-EST. PATIENT-LVL III: ICD-10-PCS | Mod: PBBFAC,,, | Performed by: OBSTETRICS & GYNECOLOGY

## 2021-09-14 PROCEDURE — 3075F PR MOST RECENT SYSTOLIC BLOOD PRESS GE 130-139MM HG: ICD-10-PCS | Mod: CPTII,S$GLB,, | Performed by: OBSTETRICS & GYNECOLOGY

## 2021-09-14 PROCEDURE — 99214 PR OFFICE/OUTPT VISIT, EST, LEVL IV, 30-39 MIN: ICD-10-PCS | Mod: S$GLB,,, | Performed by: OBSTETRICS & GYNECOLOGY

## 2021-09-14 PROCEDURE — 99214 OFFICE O/P EST MOD 30 MIN: CPT | Mod: S$GLB,,, | Performed by: OBSTETRICS & GYNECOLOGY

## 2021-09-14 PROCEDURE — 3078F DIAST BP <80 MM HG: CPT | Mod: CPTII,S$GLB,, | Performed by: OBSTETRICS & GYNECOLOGY

## 2021-09-14 PROCEDURE — 1159F PR MEDICATION LIST DOCUMENTED IN MEDICAL RECORD: ICD-10-PCS | Mod: CPTII,S$GLB,, | Performed by: OBSTETRICS & GYNECOLOGY

## 2021-09-14 PROCEDURE — 1101F PR PT FALLS ASSESS DOC 0-1 FALLS W/OUT INJ PAST YR: ICD-10-PCS | Mod: CPTII,S$GLB,, | Performed by: OBSTETRICS & GYNECOLOGY

## 2021-09-14 PROCEDURE — 99999 PR PBB SHADOW E&M-EST. PATIENT-LVL III: CPT | Mod: PBBFAC,,, | Performed by: OBSTETRICS & GYNECOLOGY

## 2021-09-14 PROCEDURE — 4010F PR ACE/ARB THEARPY RXD/TAKEN: ICD-10-PCS | Mod: CPTII,S$GLB,, | Performed by: OBSTETRICS & GYNECOLOGY

## 2021-09-14 PROCEDURE — 1159F MED LIST DOCD IN RCRD: CPT | Mod: CPTII,S$GLB,, | Performed by: OBSTETRICS & GYNECOLOGY

## 2021-09-14 PROCEDURE — 3288F PR FALLS RISK ASSESSMENT DOCUMENTED: ICD-10-PCS | Mod: CPTII,S$GLB,, | Performed by: OBSTETRICS & GYNECOLOGY

## 2021-09-14 PROCEDURE — 1125F AMNT PAIN NOTED PAIN PRSNT: CPT | Mod: CPTII,S$GLB,, | Performed by: OBSTETRICS & GYNECOLOGY

## 2021-09-14 RX ORDER — DICLOFENAC SODIUM 10 MG/G
2 GEL TOPICAL DAILY PRN
Status: ON HOLD | COMMUNITY
End: 2023-04-12 | Stop reason: CLARIF

## 2021-09-22 ENCOUNTER — PATIENT MESSAGE (OUTPATIENT)
Dept: CARDIOLOGY | Facility: CLINIC | Age: 71
End: 2021-09-22

## 2021-09-22 ENCOUNTER — OFFICE VISIT (OUTPATIENT)
Dept: CARDIOLOGY | Facility: CLINIC | Age: 71
End: 2021-09-22
Attending: INTERNAL MEDICINE
Payer: MEDICARE

## 2021-09-22 VITALS
HEART RATE: 82 BPM | HEIGHT: 64 IN | BODY MASS INDEX: 31.86 KG/M2 | WEIGHT: 186.63 LBS | SYSTOLIC BLOOD PRESSURE: 124 MMHG | DIASTOLIC BLOOD PRESSURE: 78 MMHG

## 2021-09-22 DIAGNOSIS — J45.991 COUGH VARIANT ASTHMA: ICD-10-CM

## 2021-09-22 DIAGNOSIS — M79.7 FIBROMYALGIA: ICD-10-CM

## 2021-09-22 DIAGNOSIS — I10 ESSENTIAL HYPERTENSION: ICD-10-CM

## 2021-09-22 DIAGNOSIS — I47.10 SUPRAVENTRICULAR TACHYCARDIA: ICD-10-CM

## 2021-09-22 DIAGNOSIS — C56.3 MALIGNANT NEOPLASM OF BOTH OVARIES: ICD-10-CM

## 2021-09-22 DIAGNOSIS — E66.9 MILD OBESITY: ICD-10-CM

## 2021-09-22 DIAGNOSIS — E78.00 HYPERCHOLESTEROLEMIA: ICD-10-CM

## 2021-09-22 PROCEDURE — 93000 ELECTROCARDIOGRAM COMPLETE: CPT | Mod: S$GLB,,, | Performed by: INTERNAL MEDICINE

## 2021-09-22 PROCEDURE — 3008F BODY MASS INDEX DOCD: CPT | Mod: CPTII,S$GLB,, | Performed by: INTERNAL MEDICINE

## 2021-09-22 PROCEDURE — 1101F PR PT FALLS ASSESS DOC 0-1 FALLS W/OUT INJ PAST YR: ICD-10-PCS | Mod: CPTII,S$GLB,, | Performed by: INTERNAL MEDICINE

## 2021-09-22 PROCEDURE — 4010F ACE/ARB THERAPY RXD/TAKEN: CPT | Mod: CPTII,S$GLB,, | Performed by: INTERNAL MEDICINE

## 2021-09-22 PROCEDURE — 3078F PR MOST RECENT DIASTOLIC BLOOD PRESSURE < 80 MM HG: ICD-10-PCS | Mod: CPTII,S$GLB,, | Performed by: INTERNAL MEDICINE

## 2021-09-22 PROCEDURE — 3288F PR FALLS RISK ASSESSMENT DOCUMENTED: ICD-10-PCS | Mod: CPTII,S$GLB,, | Performed by: INTERNAL MEDICINE

## 2021-09-22 PROCEDURE — 1101F PT FALLS ASSESS-DOCD LE1/YR: CPT | Mod: CPTII,S$GLB,, | Performed by: INTERNAL MEDICINE

## 2021-09-22 PROCEDURE — 3074F PR MOST RECENT SYSTOLIC BLOOD PRESSURE < 130 MM HG: ICD-10-PCS | Mod: CPTII,S$GLB,, | Performed by: INTERNAL MEDICINE

## 2021-09-22 PROCEDURE — 1160F RVW MEDS BY RX/DR IN RCRD: CPT | Mod: CPTII,S$GLB,, | Performed by: INTERNAL MEDICINE

## 2021-09-22 PROCEDURE — 3008F PR BODY MASS INDEX (BMI) DOCUMENTED: ICD-10-PCS | Mod: CPTII,S$GLB,, | Performed by: INTERNAL MEDICINE

## 2021-09-22 PROCEDURE — 99214 OFFICE O/P EST MOD 30 MIN: CPT | Mod: 25,S$GLB,, | Performed by: INTERNAL MEDICINE

## 2021-09-22 PROCEDURE — 93000 PR ELECTROCARDIOGRAM, COMPLETE: ICD-10-PCS | Mod: S$GLB,,, | Performed by: INTERNAL MEDICINE

## 2021-09-22 PROCEDURE — 99999 PR PBB SHADOW E&M-EST. PATIENT-LVL III: ICD-10-PCS | Mod: PBBFAC,,, | Performed by: INTERNAL MEDICINE

## 2021-09-22 PROCEDURE — 1125F PR PAIN SEVERITY QUANTIFIED, PAIN PRESENT: ICD-10-PCS | Mod: CPTII,S$GLB,, | Performed by: INTERNAL MEDICINE

## 2021-09-22 PROCEDURE — 3074F SYST BP LT 130 MM HG: CPT | Mod: CPTII,S$GLB,, | Performed by: INTERNAL MEDICINE

## 2021-09-22 PROCEDURE — 1159F PR MEDICATION LIST DOCUMENTED IN MEDICAL RECORD: ICD-10-PCS | Mod: CPTII,S$GLB,, | Performed by: INTERNAL MEDICINE

## 2021-09-22 PROCEDURE — 99214 PR OFFICE/OUTPT VISIT, EST, LEVL IV, 30-39 MIN: ICD-10-PCS | Mod: 25,S$GLB,, | Performed by: INTERNAL MEDICINE

## 2021-09-22 PROCEDURE — 3288F FALL RISK ASSESSMENT DOCD: CPT | Mod: CPTII,S$GLB,, | Performed by: INTERNAL MEDICINE

## 2021-09-22 PROCEDURE — 93005 ELECTROCARDIOGRAM TRACING: CPT

## 2021-09-22 PROCEDURE — 4010F PR ACE/ARB THEARPY RXD/TAKEN: ICD-10-PCS | Mod: CPTII,S$GLB,, | Performed by: INTERNAL MEDICINE

## 2021-09-22 PROCEDURE — 1159F MED LIST DOCD IN RCRD: CPT | Mod: CPTII,S$GLB,, | Performed by: INTERNAL MEDICINE

## 2021-09-22 PROCEDURE — 3078F DIAST BP <80 MM HG: CPT | Mod: CPTII,S$GLB,, | Performed by: INTERNAL MEDICINE

## 2021-09-22 PROCEDURE — 99999 PR PBB SHADOW E&M-EST. PATIENT-LVL III: CPT | Mod: PBBFAC,,, | Performed by: INTERNAL MEDICINE

## 2021-09-22 PROCEDURE — 1160F PR REVIEW ALL MEDS BY PRESCRIBER/CLIN PHARMACIST DOCUMENTED: ICD-10-PCS | Mod: CPTII,S$GLB,, | Performed by: INTERNAL MEDICINE

## 2021-09-22 PROCEDURE — 1125F AMNT PAIN NOTED PAIN PRSNT: CPT | Mod: CPTII,S$GLB,, | Performed by: INTERNAL MEDICINE

## 2021-09-22 RX ORDER — DILTIAZEM HYDROCHLORIDE 240 MG/1
240 CAPSULE, EXTENDED RELEASE ORAL DAILY
Qty: 90 CAPSULE | Refills: 3 | Status: SHIPPED | OUTPATIENT
Start: 2021-09-22 | End: 2022-08-29

## 2021-09-22 RX ORDER — LOSARTAN POTASSIUM 100 MG/1
100 TABLET ORAL DAILY
Qty: 90 TABLET | Refills: 3 | Status: SHIPPED | OUTPATIENT
Start: 2021-09-22 | End: 2023-04-04

## 2021-09-22 RX ORDER — PITAVASTATIN CALCIUM 2.09 MG/1
2 TABLET, FILM COATED ORAL DAILY
Qty: 90 TABLET | Refills: 3 | Status: SHIPPED | OUTPATIENT
Start: 2021-09-22 | End: 2023-03-09

## 2021-10-11 ENCOUNTER — PATIENT MESSAGE (OUTPATIENT)
Dept: GYNECOLOGIC ONCOLOGY | Facility: CLINIC | Age: 71
End: 2021-10-11

## 2021-10-13 ENCOUNTER — INFUSION (OUTPATIENT)
Dept: INFUSION THERAPY | Facility: HOSPITAL | Age: 71
End: 2021-10-13
Attending: OBSTETRICS & GYNECOLOGY
Payer: MEDICARE

## 2021-10-13 ENCOUNTER — PATIENT MESSAGE (OUTPATIENT)
Dept: GYNECOLOGIC ONCOLOGY | Facility: CLINIC | Age: 71
End: 2021-10-13
Payer: MEDICARE

## 2021-10-13 DIAGNOSIS — C56.3 MALIGNANT NEOPLASM OF BOTH OVARIES: Primary | ICD-10-CM

## 2021-10-13 LAB — CANCER AG125 SERPL-ACNC: 152 U/ML (ref 0–30)

## 2021-10-13 PROCEDURE — 63600175 PHARM REV CODE 636 W HCPCS: Performed by: OBSTETRICS & GYNECOLOGY

## 2021-10-13 PROCEDURE — 25000003 PHARM REV CODE 250: Performed by: OBSTETRICS & GYNECOLOGY

## 2021-10-13 PROCEDURE — A4216 STERILE WATER/SALINE, 10 ML: HCPCS | Performed by: OBSTETRICS & GYNECOLOGY

## 2021-10-13 PROCEDURE — 36591 DRAW BLOOD OFF VENOUS DEVICE: CPT

## 2021-10-13 PROCEDURE — 86304 IMMUNOASSAY TUMOR CA 125: CPT | Performed by: OBSTETRICS & GYNECOLOGY

## 2021-10-13 RX ORDER — SODIUM CHLORIDE 0.9 % (FLUSH) 0.9 %
10 SYRINGE (ML) INJECTION
Status: DISCONTINUED | OUTPATIENT
Start: 2021-10-13 | End: 2021-10-13 | Stop reason: HOSPADM

## 2021-10-13 RX ORDER — SODIUM CHLORIDE 0.9 % (FLUSH) 0.9 %
10 SYRINGE (ML) INJECTION
Status: CANCELLED | OUTPATIENT
Start: 2021-10-13

## 2021-10-13 RX ORDER — HEPARIN 100 UNIT/ML
500 SYRINGE INTRAVENOUS
Status: DISCONTINUED | OUTPATIENT
Start: 2021-10-13 | End: 2021-10-13 | Stop reason: HOSPADM

## 2021-10-13 RX ORDER — HEPARIN 100 UNIT/ML
500 SYRINGE INTRAVENOUS
Status: CANCELLED | OUTPATIENT
Start: 2021-10-13

## 2021-10-13 RX ADMIN — Medication 10 ML: at 10:10

## 2021-10-13 RX ADMIN — HEPARIN 500 UNITS: 100 SYRINGE at 10:10

## 2021-10-14 ENCOUNTER — TELEPHONE (OUTPATIENT)
Dept: GYNECOLOGIC ONCOLOGY | Facility: CLINIC | Age: 71
End: 2021-10-14

## 2021-10-14 DIAGNOSIS — C56.3 MALIGNANT NEOPLASM OF BOTH OVARIES: Primary | ICD-10-CM

## 2021-10-14 DIAGNOSIS — R97.1 ELEVATED CANCER ANTIGEN 125 (CA-125): ICD-10-CM

## 2021-10-18 ENCOUNTER — TELEPHONE (OUTPATIENT)
Dept: GYNECOLOGIC ONCOLOGY | Facility: CLINIC | Age: 71
End: 2021-10-18

## 2021-10-18 ENCOUNTER — PATIENT MESSAGE (OUTPATIENT)
Dept: GYNECOLOGIC ONCOLOGY | Facility: CLINIC | Age: 71
End: 2021-10-18
Payer: MEDICARE

## 2021-10-22 ENCOUNTER — HOSPITAL ENCOUNTER (OUTPATIENT)
Dept: RADIOLOGY | Facility: HOSPITAL | Age: 71
Discharge: HOME | End: 2021-10-22
Attending: OBSTETRICS & GYNECOLOGY
Payer: MEDICARE

## 2021-10-22 ENCOUNTER — INFUSION (OUTPATIENT)
Dept: INFUSION THERAPY | Facility: HOSPITAL | Age: 71
End: 2021-10-22
Attending: OBSTETRICS & GYNECOLOGY
Payer: MEDICARE

## 2021-10-22 DIAGNOSIS — C56.3 MALIGNANT NEOPLASM OF BOTH OVARIES: ICD-10-CM

## 2021-10-22 DIAGNOSIS — R97.1 ELEVATED CANCER ANTIGEN 125 (CA-125): ICD-10-CM

## 2021-10-22 DIAGNOSIS — C56.3 MALIGNANT NEOPLASM OF BOTH OVARIES: Primary | ICD-10-CM

## 2021-10-22 LAB
ANION GAP SERPL CALC-SCNC: 13 MMOL/L (ref 8–16)
BUN SERPL-MCNC: 9 MG/DL (ref 8–23)
CALCIUM SERPL-MCNC: 10 MG/DL (ref 8.7–10.5)
CHLORIDE SERPL-SCNC: 104 MMOL/L (ref 95–110)
CO2 SERPL-SCNC: 25 MMOL/L (ref 23–29)
CREAT SERPL-MCNC: 1.2 MG/DL (ref 0.5–1.4)
EST. GFR  (AFRICAN AMERICAN): 52.9 ML/MIN/1.73 M^2
EST. GFR  (NON AFRICAN AMERICAN): 45.9 ML/MIN/1.73 M^2
GLUCOSE SERPL-MCNC: 110 MG/DL (ref 70–110)
POTASSIUM SERPL-SCNC: 4.3 MMOL/L (ref 3.5–5.1)
SODIUM SERPL-SCNC: 142 MMOL/L (ref 136–145)

## 2021-10-22 PROCEDURE — 25000003 PHARM REV CODE 250: Performed by: OBSTETRICS & GYNECOLOGY

## 2021-10-22 PROCEDURE — A4216 STERILE WATER/SALINE, 10 ML: HCPCS | Performed by: OBSTETRICS & GYNECOLOGY

## 2021-10-22 PROCEDURE — 71260 CT CHEST ABDOMEN PELVIS WITH CONTRAST (XPD): ICD-10-PCS | Mod: 26,,, | Performed by: RADIOLOGY

## 2021-10-22 PROCEDURE — 71260 CT THORAX DX C+: CPT | Mod: TC

## 2021-10-22 PROCEDURE — 25500020 PHARM REV CODE 255: Performed by: OBSTETRICS & GYNECOLOGY

## 2021-10-22 PROCEDURE — 71260 CT THORAX DX C+: CPT | Mod: 26,,, | Performed by: RADIOLOGY

## 2021-10-22 PROCEDURE — 74177 CT CHEST ABDOMEN PELVIS WITH CONTRAST (XPD): ICD-10-PCS | Mod: 26,,, | Performed by: RADIOLOGY

## 2021-10-22 PROCEDURE — 80048 BASIC METABOLIC PNL TOTAL CA: CPT | Performed by: OBSTETRICS & GYNECOLOGY

## 2021-10-22 PROCEDURE — 36591 DRAW BLOOD OFF VENOUS DEVICE: CPT

## 2021-10-22 PROCEDURE — 74177 CT ABD & PELVIS W/CONTRAST: CPT | Mod: 26,,, | Performed by: RADIOLOGY

## 2021-10-22 PROCEDURE — 74177 CT ABD & PELVIS W/CONTRAST: CPT | Mod: TC

## 2021-10-22 PROCEDURE — 63600175 PHARM REV CODE 636 W HCPCS: Performed by: OBSTETRICS & GYNECOLOGY

## 2021-10-22 RX ORDER — HEPARIN 100 UNIT/ML
500 SYRINGE INTRAVENOUS
Status: DISCONTINUED | OUTPATIENT
Start: 2021-10-22 | End: 2021-10-22 | Stop reason: HOSPADM

## 2021-10-22 RX ORDER — HEPARIN 100 UNIT/ML
500 SYRINGE INTRAVENOUS
Status: CANCELLED | OUTPATIENT
Start: 2021-10-22

## 2021-10-22 RX ORDER — SODIUM CHLORIDE 0.9 % (FLUSH) 0.9 %
10 SYRINGE (ML) INJECTION
Status: CANCELLED | OUTPATIENT
Start: 2021-10-22

## 2021-10-22 RX ORDER — SODIUM CHLORIDE 0.9 % (FLUSH) 0.9 %
10 SYRINGE (ML) INJECTION
Status: DISCONTINUED | OUTPATIENT
Start: 2021-10-22 | End: 2021-10-22 | Stop reason: HOSPADM

## 2021-10-22 RX ADMIN — IOHEXOL 15 ML: 350 INJECTION, SOLUTION INTRAVENOUS at 04:10

## 2021-10-22 RX ADMIN — IOHEXOL 100 ML: 350 INJECTION, SOLUTION INTRAVENOUS at 04:10

## 2021-10-22 RX ADMIN — Medication 10 ML: at 02:10

## 2021-10-22 RX ADMIN — HEPARIN 500 UNITS: 100 SYRINGE at 02:10

## 2021-10-26 ENCOUNTER — TELEPHONE (OUTPATIENT)
Dept: GYNECOLOGIC ONCOLOGY | Facility: CLINIC | Age: 71
End: 2021-10-26
Payer: MEDICARE

## 2021-10-26 ENCOUNTER — PATIENT MESSAGE (OUTPATIENT)
Dept: GYNECOLOGIC ONCOLOGY | Facility: CLINIC | Age: 71
End: 2021-10-26
Payer: MEDICARE

## 2021-10-27 ENCOUNTER — OFFICE VISIT (OUTPATIENT)
Dept: GYNECOLOGIC ONCOLOGY | Facility: CLINIC | Age: 71
End: 2021-10-27
Payer: MEDICARE

## 2021-10-27 VITALS
DIASTOLIC BLOOD PRESSURE: 82 MMHG | HEART RATE: 95 BPM | SYSTOLIC BLOOD PRESSURE: 144 MMHG | WEIGHT: 178.69 LBS | BODY MASS INDEX: 30.67 KG/M2

## 2021-10-27 DIAGNOSIS — C56.3 MALIGNANT NEOPLASM OF BOTH OVARIES: Primary | ICD-10-CM

## 2021-10-27 PROCEDURE — 4010F PR ACE/ARB THEARPY RXD/TAKEN: ICD-10-PCS | Mod: CPTII,S$GLB,, | Performed by: OBSTETRICS & GYNECOLOGY

## 2021-10-27 PROCEDURE — 1125F PR PAIN SEVERITY QUANTIFIED, PAIN PRESENT: ICD-10-PCS | Mod: CPTII,S$GLB,, | Performed by: OBSTETRICS & GYNECOLOGY

## 2021-10-27 PROCEDURE — 3077F SYST BP >= 140 MM HG: CPT | Mod: CPTII,S$GLB,, | Performed by: OBSTETRICS & GYNECOLOGY

## 2021-10-27 PROCEDURE — 99999 PR PBB SHADOW E&M-EST. PATIENT-LVL IV: CPT | Mod: PBBFAC,,, | Performed by: OBSTETRICS & GYNECOLOGY

## 2021-10-27 PROCEDURE — 99417 PR PROLONGED SVC, OUTPT, W/WO DIRECT PT CONTACT,  EA ADDTL 15 MIN: ICD-10-PCS | Mod: S$GLB,,, | Performed by: OBSTETRICS & GYNECOLOGY

## 2021-10-27 PROCEDURE — 3008F PR BODY MASS INDEX (BMI) DOCUMENTED: ICD-10-PCS | Mod: CPTII,S$GLB,, | Performed by: OBSTETRICS & GYNECOLOGY

## 2021-10-27 PROCEDURE — 3288F FALL RISK ASSESSMENT DOCD: CPT | Mod: CPTII,S$GLB,, | Performed by: OBSTETRICS & GYNECOLOGY

## 2021-10-27 PROCEDURE — 1101F PT FALLS ASSESS-DOCD LE1/YR: CPT | Mod: CPTII,S$GLB,, | Performed by: OBSTETRICS & GYNECOLOGY

## 2021-10-27 PROCEDURE — 1159F MED LIST DOCD IN RCRD: CPT | Mod: CPTII,S$GLB,, | Performed by: OBSTETRICS & GYNECOLOGY

## 2021-10-27 PROCEDURE — 3288F PR FALLS RISK ASSESSMENT DOCUMENTED: ICD-10-PCS | Mod: CPTII,S$GLB,, | Performed by: OBSTETRICS & GYNECOLOGY

## 2021-10-27 PROCEDURE — 99215 OFFICE O/P EST HI 40 MIN: CPT | Mod: S$GLB,,, | Performed by: OBSTETRICS & GYNECOLOGY

## 2021-10-27 PROCEDURE — 3079F DIAST BP 80-89 MM HG: CPT | Mod: CPTII,S$GLB,, | Performed by: OBSTETRICS & GYNECOLOGY

## 2021-10-27 PROCEDURE — 3008F BODY MASS INDEX DOCD: CPT | Mod: CPTII,S$GLB,, | Performed by: OBSTETRICS & GYNECOLOGY

## 2021-10-27 PROCEDURE — 99417 PROLNG OP E/M EACH 15 MIN: CPT | Mod: S$GLB,,, | Performed by: OBSTETRICS & GYNECOLOGY

## 2021-10-27 PROCEDURE — 1159F PR MEDICATION LIST DOCUMENTED IN MEDICAL RECORD: ICD-10-PCS | Mod: CPTII,S$GLB,, | Performed by: OBSTETRICS & GYNECOLOGY

## 2021-10-27 PROCEDURE — 3077F PR MOST RECENT SYSTOLIC BLOOD PRESSURE >= 140 MM HG: ICD-10-PCS | Mod: CPTII,S$GLB,, | Performed by: OBSTETRICS & GYNECOLOGY

## 2021-10-27 PROCEDURE — 99215 PR OFFICE/OUTPT VISIT, EST, LEVL V, 40-54 MIN: ICD-10-PCS | Mod: S$GLB,,, | Performed by: OBSTETRICS & GYNECOLOGY

## 2021-10-27 PROCEDURE — 4010F ACE/ARB THERAPY RXD/TAKEN: CPT | Mod: CPTII,S$GLB,, | Performed by: OBSTETRICS & GYNECOLOGY

## 2021-10-27 PROCEDURE — 1101F PR PT FALLS ASSESS DOC 0-1 FALLS W/OUT INJ PAST YR: ICD-10-PCS | Mod: CPTII,S$GLB,, | Performed by: OBSTETRICS & GYNECOLOGY

## 2021-10-27 PROCEDURE — 1125F AMNT PAIN NOTED PAIN PRSNT: CPT | Mod: CPTII,S$GLB,, | Performed by: OBSTETRICS & GYNECOLOGY

## 2021-10-27 PROCEDURE — 3079F PR MOST RECENT DIASTOLIC BLOOD PRESSURE 80-89 MM HG: ICD-10-PCS | Mod: CPTII,S$GLB,, | Performed by: OBSTETRICS & GYNECOLOGY

## 2021-10-27 PROCEDURE — 99999 PR PBB SHADOW E&M-EST. PATIENT-LVL IV: ICD-10-PCS | Mod: PBBFAC,,, | Performed by: OBSTETRICS & GYNECOLOGY

## 2021-10-27 RX ORDER — VITAMIN A 3000 MCG
10000 CAPSULE ORAL DAILY
COMMUNITY
End: 2023-04-04

## 2021-10-27 RX ORDER — ERGOCALCIFEROL 1.25 MG/1
50000 CAPSULE ORAL
COMMUNITY
Start: 2021-10-20 | End: 2022-10-20

## 2021-10-27 RX ORDER — MULTIVIT WITH MINERALS/HERBS
1 TABLET ORAL DAILY
Status: ON HOLD | COMMUNITY
End: 2023-04-12 | Stop reason: CLARIF

## 2021-10-27 RX ORDER — ASCORBIC ACID 500 MG
500 TABLET ORAL DAILY
COMMUNITY
End: 2023-04-04

## 2021-10-29 ENCOUNTER — TELEPHONE (OUTPATIENT)
Dept: GYNECOLOGIC ONCOLOGY | Facility: CLINIC | Age: 71
End: 2021-10-29
Payer: MEDICARE

## 2021-10-29 ENCOUNTER — PATIENT MESSAGE (OUTPATIENT)
Dept: GYNECOLOGIC ONCOLOGY | Facility: CLINIC | Age: 71
End: 2021-10-29
Payer: MEDICARE

## 2021-11-03 ENCOUNTER — TELEPHONE (OUTPATIENT)
Dept: INFUSION THERAPY | Facility: HOSPITAL | Age: 71
End: 2021-11-03
Payer: MEDICARE

## 2021-11-08 DIAGNOSIS — C56.3 MALIGNANT NEOPLASM OF BOTH OVARIES: Primary | ICD-10-CM

## 2021-11-15 ENCOUNTER — INFUSION (OUTPATIENT)
Dept: INFUSION THERAPY | Facility: HOSPITAL | Age: 71
End: 2021-11-15
Attending: OBSTETRICS & GYNECOLOGY
Payer: MEDICARE

## 2021-11-15 DIAGNOSIS — Z01.818 EXAMINATION PRIOR TO CHEMOTHERAPY: Primary | ICD-10-CM

## 2021-11-15 DIAGNOSIS — C56.3 MALIGNANT NEOPLASM OF BOTH OVARIES: Primary | ICD-10-CM

## 2021-11-15 LAB
ALBUMIN SERPL BCP-MCNC: 3.8 G/DL (ref 3.5–5.2)
ALP SERPL-CCNC: 103 U/L (ref 55–135)
ALT SERPL W/O P-5'-P-CCNC: 9 U/L (ref 10–44)
ANION GAP SERPL CALC-SCNC: 9 MMOL/L (ref 8–16)
AST SERPL-CCNC: 14 U/L (ref 10–40)
BILIRUB SERPL-MCNC: 0.4 MG/DL (ref 0.1–1)
BUN SERPL-MCNC: 13 MG/DL (ref 8–23)
CALCIUM SERPL-MCNC: 9.8 MG/DL (ref 8.7–10.5)
CANCER AG125 SERPL-ACNC: 337 U/ML (ref 0–30)
CHLORIDE SERPL-SCNC: 105 MMOL/L (ref 95–110)
CO2 SERPL-SCNC: 27 MMOL/L (ref 23–29)
CREAT SERPL-MCNC: 1 MG/DL (ref 0.5–1.4)
ERYTHROCYTE [DISTWIDTH] IN BLOOD BY AUTOMATED COUNT: 14 % (ref 11.5–14.5)
EST. GFR  (AFRICAN AMERICAN): >60 ML/MIN/1.73 M^2
EST. GFR  (NON AFRICAN AMERICAN): 57.2 ML/MIN/1.73 M^2
GLUCOSE SERPL-MCNC: 104 MG/DL (ref 70–110)
HCT VFR BLD AUTO: 34.8 % (ref 37–48.5)
HGB BLD-MCNC: 11.3 G/DL (ref 12–16)
IMM GRANULOCYTES # BLD AUTO: 0 K/UL (ref 0–0.04)
MCH RBC QN AUTO: 30.8 PG (ref 27–31)
MCHC RBC AUTO-ENTMCNC: 32.5 G/DL (ref 32–36)
MCV RBC AUTO: 95 FL (ref 82–98)
NEUTROPHILS # BLD AUTO: 2.2 K/UL (ref 1.8–7.7)
PLATELET # BLD AUTO: 289 K/UL (ref 150–450)
PMV BLD AUTO: 8.9 FL (ref 9.2–12.9)
POTASSIUM SERPL-SCNC: 4.2 MMOL/L (ref 3.5–5.1)
PROT SERPL-MCNC: 7.5 G/DL (ref 6–8.4)
RBC # BLD AUTO: 3.67 M/UL (ref 4–5.4)
SODIUM SERPL-SCNC: 141 MMOL/L (ref 136–145)
WBC # BLD AUTO: 4.04 K/UL (ref 3.9–12.7)

## 2021-11-15 PROCEDURE — A4216 STERILE WATER/SALINE, 10 ML: HCPCS | Performed by: OBSTETRICS & GYNECOLOGY

## 2021-11-15 PROCEDURE — 25000003 PHARM REV CODE 250: Performed by: OBSTETRICS & GYNECOLOGY

## 2021-11-15 PROCEDURE — 86304 IMMUNOASSAY TUMOR CA 125: CPT | Performed by: OBSTETRICS & GYNECOLOGY

## 2021-11-15 PROCEDURE — 36591 DRAW BLOOD OFF VENOUS DEVICE: CPT

## 2021-11-15 PROCEDURE — 85027 COMPLETE CBC AUTOMATED: CPT | Performed by: OBSTETRICS & GYNECOLOGY

## 2021-11-15 PROCEDURE — 80053 COMPREHEN METABOLIC PANEL: CPT | Performed by: OBSTETRICS & GYNECOLOGY

## 2021-11-15 PROCEDURE — 36415 COLL VENOUS BLD VENIPUNCTURE: CPT | Performed by: OBSTETRICS & GYNECOLOGY

## 2021-11-15 PROCEDURE — 63600175 PHARM REV CODE 636 W HCPCS: Performed by: OBSTETRICS & GYNECOLOGY

## 2021-11-15 RX ORDER — HEPARIN 100 UNIT/ML
500 SYRINGE INTRAVENOUS
Status: DISCONTINUED | OUTPATIENT
Start: 2021-11-15 | End: 2021-11-15 | Stop reason: HOSPADM

## 2021-11-15 RX ORDER — SODIUM CHLORIDE 0.9 % (FLUSH) 0.9 %
10 SYRINGE (ML) INJECTION
Status: CANCELLED | OUTPATIENT
Start: 2021-11-15

## 2021-11-15 RX ORDER — HEPARIN 100 UNIT/ML
500 SYRINGE INTRAVENOUS
Status: CANCELLED | OUTPATIENT
Start: 2021-11-15

## 2021-11-15 RX ORDER — ONDANSETRON HYDROCHLORIDE 8 MG/1
8 TABLET, FILM COATED ORAL EVERY 8 HOURS PRN
Qty: 30 TABLET | Refills: 3 | Status: ON HOLD | OUTPATIENT
Start: 2021-11-15 | End: 2023-04-12 | Stop reason: CLARIF

## 2021-11-15 RX ORDER — SODIUM CHLORIDE 0.9 % (FLUSH) 0.9 %
10 SYRINGE (ML) INJECTION
Status: DISCONTINUED | OUTPATIENT
Start: 2021-11-15 | End: 2021-11-15 | Stop reason: HOSPADM

## 2021-11-15 RX ADMIN — Medication 10 ML: at 11:11

## 2021-11-15 RX ADMIN — HEPARIN 500 UNITS: 100 SYRINGE at 11:11

## 2021-11-16 RX ORDER — SODIUM CHLORIDE 0.9 % (FLUSH) 0.9 %
10 SYRINGE (ML) INJECTION
Status: CANCELLED | OUTPATIENT
Start: 2021-11-16

## 2021-11-16 RX ORDER — HEPARIN 100 UNIT/ML
500 SYRINGE INTRAVENOUS
Status: CANCELLED | OUTPATIENT
Start: 2021-11-16

## 2021-11-16 RX ORDER — EPINEPHRINE 0.3 MG/.3ML
0.3 INJECTION SUBCUTANEOUS ONCE AS NEEDED
Status: CANCELLED | OUTPATIENT
Start: 2021-11-16

## 2021-11-16 RX ORDER — DIPHENHYDRAMINE HYDROCHLORIDE 50 MG/ML
50 INJECTION INTRAMUSCULAR; INTRAVENOUS ONCE AS NEEDED
Status: CANCELLED | OUTPATIENT
Start: 2021-11-16

## 2021-11-17 ENCOUNTER — INFUSION (OUTPATIENT)
Dept: INFUSION THERAPY | Facility: HOSPITAL | Age: 71
End: 2021-11-17
Attending: OBSTETRICS & GYNECOLOGY
Payer: MEDICARE

## 2021-11-17 VITALS
HEIGHT: 64 IN | WEIGHT: 184.31 LBS | TEMPERATURE: 98 F | BODY MASS INDEX: 31.47 KG/M2 | HEART RATE: 82 BPM | RESPIRATION RATE: 18 BRPM | SYSTOLIC BLOOD PRESSURE: 129 MMHG | DIASTOLIC BLOOD PRESSURE: 68 MMHG

## 2021-11-17 DIAGNOSIS — C56.3 MALIGNANT NEOPLASM OF BOTH OVARIES: Primary | ICD-10-CM

## 2021-11-17 PROCEDURE — 96366 THER/PROPH/DIAG IV INF ADDON: CPT

## 2021-11-17 PROCEDURE — 96413 CHEMO IV INFUSION 1 HR: CPT

## 2021-11-17 PROCEDURE — 96417 CHEMO IV INFUS EACH ADDL SEQ: CPT

## 2021-11-17 PROCEDURE — 25000003 PHARM REV CODE 250: Performed by: OBSTETRICS & GYNECOLOGY

## 2021-11-17 PROCEDURE — A4216 STERILE WATER/SALINE, 10 ML: HCPCS | Performed by: OBSTETRICS & GYNECOLOGY

## 2021-11-17 PROCEDURE — 96367 TX/PROPH/DG ADDL SEQ IV INF: CPT

## 2021-11-17 PROCEDURE — 63600175 PHARM REV CODE 636 W HCPCS: Mod: JG | Performed by: OBSTETRICS & GYNECOLOGY

## 2021-11-17 RX ORDER — SODIUM CHLORIDE 0.9 % (FLUSH) 0.9 %
10 SYRINGE (ML) INJECTION
Status: DISCONTINUED | OUTPATIENT
Start: 2021-11-17 | End: 2021-11-17 | Stop reason: HOSPADM

## 2021-11-17 RX ORDER — DIPHENHYDRAMINE HYDROCHLORIDE 50 MG/ML
50 INJECTION INTRAMUSCULAR; INTRAVENOUS ONCE AS NEEDED
Status: DISCONTINUED | OUTPATIENT
Start: 2021-11-17 | End: 2021-11-17 | Stop reason: HOSPADM

## 2021-11-17 RX ORDER — EPINEPHRINE 0.3 MG/.3ML
0.3 INJECTION SUBCUTANEOUS ONCE AS NEEDED
Status: DISCONTINUED | OUTPATIENT
Start: 2021-11-17 | End: 2021-11-17 | Stop reason: HOSPADM

## 2021-11-17 RX ORDER — HEPARIN 100 UNIT/ML
500 SYRINGE INTRAVENOUS
Status: DISCONTINUED | OUTPATIENT
Start: 2021-11-17 | End: 2021-11-17 | Stop reason: HOSPADM

## 2021-11-17 RX ADMIN — HEPARIN 500 UNITS: 100 SYRINGE at 05:11

## 2021-11-17 RX ADMIN — DOXORUBICIN HYDROCHLORIDE 58 MG: 2 INJECTABLE, LIPOSOMAL INTRAVENOUS at 03:11

## 2021-11-17 RX ADMIN — CARBOPLATIN 470 MG: 10 INJECTION INTRAVENOUS at 04:11

## 2021-11-17 RX ADMIN — APREPITANT 130 MG: 130 INJECTION, EMULSION INTRAVENOUS at 03:11

## 2021-11-17 RX ADMIN — Medication 10 ML: at 05:11

## 2021-11-17 RX ADMIN — PALONOSETRON HYDROCHLORIDE 0.25 MG: 0.25 INJECTION, SOLUTION INTRAVENOUS at 02:11

## 2021-11-18 ENCOUNTER — PATIENT MESSAGE (OUTPATIENT)
Dept: GYNECOLOGIC ONCOLOGY | Facility: CLINIC | Age: 71
End: 2021-11-18
Payer: MEDICARE

## 2021-11-18 ENCOUNTER — TELEPHONE (OUTPATIENT)
Dept: GYNECOLOGIC ONCOLOGY | Facility: CLINIC | Age: 71
End: 2021-11-18
Payer: MEDICARE

## 2021-11-23 ENCOUNTER — TELEPHONE (OUTPATIENT)
Dept: GYNECOLOGIC ONCOLOGY | Facility: CLINIC | Age: 71
End: 2021-11-23
Payer: MEDICARE

## 2021-11-23 DIAGNOSIS — C56.3 MALIGNANT NEOPLASM OF BOTH OVARIES: ICD-10-CM

## 2021-11-23 DIAGNOSIS — Z51.11 ENCOUNTER FOR ANTINEOPLASTIC CHEMOTHERAPY: Primary | ICD-10-CM

## 2021-11-28 ENCOUNTER — PATIENT MESSAGE (OUTPATIENT)
Dept: GYNECOLOGIC ONCOLOGY | Facility: CLINIC | Age: 71
End: 2021-11-28
Payer: MEDICARE

## 2021-12-02 DIAGNOSIS — C56.3 MALIGNANT NEOPLASM OF BOTH OVARIES: ICD-10-CM

## 2021-12-02 RX ORDER — HYDROCODONE BITARTRATE AND ACETAMINOPHEN 5; 325 MG/1; MG/1
1 TABLET ORAL EVERY 6 HOURS PRN
Qty: 60 TABLET | Refills: 0 | Status: SHIPPED | OUTPATIENT
Start: 2021-12-02 | End: 2022-01-04 | Stop reason: SDUPTHER

## 2021-12-03 ENCOUNTER — TELEPHONE (OUTPATIENT)
Dept: GYNECOLOGIC ONCOLOGY | Facility: OTHER | Age: 71
End: 2021-12-03
Payer: MEDICARE

## 2021-12-03 ENCOUNTER — OFFICE VISIT (OUTPATIENT)
Dept: PSYCHIATRY | Facility: CLINIC | Age: 71
End: 2021-12-03
Payer: MEDICARE

## 2021-12-03 ENCOUNTER — TELEPHONE (OUTPATIENT)
Dept: INFUSION THERAPY | Facility: HOSPITAL | Age: 71
End: 2021-12-03
Payer: MEDICARE

## 2021-12-03 ENCOUNTER — HOSPITAL ENCOUNTER (OUTPATIENT)
Dept: CARDIOLOGY | Facility: HOSPITAL | Age: 71
Discharge: HOME | End: 2021-12-03
Attending: OBSTETRICS & GYNECOLOGY
Payer: MEDICARE

## 2021-12-03 VITALS
DIASTOLIC BLOOD PRESSURE: 60 MMHG | WEIGHT: 184 LBS | SYSTOLIC BLOOD PRESSURE: 110 MMHG | HEIGHT: 64 IN | BODY MASS INDEX: 31.41 KG/M2 | HEART RATE: 70 BPM

## 2021-12-03 DIAGNOSIS — F41.1 GENERALIZED ANXIETY DISORDER: Primary | ICD-10-CM

## 2021-12-03 DIAGNOSIS — Z51.11 ENCOUNTER FOR ANTINEOPLASTIC CHEMOTHERAPY: ICD-10-CM

## 2021-12-03 DIAGNOSIS — C56.3 MALIGNANT NEOPLASM OF BOTH OVARIES: ICD-10-CM

## 2021-12-03 DIAGNOSIS — F32.9 REACTIVE DEPRESSION: ICD-10-CM

## 2021-12-03 DIAGNOSIS — C56.3 MALIGNANT NEOPLASM OF BOTH OVARIES: Primary | ICD-10-CM

## 2021-12-03 LAB
ASCENDING AORTA: 2.58 CM
AV INDEX (PROSTH): 0.6
AV MEAN GRADIENT: 5 MMHG
AV PEAK GRADIENT: 10 MMHG
AV VALVE AREA: 1.45 CM2
AV VELOCITY RATIO: 0.65
BSA FOR ECHO PROCEDURE: 1.94 M2
CV ECHO LV RWT: 0.5 CM
DOP CALC AO PEAK VEL: 1.55 M/S
DOP CALC AO VTI: 29.92 CM
DOP CALC LVOT AREA: 2.4 CM2
DOP CALC LVOT DIAMETER: 1.76 CM
DOP CALC LVOT PEAK VEL: 1 M/S
DOP CALC LVOT STROKE VOLUME: 43.48 CM3
DOP CALCLVOT PEAK VEL VTI: 17.88 CM
E WAVE DECELERATION TIME: 143.85 MSEC
E/A RATIO: 1.23
E/E' RATIO: 10.67 M/S
ECHO LV POSTERIOR WALL: 1 CM (ref 0.6–1.1)
EJECTION FRACTION: 65 %
FRACTIONAL SHORTENING: 36 % (ref 28–44)
INTERVENTRICULAR SEPTUM: 0.9 CM (ref 0.6–1.1)
IVRT: 62.8 MSEC
LA MAJOR: 5.51 CM
LA MINOR: 5.56 CM
LA WIDTH: 3.86 CM
LEFT ATRIUM SIZE: 3.79 CM
LEFT ATRIUM VOLUME INDEX MOD: 39.2 ML/M2
LEFT ATRIUM VOLUME INDEX: 36.4 ML/M2
LEFT ATRIUM VOLUME MOD: 74.06 CM3
LEFT ATRIUM VOLUME: 68.83 CM3
LEFT INTERNAL DIMENSION IN SYSTOLE: 2.58 CM (ref 2.1–4)
LEFT VENTRICLE DIASTOLIC VOLUME INDEX: 41.39 ML/M2
LEFT VENTRICLE DIASTOLIC VOLUME: 78.23 ML
LEFT VENTRICLE MASS INDEX: 63 G/M2
LEFT VENTRICLE SYSTOLIC VOLUME INDEX: 12.8 ML/M2
LEFT VENTRICLE SYSTOLIC VOLUME: 24.23 ML
LEFT VENTRICULAR INTERNAL DIMENSION IN DIASTOLE: 4 CM (ref 3.5–6)
LEFT VENTRICULAR MASS: 118.23 G
LV LATERAL E/E' RATIO: 9.6 M/S
LV SEPTAL E/E' RATIO: 12 M/S
MV A" WAVE DURATION": 9.99 MSEC
MV PEAK A VEL: 0.78 M/S
MV PEAK E VEL: 0.96 M/S
MV STENOSIS PRESSURE HALF TIME: 41.72 MS
MV VALVE AREA P 1/2 METHOD: 5.27 CM2
PISA TR MAX VEL: 2.9 M/S
PULM VEIN S/D RATIO: 1.23
PV PEAK D VEL: 0.44 M/S
PV PEAK S VEL: 0.54 M/S
QEF: 66 %
RA MAJOR: 4.33 CM
RA PRESSURE: 3 MMHG
RA WIDTH: 3.54 CM
RV TISSUE DOPPLER FREE WALL SYSTOLIC VELOCITY 1 (APICAL 4 CHAMBER VIEW): 10.43 CM/S
SINUS: 2.88 CM
STJ: 2.36 CM
TDI LATERAL: 0.1 M/S
TDI SEPTAL: 0.08 M/S
TDI: 0.09 M/S
TR MAX PG: 34 MMHG
TRICUSPID ANNULAR PLANE SYSTOLIC EXCURSION: 1.62 CM
TV REST PULMONARY ARTERY PRESSURE: 37 MMHG

## 2021-12-03 PROCEDURE — 93306 ECHO (CUPID ONLY): ICD-10-PCS | Mod: 26,,, | Performed by: INTERNAL MEDICINE

## 2021-12-03 PROCEDURE — 93356 ECHO (CUPID ONLY): ICD-10-PCS | Mod: ,,, | Performed by: INTERNAL MEDICINE

## 2021-12-03 PROCEDURE — 4010F PR ACE/ARB THEARPY RXD/TAKEN: ICD-10-PCS | Mod: CPTII,S$GLB,, | Performed by: PSYCHOLOGIST

## 2021-12-03 PROCEDURE — 93306 TTE W/DOPPLER COMPLETE: CPT

## 2021-12-03 PROCEDURE — 90791 PR PSYCHIATRIC DIAGNOSTIC EVALUATION: ICD-10-PCS | Mod: S$GLB,,, | Performed by: PSYCHOLOGIST

## 2021-12-03 PROCEDURE — 4010F ACE/ARB THERAPY RXD/TAKEN: CPT | Mod: CPTII,S$GLB,, | Performed by: PSYCHOLOGIST

## 2021-12-03 PROCEDURE — 93306 TTE W/DOPPLER COMPLETE: CPT | Mod: 26,,, | Performed by: INTERNAL MEDICINE

## 2021-12-03 PROCEDURE — 99999 PR PBB SHADOW E&M-EST. PATIENT-LVL III: ICD-10-PCS | Mod: PBBFAC,,, | Performed by: PSYCHOLOGIST

## 2021-12-03 PROCEDURE — 90791 PSYCH DIAGNOSTIC EVALUATION: CPT | Mod: S$GLB,,, | Performed by: PSYCHOLOGIST

## 2021-12-03 PROCEDURE — 93356 MYOCRD STRAIN IMG SPCKL TRCK: CPT | Mod: ,,, | Performed by: INTERNAL MEDICINE

## 2021-12-03 PROCEDURE — 99999 PR PBB SHADOW E&M-EST. PATIENT-LVL III: CPT | Mod: PBBFAC,,, | Performed by: PSYCHOLOGIST

## 2021-12-06 ENCOUNTER — PATIENT MESSAGE (OUTPATIENT)
Dept: GYNECOLOGIC ONCOLOGY | Facility: CLINIC | Age: 71
End: 2021-12-06
Payer: MEDICARE

## 2021-12-07 ENCOUNTER — TELEPHONE (OUTPATIENT)
Dept: HEMATOLOGY/ONCOLOGY | Facility: CLINIC | Age: 71
End: 2021-12-07
Payer: MEDICARE

## 2021-12-08 ENCOUNTER — OFFICE VISIT (OUTPATIENT)
Dept: GYNECOLOGIC ONCOLOGY | Facility: CLINIC | Age: 71
End: 2021-12-08
Payer: MEDICARE

## 2021-12-08 VITALS
HEIGHT: 64 IN | WEIGHT: 182.88 LBS | HEART RATE: 86 BPM | DIASTOLIC BLOOD PRESSURE: 64 MMHG | SYSTOLIC BLOOD PRESSURE: 122 MMHG | BODY MASS INDEX: 31.22 KG/M2

## 2021-12-08 DIAGNOSIS — C56.3 MALIGNANT NEOPLASM OF BOTH OVARIES: Primary | ICD-10-CM

## 2021-12-08 DIAGNOSIS — Z01.818 EXAMINATION PRIOR TO CHEMOTHERAPY: ICD-10-CM

## 2021-12-08 PROCEDURE — 99215 OFFICE O/P EST HI 40 MIN: CPT | Mod: S$GLB,,, | Performed by: OBSTETRICS & GYNECOLOGY

## 2021-12-08 PROCEDURE — 99215 PR OFFICE/OUTPT VISIT, EST, LEVL V, 40-54 MIN: ICD-10-PCS | Mod: S$GLB,,, | Performed by: OBSTETRICS & GYNECOLOGY

## 2021-12-08 PROCEDURE — 99999 PR PBB SHADOW E&M-EST. PATIENT-LVL IV: ICD-10-PCS | Mod: PBBFAC,,, | Performed by: OBSTETRICS & GYNECOLOGY

## 2021-12-08 PROCEDURE — 99999 PR PBB SHADOW E&M-EST. PATIENT-LVL IV: CPT | Mod: PBBFAC,,, | Performed by: OBSTETRICS & GYNECOLOGY

## 2021-12-08 PROCEDURE — 4010F ACE/ARB THERAPY RXD/TAKEN: CPT | Mod: CPTII,S$GLB,, | Performed by: OBSTETRICS & GYNECOLOGY

## 2021-12-08 PROCEDURE — 4010F PR ACE/ARB THEARPY RXD/TAKEN: ICD-10-PCS | Mod: CPTII,S$GLB,, | Performed by: OBSTETRICS & GYNECOLOGY

## 2021-12-13 ENCOUNTER — INFUSION (OUTPATIENT)
Dept: INFUSION THERAPY | Facility: HOSPITAL | Age: 71
End: 2021-12-13
Attending: OBSTETRICS & GYNECOLOGY
Payer: MEDICARE

## 2021-12-13 ENCOUNTER — TELEPHONE (OUTPATIENT)
Dept: GYNECOLOGIC ONCOLOGY | Facility: CLINIC | Age: 71
End: 2021-12-13
Payer: MEDICARE

## 2021-12-13 DIAGNOSIS — C56.3 MALIGNANT NEOPLASM OF BOTH OVARIES: Primary | ICD-10-CM

## 2021-12-13 LAB
ALBUMIN SERPL BCP-MCNC: 3.5 G/DL (ref 3.5–5.2)
ALP SERPL-CCNC: 117 U/L (ref 55–135)
ALT SERPL W/O P-5'-P-CCNC: 12 U/L (ref 10–44)
ANION GAP SERPL CALC-SCNC: 9 MMOL/L (ref 8–16)
AST SERPL-CCNC: 14 U/L (ref 10–40)
BILIRUB SERPL-MCNC: 0.3 MG/DL (ref 0.1–1)
BUN SERPL-MCNC: 13 MG/DL (ref 8–23)
CALCIUM SERPL-MCNC: 9.5 MG/DL (ref 8.7–10.5)
CANCER AG125 SERPL-ACNC: 105 U/ML (ref 0–30)
CHLORIDE SERPL-SCNC: 103 MMOL/L (ref 95–110)
CO2 SERPL-SCNC: 27 MMOL/L (ref 23–29)
CREAT SERPL-MCNC: 0.9 MG/DL (ref 0.5–1.4)
ERYTHROCYTE [DISTWIDTH] IN BLOOD BY AUTOMATED COUNT: 14.8 % (ref 11.5–14.5)
EST. GFR  (AFRICAN AMERICAN): >60 ML/MIN/1.73 M^2
EST. GFR  (NON AFRICAN AMERICAN): >60 ML/MIN/1.73 M^2
GLUCOSE SERPL-MCNC: 109 MG/DL (ref 70–110)
HCT VFR BLD AUTO: 32.5 % (ref 37–48.5)
HGB BLD-MCNC: 10.2 G/DL (ref 12–16)
IMM GRANULOCYTES # BLD AUTO: 0 K/UL (ref 0–0.04)
MCH RBC QN AUTO: 29.7 PG (ref 27–31)
MCHC RBC AUTO-ENTMCNC: 31.4 G/DL (ref 32–36)
MCV RBC AUTO: 95 FL (ref 82–98)
NEUTROPHILS # BLD AUTO: 1.1 K/UL (ref 1.8–7.7)
PLATELET # BLD AUTO: 155 K/UL (ref 150–450)
PMV BLD AUTO: 8.5 FL (ref 9.2–12.9)
POTASSIUM SERPL-SCNC: 4 MMOL/L (ref 3.5–5.1)
PROT SERPL-MCNC: 6.8 G/DL (ref 6–8.4)
RBC # BLD AUTO: 3.43 M/UL (ref 4–5.4)
SODIUM SERPL-SCNC: 139 MMOL/L (ref 136–145)
WBC # BLD AUTO: 2.85 K/UL (ref 3.9–12.7)

## 2021-12-13 PROCEDURE — A4216 STERILE WATER/SALINE, 10 ML: HCPCS | Performed by: OBSTETRICS & GYNECOLOGY

## 2021-12-13 PROCEDURE — 36415 COLL VENOUS BLD VENIPUNCTURE: CPT | Performed by: OBSTETRICS & GYNECOLOGY

## 2021-12-13 PROCEDURE — 25000003 PHARM REV CODE 250: Performed by: OBSTETRICS & GYNECOLOGY

## 2021-12-13 PROCEDURE — 63600175 PHARM REV CODE 636 W HCPCS: Performed by: OBSTETRICS & GYNECOLOGY

## 2021-12-13 PROCEDURE — 36591 DRAW BLOOD OFF VENOUS DEVICE: CPT

## 2021-12-13 PROCEDURE — 86304 IMMUNOASSAY TUMOR CA 125: CPT | Performed by: OBSTETRICS & GYNECOLOGY

## 2021-12-13 PROCEDURE — 85027 COMPLETE CBC AUTOMATED: CPT | Performed by: OBSTETRICS & GYNECOLOGY

## 2021-12-13 PROCEDURE — 80053 COMPREHEN METABOLIC PANEL: CPT | Performed by: OBSTETRICS & GYNECOLOGY

## 2021-12-13 RX ORDER — HEPARIN 100 UNIT/ML
500 SYRINGE INTRAVENOUS
Status: CANCELLED | OUTPATIENT
Start: 2021-12-13

## 2021-12-13 RX ORDER — HEPARIN 100 UNIT/ML
500 SYRINGE INTRAVENOUS
Status: DISCONTINUED | OUTPATIENT
Start: 2021-12-13 | End: 2021-12-13 | Stop reason: HOSPADM

## 2021-12-13 RX ORDER — SODIUM CHLORIDE 0.9 % (FLUSH) 0.9 %
10 SYRINGE (ML) INJECTION
Status: DISCONTINUED | OUTPATIENT
Start: 2021-12-13 | End: 2021-12-13 | Stop reason: HOSPADM

## 2021-12-13 RX ORDER — SODIUM CHLORIDE 0.9 % (FLUSH) 0.9 %
10 SYRINGE (ML) INJECTION
Status: CANCELLED | OUTPATIENT
Start: 2021-12-13

## 2021-12-13 RX ADMIN — Medication 10 ML: at 10:12

## 2021-12-13 RX ADMIN — HEPARIN 500 UNITS: 100 SYRINGE at 10:12

## 2021-12-14 ENCOUNTER — CLINICAL SUPPORT (OUTPATIENT)
Dept: HEMATOLOGY/ONCOLOGY | Facility: CLINIC | Age: 71
End: 2021-12-14
Payer: MEDICARE

## 2021-12-14 VITALS — BODY MASS INDEX: 30.56 KG/M2 | HEIGHT: 64 IN | WEIGHT: 179 LBS

## 2021-12-14 DIAGNOSIS — E66.9 MILD OBESITY: ICD-10-CM

## 2021-12-14 DIAGNOSIS — Z71.3 NUTRITIONAL COUNSELING: Primary | ICD-10-CM

## 2021-12-14 DIAGNOSIS — C56.3 MALIGNANT NEOPLASM OF BOTH OVARIES: ICD-10-CM

## 2021-12-14 PROCEDURE — 97802 PR MED NUTR THER, 1ST, INDIV, EA 15 MIN: ICD-10-PCS | Mod: S$GLB,,, | Performed by: DIETITIAN, REGISTERED

## 2021-12-14 PROCEDURE — 97802 MEDICAL NUTRITION INDIV IN: CPT | Mod: S$GLB,,, | Performed by: DIETITIAN, REGISTERED

## 2021-12-14 PROCEDURE — 99999 PR PBB SHADOW E&M-EST. PATIENT-LVL II: CPT | Mod: PBBFAC,,, | Performed by: DIETITIAN, REGISTERED

## 2021-12-14 PROCEDURE — 99999 PR PBB SHADOW E&M-EST. PATIENT-LVL II: ICD-10-PCS | Mod: PBBFAC,,, | Performed by: DIETITIAN, REGISTERED

## 2021-12-20 ENCOUNTER — PATIENT MESSAGE (OUTPATIENT)
Dept: GYNECOLOGIC ONCOLOGY | Facility: CLINIC | Age: 71
End: 2021-12-20
Payer: MEDICARE

## 2021-12-27 ENCOUNTER — INFUSION (OUTPATIENT)
Dept: INFUSION THERAPY | Facility: HOSPITAL | Age: 71
End: 2021-12-27
Attending: OBSTETRICS & GYNECOLOGY
Payer: MEDICARE

## 2021-12-27 DIAGNOSIS — C56.3 MALIGNANT NEOPLASM OF BOTH OVARIES: Primary | ICD-10-CM

## 2021-12-27 LAB
ALBUMIN SERPL BCP-MCNC: 3.9 G/DL (ref 3.5–5.2)
ALP SERPL-CCNC: 96 U/L (ref 55–135)
ALT SERPL W/O P-5'-P-CCNC: 12 U/L (ref 10–44)
ANION GAP SERPL CALC-SCNC: 5 MMOL/L (ref 8–16)
AST SERPL-CCNC: 12 U/L (ref 10–40)
BILIRUB SERPL-MCNC: 0.2 MG/DL (ref 0.1–1)
BUN SERPL-MCNC: 19 MG/DL (ref 8–23)
CALCIUM SERPL-MCNC: 9.6 MG/DL (ref 8.7–10.5)
CHLORIDE SERPL-SCNC: 102 MMOL/L (ref 95–110)
CO2 SERPL-SCNC: 30 MMOL/L (ref 23–29)
CREAT SERPL-MCNC: 1 MG/DL (ref 0.5–1.4)
ERYTHROCYTE [DISTWIDTH] IN BLOOD BY AUTOMATED COUNT: 15.9 % (ref 11.5–14.5)
EST. GFR  (AFRICAN AMERICAN): >60 ML/MIN/1.73 M^2
EST. GFR  (NON AFRICAN AMERICAN): 57.2 ML/MIN/1.73 M^2
GLUCOSE SERPL-MCNC: 102 MG/DL (ref 70–110)
HCT VFR BLD AUTO: 35 % (ref 37–48.5)
HGB BLD-MCNC: 10.9 G/DL (ref 12–16)
IMM GRANULOCYTES # BLD AUTO: 0.05 K/UL (ref 0–0.04)
MCH RBC QN AUTO: 30.2 PG (ref 27–31)
MCHC RBC AUTO-ENTMCNC: 31.1 G/DL (ref 32–36)
MCV RBC AUTO: 97 FL (ref 82–98)
NEUTROPHILS # BLD AUTO: 3.6 K/UL (ref 1.8–7.7)
PLATELET # BLD AUTO: 324 K/UL (ref 150–450)
PMV BLD AUTO: 8.5 FL (ref 9.2–12.9)
POTASSIUM SERPL-SCNC: 4.2 MMOL/L (ref 3.5–5.1)
PROT SERPL-MCNC: 7.2 G/DL (ref 6–8.4)
RBC # BLD AUTO: 3.61 M/UL (ref 4–5.4)
SODIUM SERPL-SCNC: 137 MMOL/L (ref 136–145)
WBC # BLD AUTO: 6.43 K/UL (ref 3.9–12.7)

## 2021-12-27 PROCEDURE — 36591 DRAW BLOOD OFF VENOUS DEVICE: CPT

## 2021-12-27 PROCEDURE — 80053 COMPREHEN METABOLIC PANEL: CPT | Performed by: OBSTETRICS & GYNECOLOGY

## 2021-12-27 PROCEDURE — 63600175 PHARM REV CODE 636 W HCPCS: Performed by: OBSTETRICS & GYNECOLOGY

## 2021-12-27 PROCEDURE — A4216 STERILE WATER/SALINE, 10 ML: HCPCS | Performed by: OBSTETRICS & GYNECOLOGY

## 2021-12-27 PROCEDURE — 85027 COMPLETE CBC AUTOMATED: CPT | Performed by: OBSTETRICS & GYNECOLOGY

## 2021-12-27 PROCEDURE — 25000003 PHARM REV CODE 250: Performed by: OBSTETRICS & GYNECOLOGY

## 2021-12-27 RX ORDER — HEPARIN 100 UNIT/ML
500 SYRINGE INTRAVENOUS
Status: DISCONTINUED | OUTPATIENT
Start: 2021-12-27 | End: 2021-12-27 | Stop reason: HOSPADM

## 2021-12-27 RX ORDER — SODIUM CHLORIDE 0.9 % (FLUSH) 0.9 %
10 SYRINGE (ML) INJECTION
Status: DISCONTINUED | OUTPATIENT
Start: 2021-12-27 | End: 2021-12-27 | Stop reason: HOSPADM

## 2021-12-27 RX ORDER — HEPARIN 100 UNIT/ML
500 SYRINGE INTRAVENOUS
Status: CANCELLED | OUTPATIENT
Start: 2021-12-27

## 2021-12-27 RX ORDER — FLUTICASONE FUROATE AND VILANTEROL TRIFENATATE 200; 25 UG/1; UG/1
1 POWDER RESPIRATORY (INHALATION)
COMMUNITY
Start: 2021-11-29 | End: 2021-12-29 | Stop reason: SDUPTHER

## 2021-12-27 RX ORDER — SODIUM CHLORIDE 0.9 % (FLUSH) 0.9 %
10 SYRINGE (ML) INJECTION
Status: CANCELLED | OUTPATIENT
Start: 2021-12-27

## 2021-12-27 RX ORDER — PROMETHAZINE HYDROCHLORIDE 6.25 MG/5ML
6.25 SYRUP ORAL 4 TIMES DAILY PRN
COMMUNITY
Start: 2021-11-29 | End: 2021-12-29 | Stop reason: SDUPTHER

## 2021-12-27 RX ORDER — PREDNISONE 10 MG/1
TABLET ORAL
COMMUNITY
Start: 2021-12-17 | End: 2022-05-04

## 2021-12-27 RX ADMIN — Medication 10 ML: at 12:12

## 2021-12-27 RX ADMIN — HEPARIN 500 UNITS: 100 SYRINGE at 12:12

## 2021-12-29 ENCOUNTER — OFFICE VISIT (OUTPATIENT)
Dept: GYNECOLOGIC ONCOLOGY | Facility: CLINIC | Age: 71
End: 2021-12-29
Payer: MEDICARE

## 2021-12-29 ENCOUNTER — INFUSION (OUTPATIENT)
Dept: INFUSION THERAPY | Facility: HOSPITAL | Age: 71
End: 2021-12-29
Attending: OBSTETRICS & GYNECOLOGY
Payer: MEDICARE

## 2021-12-29 VITALS
WEIGHT: 183.63 LBS | SYSTOLIC BLOOD PRESSURE: 144 MMHG | DIASTOLIC BLOOD PRESSURE: 65 MMHG | HEART RATE: 79 BPM | BODY MASS INDEX: 31.51 KG/M2

## 2021-12-29 VITALS
TEMPERATURE: 98 F | RESPIRATION RATE: 19 BRPM | SYSTOLIC BLOOD PRESSURE: 122 MMHG | HEART RATE: 82 BPM | OXYGEN SATURATION: 100 % | DIASTOLIC BLOOD PRESSURE: 56 MMHG

## 2021-12-29 DIAGNOSIS — C56.3 MALIGNANT NEOPLASM OF BOTH OVARIES: Primary | ICD-10-CM

## 2021-12-29 DIAGNOSIS — F41.9 ANXIETY: ICD-10-CM

## 2021-12-29 DIAGNOSIS — Z01.818 EXAMINATION PRIOR TO CHEMOTHERAPY: Primary | ICD-10-CM

## 2021-12-29 DIAGNOSIS — C56.3 MALIGNANT NEOPLASM OF BOTH OVARIES: ICD-10-CM

## 2021-12-29 PROCEDURE — 4010F ACE/ARB THERAPY RXD/TAKEN: CPT | Mod: CPTII,S$GLB,, | Performed by: OBSTETRICS & GYNECOLOGY

## 2021-12-29 PROCEDURE — 3077F PR MOST RECENT SYSTOLIC BLOOD PRESSURE >= 140 MM HG: ICD-10-PCS | Mod: CPTII,S$GLB,, | Performed by: OBSTETRICS & GYNECOLOGY

## 2021-12-29 PROCEDURE — 3078F DIAST BP <80 MM HG: CPT | Mod: CPTII,S$GLB,, | Performed by: OBSTETRICS & GYNECOLOGY

## 2021-12-29 PROCEDURE — 99999 PR PBB SHADOW E&M-EST. PATIENT-LVL II: CPT | Mod: PBBFAC,,, | Performed by: OBSTETRICS & GYNECOLOGY

## 2021-12-29 PROCEDURE — 96367 TX/PROPH/DG ADDL SEQ IV INF: CPT

## 2021-12-29 PROCEDURE — 4010F PR ACE/ARB THEARPY RXD/TAKEN: ICD-10-PCS | Mod: CPTII,S$GLB,, | Performed by: OBSTETRICS & GYNECOLOGY

## 2021-12-29 PROCEDURE — 25000003 PHARM REV CODE 250: Performed by: OBSTETRICS & GYNECOLOGY

## 2021-12-29 PROCEDURE — 1101F PT FALLS ASSESS-DOCD LE1/YR: CPT | Mod: CPTII,S$GLB,, | Performed by: OBSTETRICS & GYNECOLOGY

## 2021-12-29 PROCEDURE — 3288F PR FALLS RISK ASSESSMENT DOCUMENTED: ICD-10-PCS | Mod: CPTII,S$GLB,, | Performed by: OBSTETRICS & GYNECOLOGY

## 2021-12-29 PROCEDURE — 1125F PR PAIN SEVERITY QUANTIFIED, PAIN PRESENT: ICD-10-PCS | Mod: CPTII,S$GLB,, | Performed by: OBSTETRICS & GYNECOLOGY

## 2021-12-29 PROCEDURE — 96417 CHEMO IV INFUS EACH ADDL SEQ: CPT

## 2021-12-29 PROCEDURE — 3078F PR MOST RECENT DIASTOLIC BLOOD PRESSURE < 80 MM HG: ICD-10-PCS | Mod: CPTII,S$GLB,, | Performed by: OBSTETRICS & GYNECOLOGY

## 2021-12-29 PROCEDURE — 96413 CHEMO IV INFUSION 1 HR: CPT

## 2021-12-29 PROCEDURE — 3077F SYST BP >= 140 MM HG: CPT | Mod: CPTII,S$GLB,, | Performed by: OBSTETRICS & GYNECOLOGY

## 2021-12-29 PROCEDURE — 3288F FALL RISK ASSESSMENT DOCD: CPT | Mod: CPTII,S$GLB,, | Performed by: OBSTETRICS & GYNECOLOGY

## 2021-12-29 PROCEDURE — 1125F AMNT PAIN NOTED PAIN PRSNT: CPT | Mod: CPTII,S$GLB,, | Performed by: OBSTETRICS & GYNECOLOGY

## 2021-12-29 PROCEDURE — 1101F PR PT FALLS ASSESS DOC 0-1 FALLS W/OUT INJ PAST YR: ICD-10-PCS | Mod: CPTII,S$GLB,, | Performed by: OBSTETRICS & GYNECOLOGY

## 2021-12-29 PROCEDURE — 99999 PR PBB SHADOW E&M-EST. PATIENT-LVL II: ICD-10-PCS | Mod: PBBFAC,,, | Performed by: OBSTETRICS & GYNECOLOGY

## 2021-12-29 PROCEDURE — 3008F BODY MASS INDEX DOCD: CPT | Mod: CPTII,S$GLB,, | Performed by: OBSTETRICS & GYNECOLOGY

## 2021-12-29 PROCEDURE — 3008F PR BODY MASS INDEX (BMI) DOCUMENTED: ICD-10-PCS | Mod: CPTII,S$GLB,, | Performed by: OBSTETRICS & GYNECOLOGY

## 2021-12-29 PROCEDURE — 99214 OFFICE O/P EST MOD 30 MIN: CPT | Mod: S$GLB,,, | Performed by: OBSTETRICS & GYNECOLOGY

## 2021-12-29 PROCEDURE — 99214 PR OFFICE/OUTPT VISIT, EST, LEVL IV, 30-39 MIN: ICD-10-PCS | Mod: S$GLB,,, | Performed by: OBSTETRICS & GYNECOLOGY

## 2021-12-29 PROCEDURE — 63600175 PHARM REV CODE 636 W HCPCS: Performed by: OBSTETRICS & GYNECOLOGY

## 2021-12-29 RX ORDER — DIPHENHYDRAMINE HYDROCHLORIDE 50 MG/ML
50 INJECTION INTRAMUSCULAR; INTRAVENOUS ONCE AS NEEDED
Status: DISCONTINUED | OUTPATIENT
Start: 2021-12-29 | End: 2021-12-29 | Stop reason: HOSPADM

## 2021-12-29 RX ORDER — SODIUM CHLORIDE 0.9 % (FLUSH) 0.9 %
10 SYRINGE (ML) INJECTION
Status: CANCELLED | OUTPATIENT
Start: 2021-12-29

## 2021-12-29 RX ORDER — ALPRAZOLAM 0.25 MG/1
0.25 TABLET ORAL 2 TIMES DAILY PRN
Qty: 30 TABLET | Refills: 2 | Status: SHIPPED | OUTPATIENT
Start: 2021-12-29 | End: 2022-05-23

## 2021-12-29 RX ORDER — DIPHENHYDRAMINE HYDROCHLORIDE 50 MG/ML
50 INJECTION INTRAMUSCULAR; INTRAVENOUS ONCE AS NEEDED
Status: CANCELLED | OUTPATIENT
Start: 2021-12-29

## 2021-12-29 RX ORDER — HEPARIN 100 UNIT/ML
500 SYRINGE INTRAVENOUS
Status: DISCONTINUED | OUTPATIENT
Start: 2021-12-29 | End: 2021-12-29 | Stop reason: HOSPADM

## 2021-12-29 RX ORDER — EPINEPHRINE 0.3 MG/.3ML
0.3 INJECTION SUBCUTANEOUS ONCE AS NEEDED
Status: CANCELLED | OUTPATIENT
Start: 2021-12-29

## 2021-12-29 RX ORDER — SODIUM CHLORIDE 0.9 % (FLUSH) 0.9 %
10 SYRINGE (ML) INJECTION
Status: DISCONTINUED | OUTPATIENT
Start: 2021-12-29 | End: 2021-12-29 | Stop reason: HOSPADM

## 2021-12-29 RX ORDER — EPINEPHRINE 0.3 MG/.3ML
0.3 INJECTION SUBCUTANEOUS ONCE AS NEEDED
Status: DISCONTINUED | OUTPATIENT
Start: 2021-12-29 | End: 2021-12-29 | Stop reason: HOSPADM

## 2021-12-29 RX ORDER — HEPARIN 100 UNIT/ML
500 SYRINGE INTRAVENOUS
Status: CANCELLED | OUTPATIENT
Start: 2021-12-29

## 2021-12-29 RX ADMIN — CARBOPLATIN 470 MG: 10 INJECTION INTRAVENOUS at 04:12

## 2021-12-29 RX ADMIN — DOXORUBICIN HYDROCHLORIDE 58 MG: 2 INJECTABLE, LIPOSOMAL INTRAVENOUS at 03:12

## 2021-12-29 RX ADMIN — PALONOSETRON HYDROCHLORIDE 0.25 MG: 0.25 INJECTION, SOLUTION INTRAVENOUS at 02:12

## 2021-12-29 RX ADMIN — SODIUM CHLORIDE: 9 INJECTION, SOLUTION INTRAVENOUS at 05:12

## 2021-12-29 RX ADMIN — HEPARIN SODIUM (PORCINE) LOCK FLUSH IV SOLN 100 UNIT/ML 500 UNITS: 100 SOLUTION at 05:12

## 2021-12-30 ENCOUNTER — INFUSION (OUTPATIENT)
Dept: INFUSION THERAPY | Facility: OTHER | Age: 71
End: 2021-12-30
Attending: INTERNAL MEDICINE
Payer: MEDICARE

## 2021-12-30 VITALS
RESPIRATION RATE: 18 BRPM | SYSTOLIC BLOOD PRESSURE: 140 MMHG | OXYGEN SATURATION: 100 % | HEART RATE: 74 BPM | DIASTOLIC BLOOD PRESSURE: 70 MMHG | TEMPERATURE: 99 F

## 2021-12-30 DIAGNOSIS — C56.3 MALIGNANT NEOPLASM OF BOTH OVARIES: Primary | ICD-10-CM

## 2021-12-30 PROCEDURE — 63600175 PHARM REV CODE 636 W HCPCS: Mod: TB | Performed by: OBSTETRICS & GYNECOLOGY

## 2021-12-30 PROCEDURE — 96372 THER/PROPH/DIAG INJ SC/IM: CPT

## 2021-12-30 RX ADMIN — PEGFILGRASTIM-CBQV 6 MG: 6 INJECTION, SOLUTION SUBCUTANEOUS at 02:12

## 2022-01-04 DIAGNOSIS — C56.3 MALIGNANT NEOPLASM OF BOTH OVARIES: ICD-10-CM

## 2022-01-04 RX ORDER — HYDROCODONE BITARTRATE AND ACETAMINOPHEN 5; 325 MG/1; MG/1
1 TABLET ORAL EVERY 6 HOURS PRN
Qty: 60 TABLET | Refills: 0 | Status: SHIPPED | OUTPATIENT
Start: 2022-01-04 | End: 2022-01-26 | Stop reason: ALTCHOICE

## 2022-01-06 ENCOUNTER — OFFICE VISIT (OUTPATIENT)
Dept: PSYCHIATRY | Facility: CLINIC | Age: 72
End: 2022-01-06
Payer: MEDICARE

## 2022-01-06 DIAGNOSIS — F32.A DEPRESSION, UNSPECIFIED DEPRESSION TYPE: ICD-10-CM

## 2022-01-06 DIAGNOSIS — C56.3 MALIGNANT NEOPLASM OF BOTH OVARIES: ICD-10-CM

## 2022-01-06 DIAGNOSIS — F41.1 GENERALIZED ANXIETY DISORDER: Primary | ICD-10-CM

## 2022-01-06 PROCEDURE — 99999 PR PBB SHADOW E&M-EST. PATIENT-LVL I: CPT | Mod: PBBFAC,,, | Performed by: PSYCHOLOGIST

## 2022-01-06 PROCEDURE — 90834 PR PSYCHOTHERAPY W/PATIENT, 45 MIN: ICD-10-PCS | Mod: S$GLB,,, | Performed by: PSYCHOLOGIST

## 2022-01-06 PROCEDURE — 1159F MED LIST DOCD IN RCRD: CPT | Mod: CPTII,S$GLB,, | Performed by: PSYCHOLOGIST

## 2022-01-06 PROCEDURE — 99999 PR PBB SHADOW E&M-EST. PATIENT-LVL I: ICD-10-PCS | Mod: PBBFAC,,, | Performed by: PSYCHOLOGIST

## 2022-01-06 PROCEDURE — 90834 PSYTX W PT 45 MINUTES: CPT | Mod: S$GLB,,, | Performed by: PSYCHOLOGIST

## 2022-01-06 PROCEDURE — 1159F PR MEDICATION LIST DOCUMENTED IN MEDICAL RECORD: ICD-10-PCS | Mod: CPTII,S$GLB,, | Performed by: PSYCHOLOGIST

## 2022-01-06 NOTE — PROGRESS NOTES
INFORMED CONSENT: Liat Gilmore   is known to this provider and identity was confirmed via NAME and .  The patient has been informed of the risks and benefits associated with engaging in psychotherapy, the handling of protected health information, the rights of privacy and the limits of confidentiality. The patient has also been informed of the importance of reporting any suicidal or homicidal ideation to this or any provider to ensure safety of all parties, and the Liat Gilmore expressed understanding. The patient was agreeable to these terms and freely participates in individual psychotherapy.    PSYCHO-ONCOLOGY NOTE/ Individual Psychotherapy     Date: 2022   [facility]        Therapeutic Intervention: Met with patient.  Outpatient - Behavior modifying psychotherapy 45 min - CPT code 79508      Patient was last seen by me on 12/3/2021    Problem list  Patient Active Problem List   Diagnosis    Supraventricular tachycardia    Hypertension    Hypercholesterolemia    Mild obesity    Fibromyalgia    Cough variant asthma    Malignant neoplasm of both ovaries    Generalized anxiety disorder    Lung mass    Reactive depression    Multiple lung nodules on CT    Herpes simplex vulvovaginitis    Elevated cancer antigen 125 (CA-125)       Chief complaint/reason for encounter: depression and anxiety   Met with patient to evaluate psychosocial adaptation to diagnosis/treatment/survivorship of recurrence ovarian cancer    Current Medications  Current Outpatient Medications   Medication    acyclovir (ZOVIRAX) 400 MG tablet    albuterol 90 mcg/actuation inhaler    ALPRAZolam (XANAX) 0.25 MG tablet    ascorbic acid, vitamin C, (VITAMIN C) 500 MG tablet    b complex vitamins tablet    BREO ELLIPTA 200-25 mcg/dose DsDv diskus inhaler    cyanocobalamin/cobamamide (B12 SL)    cycloSPORINE (RESTASIS) 0.05 % ophthalmic emulsion    diclofenac sodium (VOLTAREN) 1 % Gel    diltiaZEM (TIAZAC) 240  MG Cs24    docosahexaenoic acid/epa (FISH OIL ORAL)    docusate sodium (COLACE ORAL)    ergocalciferol (ERGOCALCIFEROL) 50,000 unit Cap    fluticasone (FLONASE) 50 mcg/actuation nasal spray    HYDROcodone-acetaminophen (NORCO) 5-325 mg per tablet    losartan (COZAAR) 100 MG tablet    metFORMIN (GLUCOPHAGE) 500 MG tablet    multivitamin capsule    ondansetron (ZOFRAN) 8 MG tablet    paroxetine (PAXIL) 10 mg/5 mL Susp    pitavastatin calcium (LIVALO) 2 mg Tab tablet    predniSONE (DELTASONE) 10 MG tablet    promethazine (PHENERGAN) 6.25 mg/5 mL syrup    senna (SENOKOT) 8.6 mg tablet    vitamin A 12400 UNIT capsule    zafirlukast (ACCOLATE) 20 MG tablet     No current facility-administered medications for this visit.     Facility-Administered Medications Ordered in Other Visits   Medication Frequency    heparin, porcine (PF) 100 unit/mL injection flush 500 Units PRN    sodium chloride 0.9% flush 10 mL PRN       Objective:  Liat Gilmore arrived promptly for the session.   Ms. Gilmore was independently ambulatory at the time of session. The patient was fully cooperative throughout the session.  Appearance: age appropriate, appropriately  dressed, adequately  groomed  Behavior/Cooperation: friendly and cooperative  Speech: normal in rate, volume, and tone and appropriate quality, quantity and organization of sentences  Mood: anxious, depressed  Affect: mood congruent  Thought Process: goal-directed, logical  Thought Content: normal,  No delusions or paranoia; did not appear to be responding to internal stimuli during the session  Orientation: grossly intact  Memory: Grossly intact  Attention Span/Concentration: Attends to session without distraction; reports no difficulty  Fund of Knowledge: average  Estimate of Intelligence: average from verbal skills and history  Cognition: grossly intact  Insight: patient has awareness of illness; good insight into own behavior and behavior of others  Judgment: the  patient's behavior is adequate to circumstances    Interval history and content of current session: Patient with some residual negative cognition about body.  Discussed diagnosis, treatment, prognosis, current adaptation to disease and treatment status and family's adaptation to disease and treatment status. Reports to be coping with great difficulty. Evaluated cognitive response, paying particular attention to negative intrusive thoughts of a persistent and detrimental nature. Thoughts of this type are in evidence with moderate distress. Provided cognitive behavioral therapy to address negative cognitions. Identified and evaluated psychosocial and environmental stressors secondary to diagnosis and treatment.  Examined proactive behaviors that may be implemented to minimize or ameliorate psychosocial stressors secondary to diagnosis and treatment.     Risk parameters:   Patient reports no suicidal ideation  Patient reports no homicidal ideation  Patient reports no self-injurious behavior  Patient reports no violent behavior   Safety needs:  None at this time      Verbal deficits: None     Patient's response to intervention:The patient's response to intervention is accepting.     Progress toward goals and other mental status changes:  The patient's progress toward goals is good.      Progress to date:Progress as Expected      Goals from last visit: Met     Patient reported outcomes:    Distress Score    Distress Score: 7        Practical Problems Physical Problems   : No Appearance: Yes   Housing: No Bathing / Dressing: No   Insurance / Financial: No Breathing: Yes    Transportation: No  Changes in Urination: No    Work / School: No  Constipation: Yes   Treatment Decisions: No  Diarrhea: No     Eating: No    Family Problems Fatigue: Yes    Dealing with Children: No Feeling Swollen: Yes    Dealing with Partner: No Fevers: No    Ability to Have Children: No  Getting Around: Yes       Indigestion: Yes      Memory / Concentration: No   Emotional Problems Mouth Sores: No    Depression: Yes  Nausea: No    Fears: Yes  Nose Dry / Congested: No    Nervousness: Yes  Pain: Yes    Sadness: Yes Sexual: No    Worry: Yes Skin Dry / Itchy: Yes    Loss of Interest in Usual Activities: Yes Sleep: Yes     Substance Abuse: No    Spiritual/Religions Concerns Tingling in Hands / Feet: Yes   Spritual / Rastafari Concerns: Yes         Other Problems              PHQ ANSWERS    Q1. Little interest or pleasure in doing things: (P) More than half the days (01/04/22 2339)  Q2. Feeling down, depressed, or hopeless: (P) More than half the days (01/04/22 2339)  Q3. Trouble falling or staying asleep, or sleeping too much: (P) More than half the days (01/04/22 2339)  Q4. Feeling tired or having little energy: (P) More than half the days (01/04/22 2339)  Q5. Poor appetite or overeating: (P) More than half the days (01/04/22 2339)  Q6. Feeling bad about yourself - or that you are a failure or have let yourself or your family down: (P) More than half the days (01/04/22 2339)  Q7. Trouble concentrating on things, such as reading the newspaper or watching television: (P) More than half the days (01/04/22 2339)  Q8. Moving or speaking so slowly that other people could have noticed. Or the opposite - being so fidgety or restless that you have been moving around a lot more than usual: (P) Not at all (01/04/22 2339)  Q9.  No SI    PHQ8 Score : (P) 14 (01/04/22 2339)  PHQ-9 Total Score: (P) 14 (01/04/22 2339)     CLOVER-7 Answers    GAD7 1/4/2022   1. Feeling nervous, anxious, or on edge? 1   2. Not being able to stop or control worrying? 1   3. Worrying too much about different things? 2   4. Trouble relaxing? 2   5. Being so restless that it is hard to sit still? 0   6. Becoming easily annoyed or irritable? 0   7. Feeling afraid as if something awful might happen? 1   CLOVER-7 Score 7     CLOVER-7 Score: (P) 7  Interpretation: (P) Mild Anxiety     Client  Strengths: verbal, intelligent, successful, good social support, good insight, commitment to wellness, strong suha, strong cultural traditions     Diagnosis:     ICD-10-CM ICD-9-CM   1. Generalized anxiety disorder  F41.1 300.02   2. Malignant neoplasm of both ovaries  C56.3 183.0   3. Depression, unspecified depression type  F32.A 311     Treatment Plan:individual psychotherapy and medication management by physician  · Target symptoms: depression, anxiety   · Why chosen therapy is appropriate versus another modality: relevant to diagnosis, patient responds to this modality, evidence based practice  · Outcome monitoring methods: self-report, observation, checklist/rating scale  · Therapeutic intervention type: behavior modifying psychotherapy  · Prognosis: Good      Behavioral goals:    Exercise:   Stress management: Work with RD related to nutrition   Social engagement:   Nutrition:   Smoking Cessation:   Therapy:    Return to clinic: 3 weeks       Length of Service (minutes direct face-to-face contact): 45    Neetu Valdez, PhD  Clinical Psychologist  [place] License #[id number]  [place] License #[id number]

## 2022-01-13 ENCOUNTER — PATIENT MESSAGE (OUTPATIENT)
Dept: HEMATOLOGY/ONCOLOGY | Facility: CLINIC | Age: 72
End: 2022-01-13
Payer: MEDICARE

## 2022-01-14 ENCOUNTER — PATIENT MESSAGE (OUTPATIENT)
Dept: GYNECOLOGIC ONCOLOGY | Facility: CLINIC | Age: 72
End: 2022-01-14
Payer: MEDICARE

## 2022-01-14 ENCOUNTER — DOCUMENTATION ONLY (OUTPATIENT)
Dept: PALLIATIVE MEDICINE | Facility: OTHER | Age: 72
End: 2022-01-14
Payer: MEDICARE

## 2022-01-14 NOTE — PROGRESS NOTES
"Received consult to palliative care from Dr. Treviño: "She has recurrent ovarian cancer, is on active treatment, and is very functional with minimal symptoms at present."    I contacted the patient via telephone.  Ms. Gilmore was just leaving the grocery store when we spoke.  I asked her about her understanding regarding diagnosis. She reports she was diagnosed with ovarian cancer in 2020, she underwent debulking surgery and subsequently began began chemotherapy in July 2020, she was then in remission for roughly 9-10 months until she developed recurrence of disease (CT 10/22/21 multiple hypodensities scattered along the periphery of the liver concerning for peritoneal implants) . She has great insight into her disease stating "my prognosis is poor", "this is not curable". She reports her biggest symptom is abdominal pain which she develops 3 days after chemotherapy and lasting up to 10 days, she has been taking hydrocodone with some relief. She reports that she is active on an ovarian cancer forum and read that fasting would help before chemotherapy, she tried decreasing her oral intake prior to her second round of chemotherapy with some relief. She reports she has intermittent constipation which she manages with Colace and senokot. She is currently receiving disease directed therapy and her goals are to continue this.    We reflected on her life outside of her diagnosis. She lives alone, she is independent of ADLs. She has one daughter, Sue Avalos, who moved back to [place] after her diagnosis. Her daughter has her masters in public health. The patient was an  working in the  [employer]. She is retired now and was hoping to work part time after snf but has been unable to do this due to her diagnosis. She was hoping to travel as well. She was able to visit the [facility] with her daughter prior to beginning chemotherapy.      I noted from Dr. Treviño's note that she was " thinking about traveling to [place] this month for mistletoe therapy. She reports that the  mistetoe plant has been used as complimentary medicine to help with symptoms. She understands this  may not slow down progression of disease, but if it would help with her abdominal pain she is open to trying it. She is very knowledgeable and interested in complimentary disease. Due to the COVID pandemic, she was unable to travel to [place]. She is hoping to travel in March 2022.     Overall, her insight into her disease and overall prognosis is excellent. I would like to follow up with her in person when she meets with Dr. Treviño on Jan 26, she is open to this. I will discuss with .

## 2022-01-21 ENCOUNTER — PATIENT MESSAGE (OUTPATIENT)
Dept: HEMATOLOGY/ONCOLOGY | Facility: CLINIC | Age: 72
End: 2022-01-21

## 2022-01-21 ENCOUNTER — OFFICE VISIT (OUTPATIENT)
Dept: HEMATOLOGY/ONCOLOGY | Facility: CLINIC | Age: 72
End: 2022-01-21
Payer: MEDICARE

## 2022-01-21 VITALS
WEIGHT: 183.19 LBS | HEART RATE: 87 BPM | HEIGHT: 64 IN | DIASTOLIC BLOOD PRESSURE: 66 MMHG | BODY MASS INDEX: 31.27 KG/M2 | SYSTOLIC BLOOD PRESSURE: 148 MMHG

## 2022-01-21 DIAGNOSIS — G89.29 CHRONIC LOW BACK PAIN: ICD-10-CM

## 2022-01-21 DIAGNOSIS — M54.50 CHRONIC LOW BACK PAIN: ICD-10-CM

## 2022-01-21 DIAGNOSIS — G47.00 INSOMNIA, UNSPECIFIED TYPE: ICD-10-CM

## 2022-01-21 DIAGNOSIS — T45.1X5A CHEMOTHERAPY-INDUCED NEUROPATHY: Primary | ICD-10-CM

## 2022-01-21 DIAGNOSIS — G62.0 CHEMOTHERAPY-INDUCED NEUROPATHY: Primary | ICD-10-CM

## 2022-01-21 DIAGNOSIS — F41.1 GENERALIZED ANXIETY DISORDER: ICD-10-CM

## 2022-01-21 DIAGNOSIS — M79.7 FIBROMYALGIA: ICD-10-CM

## 2022-01-21 DIAGNOSIS — C56.3 MALIGNANT NEOPLASM OF BOTH OVARIES: ICD-10-CM

## 2022-01-21 PROCEDURE — 1101F PR PT FALLS ASSESS DOC 0-1 FALLS W/OUT INJ PAST YR: ICD-10-PCS | Mod: CPTII,S$GLB,, | Performed by: OBSTETRICS & GYNECOLOGY

## 2022-01-21 PROCEDURE — 1101F PT FALLS ASSESS-DOCD LE1/YR: CPT | Mod: CPTII,S$GLB,, | Performed by: OBSTETRICS & GYNECOLOGY

## 2022-01-21 PROCEDURE — 1125F PR PAIN SEVERITY QUANTIFIED, PAIN PRESENT: ICD-10-PCS | Mod: CPTII,S$GLB,, | Performed by: OBSTETRICS & GYNECOLOGY

## 2022-01-21 PROCEDURE — 3008F BODY MASS INDEX DOCD: CPT | Mod: CPTII,S$GLB,, | Performed by: OBSTETRICS & GYNECOLOGY

## 2022-01-21 PROCEDURE — 3008F PR BODY MASS INDEX (BMI) DOCUMENTED: ICD-10-PCS | Mod: CPTII,S$GLB,, | Performed by: OBSTETRICS & GYNECOLOGY

## 2022-01-21 PROCEDURE — 99999 PR PBB SHADOW E&M-EST. PATIENT-LVL V: CPT | Mod: PBBFAC,,, | Performed by: OBSTETRICS & GYNECOLOGY

## 2022-01-21 PROCEDURE — 1125F AMNT PAIN NOTED PAIN PRSNT: CPT | Mod: CPTII,S$GLB,, | Performed by: OBSTETRICS & GYNECOLOGY

## 2022-01-21 PROCEDURE — 99215 PR OFFICE/OUTPT VISIT, EST, LEVL V, 40-54 MIN: ICD-10-PCS | Mod: S$GLB,,, | Performed by: OBSTETRICS & GYNECOLOGY

## 2022-01-21 PROCEDURE — 3077F SYST BP >= 140 MM HG: CPT | Mod: CPTII,S$GLB,, | Performed by: OBSTETRICS & GYNECOLOGY

## 2022-01-21 PROCEDURE — 3288F PR FALLS RISK ASSESSMENT DOCUMENTED: ICD-10-PCS | Mod: CPTII,S$GLB,, | Performed by: OBSTETRICS & GYNECOLOGY

## 2022-01-21 PROCEDURE — 3077F PR MOST RECENT SYSTOLIC BLOOD PRESSURE >= 140 MM HG: ICD-10-PCS | Mod: CPTII,S$GLB,, | Performed by: OBSTETRICS & GYNECOLOGY

## 2022-01-21 PROCEDURE — 1159F PR MEDICATION LIST DOCUMENTED IN MEDICAL RECORD: ICD-10-PCS | Mod: CPTII,S$GLB,, | Performed by: OBSTETRICS & GYNECOLOGY

## 2022-01-21 PROCEDURE — 99999 PR PBB SHADOW E&M-EST. PATIENT-LVL V: ICD-10-PCS | Mod: PBBFAC,,, | Performed by: OBSTETRICS & GYNECOLOGY

## 2022-01-21 PROCEDURE — 3078F DIAST BP <80 MM HG: CPT | Mod: CPTII,S$GLB,, | Performed by: OBSTETRICS & GYNECOLOGY

## 2022-01-21 PROCEDURE — 3078F PR MOST RECENT DIASTOLIC BLOOD PRESSURE < 80 MM HG: ICD-10-PCS | Mod: CPTII,S$GLB,, | Performed by: OBSTETRICS & GYNECOLOGY

## 2022-01-21 PROCEDURE — 3288F FALL RISK ASSESSMENT DOCD: CPT | Mod: CPTII,S$GLB,, | Performed by: OBSTETRICS & GYNECOLOGY

## 2022-01-21 PROCEDURE — 99215 OFFICE O/P EST HI 40 MIN: CPT | Mod: S$GLB,,, | Performed by: OBSTETRICS & GYNECOLOGY

## 2022-01-21 PROCEDURE — 1159F MED LIST DOCD IN RCRD: CPT | Mod: CPTII,S$GLB,, | Performed by: OBSTETRICS & GYNECOLOGY

## 2022-01-21 NOTE — PROGRESS NOTES
"Integrative Health and Medicine Initial Visit    This 71 y.o.  female seeks an integrative approach to discuss what she can do to improve her long-term survival from ovarian cancer and to address side effects related to her ovarian cancer treatment. She was referred by Dr. Riggins.    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 She is s/p exp lap/staging dbulking 6/22/2020 with Dr. Ratliff at [facility]. She received her 1st cycle of Carbo Taxol 7/2020.  She had CT scan after 4 cycles and pulmonary nodules were noted. She then had 6 cycles of Carbo Taxol which was completed Nov 2020. Her disease was stable until 10/22/2021- CT scan showed liver mets. She has had 2 cycles of Carbo Doxil. 3rd cycle is scheduled for1/26/2022     She reports insomnia-she is having difficulty falling asleep and staying asleep. She sleeps with the light on "since my cancer diagnosis. I don't like to be in a dark room." she reports that she did "fairly well until my recurrence." She is "afraid of a horrible death." Before bed she goes on a forum with other women for ovarian cancer.   She has a history of fibromyalgia- has had chronic pain and fatigue for years. She feels like she chooses Xanax or pain medication at night. She has hip pain , low back pain  She has tried Melatonin- did not help her stay asleep. She has tried Ambien and Restoril- "felt hungover." When she does sleep she has the energy to walk for exercise.   She also reports stomach pain 2-3 days after chemotherapy. She has started intermittent fasting- feels better but has not helped pain related to chemotherapy    She did share that she had a negative experience at [facility] after her surgery which "still haunts me." This resulted in her changing her care to [facility]. She is seeing Dr. Valdez and taking Paxil for depression  She also plans on pursuing Mistletoe therapy in [place]- does not intend to use this instead of chemotherapy. She plans to " continue chemotherapy.   Sleep  How many hours of sleep per night? Broken sleep  Do you have trouble falling asleep, staying asleep or waking up earlier than you need to? yes  Do you have daytime fatigue? yes  Do you need medication for sleep? yes  Do you use any supplements or other interventions for sleep? yes    Nutrition   Food allergies or sensitivities:no  Do you adhere to a particular type of diet? Intermittent fasting  What type of diet do you follow? Intermittent fasting- eats between 11am and 7pm  Do you have any concerns with your eating habits? no  Are you concerned with your level of alcohol intake? no      Past Medical History  Past Medical History:   Diagnosis Date    Anxiety 2020    Asthma 10/31/2018    1985: Diagnosed. cough variant    Bronchiectasis     Elevated cancer antigen 125 (CA-125) 2021    Fibromyalgia 10/31/2018    2006: Diagnosed.    Herpes simplex vulvovaginitis 2021    Hx of psychiatric care     Hypercholesterolemia 10/31/2018    2010: Began statin.    Hyperlipidemia     Hypertension     Lung mass 2020    Malignant neoplasm of both ovaries 2020    Overweight 10/31/2018    10/31/2018: Weight 75 kg. BMI 28.    Psychiatric problem     Pulmonary nodules 12/10/2020    Reactive depression 2020    Supraventricular tachycardia     Tachycardia     Therapy         Past Surgical History   Past Surgical History:   Procedure Laterality Date    APPENDECTOMY       SECTION      x 1    HYSTERECTOMY      LAPAROSCOPIC SURGICAL REMOVAL OF OMENTUM      PELVIC AND PARA-AORTIC LYMPH NODE DISSECTION      TX REMOVAL OF OVARY/TUBE(S)      TONSILLECTOMY      TRANSPHENOIDAL PITUITARY RESECTION  2000    TUMOR REMOVAL          Family History   Family History   Problem Relation Age of Onset    Angina Mother     Stroke Father     Colon cancer Sister 70    Depression Sister     Anxiety disorder Sister     Colon cancer Brother 80    Heart disease  Brother     Drug abuse Brother     Breast cancer Paternal Aunt     Ovarian cancer Neg Hx     Uterine cancer Neg Hx         Social History  Social History     Socioeconomic History    Marital status:     Number of children: 1   Tobacco Use    Smoking status: Never Smoker    Smokeless tobacco: Never Used   Substance and Sexual Activity    Alcohol use: No     Comment: Rarely    Drug use: No    Sexual activity: Not Currently        Allergies  Review of patient's allergies indicates:   Allergen Reactions    Erythromycin Other (See Comments)     Stomach Cramps        Current Medications:    Current Outpatient Medications:     acyclovir (ZOVIRAX) 400 MG tablet, Take 1 tablet (400 mg total) by mouth once daily. (Patient taking differently: Take 400 mg by mouth once daily. Pt takes 2 tablets (800 mg) once daily), Disp: 30 tablet, Rfl: 11    albuterol 90 mcg/actuation inhaler, INHALE 2 PUFFS INTO THE LUNGS EVERY 6 (SIX) HOURS AS NEEDED FOR WHEEZING., Disp: , Rfl:     ALPRAZolam (XANAX) 0.25 MG tablet, Take 1 tablet (0.25 mg total) by mouth 2 (two) times daily as needed for Anxiety., Disp: 30 tablet, Rfl: 2    ascorbic acid, vitamin C, (VITAMIN C) 500 MG tablet, Take 500 mg by mouth once daily., Disp: , Rfl:     b complex vitamins tablet, Take 1 tablet by mouth once daily., Disp: , Rfl:     BREO ELLIPTA 200-25 mcg/dose DsDv diskus inhaler, inhale ONE PUFF EVERY DAY, Disp: , Rfl:     cyanocobalamin/cobamamide (B12 SL), Place 2 mLs under the tongue once daily., Disp: , Rfl:     cycloSPORINE (RESTASIS) 0.05 % ophthalmic emulsion, Place 1 drop into both eyes 2 (two) times daily. , Disp: , Rfl:     diclofenac sodium (VOLTAREN) 1 % Gel, Apply 2 g topically daily as needed., Disp: , Rfl:     diltiaZEM (TIAZAC) 240 MG Cs24, Take 1 capsule (240 mg total) by mouth once daily., Disp: 90 capsule, Rfl: 3    docosahexaenoic acid/epa (FISH OIL ORAL), Take 1 capsule by mouth once daily., Disp: , Rfl:      docusate sodium (COLACE ORAL), Take 1 capsule by mouth daily as needed. , Disp: , Rfl:     ergocalciferol (ERGOCALCIFEROL) 50,000 unit Cap, Take 50,000 Units by mouth every 7 days., Disp: , Rfl:     fluticasone (FLONASE) 50 mcg/actuation nasal spray, 2 sprays (100 mcg total) by Each Nare route once daily. (Patient taking differently: 1 spray by Each Nostril route once daily.), Disp: 1 Bottle, Rfl: 0    HYDROcodone-acetaminophen (NORCO) 5-325 mg per tablet, Take 1 tablet by mouth every 6 (six) hours as needed for Pain., Disp: 60 tablet, Rfl: 0    losartan (COZAAR) 100 MG tablet, Take 1 tablet (100 mg total) by mouth once daily., Disp: 90 tablet, Rfl: 3    metFORMIN (GLUCOPHAGE) 500 MG tablet, Take 500 mg by mouth daily with breakfast. , Disp: , Rfl:     multivitamin capsule, Take 1 capsule by mouth once daily., Disp: , Rfl:     ondansetron (ZOFRAN) 8 MG tablet, Take 1 tablet (8 mg total) by mouth every 8 (eight) hours as needed for Nausea., Disp: 30 tablet, Rfl: 3    paroxetine (PAXIL) 10 MG tablet, Take by mouth once daily., Disp: , Rfl:     pitavastatin calcium (LIVALO) 2 mg Tab tablet, Take 1 tablet (2 mg total) by mouth once daily. (Patient taking differently: Take 2 mg by mouth once daily. Pt takes 1/2 tablet (1mg) daily), Disp: 90 tablet, Rfl: 3    predniSONE (DELTASONE) 10 MG tablet, TAKE THREE TABLETS BY MOUTH EVERY DAY FOR TWO DAYS. THEN TWO FOR THREE DAYS. THEN ONE FOR THREE DAYS., Disp: , Rfl:     promethazine (PHENERGAN) 6.25 mg/5 mL syrup, Take by mouth every 6 (six) hours as needed for Nausea., Disp: , Rfl:     senna (SENOKOT) 8.6 mg tablet, Take 1 tablet by mouth once daily., Disp: , Rfl:     vitamin A 55116 UNIT capsule, Take 10,000 Units by mouth once daily., Disp: , Rfl:     zafirlukast (ACCOLATE) 20 MG tablet, Take 20 mg by mouth once daily. , Disp: , Rfl:   No current facility-administered medications for this visit.    Facility-Administered Medications Ordered in Other Visits:      "heparin, porcine (PF) 100 unit/mL injection flush 500 Units, 500 Units, Intravenous, PRN, Francisco Foster MD    sodium chloride 0.9% flush 10 mL, 10 mL, Intravenous, PRN, Francisco Foster MD     Review of Systems  Constitutional: no weight loss, weight gain, fever,+ fatigue    Cardiovascular: No inability to lie flat, chest pain, exercise intolerance, swelling, heart palpitations  Respiratory: No wheezing, coughing blood, shortness of breath, or cough  Gastrointestinal: No diarrhea, bloody stool, nausea/vomiting, constipation, gas, hemorrhoids, +abdominal pain  Genitourinary: No blood in urine, painful urination, urgency of urination, frequency of urination, incomplete emptying, incontinence, abnormal bleeding, painful periods, heavy periods, vaginal discharge, vaginal odor, painful intercourse, sexual problems, bleeding after intercourse.  Musculoskeletal: No muscle weakness, +low back pain, +hip pain  Skin/Breast: No painful breasts, nipple discharge, masses, rash, ulcers  Neurological: No passing out, seizures, numbness, headache, +neuropathy  Endocrine: No diabetes, hypothyroid, hyperthyroid, hot flashes, hair loss, abnormal hair growth, acne  Psychiatric: + depression, crying, +anxiety  Hematologic: No bruises, bleeding, swollen lymph nodes, anemia.    Physical Exam   BP: 148/86  Ht:5'4"  Wt: 183  BMI Body mass index is 31.45 kg/m².    Physical Exam  deferred  ASSESSMENT  Recurrent ovarian cancer  Insomnia  PTSD  Chronic low back pain  PLAN Total time 50 minutes- face to face time, review of medical record and arranging follow up  Counseled her on possible benefits of acupuncture to help with low back pain, hip pain and neuropathy as well as stomach pain related to her chemo  Counseled her to discuss sleep hygiene further with Dr. Valdez. Goal to start dimming lights- explained that sleeping with lights on affects sleep. Encouraged her to stop screen time 2 hours before bed  Referral to OT- Tamarin  Follow up " in 3 months

## 2022-01-24 ENCOUNTER — INFUSION (OUTPATIENT)
Dept: INFUSION THERAPY | Facility: HOSPITAL | Age: 72
End: 2022-01-24
Attending: OBSTETRICS & GYNECOLOGY
Payer: MEDICARE

## 2022-01-24 DIAGNOSIS — C56.3 MALIGNANT NEOPLASM OF BOTH OVARIES: Primary | ICD-10-CM

## 2022-01-24 LAB
ALBUMIN SERPL BCP-MCNC: 3.6 G/DL (ref 3.5–5.2)
ALP SERPL-CCNC: 107 U/L (ref 55–135)
ALT SERPL W/O P-5'-P-CCNC: 9 U/L (ref 10–44)
ANION GAP SERPL CALC-SCNC: 7 MMOL/L (ref 8–16)
AST SERPL-CCNC: 11 U/L (ref 10–40)
BILIRUB SERPL-MCNC: 0.2 MG/DL (ref 0.1–1)
BUN SERPL-MCNC: 16 MG/DL (ref 8–23)
CALCIUM SERPL-MCNC: 9.6 MG/DL (ref 8.7–10.5)
CANCER AG125 SERPL-ACNC: 33 U/ML (ref 0–30)
CHLORIDE SERPL-SCNC: 105 MMOL/L (ref 95–110)
CO2 SERPL-SCNC: 27 MMOL/L (ref 23–29)
CREAT SERPL-MCNC: 1 MG/DL (ref 0.5–1.4)
ERYTHROCYTE [DISTWIDTH] IN BLOOD BY AUTOMATED COUNT: 17.3 % (ref 11.5–14.5)
EST. GFR  (AFRICAN AMERICAN): >60 ML/MIN/1.73 M^2
EST. GFR  (NON AFRICAN AMERICAN): 57.2 ML/MIN/1.73 M^2
GLUCOSE SERPL-MCNC: 105 MG/DL (ref 70–110)
HCT VFR BLD AUTO: 31.7 % (ref 37–48.5)
HGB BLD-MCNC: 10.2 G/DL (ref 12–16)
IMM GRANULOCYTES # BLD AUTO: 0.01 K/UL (ref 0–0.04)
MCH RBC QN AUTO: 31.3 PG (ref 27–31)
MCHC RBC AUTO-ENTMCNC: 32.2 G/DL (ref 32–36)
MCV RBC AUTO: 97 FL (ref 82–98)
NEUTROPHILS # BLD AUTO: 1.9 K/UL (ref 1.8–7.7)
PLATELET # BLD AUTO: 190 K/UL (ref 150–450)
PMV BLD AUTO: 8.4 FL (ref 9.2–12.9)
POTASSIUM SERPL-SCNC: 4.6 MMOL/L (ref 3.5–5.1)
PROT SERPL-MCNC: 7.2 G/DL (ref 6–8.4)
RBC # BLD AUTO: 3.26 M/UL (ref 4–5.4)
SODIUM SERPL-SCNC: 139 MMOL/L (ref 136–145)
WBC # BLD AUTO: 3.36 K/UL (ref 3.9–12.7)

## 2022-01-24 PROCEDURE — 25000003 PHARM REV CODE 250: Performed by: OBSTETRICS & GYNECOLOGY

## 2022-01-24 PROCEDURE — A4216 STERILE WATER/SALINE, 10 ML: HCPCS | Performed by: OBSTETRICS & GYNECOLOGY

## 2022-01-24 PROCEDURE — 63600175 PHARM REV CODE 636 W HCPCS: Performed by: OBSTETRICS & GYNECOLOGY

## 2022-01-24 PROCEDURE — 36415 COLL VENOUS BLD VENIPUNCTURE: CPT | Performed by: OBSTETRICS & GYNECOLOGY

## 2022-01-24 PROCEDURE — 85027 COMPLETE CBC AUTOMATED: CPT | Performed by: OBSTETRICS & GYNECOLOGY

## 2022-01-24 PROCEDURE — 36591 DRAW BLOOD OFF VENOUS DEVICE: CPT

## 2022-01-24 PROCEDURE — 80053 COMPREHEN METABOLIC PANEL: CPT | Performed by: OBSTETRICS & GYNECOLOGY

## 2022-01-24 PROCEDURE — 86304 IMMUNOASSAY TUMOR CA 125: CPT | Performed by: OBSTETRICS & GYNECOLOGY

## 2022-01-24 RX ORDER — SODIUM CHLORIDE 0.9 % (FLUSH) 0.9 %
10 SYRINGE (ML) INJECTION
Status: DISCONTINUED | OUTPATIENT
Start: 2022-01-24 | End: 2022-01-24 | Stop reason: HOSPADM

## 2022-01-24 RX ORDER — SODIUM CHLORIDE 0.9 % (FLUSH) 0.9 %
10 SYRINGE (ML) INJECTION
Status: CANCELLED | OUTPATIENT
Start: 2022-01-24

## 2022-01-24 RX ORDER — HEPARIN 100 UNIT/ML
500 SYRINGE INTRAVENOUS
Status: DISCONTINUED | OUTPATIENT
Start: 2022-01-24 | End: 2022-01-24 | Stop reason: HOSPADM

## 2022-01-24 RX ORDER — HEPARIN 100 UNIT/ML
500 SYRINGE INTRAVENOUS
Status: CANCELLED | OUTPATIENT
Start: 2022-01-24

## 2022-01-24 RX ADMIN — Medication 10 ML: at 03:01

## 2022-01-24 RX ADMIN — HEPARIN 500 UNITS: 100 SYRINGE at 03:01

## 2022-01-25 ENCOUNTER — CLINICAL SUPPORT (OUTPATIENT)
Dept: HEMATOLOGY/ONCOLOGY | Facility: CLINIC | Age: 72
End: 2022-01-25
Payer: MEDICARE

## 2022-01-25 DIAGNOSIS — G89.29 CHRONIC LOW BACK PAIN: ICD-10-CM

## 2022-01-25 DIAGNOSIS — G62.0 CHEMOTHERAPY-INDUCED NEUROPATHY: ICD-10-CM

## 2022-01-25 DIAGNOSIS — T45.1X5A CHEMOTHERAPY-INDUCED NEUROPATHY: ICD-10-CM

## 2022-01-25 DIAGNOSIS — M54.50 CHRONIC LOW BACK PAIN: ICD-10-CM

## 2022-01-25 DIAGNOSIS — F41.1 GENERALIZED ANXIETY DISORDER: ICD-10-CM

## 2022-01-25 PROCEDURE — 97811 PR ACUPUNCT W/O ELEC STIMUL ADDL 15M: ICD-10-PCS | Mod: S$GLB,,, | Performed by: ACUPUNCTURIST

## 2022-01-25 PROCEDURE — 97810 PR ACUPUNCT W/O ELEC STIMUL 15 MIN: ICD-10-PCS | Mod: S$GLB,,, | Performed by: ACUPUNCTURIST

## 2022-01-25 PROCEDURE — 97810 ACUP 1/> WO ESTIM 1ST 15 MIN: CPT | Mod: S$GLB,,, | Performed by: ACUPUNCTURIST

## 2022-01-25 PROCEDURE — 97811 ACUP 1/> W/O ESTIM EA ADD 15: CPT | Mod: S$GLB,,, | Performed by: ACUPUNCTURIST

## 2022-01-25 NOTE — PROGRESS NOTES
Subjective:       Patient ID: Liat Gilmore is a 71 y.o. female.    Chief Complaint: Pain (cLBP) and Results (Insomnia)    New patient establishing care for chronic low back pain and CIPN. Patient also states that she is having issues with sleeping (falling asleep and staying asleep). Patient also has fibromyalgia.    Review of Systems   Constitutional: Positive for fatigue.   Musculoskeletal: Positive for arthralgias and back pain.   Neurological: Positive for numbness.   Psychiatric/Behavioral: Positive for sleep disturbance.         Objective:      Physical Exam    Assessment:       Problem List Items Addressed This Visit        Psychiatric    Generalized anxiety disorder      Other Visit Diagnoses     Chemotherapy-induced neuropathy        Chronic low back pain              Plan:       1x a week*         Pre-Symptom Score: NA  Post-Symptom Score: NA     Pain (cLBP) and Results (Insomnia)       Encounter Diagnoses   Name Primary?    Generalized anxiety disorder     Chemotherapy-induced neuropathy     Chronic low back pain        Acupuncture points used:  Gb24, Bl3, Gb20, Sp6, KD6, Bl62, Shenmen, Yintang    NO STIM USED      NEEDLES IN: 16  NEEDLES OUT: 16    NEEDLES W/O STIM  AT: 2:30 PM  NEEDLES W/O STIM REMOVED AT: 3:00 PM

## 2022-01-26 ENCOUNTER — INFUSION (OUTPATIENT)
Dept: INFUSION THERAPY | Facility: HOSPITAL | Age: 72
End: 2022-01-26
Payer: MEDICARE

## 2022-01-26 ENCOUNTER — OFFICE VISIT (OUTPATIENT)
Dept: GYNECOLOGIC ONCOLOGY | Facility: CLINIC | Age: 72
End: 2022-01-26
Payer: MEDICARE

## 2022-01-26 ENCOUNTER — DOCUMENTATION ONLY (OUTPATIENT)
Dept: PALLIATIVE MEDICINE | Facility: OTHER | Age: 72
End: 2022-01-26
Payer: MEDICARE

## 2022-01-26 VITALS
SYSTOLIC BLOOD PRESSURE: 133 MMHG | WEIGHT: 182.63 LBS | HEART RATE: 75 BPM | BODY MASS INDEX: 31.34 KG/M2 | DIASTOLIC BLOOD PRESSURE: 62 MMHG

## 2022-01-26 VITALS
OXYGEN SATURATION: 100 % | SYSTOLIC BLOOD PRESSURE: 138 MMHG | RESPIRATION RATE: 18 BRPM | WEIGHT: 182.63 LBS | HEART RATE: 72 BPM | DIASTOLIC BLOOD PRESSURE: 72 MMHG | HEIGHT: 64 IN | TEMPERATURE: 98 F | BODY MASS INDEX: 31.18 KG/M2

## 2022-01-26 DIAGNOSIS — C56.3 MALIGNANT NEOPLASM OF BOTH OVARIES: Primary | ICD-10-CM

## 2022-01-26 DIAGNOSIS — Z01.818 EXAMINATION PRIOR TO CHEMOTHERAPY: Primary | ICD-10-CM

## 2022-01-26 DIAGNOSIS — C56.3 MALIGNANT NEOPLASM OF BOTH OVARIES: ICD-10-CM

## 2022-01-26 PROCEDURE — 96367 TX/PROPH/DG ADDL SEQ IV INF: CPT

## 2022-01-26 PROCEDURE — 96417 CHEMO IV INFUS EACH ADDL SEQ: CPT

## 2022-01-26 PROCEDURE — 3008F BODY MASS INDEX DOCD: CPT | Mod: CPTII,S$GLB,, | Performed by: OBSTETRICS & GYNECOLOGY

## 2022-01-26 PROCEDURE — 96413 CHEMO IV INFUSION 1 HR: CPT

## 2022-01-26 PROCEDURE — 3078F DIAST BP <80 MM HG: CPT | Mod: CPTII,S$GLB,, | Performed by: OBSTETRICS & GYNECOLOGY

## 2022-01-26 PROCEDURE — 3078F PR MOST RECENT DIASTOLIC BLOOD PRESSURE < 80 MM HG: ICD-10-PCS | Mod: CPTII,S$GLB,, | Performed by: OBSTETRICS & GYNECOLOGY

## 2022-01-26 PROCEDURE — 99999 PR PBB SHADOW E&M-EST. PATIENT-LVL IV: ICD-10-PCS | Mod: PBBFAC,,, | Performed by: OBSTETRICS & GYNECOLOGY

## 2022-01-26 PROCEDURE — 3288F FALL RISK ASSESSMENT DOCD: CPT | Mod: CPTII,S$GLB,, | Performed by: OBSTETRICS & GYNECOLOGY

## 2022-01-26 PROCEDURE — 1101F PR PT FALLS ASSESS DOC 0-1 FALLS W/OUT INJ PAST YR: ICD-10-PCS | Mod: CPTII,S$GLB,, | Performed by: OBSTETRICS & GYNECOLOGY

## 2022-01-26 PROCEDURE — 1125F PR PAIN SEVERITY QUANTIFIED, PAIN PRESENT: ICD-10-PCS | Mod: CPTII,S$GLB,, | Performed by: OBSTETRICS & GYNECOLOGY

## 2022-01-26 PROCEDURE — 3075F PR MOST RECENT SYSTOLIC BLOOD PRESS GE 130-139MM HG: ICD-10-PCS | Mod: CPTII,S$GLB,, | Performed by: OBSTETRICS & GYNECOLOGY

## 2022-01-26 PROCEDURE — 3288F PR FALLS RISK ASSESSMENT DOCUMENTED: ICD-10-PCS | Mod: CPTII,S$GLB,, | Performed by: OBSTETRICS & GYNECOLOGY

## 2022-01-26 PROCEDURE — 1159F PR MEDICATION LIST DOCUMENTED IN MEDICAL RECORD: ICD-10-PCS | Mod: CPTII,S$GLB,, | Performed by: OBSTETRICS & GYNECOLOGY

## 2022-01-26 PROCEDURE — 63600175 PHARM REV CODE 636 W HCPCS: Performed by: OBSTETRICS & GYNECOLOGY

## 2022-01-26 PROCEDURE — 3008F PR BODY MASS INDEX (BMI) DOCUMENTED: ICD-10-PCS | Mod: CPTII,S$GLB,, | Performed by: OBSTETRICS & GYNECOLOGY

## 2022-01-26 PROCEDURE — 25000003 PHARM REV CODE 250: Performed by: OBSTETRICS & GYNECOLOGY

## 2022-01-26 PROCEDURE — 1159F MED LIST DOCD IN RCRD: CPT | Mod: CPTII,S$GLB,, | Performed by: OBSTETRICS & GYNECOLOGY

## 2022-01-26 PROCEDURE — 63600175 PHARM REV CODE 636 W HCPCS

## 2022-01-26 PROCEDURE — 1125F AMNT PAIN NOTED PAIN PRSNT: CPT | Mod: CPTII,S$GLB,, | Performed by: OBSTETRICS & GYNECOLOGY

## 2022-01-26 PROCEDURE — 99214 OFFICE O/P EST MOD 30 MIN: CPT | Mod: S$GLB,,, | Performed by: OBSTETRICS & GYNECOLOGY

## 2022-01-26 PROCEDURE — 1101F PT FALLS ASSESS-DOCD LE1/YR: CPT | Mod: CPTII,S$GLB,, | Performed by: OBSTETRICS & GYNECOLOGY

## 2022-01-26 PROCEDURE — 25000003 PHARM REV CODE 250

## 2022-01-26 PROCEDURE — 99214 PR OFFICE/OUTPT VISIT, EST, LEVL IV, 30-39 MIN: ICD-10-PCS | Mod: S$GLB,,, | Performed by: OBSTETRICS & GYNECOLOGY

## 2022-01-26 PROCEDURE — 3075F SYST BP GE 130 - 139MM HG: CPT | Mod: CPTII,S$GLB,, | Performed by: OBSTETRICS & GYNECOLOGY

## 2022-01-26 PROCEDURE — 99999 PR PBB SHADOW E&M-EST. PATIENT-LVL IV: CPT | Mod: PBBFAC,,, | Performed by: OBSTETRICS & GYNECOLOGY

## 2022-01-26 RX ORDER — SODIUM CHLORIDE 0.9 % (FLUSH) 0.9 %
10 SYRINGE (ML) INJECTION
Status: DISCONTINUED | OUTPATIENT
Start: 2022-01-26 | End: 2022-01-26 | Stop reason: HOSPADM

## 2022-01-26 RX ORDER — HEPARIN 100 UNIT/ML
500 SYRINGE INTRAVENOUS
Status: CANCELLED | OUTPATIENT
Start: 2022-01-26

## 2022-01-26 RX ORDER — HEPARIN 100 UNIT/ML
500 SYRINGE INTRAVENOUS
Status: DISCONTINUED | OUTPATIENT
Start: 2022-01-26 | End: 2022-01-26 | Stop reason: HOSPADM

## 2022-01-26 RX ORDER — EPINEPHRINE 0.3 MG/.3ML
0.3 INJECTION SUBCUTANEOUS ONCE AS NEEDED
Status: CANCELLED | OUTPATIENT
Start: 2022-01-26

## 2022-01-26 RX ORDER — SODIUM CHLORIDE 0.9 % (FLUSH) 0.9 %
10 SYRINGE (ML) INJECTION
Status: CANCELLED | OUTPATIENT
Start: 2022-01-26

## 2022-01-26 RX ORDER — EPINEPHRINE 0.3 MG/.3ML
0.3 INJECTION SUBCUTANEOUS ONCE AS NEEDED
Status: DISCONTINUED | OUTPATIENT
Start: 2022-01-26 | End: 2022-01-26 | Stop reason: HOSPADM

## 2022-01-26 RX ORDER — OXYCODONE HYDROCHLORIDE 5 MG/1
5 TABLET ORAL EVERY 8 HOURS PRN
Qty: 30 TABLET | Refills: 0 | Status: SHIPPED | OUTPATIENT
Start: 2022-01-26 | End: 2022-02-09 | Stop reason: ALTCHOICE

## 2022-01-26 RX ORDER — DIPHENHYDRAMINE HYDROCHLORIDE 50 MG/ML
50 INJECTION INTRAMUSCULAR; INTRAVENOUS ONCE AS NEEDED
Status: CANCELLED | OUTPATIENT
Start: 2022-01-26

## 2022-01-26 RX ORDER — DIPHENHYDRAMINE HYDROCHLORIDE 50 MG/ML
50 INJECTION INTRAMUSCULAR; INTRAVENOUS ONCE AS NEEDED
Status: DISCONTINUED | OUTPATIENT
Start: 2022-01-26 | End: 2022-01-26 | Stop reason: HOSPADM

## 2022-01-26 RX ADMIN — SODIUM CHLORIDE: 0.9 INJECTION, SOLUTION INTRAVENOUS at 03:01

## 2022-01-26 RX ADMIN — CARBOPLATIN 465 MG: 10 INJECTION INTRAVENOUS at 04:01

## 2022-01-26 RX ADMIN — DOXORUBICIN HYDROCHLORIDE 58 MG: 2 INJECTABLE, LIPOSOMAL INTRAVENOUS at 03:01

## 2022-01-26 RX ADMIN — HEPARIN 500 UNITS: 100 SYRINGE at 05:01

## 2022-01-26 NOTE — PLAN OF CARE
Pt admitted for C3D1 Doxil/Carboplatin. Labs reviewed and assessment performed, fatigue, occasional nausea and constipation addressed. (NOTE- Dr. Treviño removed Cinvanti from Treatment plan, caused severe headache with last tx) Pt tolerated treatment well. Plan of care reviewed and Pt discharged @ 17:30

## 2022-01-26 NOTE — PROGRESS NOTES
REFERRING PROVIDER  Francisco Foster     INTERVAL HISTORY  CC: Recurrent IIIC Ovarian Cancer Treatment Planning / Prechemo #3     Liat Gilmore is a 71 y.o. with recurrent Stage IIIC HGSOC.  She is s/p cycle #2 of Carboplatin and Doxil on  12/29/2021.  Cycle #2 was delayed for low ANC (and Daryl).  She has had mildly increased abdominal pain that she describes as a gnawing feeling in the lower central abdomen.  This comes on randomly and lasts about two hours.  She has been taking occasional Norco with incomplete relief.  She also has left groin pain that she uses either an ice pack on or a heating pad.  Otherwise she is having normal bowel movements for her using intermittent colace and Senekot with occasional Milk of Magnesia. She has no nausea/ vomiting.  She thinks Emend gave her a headache so we discontinued that from her treatment plan.      She is thinking about going to [place] for Mistletoe therapy in this spring.     HISTORY OF PRESENT CONDITION  (As summarized in Dr. Foster's notes)  March 2020 patient began to notice some lower abdominal discomfort.  This was at the height of COVID-19 pandemic.  She was referred to a urologist who did a tele health visit.  An ultrasound in May 2020 showed a complex cyst in the middle pole of the kidney but a pelvic cyst as well.  The patient then returned to see her gynecologist Dr. Marily Alston in [place].  An ultrasound showed a complex multi-cystic mass posterior to the uterus; left adnexa 57 x 36 mm cyst and a 30 x 23 x 23 cm complex cyst; right adnexa with a 37 x 31 cm cyst; endometrium not clearly seen.  At that time she was having difficulty with bowel movements.     5/8/202 : 173     6/2/2020 CT AP:  Bilateral solid and cystic adnexal mass lesions (right 6.7 cm ad left 6.3 cm) suspicious for primary ovarian malignancy along with innumerable bilateral lower lobe pulmonary nodules most consistent with metastatic disease.  Subcentimeter soft tissue  nodular densities within the omentum as above. These are indeterminate on this exam, however peritoneal metastatic implants cannot be excluded.      6/22/202  Exploratory laparotomy, modified radical hysterectomy with radical resection of bilateral ovarian masses with optimal tumor reductive surgery including resection of pelvic disease, excision of numerous tumor implants, infracolic omentectomy with resection of a 2 cm omental nodule, bilateral pelvic and periaortic lymph node dissection and appendectomy.   (Dr. Мария Ratliff)  FINDINGS:  optimally resected to R0. The upper abdominal disease include omental nodule, peritoneal implants, bowel mesentery implants and right diaphragm implants, which were all excised.  PATHOLOGY:high grade serous ovarian ca bilaterally, numerous implants (omental, peritoneal, diaphragm, SB mesentery), negative nodes, positive washings. HER2 negative; MSI stable     7/1/2020 :  114     7/6/2020 CT Chest:  Bilateral pulmonary nodules as detailed most consistent with metastatic disease.  Peritoneal soft tissue nodularity in the left upper quadrant unchanged from the prior exam and again consistent with metastatic disease.  Bibasilar atelectasis.     7/16/2020 Cycle #1 Carbo / Taxol (transferred to [facility] -details in Dr. Foster's prior notes)  8/14/2020 Cycle #2 Carbo / Taxol (8/5/2020 :  25)  9/3/2020 Cycle #3 Carbo / Taxol (9/2/2020 : 12)  9/24/2020 Cycle #4 Carbo / Taxol (9/23/202 : 10)  10/16/2020 Cycle #5 Carbo / Taxol   11/5/2020  Cycle #6 Carbo / Taxol     11/24/2020 : 11  1/26/2021 : 12  5/10/2021 : 11  6/8/2021 :  11     8/31/2021 CT CAP:  there are innumerable nodules in the lungs bilaterally, concerning for metastatic disease.  Left kidney cyst.  Additional findings as above.     9/13/2021  :  55    10/13/2021 :  152     10/13/2021 CT CAP: No local recurrence identified.  Multiple pulmonary nodules/opacities without significant  interval change.  Random nodules could indicate metastatic disease but are nonspecific.  Several of the larger opacities have configuration suggestive of endobronchial material in dilated bronchi from airway infection/inflammation.  Also, clustered micro nodules are consistent with small airway infection/inflammation.  A 1.2 cm hypodense lesion in left lobe of liver, unchanged and nonspecific.     11/24/2020 CT CAP:  there is no evidence of local disease recurrence.  There are multiple pulmonary nodules and irregular opacities scattered throughout both lungs, unchanged in appearance from prior exam 08/31/2020.  While some solid nodules may reflect metastatic disease, majority of the pulmonary opacities demonstrate tubular and/or branching configurations, which can be seen with mucoid impaction, and tree-in-bud configuration, suggesting small airways infection/inflammation (for example MAC infection, given the pattern).  Stable indeterminate 1.2 cm hepatic lesion in the caudate lobe.     12/9/2020  PET/CT:  In the abdomen and pelvis, there is physiologic tracer distribution within the abdominal organs.  There is mildly hypermetabolic solid nodules in both lungs equivocal for metastases.  Given indications of old granulomatous disease and clustered distribution of nodules in the anterior right upper and right lower lobes, noncalcified granulomas are a differential consideration and not mutually exclusive.  Tissue sampling would be required for definitive diagnosis, and the largest nodules in the lower lobes may be amenable to percutaneous biopsy.  Additional mildly FDG avid regions of ground-glass attenuation are identified in the right lung, possibly reflecting small airways infection or inflammation with additional metastatic foci not definitively excluded.      10/22/2021 CT CAP:  interval development of multiple hypodensities scattered along the periphery of the liver concerning for peritoneal implants.  Stable  pulmonary nodules and irregular opacities scattered throughout both lungs.  While some nodules may reflect metastatic disease, majority demonstrate a tubular and branching pattern suggesting mucoid impaction in a pattern consistent with MAC infection.  Incisional hernia at the midline of the lower abdomen containing a few loops of nonobstructed small bowel.     11/17/2021:  Cycle #1 Carbo/ Doxil  ( 11/5/2021 = 337) (Udyneca added for next)  12/29/2021:  Cycle #2 Carbo / Doxil ( 12/13/2021 = 105)  1/26/2021: Cycle #3 Carbo / Doxil ( 1/24/2021 = 33)     REVIEW OF SYSTEMS  Review of Systems   Constitutional: Negative for appetite change, chills, fatigue, fever and unexpected weight change.   HENT:   Positive for lump/mass (pea sized mass at lower aspect of midline incision). Negative for mouth sores.    Respiratory: Negative for chest tightness, cough and shortness of breath.    Cardiovascular: Negative for chest pain, leg swelling and palpitations.   Gastrointestinal: Positive for abdominal pain. Negative for abdominal distention, blood in stool, constipation, diarrhea, nausea and vomiting.   Genitourinary: Negative for dysuria, frequency, vaginal bleeding and vaginal discharge.    Musculoskeletal: Negative for arthralgias and myalgias.   Skin: Negative for rash.   Neurological: Negative for dizziness, light-headedness and numbness.   Hematological: Negative for adenopathy.   Psychiatric/Behavioral: Negative for decreased concentration, depression and sleep disturbance. The patient is not nervous/anxious.      OBJECTIVE   Vitals:    01/26/22 1144   BP: 133/62   Pulse: 75      Body mass index is 31.34 kg/m².     Physical Exam:   Constitutional: She is oriented to person, place, and time. She appears well-developed and well-nourished. No distress.    HENT:   Head: Normocephalic and atraumatic.    Eyes: Conjunctivae and EOM are normal.     Cardiovascular: Normal rate, regular rhythm and normal heart  sounds.  Exam reveals no clubbing, no cyanosis and no edema.     Pulmonary/Chest: Effort normal. No respiratory distress. She has no wheezes. She has no rales. She exhibits no tenderness.        Abdominal: Soft. She exhibits abdominal incision (small pea sized nodule lower aspect of midline incision). She exhibits no distension and no mass. There is no abdominal tenderness. There is no rebound and no guarding. No hernia.                Lymphadenopathy:     She has no cervical adenopathy.    Neurological: She is alert and oriented to person, place, and time.    Skin: No rash noted. No cyanosis. Nails show no clubbing.    Psychiatric: She has a normal mood and affect. Her behavior is normal. Judgment and thought content normal.     ECOG status: 0    LABORATORY DATA  Lab data reviewed.    ASSESSMENT    1. Examination prior to chemotherapy    2. Malignant neoplasm of both ovaries     Patient doing reasonably well with carbo / doxil.  Proceed with Cycle #3 of Carbo Doxil.  Gave patient an alternate pain regimen using alternating acetamenophin and ibuprofen with oxycodone for breakthrough.     PLAN  Orders Placed This Encounter   Procedures    CT Chest Abdomen Pelvis With Contrast       1.  Proceed with cycle #3 Carbo / Doxil  2.  Continue follow-up with palliative care  3.  Continue utilizing integrative oncology resources  4.  Follow-up prior to next cycle      Ghassan Treviño MD

## 2022-01-26 NOTE — PROGRESS NOTES
"I met with Ms. Gilmore in the gyn onc clinic alongside Dr. Treviño.  She is very well appearing. She discussed her abdominal pain with me, lower pelvic pain, described as gnawing.  She reports little relief with hydrocodone. She is trying intermittent fasting and reports some relief, reports "it isn't making it worse". She had acupuncture yesterday and is scheduled to have another session next week, but is unsure if she will continue. She is also beginning meditation and yoga. She reports her CA-125 was lower on Monday which makes her feel "optimistic".     She reports that she knows she has liver metastasis and has some lung nodules, however she did not desire a lung biopsy in the past. She stated that while she understands a biopsy is the only definitive way to prove metastasis, there are risks associated with these procedures. She stated "if it is cancer, I am getting chemo, and it is either going to work or not". She is feeling optimistic about traveling to [place] for mistletoe therapy, she is planning to use mistletoe therapy in adjunct to chemotherapy.  She is hopeful to travel next month along with her daughter.  Overall, she appears to be doing well and feeling well.  Most prominent symptom is abdominal pain, we discussed using heating pads and changing her opioid and using tylenol/ ibuprofen. She reports pain is most prominent at night, I discussed potentially taking her pain medication prior to going to bed. She is receiving chemo this afternoon.      "

## 2022-01-27 ENCOUNTER — INFUSION (OUTPATIENT)
Dept: INFUSION THERAPY | Facility: HOSPITAL | Age: 72
End: 2022-01-27
Attending: OBSTETRICS & GYNECOLOGY
Payer: MEDICARE

## 2022-01-27 ENCOUNTER — PATIENT MESSAGE (OUTPATIENT)
Dept: HEMATOLOGY/ONCOLOGY | Facility: CLINIC | Age: 72
End: 2022-01-27
Payer: MEDICARE

## 2022-01-27 DIAGNOSIS — C56.3 MALIGNANT NEOPLASM OF BOTH OVARIES: Primary | ICD-10-CM

## 2022-01-27 PROCEDURE — 96372 THER/PROPH/DIAG INJ SC/IM: CPT

## 2022-01-27 PROCEDURE — 63600175 PHARM REV CODE 636 W HCPCS: Mod: TB | Performed by: OBSTETRICS & GYNECOLOGY

## 2022-01-27 RX ADMIN — PEGFILGRASTIM-CBQV 6 MG: 6 INJECTION, SOLUTION SUBCUTANEOUS at 03:01

## 2022-01-28 ENCOUNTER — PATIENT MESSAGE (OUTPATIENT)
Dept: PSYCHIATRY | Facility: CLINIC | Age: 72
End: 2022-01-28
Payer: MEDICARE

## 2022-01-28 ENCOUNTER — PATIENT MESSAGE (OUTPATIENT)
Dept: GYNECOLOGIC ONCOLOGY | Facility: CLINIC | Age: 72
End: 2022-01-28
Payer: MEDICARE

## 2022-02-07 ENCOUNTER — PATIENT MESSAGE (OUTPATIENT)
Dept: GYNECOLOGIC ONCOLOGY | Facility: CLINIC | Age: 72
End: 2022-02-07
Payer: MEDICARE

## 2022-02-07 DIAGNOSIS — C56.3 MALIGNANT NEOPLASM OF BOTH OVARIES: ICD-10-CM

## 2022-02-09 ENCOUNTER — PATIENT MESSAGE (OUTPATIENT)
Dept: GYNECOLOGIC ONCOLOGY | Facility: CLINIC | Age: 72
End: 2022-02-09
Payer: MEDICARE

## 2022-02-09 RX ORDER — HYDROCODONE BITARTRATE AND ACETAMINOPHEN 5; 325 MG/1; MG/1
1 TABLET ORAL EVERY 6 HOURS PRN
Qty: 60 TABLET | Refills: 0 | Status: SHIPPED | OUTPATIENT
Start: 2022-02-09 | End: 2022-03-09 | Stop reason: SDUPTHER

## 2022-02-15 ENCOUNTER — INFUSION (OUTPATIENT)
Dept: INFUSION THERAPY | Facility: HOSPITAL | Age: 72
End: 2022-02-15
Attending: OBSTETRICS & GYNECOLOGY
Payer: MEDICARE

## 2022-02-15 ENCOUNTER — HOSPITAL ENCOUNTER (OUTPATIENT)
Dept: RADIOLOGY | Facility: HOSPITAL | Age: 72
Discharge: HOME | End: 2022-02-15
Attending: OBSTETRICS & GYNECOLOGY
Payer: MEDICARE

## 2022-02-15 DIAGNOSIS — C56.3 MALIGNANT NEOPLASM OF BOTH OVARIES: ICD-10-CM

## 2022-02-15 DIAGNOSIS — C56.3 MALIGNANT NEOPLASM OF BOTH OVARIES: Primary | ICD-10-CM

## 2022-02-15 PROCEDURE — 71260 CT CHEST ABDOMEN PELVIS WITH CONTRAST (XPD): ICD-10-PCS | Mod: 26,,, | Performed by: RADIOLOGY

## 2022-02-15 PROCEDURE — 96523 IRRIG DRUG DELIVERY DEVICE: CPT

## 2022-02-15 PROCEDURE — 74177 CT CHEST ABDOMEN PELVIS WITH CONTRAST (XPD): ICD-10-PCS | Mod: 26,,, | Performed by: RADIOLOGY

## 2022-02-15 PROCEDURE — 71260 CT THORAX DX C+: CPT | Mod: TC

## 2022-02-15 PROCEDURE — A4216 STERILE WATER/SALINE, 10 ML: HCPCS | Performed by: OBSTETRICS & GYNECOLOGY

## 2022-02-15 PROCEDURE — 63600175 PHARM REV CODE 636 W HCPCS: Performed by: OBSTETRICS & GYNECOLOGY

## 2022-02-15 PROCEDURE — 71260 CT THORAX DX C+: CPT | Mod: 26,,, | Performed by: RADIOLOGY

## 2022-02-15 PROCEDURE — 74177 CT ABD & PELVIS W/CONTRAST: CPT | Mod: TC

## 2022-02-15 PROCEDURE — 25500020 PHARM REV CODE 255: Performed by: OBSTETRICS & GYNECOLOGY

## 2022-02-15 PROCEDURE — 25000003 PHARM REV CODE 250: Performed by: OBSTETRICS & GYNECOLOGY

## 2022-02-15 PROCEDURE — 74177 CT ABD & PELVIS W/CONTRAST: CPT | Mod: 26,,, | Performed by: RADIOLOGY

## 2022-02-15 RX ORDER — SODIUM CHLORIDE 0.9 % (FLUSH) 0.9 %
10 SYRINGE (ML) INJECTION
Status: DISCONTINUED | OUTPATIENT
Start: 2022-02-15 | End: 2022-02-15 | Stop reason: HOSPADM

## 2022-02-15 RX ORDER — HEPARIN 100 UNIT/ML
500 SYRINGE INTRAVENOUS
Status: CANCELLED | OUTPATIENT
Start: 2022-02-15

## 2022-02-15 RX ORDER — SODIUM CHLORIDE 0.9 % (FLUSH) 0.9 %
10 SYRINGE (ML) INJECTION
Status: CANCELLED | OUTPATIENT
Start: 2022-02-15

## 2022-02-15 RX ORDER — HEPARIN 100 UNIT/ML
500 SYRINGE INTRAVENOUS
Status: DISCONTINUED | OUTPATIENT
Start: 2022-02-15 | End: 2022-02-15 | Stop reason: HOSPADM

## 2022-02-15 RX ADMIN — IOHEXOL 15 ML: 350 INJECTION, SOLUTION INTRAVENOUS at 02:02

## 2022-02-15 RX ADMIN — SODIUM CHLORIDE, PRESERVATIVE FREE 10 ML: 5 INJECTION INTRAVENOUS at 09:02

## 2022-02-15 RX ADMIN — IOHEXOL 100 ML: 350 INJECTION, SOLUTION INTRAVENOUS at 03:02

## 2022-02-15 RX ADMIN — HEPARIN SODIUM (PORCINE) LOCK FLUSH IV SOLN 100 UNIT/ML 500 UNITS: 100 SOLUTION at 03:02

## 2022-02-15 NOTE — NURSING
Pt here for port a cath access for CT scan today. Tolerated procedure without difficulty. Power bauer needle left in place and ns locked.

## 2022-02-16 ENCOUNTER — PATIENT MESSAGE (OUTPATIENT)
Dept: CARDIOLOGY | Facility: CLINIC | Age: 72
End: 2022-02-16
Payer: MEDICARE

## 2022-02-17 ENCOUNTER — OFFICE VISIT (OUTPATIENT)
Dept: GYNECOLOGIC ONCOLOGY | Facility: CLINIC | Age: 72
End: 2022-02-17
Payer: MEDICARE

## 2022-02-17 ENCOUNTER — OFFICE VISIT (OUTPATIENT)
Dept: PSYCHIATRY | Facility: CLINIC | Age: 72
End: 2022-02-17
Payer: MEDICARE

## 2022-02-17 VITALS
HEART RATE: 85 BPM | SYSTOLIC BLOOD PRESSURE: 127 MMHG | DIASTOLIC BLOOD PRESSURE: 69 MMHG | BODY MASS INDEX: 31.64 KG/M2 | WEIGHT: 184.31 LBS

## 2022-02-17 DIAGNOSIS — Z01.818 EXAMINATION PRIOR TO CHEMOTHERAPY: Primary | ICD-10-CM

## 2022-02-17 DIAGNOSIS — C56.3 MALIGNANT NEOPLASM OF BOTH OVARIES: ICD-10-CM

## 2022-02-17 DIAGNOSIS — F32.A DEPRESSION, UNSPECIFIED DEPRESSION TYPE: ICD-10-CM

## 2022-02-17 DIAGNOSIS — F41.1 GENERALIZED ANXIETY DISORDER: Primary | ICD-10-CM

## 2022-02-17 PROCEDURE — 1159F MED LIST DOCD IN RCRD: CPT | Mod: CPTII,S$GLB,, | Performed by: OBSTETRICS & GYNECOLOGY

## 2022-02-17 PROCEDURE — 3074F SYST BP LT 130 MM HG: CPT | Mod: CPTII,S$GLB,, | Performed by: OBSTETRICS & GYNECOLOGY

## 2022-02-17 PROCEDURE — 1126F AMNT PAIN NOTED NONE PRSNT: CPT | Mod: CPTII,S$GLB,, | Performed by: OBSTETRICS & GYNECOLOGY

## 2022-02-17 PROCEDURE — 90834 PR PSYCHOTHERAPY W/PATIENT, 45 MIN: ICD-10-PCS | Mod: S$GLB,,, | Performed by: PSYCHOLOGIST

## 2022-02-17 PROCEDURE — 3078F DIAST BP <80 MM HG: CPT | Mod: CPTII,S$GLB,, | Performed by: OBSTETRICS & GYNECOLOGY

## 2022-02-17 PROCEDURE — 1101F PT FALLS ASSESS-DOCD LE1/YR: CPT | Mod: CPTII,S$GLB,, | Performed by: OBSTETRICS & GYNECOLOGY

## 2022-02-17 PROCEDURE — 99214 OFFICE O/P EST MOD 30 MIN: CPT | Mod: S$GLB,,, | Performed by: OBSTETRICS & GYNECOLOGY

## 2022-02-17 PROCEDURE — 99999 PR PBB SHADOW E&M-EST. PATIENT-LVL IV: ICD-10-PCS | Mod: PBBFAC,,, | Performed by: OBSTETRICS & GYNECOLOGY

## 2022-02-17 PROCEDURE — 1126F PR PAIN SEVERITY QUANTIFIED, NO PAIN PRESENT: ICD-10-PCS | Mod: CPTII,S$GLB,, | Performed by: OBSTETRICS & GYNECOLOGY

## 2022-02-17 PROCEDURE — 3288F FALL RISK ASSESSMENT DOCD: CPT | Mod: CPTII,S$GLB,, | Performed by: OBSTETRICS & GYNECOLOGY

## 2022-02-17 PROCEDURE — 99214 PR OFFICE/OUTPT VISIT, EST, LEVL IV, 30-39 MIN: ICD-10-PCS | Mod: S$GLB,,, | Performed by: OBSTETRICS & GYNECOLOGY

## 2022-02-17 PROCEDURE — 1159F PR MEDICATION LIST DOCUMENTED IN MEDICAL RECORD: ICD-10-PCS | Mod: CPTII,S$GLB,, | Performed by: OBSTETRICS & GYNECOLOGY

## 2022-02-17 PROCEDURE — 1159F PR MEDICATION LIST DOCUMENTED IN MEDICAL RECORD: ICD-10-PCS | Mod: CPTII,S$GLB,, | Performed by: PSYCHOLOGIST

## 2022-02-17 PROCEDURE — 3008F BODY MASS INDEX DOCD: CPT | Mod: CPTII,S$GLB,, | Performed by: OBSTETRICS & GYNECOLOGY

## 2022-02-17 PROCEDURE — 90834 PSYTX W PT 45 MINUTES: CPT | Mod: S$GLB,,, | Performed by: PSYCHOLOGIST

## 2022-02-17 PROCEDURE — 1160F RVW MEDS BY RX/DR IN RCRD: CPT | Mod: CPTII,S$GLB,, | Performed by: OBSTETRICS & GYNECOLOGY

## 2022-02-17 PROCEDURE — 1101F PR PT FALLS ASSESS DOC 0-1 FALLS W/OUT INJ PAST YR: ICD-10-PCS | Mod: CPTII,S$GLB,, | Performed by: OBSTETRICS & GYNECOLOGY

## 2022-02-17 PROCEDURE — 99999 PR PBB SHADOW E&M-EST. PATIENT-LVL I: CPT | Mod: PBBFAC,,, | Performed by: PSYCHOLOGIST

## 2022-02-17 PROCEDURE — 1159F MED LIST DOCD IN RCRD: CPT | Mod: CPTII,S$GLB,, | Performed by: PSYCHOLOGIST

## 2022-02-17 PROCEDURE — 99999 PR PBB SHADOW E&M-EST. PATIENT-LVL IV: CPT | Mod: PBBFAC,,, | Performed by: OBSTETRICS & GYNECOLOGY

## 2022-02-17 PROCEDURE — 3074F PR MOST RECENT SYSTOLIC BLOOD PRESSURE < 130 MM HG: ICD-10-PCS | Mod: CPTII,S$GLB,, | Performed by: OBSTETRICS & GYNECOLOGY

## 2022-02-17 PROCEDURE — 3078F PR MOST RECENT DIASTOLIC BLOOD PRESSURE < 80 MM HG: ICD-10-PCS | Mod: CPTII,S$GLB,, | Performed by: OBSTETRICS & GYNECOLOGY

## 2022-02-17 PROCEDURE — 3288F PR FALLS RISK ASSESSMENT DOCUMENTED: ICD-10-PCS | Mod: CPTII,S$GLB,, | Performed by: OBSTETRICS & GYNECOLOGY

## 2022-02-17 PROCEDURE — 3008F PR BODY MASS INDEX (BMI) DOCUMENTED: ICD-10-PCS | Mod: CPTII,S$GLB,, | Performed by: OBSTETRICS & GYNECOLOGY

## 2022-02-17 PROCEDURE — 1160F PR REVIEW ALL MEDS BY PRESCRIBER/CLIN PHARMACIST DOCUMENTED: ICD-10-PCS | Mod: CPTII,S$GLB,, | Performed by: OBSTETRICS & GYNECOLOGY

## 2022-02-17 PROCEDURE — 99999 PR PBB SHADOW E&M-EST. PATIENT-LVL I: ICD-10-PCS | Mod: PBBFAC,,, | Performed by: PSYCHOLOGIST

## 2022-02-17 NOTE — PROGRESS NOTES
INFORMED CONSENT: Liat Gilmore   is known to this provider and identity was confirmed via NAME and .  The patient has been informed of the risks and benefits associated with engaging in psychotherapy, the handling of protected health information, the rights of privacy and the limits of confidentiality. The patient has also been informed of the importance of reporting any suicidal or homicidal ideation to this or any provider to ensure safety of all parties, and the Liat Gilmore expressed understanding. The patient was agreeable to these terms and freely participates in individual psychotherapy.    PSYCHO-ONCOLOGY NOTE/ Individual Psychotherapy     Date: 2022   [facility]        Therapeutic Intervention: Met with patient.  Outpatient - Behavior modifying psychotherapy 45 min - CPT code 17144      Patient was last seen by me on 2022    Problem list  Patient Active Problem List   Diagnosis    Supraventricular tachycardia    Hypertension    Hypercholesterolemia    Mild obesity    Fibromyalgia    Cough variant asthma    Malignant neoplasm of both ovaries    Generalized anxiety disorder    Lung mass    Reactive depression    Multiple lung nodules on CT    Herpes simplex vulvovaginitis    Elevated cancer antigen 125 (CA-125)       Chief complaint/reason for encounter: depression and anxiety   Met with patient to evaluate psychosocial adaptation to diagnosis/treatment/survivorship of Ovarian Cancer    Current Medications  Current Outpatient Medications   Medication    acyclovir (ZOVIRAX) 400 MG tablet    albuterol 90 mcg/actuation inhaler    ALPRAZolam (XANAX) 0.25 MG tablet    ascorbic acid, vitamin C, (VITAMIN C) 500 MG tablet    b complex vitamins tablet    BREO ELLIPTA 200-25 mcg/dose DsDv diskus inhaler    cyanocobalamin/cobamamide (B12 SL)    cycloSPORINE (RESTASIS) 0.05 % ophthalmic emulsion    diclofenac sodium (VOLTAREN) 1 % Gel    diltiaZEM (TIAZAC) 240 MG Cs24     docosahexaenoic acid/epa (FISH OIL ORAL)    docusate sodium (COLACE ORAL)    ergocalciferol (ERGOCALCIFEROL) 50,000 unit Cap    fluticasone (FLONASE) 50 mcg/actuation nasal spray    HYDROcodone-acetaminophen (NORCO) 5-325 mg per tablet    losartan (COZAAR) 100 MG tablet    metFORMIN (GLUCOPHAGE) 500 MG tablet    multivitamin capsule    ondansetron (ZOFRAN) 8 MG tablet    paroxetine (PAXIL) 10 MG tablet    pitavastatin calcium (LIVALO) 2 mg Tab tablet    predniSONE (DELTASONE) 10 MG tablet    promethazine (PHENERGAN) 6.25 mg/5 mL syrup    senna (SENOKOT) 8.6 mg tablet    vitamin A 26704 UNIT capsule    zafirlukast (ACCOLATE) 20 MG tablet     No current facility-administered medications for this visit.     Facility-Administered Medications Ordered in Other Visits   Medication Frequency    heparin, porcine (PF) 100 unit/mL injection flush 500 Units PRN    sodium chloride 0.9% flush 10 mL PRN       Objective:  Liat Gilmore arrived late for the session.   Ms. Gilmore was independently ambulatory at the time of session. The patient was fully cooperative throughout the session.  Appearance: age appropriate, appropriately  dressed, adequately  groomed  Behavior/Cooperation: friendly and cooperative  Speech: normal in rate, volume, and tone and appropriate quality, quantity and organization of sentences  Mood: dysthymic  Affect: mood congruent  Thought Process: goal-directed, logical  Thought Content: normal,  No delusions or paranoia; did not appear to be responding to internal stimuli during the session  Orientation: grossly intact  Memory: Grossly intact  Attention Span/Concentration: Attends to session without distraction; reports no difficulty  Fund of Knowledge: average  Estimate of Intelligence: average from verbal skills and history  Cognition: grossly intact  Insight: patient has awareness of illness; good insight into own behavior and behavior of others  Judgment: the patient's behavior is adequate  to circumstances    Interval history and content of current session: Patient with increase in distress related to pain and difficulty traveling to [place] for complementary therapy for cancer. Patient met with [facility] NP.  Discussed diagnosis, treatment, prognosis, current adaptation to disease and treatment status and family's adaptation to disease and treatment status. Reports to be coping with moderate difficulty. Evaluated cognitive response, paying particular attention to negative intrusive thoughts of a persistent and detrimental nature. Thoughts of this type are in evidence with severe distress. Provided cognitive behavioral therapy to address negative cognitions. Identified and evaluated psychosocial and environmental stressors secondary to diagnosis and treatment.  Examined proactive behaviors that may be implemented to minimize or ameliorate psychosocial stressors secondary to diagnosis and treatment.     Risk parameters:   Patient reports no suicidal ideation  Patient reports no homicidal ideation  Patient reports no self-injurious behavior  Patient reports no violent behavior   Safety needs:  None at this time      Verbal deficits: None     Patient's response to intervention:The patient's response to intervention is accepting.     Progress toward goals and other mental status changes:  The patient's progress toward goals is fair .      Progress to date:No Progress - Continue Objectives      Goals from last visit: Met     Patient reported outcomes:    Distress Thermometer:   Distress Score    Distress Score: 8        Practical Problems Physical Problems   : No Appearance: No   Housing: No Bathing / Dressing: No   Insurance / Financial: No Breathing: Yes    Transportation: No  Changes in Urination: No    Work / School: No  Constipation: Yes   Treatment Decisions: No  Diarrhea: No     Eating: No    Family Problems Fatigue: Yes    Dealing with Children: No Feeling Swollen: No    Dealing with Partner:  No Fevers: No    Ability to Have Children: No  Getting Around: No    Family Health Issues: No  Indigestion: No     Memory / Concentration: No   Emotional Problems Mouth Sores: Yes    Depression: Yes  Nausea: No    Fears: Yes  Nose Dry / Congested: Yes    Nervousness: Yes  Pain: Yes    Sadness: Yes Sexual: No    Worry: Yes Skin Dry / Itchy: Yes    Loss of Interest in Usual Activities: Yes Sleep: Yes     Substance Abuse: No    Spiritual/Religions Concerns Tingling in Hands / Feet: No   Spritual / Moravian Concerns: No         Other Problems             PHQ-9= 13, NO SI   CLOVER-7= 10     Client Strengths: verbal, intelligent, successful, good social support, good insight, commitment to wellness, strong suha, strong cultural traditions     Diagnosis:     ICD-10-CM ICD-9-CM   1. Generalized anxiety disorder  F41.1 300.02   2. Malignant neoplasm of both ovaries  C56.3 183.0   3. Depression, unspecified depression type  F32.A 311     Treatment Plan:individual psychotherapy and medication management by physician  · Target symptoms: depression, anxiety   · Why chosen therapy is appropriate versus another modality: relevant to diagnosis, patient responds to this modality, evidence based practice  · Outcome monitoring methods: self-report, observation, checklist/rating scale  · Therapeutic intervention type: behavior modifying psychotherapy  · Prognosis: Good      Behavioral goals:    Exercise:   Stress management: Discussed goal of care with Onc.   Social engagement:   Nutrition:   Smoking Cessation:   Therapy:    Return to clinic: 3 weeks      Length of Service (minutes direct face-to-face contact): 45    Neetu Valdez, PhD  Clinical Psychologist  [place] License #[id number]  [place] License #[id number]

## 2022-02-17 NOTE — PROGRESS NOTES
REFERRING PROVIDER  Francisco Foster     INTERVAL HISTORY  CC: Recurrent IIIC Ovarian Cancer Treatment Planning / Prechemo #4     Liat Gilmore is a 71 y.o. with recurrent Stage IIIC HGSOC.  She is s/p cycle #3 of Carboplatin and Doxil on  1/26/2021.  Cycle #2 was delayed for low ANC (and Daryl).  This past cycle, she developed painful mouth sores about 2.5 weeks into treatment.  She also has persistent discomfort in her fingertips where there is pain to touch, sensitivity, and difficulty gripping.  Otherwise she is having normal bowel movements for her using intermittent colace and Senekot with occasional Milk of Magnesia. She has no nausea/ vomiting.     She tried alternating Tylenol and Ibuprofen with Oxycodone for breakthrough but did not feel that it worked as well as hydrocodone-acteominophen 5-325 mg     She would very much like a treatment break.  She continues thinking about going to [place] for M[place]to[place] therapy in this spring.     HISTORY OF PRESENT CONDITION  (As summarized in Dr. Foster's notes)  March 2020 patient began to notice some lower abdominal discomfort.  This was at the height of COVID-19 pandemic.  She was referred to a urologist who did a tele health visit.  An ultrasound in May 2020 showed a complex cyst in the middle pole of the kidney but a pelvic cyst as well.  The patient then returned to see her gynecologist Dr. Marily Alston in [place].  An ultrasound showed a complex multi-cystic mass posterior to the uterus; left adnexa 57 x 36 mm cyst and a 30 x 23 x 23 cm complex cyst; right adnexa with a 37 x 31 cm cyst; endometrium not clearly seen.  At that time she was having difficulty with bowel movements.     5/8/202 : 173     6/2/2020 CT AP:  Bilateral solid and cystic adnexal mass lesions (right 6.7 cm and left 6.3 cm) suspicious for primary ovarian malignancy along with innumerable bilateral lower lobe pulmonary nodules most consistent with metastatic disease.  Subcentimeter  soft tissue nodular densities within the omentum as above. These are indeterminate on this exam, however peritoneal metastatic implants cannot be excluded.      6/22/2020  Exploratory laparotomy, modified radical hysterectomy with radical resection of bilateral ovarian masses with optimal tumor reductive surgery including resection of pelvic disease, excision of numerous tumor implants, infracolic omentectomy with resection of a 2 cm omental nodule, bilateral pelvic and periaortic lymph node dissection and appendectomy.   (Dr. Мария Ratliff)  FINDINGS:  optimally resected to R0. The upper abdominal disease include omental nodule, peritoneal implants, bowel mesentery implants and right diaphragm implants, which were all excised.  PATHOLOGY:high grade serous ovarian ca bilaterally, numerous implants (omental, peritoneal, diaphragm, SB mesentery), negative nodes, positive washings. HER2 negative; MSI stable     7/1/2020 :  114     7/6/2020 CT Chest:  Bilateral pulmonary nodules as detailed most consistent with metastatic disease.  Peritoneal soft tissue nodularity in the left upper quadrant unchanged from the prior exam and again consistent with metastatic disease.  Bibasilar atelectasis.     7/16/2020 Cycle #1 Carbo / Taxol (transferred to [facility] -details in Dr. Foster's prior notes)  8/14/2020 Cycle #2 Carbo / Taxol (8/5/2020 :  25)  9/3/2020 Cycle #3 Carbo / Taxol (9/2/2020 : 12)  9/24/2020 Cycle #4 Carbo / Taxol (9/23/202 : 10)  10/16/2020 Cycle #5 Carbo / Taxol   11/5/2020  Cycle #6 Carbo / Taxol     11/24/2020 : 11  1/26/2021 : 12  5/10/2021 : 11  6/8/2021 :  11     8/31/2021 CT CAP:  there are innumerable nodules in the lungs bilaterally, concerning for metastatic disease.  Left kidney cyst.  Additional findings as above.     9/13/2021  :  55    10/13/2021 :  152     10/13/2021 CT CAP: No local recurrence identified.  Multiple pulmonary nodules/opacities without  significant interval change.  Random nodules could indicate metastatic disease but are nonspecific.  Several of the larger opacities have configuration suggestive of endobronchial material in dilated bronchi from airway infection/inflammation.  Also, clustered micro nodules are consistent with small airway infection/inflammation.  A 1.2 cm hypodense lesion in left lobe of liver, unchanged and nonspecific.     11/24/2020 CT CAP:  there is no evidence of local disease recurrence.  There are multiple pulmonary nodules and irregular opacities scattered throughout both lungs, unchanged in appearance from prior exam 08/31/2020.  While some solid nodules may reflect metastatic disease, majority of the pulmonary opacities demonstrate tubular and/or branching configurations, which can be seen with mucoid impaction, and tree-in-bud configuration, suggesting small airways infection/inflammation (for example MAC infection, given the pattern).  Stable indeterminate 1.2 cm hepatic lesion in the caudate lobe.     12/9/2020  PET/CT:  In the abdomen and pelvis, there is physiologic tracer distribution within the abdominal organs.  There is mildly hypermetabolic solid nodules in both lungs equivocal for metastases.  Given indications of old granulomatous disease and clustered distribution of nodules in the anterior right upper and right lower lobes, noncalcified granulomas are a differential consideration and not mutually exclusive.  Tissue sampling would be required for definitive diagnosis, and the largest nodules in the lower lobes may be amenable to percutaneous biopsy.  Additional mildly FDG avid regions of ground-glass attenuation are identified in the right lung, possibly reflecting small airways infection or inflammation with additional metastatic foci not definitively excluded.      10/22/2021 CT CAP:  interval development of multiple hypodensities scattered along the periphery of the liver concerning for peritoneal  implants.  Stable pulmonary nodules and irregular opacities scattered throughout both lungs.  While some nodules may reflect metastatic disease, majority demonstrate a tubular and branching pattern suggesting mucoid impaction in a pattern consistent with MAC infection.  Incisional hernia at the midline of the lower abdomen containing a few loops of nonobstructed small bowel.     11/17/2021:  Cycle #1 Carbo/ Doxil  ( 11/5/2021 = 337) (Udyneca added for next)  12/29/2021:  Cycle #2 Carbo / Doxil ( 12/13/2021 = 105)  1/26/2022: Cycle #3 Carbo / Doxil ( 1/24/2021 = 33)    2/15/2022 CT CAP:  Decreased size of the previously described hepatic lesions/peritoneal implants in this patient with history of ovarian cancer and known metastases, consistent with response to treatment.  No definite new liver lesions.  Stable, 4.5 cm low-density lesion along the periphery of the posterior right hepatic lobe, concerning for peritoneal implant.  Stable pulmonary nodules and irregular opacities, majority of which are favored to represent a pattern consistent with mucoid impaction or MAC infection.  No definite new nodules allowing for widespread disease.  Small incisional hernia containing a few loops of nonobstructed bowel with the hernia sac slightly increased in size from prior exam.     OBJECTIVE   Vitals:    02/17/22 1412   BP: 127/69   Pulse: 85      Body mass index is 31.64 kg/m².     Physical Exam:   Constitutional: She is oriented to person, place, and time. She appears well-developed and well-nourished. No distress.    HENT:   Head: Normocephalic and atraumatic.    Eyes: Conjunctivae and EOM are normal. No scleral icterus.      Pulmonary/Chest: Effort normal. No respiratory distress.                      Neurological: She is alert and oriented to person, place, and time.    Skin: No rash noted.    Psychiatric: She has a normal mood and affect. Judgment and thought content normal.     ECOG status:  0    LABORATORY DATA  Lab data reviewed.    RADIOLOGICAL DATA  Radiology data reviewed.    PATHOLOGY DATA  Pathology data reviewed.    ASSESSMENT    1. Examination prior to chemotherapy    2. Malignant neoplasm of both ovaries    Patient doing reasonably well with carbo / doxil.  She would like to take a month off and I think that would be very reasonable.  Proceed with Cycle #4 of Carbo Doxil late March.      PLAN  1.  Continue follow-up with palliative care  2.  Continue utilizing integrative oncology resources          Ghassan Treviño MD

## 2022-02-21 ENCOUNTER — DOCUMENTATION ONLY (OUTPATIENT)
Dept: PALLIATIVE MEDICINE | Facility: OTHER | Age: 72
End: 2022-02-21
Payer: MEDICARE

## 2022-02-22 NOTE — PROGRESS NOTES
"I contacted Ms. Gilmore, she saw Dr. Treviño on 2/17/22. She shared with me that she has been "having a tough time". She endorses trouble with mouth sores and side effects of chemo. After speaking to Dr. Treviño she is planning to take a month break from chemotherapy.  She endorses currently the side effects have negatively impacted her quality of life. She is hopeful to travel to [place] for M[place] therapy in the next few weeks and is hoping this will allow her to tolerate chemo. Her goal is "with chemotherapy and Mistletoe hopefully I will be able to tolerate chemo". Emotional support provided. I informed her this is a very good goal.    In terms of pain, she is taking hydrocodone 5 mg, taking 2 tablets per day and reports relief. She feels her BM are normal. Her biggest fear is pain. She endorses after her surgery she was in debilitating pain and this put her in a "dark place" noting she was depressed and terrified.   I discussed with her that if the pain regimen does not provide relief we can always adjust this as our goal is to provide her symptom management. She stated "I am not terrified of dying". Prior to our conversation she was washing dishes and loading the . After our conversation, she was planning to go to [facility] to walk.      I will plan to follow up with her when she sees Dr. Treviño in roughly a month.     "

## 2022-03-04 ENCOUNTER — TELEPHONE (OUTPATIENT)
Dept: HEMATOLOGY/ONCOLOGY | Facility: CLINIC | Age: 72
End: 2022-03-04
Payer: MEDICARE

## 2022-03-04 DIAGNOSIS — C56.3 MALIGNANT NEOPLASM OF BOTH OVARIES: Primary | ICD-10-CM

## 2022-03-10 ENCOUNTER — TELEPHONE (OUTPATIENT)
Dept: HEMATOLOGY/ONCOLOGY | Facility: CLINIC | Age: 72
End: 2022-03-10
Payer: MEDICARE

## 2022-03-10 DIAGNOSIS — C56.3 MALIGNANT NEOPLASM OF BOTH OVARIES: Primary | ICD-10-CM

## 2022-03-13 ENCOUNTER — HOSPITAL ENCOUNTER (EMERGENCY)
Facility: OTHER | Age: 72
Discharge: HOME | End: 2022-03-13
Attending: EMERGENCY MEDICINE
Payer: MEDICARE

## 2022-03-13 VITALS
HEART RATE: 97 BPM | BODY MASS INDEX: 29.88 KG/M2 | OXYGEN SATURATION: 96 % | DIASTOLIC BLOOD PRESSURE: 76 MMHG | WEIGHT: 175 LBS | SYSTOLIC BLOOD PRESSURE: 145 MMHG | RESPIRATION RATE: 19 BRPM | TEMPERATURE: 99 F | HEIGHT: 64 IN

## 2022-03-13 DIAGNOSIS — R10.9 ABDOMINAL PAIN: ICD-10-CM

## 2022-03-13 DIAGNOSIS — R11.2 NAUSEA AND VOMITING IN ADULT: Primary | ICD-10-CM

## 2022-03-13 LAB
ALBUMIN SERPL BCP-MCNC: 4.3 G/DL (ref 3.5–5.2)
ALP SERPL-CCNC: 101 U/L (ref 55–135)
ALT SERPL W/O P-5'-P-CCNC: 9 U/L (ref 10–44)
ANION GAP SERPL CALC-SCNC: 12 MMOL/L (ref 8–16)
AST SERPL-CCNC: 17 U/L (ref 10–40)
BASOPHILS # BLD AUTO: 0.03 K/UL (ref 0–0.2)
BASOPHILS NFR BLD: 0.5 % (ref 0–1.9)
BILIRUB SERPL-MCNC: 0.3 MG/DL (ref 0.1–1)
BILIRUB UR QL STRIP: NEGATIVE
BUN SERPL-MCNC: 19 MG/DL (ref 8–23)
CALCIUM SERPL-MCNC: 10.5 MG/DL (ref 8.7–10.5)
CHLORIDE SERPL-SCNC: 107 MMOL/L (ref 95–110)
CLARITY UR: CLEAR
CO2 SERPL-SCNC: 24 MMOL/L (ref 23–29)
COLOR UR: YELLOW
CREAT SERPL-MCNC: 1.1 MG/DL (ref 0.5–1.4)
DIFFERENTIAL METHOD: ABNORMAL
EOSINOPHIL # BLD AUTO: 0 K/UL (ref 0–0.5)
EOSINOPHIL NFR BLD: 0.2 % (ref 0–8)
ERYTHROCYTE [DISTWIDTH] IN BLOOD BY AUTOMATED COUNT: 17.1 % (ref 11.5–14.5)
EST. GFR  (AFRICAN AMERICAN): 59 ML/MIN/1.73 M^2
EST. GFR  (NON AFRICAN AMERICAN): 51 ML/MIN/1.73 M^2
GLUCOSE SERPL-MCNC: 129 MG/DL (ref 70–110)
GLUCOSE UR QL STRIP: NEGATIVE
HCT VFR BLD AUTO: 33.9 % (ref 37–48.5)
HGB BLD-MCNC: 11 G/DL (ref 12–16)
HGB UR QL STRIP: NEGATIVE
IMM GRANULOCYTES # BLD AUTO: 0.02 K/UL (ref 0–0.04)
IMM GRANULOCYTES NFR BLD AUTO: 0.3 % (ref 0–0.5)
KETONES UR QL STRIP: NEGATIVE
LEUKOCYTE ESTERASE UR QL STRIP: NEGATIVE
LIPASE SERPL-CCNC: 12 U/L (ref 4–60)
LYMPHOCYTES # BLD AUTO: 0.9 K/UL (ref 1–4.8)
LYMPHOCYTES NFR BLD: 12.8 % (ref 18–48)
MCH RBC QN AUTO: 32.6 PG (ref 27–31)
MCHC RBC AUTO-ENTMCNC: 32.4 G/DL (ref 32–36)
MCV RBC AUTO: 101 FL (ref 82–98)
MONOCYTES # BLD AUTO: 0.4 K/UL (ref 0.3–1)
MONOCYTES NFR BLD: 5.3 % (ref 4–15)
NEUTROPHILS # BLD AUTO: 5.4 K/UL (ref 1.8–7.7)
NEUTROPHILS NFR BLD: 80.9 % (ref 38–73)
NITRITE UR QL STRIP: NEGATIVE
NRBC BLD-RTO: 0 /100 WBC
PH UR STRIP: >=9 [PH] (ref 5–8)
PLATELET # BLD AUTO: 286 K/UL (ref 150–450)
PMV BLD AUTO: 8.6 FL (ref 9.2–12.9)
POTASSIUM SERPL-SCNC: 3.9 MMOL/L (ref 3.5–5.1)
PROT SERPL-MCNC: 7.9 G/DL (ref 6–8.4)
PROT UR QL STRIP: NEGATIVE
RBC # BLD AUTO: 3.37 M/UL (ref 4–5.4)
SODIUM SERPL-SCNC: 143 MMOL/L (ref 136–145)
SP GR UR STRIP: 1.01 (ref 1–1.03)
URN SPEC COLLECT METH UR: NORMAL
UROBILINOGEN UR STRIP-ACNC: NEGATIVE EU/DL
WBC # BLD AUTO: 6.66 K/UL (ref 3.9–12.7)

## 2022-03-13 PROCEDURE — 83690 ASSAY OF LIPASE: CPT | Performed by: EMERGENCY MEDICINE

## 2022-03-13 PROCEDURE — 80053 COMPREHEN METABOLIC PANEL: CPT | Performed by: EMERGENCY MEDICINE

## 2022-03-13 PROCEDURE — 96360 HYDRATION IV INFUSION INIT: CPT

## 2022-03-13 PROCEDURE — 85025 COMPLETE CBC W/AUTO DIFF WBC: CPT | Performed by: EMERGENCY MEDICINE

## 2022-03-13 PROCEDURE — 96361 HYDRATE IV INFUSION ADD-ON: CPT

## 2022-03-13 PROCEDURE — 99284 EMERGENCY DEPT VISIT MOD MDM: CPT | Mod: 25

## 2022-03-13 PROCEDURE — 81003 URINALYSIS AUTO W/O SCOPE: CPT | Performed by: EMERGENCY MEDICINE

## 2022-03-13 RX ORDER — SODIUM CHLORIDE 9 MG/ML
1000 INJECTION, SOLUTION INTRAVENOUS
Status: COMPLETED | OUTPATIENT
Start: 2022-03-13 | End: 2022-03-13

## 2022-03-13 RX ADMIN — SODIUM CHLORIDE 1000 ML: 9 INJECTION, SOLUTION INTRAVENOUS at 03:03

## 2022-03-13 NOTE — ED PROVIDER NOTES
Encounter Date: 3/13/2022    SCRIBE #1 NOTE: ILarry III, am scribing for, and in the presence of, Judie Gongora MD.       History     Chief Complaint   Patient presents with    Abdominal Pain     Since last night with gas and normal bowel movement, then had large emesis which made her feel a little better     Liat Gilmore is a 71 y.o. female, with a PMHx of ovarian cancer and asthma, who presents to the ED with complaint of pinching abdominal pain onset midnight prior to arrival. Patient reports that she ate a large meal, began to feel gaseous and bloated, and then took an ras seltzer. Then, she had a large emesis which gave her relief. Her bowel movements are normal. She reports chills, but denies fever, or urinary symptoms. Currently, she has decided to take a break from chemotherapy but plans to resume soon. No other exacerbating or alleviating factors. Denies any other associated symptoms.       The history is provided by the patient.     Review of patient's allergies indicates:   Allergen Reactions    Erythromycin Other (See Comments)     Stomach Cramps     Past Medical History:   Diagnosis Date    Anxiety 8/7/2020    Asthma 10/31/2018    1985: Diagnosed. cough variant    Asthma 9/26/2013 9:37:32 AM    [facility] Historical - Respiratory: Asthma-No Additional Notes    Bronchiectasis     Complications affecting other specified body systems, hypertension 9/26/2013 9:40:45 AM    [facility] Historical - Cardiovascular: Hypertension-No Additional Notes    Dizziness and giddiness 8/30/2017 3:09:28 PM    [facility] Historical - Unknown: Vertigo-No Additional Notes    Elevated cancer antigen 125 (CA-125) 9/14/2021    Fibromyalgia 10/31/2018    2006: Diagnosed.    Herpes simplex vulvovaginitis 6/9/2021    Hx of psychiatric care     Hypercholesterolemia 10/31/2018    2010: Began statin.    Hyperlipidemia     Hypertension     Infective arthritis, multiple sites 6/23/2017 11:02:17 AM     [facility] Historical - Musculoskeletal: Arthritis-No Additional Notes    Lung mass 2020    Malignant neoplasm of both ovaries 2020    Myalgia and myositis 2017 11:02:26 AM    [facility] Historical - Musculoskeletal: Fibromyalgia-No Additional Notes    Neoplasm of other genitourinary organ 2020 9:58:16 AM    [facility] Historical - Unknown: Ovarian neoplasm-finished chemo 2020- Dr. Foster Stage 3    Other and unspecified ovarian cyst 2020 4:13:20 PM    [facility] Historical - Quick Add: Ovarian cyst, left-No Additional Notes    Other genital herpes 2017 1:15:51 PM    [facility] - Infectious: Herpes, Genital-No Additional Notes    Overweight 10/31/2018    10/31/2018: Weight 75 kg. BMI 28.    Psychiatric problem     Pulmonary nodules 12/10/2020    Pure hypercholesterolemia 2017 11:01:53 AM    [facility] - Endocrine/Metabolic/Immune: Hypercholesterolemia-No Additional Notes    Reactive depression 2020    Renal failure 2019 11:55:24 AM    [facility] - Urology: Renal Failure-Diagnosed @ ER when she was there for stomach pains    Supraventricular tachycardia     Tachycardia     Tachycardia 2013 9:37:10 AM    [facility] - Symptoms/Signs: Tachycardia-No Additional Notes    Therapy      Past Surgical History:   Procedure Laterality Date    APPENDECTOMY       SECTION      x 1    HYSTERECTOMY      LAPAROSCOPIC SURGICAL REMOVAL OF OMENTUM      PELVIC AND PARA-AORTIC LYMPH NODE DISSECTION      DE REMOVAL OF OVARY/TUBE(S)      TONSILLECTOMY      TRANSPHENOIDAL PITUITARY RESECTION  2000    TUMOR REMOVAL       Family History   Problem Relation Age of Onset    Angina Mother     Stroke Father     Colon cancer Sister 70    Depression Sister     Anxiety disorder Sister     Colon cancer Brother 80    Heart disease Brother     Drug abuse Brother     Breast cancer Paternal Aunt     Ovarian  cancer Neg Hx     Uterine cancer Neg Hx      Social History     Tobacco Use    Smoking status: Never Smoker    Smokeless tobacco: Never Used   Substance Use Topics    Alcohol use: No     Comment: Rarely    Drug use: No     Review of Systems   Constitutional: Positive for chills. Negative for fever.   HENT: Negative for sore throat.    Respiratory: Negative for shortness of breath.    Cardiovascular: Negative for chest pain.   Gastrointestinal: Positive for abdominal distention, abdominal pain, nausea and vomiting.   Genitourinary: Negative for difficulty urinating, dysuria and urgency.   Musculoskeletal: Negative for back pain.   Skin: Negative for rash.   Neurological: Negative for weakness.   Hematological: Does not bruise/bleed easily.       Physical Exam     Initial Vitals [03/13/22 0140]   BP Pulse Resp Temp SpO2   (!) 145/91 108 (!) 24 98.6 °F (37 °C) 100 %      MAP       --         Physical Exam    Nursing note and vitals reviewed.  Constitutional: She appears well-developed and well-nourished. She does not have a sickly appearance. No distress.   HENT:   Head: Normocephalic and atraumatic.   Right Ear: External ear normal.   Left Ear: External ear normal.   Eyes: Conjunctivae, EOM and lids are normal. Right eye exhibits no discharge. Left eye exhibits no discharge. Right conjunctiva is not injected. Right conjunctiva has no hemorrhage. Left conjunctiva is not injected. Left conjunctiva has no hemorrhage. No scleral icterus.   Neck: Phonation normal. No stridor present. No tracheal deviation present. No JVD present.   Normal range of motion.  Cardiovascular: Normal rate, regular rhythm and normal heart sounds. Exam reveals no friction rub.    No murmur heard.  Pulses:       Radial pulses are 2+ on the right side and 2+ on the left side.        Dorsalis pedis pulses are 2+ on the right side and 2+ on the left side.   Pulmonary/Chest: Breath sounds normal. She has no wheezes. She has no rhonchi. She has no  rales.   Abdominal: Abdomen is soft. Bowel sounds are normal. She exhibits no distension. There is no abdominal tenderness. There is no rebound and no guarding.   Musculoskeletal:         General: Normal range of motion.      Cervical back: Normal range of motion.     Neurological: She is alert and oriented to person, place, and time. She has normal strength. GCS score is 15. GCS eye subscore is 4. GCS verbal subscore is 5. GCS motor subscore is 6.   Skin: Skin is warm.   Psychiatric: She has a normal mood and affect. Her speech is normal and behavior is normal. Judgment and thought content normal. Cognition and memory are normal.         ED Course   Procedures  Labs Reviewed   CBC W/ AUTO DIFFERENTIAL - Abnormal; Notable for the following components:       Result Value    RBC 3.37 (*)     Hemoglobin 11.0 (*)     Hematocrit 33.9 (*)      (*)     MCH 32.6 (*)     RDW 17.1 (*)     MPV 8.6 (*)     Lymph # 0.9 (*)     Gran % 80.9 (*)     Lymph % 12.8 (*)     All other components within normal limits   COMPREHENSIVE METABOLIC PANEL - Abnormal; Notable for the following components:    Glucose 129 (*)     ALT 9 (*)     eGFR if  59 (*)     eGFR if non  51 (*)     All other components within normal limits   LIPASE   URINALYSIS, REFLEX TO URINE CULTURE    Narrative:     Specimen Source->Urine          Imaging Results          X-Ray Abdomen Flat And Erect (Final result)  Result time 03/13/22 03:42:45    Final result by Ana Lilia Hayden MD (03/13/22 03:42:45)                 Impression:      Please see above.      Electronically signed by: Ana Lilia Hayden MD  Date:    03/13/2022  Time:    03:42             Narrative:    EXAMINATION:  XR ABDOMEN FLAT AND ERECT    CLINICAL HISTORY:  Unspecified abdominal pain    TECHNIQUE:  Flat and erect AP views of the abdomen were performed.    COMPARISON:  Correlation is made to CT chest, abdomen and pelvis 02/15/2022    FINDINGS:  Scattered air is seen  throughout nondilated loops of large and small bowel.  There is a large volume of fecal material throughout the colon.  No dilated small bowel loops or small bowel air-fluid levels are identified on upright radiograph.  There is no evidence of free intraperitoneal air.  Multiple surgical clips project over the abdomen and pelvis.  The visualized osseous structures appear intact.  There is a nodular opacity in the visualized left lower lung zone likely coinciding to pulmonary nodule identified on prior CT examination.                                 Medications   0.9%  NaCl infusion (0 mLs Intravenous Stopped 3/13/22 3987)     Medical Decision Making:   History:   Old Medical Records: I decided to obtain old medical records.  Independently Interpreted Test(s):   I have ordered and independently interpreted X-rays - see prior notes.  Clinical Tests:   Lab Tests: Ordered and Reviewed  Radiological Study: Ordered and Reviewed          Scribe Attestation:   Scribe #1: I performed the above scribed service and the documentation accurately describes the services I performed. I attest to the accuracy of the note.         Physician Attestation for Scribe: I, Judie Gongora, reviewed documentation as scribed in my presence, which is both accurate and complete.    ED Course as of 03/13/22 0455   Sun Mar 13, 2022   0158 71-year-old well-appearing female presents afebrile and normotensive but mildly tachycardic with complaint of approximately 2 hours of lower abdominal discomfort with an episode of vomiting.  Abdominal exam significant only for mild tenderness of bilateral lower quadrants.  Will obtain lab work, urinalysis and abdominal x-ray to rule out signs of a bowel obstruction.  Will also give IV fluids. [AA]   0453 71-year-old female with history of ovarian cancer presents complaining of sudden onset of abdominal pain which she described as nausea and bloating sensation.  She states her pain was relieved after 1 episode of  vomiting and at the time of my assessment denies any further nausea.  She received IV fluids, and CBC, CMP, lipase, urinalysis, and abdominal x-ray were obtained.  Lab work without significant abnormalities.  Urinalysis negative for UTI and abdominal x-ray negative for a bowel obstruction.  On reassessment the patient denies any further nausea or pain.  Repeat abdominal exam benign.  Patient will be discharged home at this time stable condition with precautions for seeking re-evaluation. [AA]      ED Course User Index  [AA] Judie Gongora MD             Clinical Impression:   Final diagnoses:  [R10.9] Abdominal pain  [R11.2] Nausea and vomiting in adult (Primary)          ED Disposition Condition    Discharge Stable        ED Prescriptions     None        Follow-up Information     Follow up With Specialties Details Why Contact Info    Shabana Dunbar MD Internal Medicine Schedule an appointment as soon as possible for a visit   [address]  [address]  [facility] [address] [phone]      [facility] Emergency Dept Emergency Medicine Go to  If symptoms worsen [address] [id number] [phone]           Judie Gongora MD  03/13/22 9569

## 2022-03-16 ENCOUNTER — PATIENT MESSAGE (OUTPATIENT)
Dept: GYNECOLOGIC ONCOLOGY | Facility: CLINIC | Age: 72
End: 2022-03-16
Payer: MEDICARE

## 2022-03-16 ENCOUNTER — PATIENT MESSAGE (OUTPATIENT)
Dept: HEMATOLOGY/ONCOLOGY | Facility: CLINIC | Age: 72
End: 2022-03-16
Payer: MEDICARE

## 2022-03-21 NOTE — TELEPHONE ENCOUNTER
Pt called and scheduled for 345 pm virtual visit. Pt very concerned that her lab results will not be back in time for her visit with Dr Treviño. Pt reassured that he would be able to conduct the visit even without the results and they would definitely retrn prior to her treatment 2 days later. Will see if lab draw and be moved to an earlier time.

## 2022-04-04 ENCOUNTER — INFUSION (OUTPATIENT)
Dept: INFUSION THERAPY | Facility: HOSPITAL | Age: 72
End: 2022-04-04
Attending: OBSTETRICS & GYNECOLOGY
Payer: MEDICARE

## 2022-04-04 ENCOUNTER — OFFICE VISIT (OUTPATIENT)
Dept: GYNECOLOGIC ONCOLOGY | Facility: CLINIC | Age: 72
End: 2022-04-04
Payer: MEDICARE

## 2022-04-04 DIAGNOSIS — C56.3 MALIGNANT NEOPLASM OF BOTH OVARIES: Primary | ICD-10-CM

## 2022-04-04 DIAGNOSIS — Z01.818 EXAMINATION PRIOR TO CHEMOTHERAPY: ICD-10-CM

## 2022-04-04 LAB
ALBUMIN SERPL BCP-MCNC: 3.9 G/DL (ref 3.5–5.2)
ALP SERPL-CCNC: 106 U/L (ref 55–135)
ALT SERPL W/O P-5'-P-CCNC: 11 U/L (ref 10–44)
ANION GAP SERPL CALC-SCNC: 7 MMOL/L (ref 8–16)
AST SERPL-CCNC: 14 U/L (ref 10–40)
BILIRUB SERPL-MCNC: 0.4 MG/DL (ref 0.1–1)
BUN SERPL-MCNC: 15 MG/DL (ref 8–23)
CALCIUM SERPL-MCNC: 10.2 MG/DL (ref 8.7–10.5)
CANCER AG125 SERPL-ACNC: 60 U/ML (ref 0–30)
CHLORIDE SERPL-SCNC: 102 MMOL/L (ref 95–110)
CO2 SERPL-SCNC: 30 MMOL/L (ref 23–29)
CREAT SERPL-MCNC: 0.9 MG/DL (ref 0.5–1.4)
ERYTHROCYTE [DISTWIDTH] IN BLOOD BY AUTOMATED COUNT: 14.5 % (ref 11.5–14.5)
EST. GFR  (AFRICAN AMERICAN): >60 ML/MIN/1.73 M^2
EST. GFR  (NON AFRICAN AMERICAN): >60 ML/MIN/1.73 M^2
GLUCOSE SERPL-MCNC: 106 MG/DL (ref 70–110)
HCT VFR BLD AUTO: 34.9 % (ref 37–48.5)
HGB BLD-MCNC: 11.1 G/DL (ref 12–16)
IMM GRANULOCYTES # BLD AUTO: 0.02 K/UL (ref 0–0.04)
MCH RBC QN AUTO: 32.8 PG (ref 27–31)
MCHC RBC AUTO-ENTMCNC: 31.8 G/DL (ref 32–36)
MCV RBC AUTO: 103 FL (ref 82–98)
NEUTROPHILS # BLD AUTO: 3 K/UL (ref 1.8–7.7)
PLATELET # BLD AUTO: 260 K/UL (ref 150–450)
PMV BLD AUTO: 9.3 FL (ref 9.2–12.9)
POTASSIUM SERPL-SCNC: 4.1 MMOL/L (ref 3.5–5.1)
PROT SERPL-MCNC: 7.4 G/DL (ref 6–8.4)
RBC # BLD AUTO: 3.38 M/UL (ref 4–5.4)
SODIUM SERPL-SCNC: 139 MMOL/L (ref 136–145)
WBC # BLD AUTO: 4.76 K/UL (ref 3.9–12.7)

## 2022-04-04 PROCEDURE — 85027 COMPLETE CBC AUTOMATED: CPT | Performed by: OBSTETRICS & GYNECOLOGY

## 2022-04-04 PROCEDURE — 4010F PR ACE/ARB THEARPY RXD/TAKEN: ICD-10-PCS | Mod: CPTII,95,, | Performed by: OBSTETRICS & GYNECOLOGY

## 2022-04-04 PROCEDURE — 25000003 PHARM REV CODE 250: Performed by: OBSTETRICS & GYNECOLOGY

## 2022-04-04 PROCEDURE — 99215 PR OFFICE/OUTPT VISIT, EST, LEVL V, 40-54 MIN: ICD-10-PCS | Mod: 95,,, | Performed by: OBSTETRICS & GYNECOLOGY

## 2022-04-04 PROCEDURE — A4216 STERILE WATER/SALINE, 10 ML: HCPCS | Performed by: OBSTETRICS & GYNECOLOGY

## 2022-04-04 PROCEDURE — 86304 IMMUNOASSAY TUMOR CA 125: CPT | Performed by: OBSTETRICS & GYNECOLOGY

## 2022-04-04 PROCEDURE — 36591 DRAW BLOOD OFF VENOUS DEVICE: CPT

## 2022-04-04 PROCEDURE — 99215 OFFICE O/P EST HI 40 MIN: CPT | Mod: 95,,, | Performed by: OBSTETRICS & GYNECOLOGY

## 2022-04-04 PROCEDURE — 4010F ACE/ARB THERAPY RXD/TAKEN: CPT | Mod: CPTII,95,, | Performed by: OBSTETRICS & GYNECOLOGY

## 2022-04-04 PROCEDURE — 63600175 PHARM REV CODE 636 W HCPCS: Performed by: OBSTETRICS & GYNECOLOGY

## 2022-04-04 PROCEDURE — 80053 COMPREHEN METABOLIC PANEL: CPT | Performed by: OBSTETRICS & GYNECOLOGY

## 2022-04-04 PROCEDURE — 36415 COLL VENOUS BLD VENIPUNCTURE: CPT | Performed by: OBSTETRICS & GYNECOLOGY

## 2022-04-04 RX ORDER — HEPARIN 100 UNIT/ML
500 SYRINGE INTRAVENOUS
Status: DISCONTINUED | OUTPATIENT
Start: 2022-04-04 | End: 2022-04-04 | Stop reason: HOSPADM

## 2022-04-04 RX ORDER — HEPARIN 100 UNIT/ML
500 SYRINGE INTRAVENOUS
Status: CANCELLED | OUTPATIENT
Start: 2022-04-04

## 2022-04-04 RX ORDER — SODIUM CHLORIDE 0.9 % (FLUSH) 0.9 %
10 SYRINGE (ML) INJECTION
Status: CANCELLED | OUTPATIENT
Start: 2022-04-04

## 2022-04-04 RX ORDER — DIPHENHYDRAMINE HYDROCHLORIDE 50 MG/ML
50 INJECTION INTRAMUSCULAR; INTRAVENOUS ONCE AS NEEDED
Status: CANCELLED | OUTPATIENT
Start: 2022-04-04

## 2022-04-04 RX ORDER — SODIUM CHLORIDE 0.9 % (FLUSH) 0.9 %
10 SYRINGE (ML) INJECTION
Status: DISCONTINUED | OUTPATIENT
Start: 2022-04-04 | End: 2022-04-04 | Stop reason: HOSPADM

## 2022-04-04 RX ORDER — EPINEPHRINE 0.3 MG/.3ML
0.3 INJECTION SUBCUTANEOUS ONCE AS NEEDED
Status: CANCELLED | OUTPATIENT
Start: 2022-04-04

## 2022-04-04 RX ADMIN — Medication 10 ML: at 11:04

## 2022-04-04 RX ADMIN — HEPARIN 500 UNITS: 100 SYRINGE at 11:04

## 2022-04-04 NOTE — PROGRESS NOTES
REFERRING PROVIDER  Francisco Foster     INTERVAL HISTORY  CC: Recurrent IIIC Ovarian Cancer Treatment Planning / Prechemo #4  Virtual visit with her and her daughter Sue Gilmore is a 71 y.o. with recurrent Stage IIIC HGSOC.  She is s/p cycle #3 of Carboplatin and Doxil on  1/26/2021.  Cycle #2 was delayed for low ANC (and Daryl).     She decided after cycle #3 to take a treatment break.  She went to [place] and started [facility] therapy last week which she takes M - W - F except the Wednesday of chemo week.      She has also started meditation therapy with [facility].  She opted not to see Dr. Avalos and did not think acupuncture was helpful.      HISTORY OF PRESENT CONDITION  (As summarized in Dr. Foster's notes)  March 2020 patient began to notice some lower abdominal discomfort.  This was at the height of COVID-19 pandemic.  She was referred to a urologist who did a tele health visit.  An ultrasound in May 2020 showed a complex cyst in the middle pole of the kidney but a pelvic cyst as well.  The patient then returned to see her gynecologist Dr. Marily Alston in [place].  An ultrasound showed a complex multi-cystic mass posterior to the uterus; left adnexa 57 x 36 mm cyst and a 30 x 23 x 23 cm complex cyst; right adnexa with a 37 x 31 cm cyst; endometrium not clearly seen.  At that time she was having difficulty with bowel movements.     5/8/202 : 173     6/2/2020 CT AP:  Bilateral solid and cystic adnexal mass lesions (right 6.7 cm and left 6.3 cm) suspicious for primary ovarian malignancy along with innumerable bilateral lower lobe pulmonary nodules most consistent with metastatic disease.  Subcentimeter soft tissue nodular densities within the omentum as above. These are indeterminate on this exam, however peritoneal metastatic implants cannot be excluded.      6/22/2020  Exploratory laparotomy, modified radical hysterectomy with radical resection of bilateral ovarian masses  with optimal tumor reductive surgery including resection of pelvic disease, excision of numerous tumor implants, infracolic omentectomy with resection of a 2 cm omental nodule, bilateral pelvic and periaortic lymph node dissection and appendectomy.   (Dr. Мария Ratliff)  FINDINGS:  optimally resected to R0. The upper abdominal disease include omental nodule, peritoneal implants, bowel mesentery implants and right diaphragm implants, which were all excised.  PATHOLOGY:high grade serous ovarian ca bilaterally, numerous implants (omental, peritoneal, diaphragm, SB mesentery), negative nodes, positive washings. HER2 negative; MSI stable     7/1/2020 :  114     7/6/2020 CT Chest:  Bilateral pulmonary nodules as detailed most consistent with metastatic disease.  Peritoneal soft tissue nodularity in the left upper quadrant unchanged from the prior exam and again consistent with metastatic disease.  Bibasilar atelectasis.     7/16/2020 Cycle #1 Carbo / Taxol (transferred to [facility] -details in Dr. Foster's prior notes)  8/14/2020 Cycle #2 Carbo / Taxol (8/5/2020 :  25)  9/3/2020 Cycle #3 Carbo / Taxol (9/2/2020 : 12)  9/24/2020 Cycle #4 Carbo / Taxol (9/23/202 : 10)  10/16/2020 Cycle #5 Carbo / Taxol   11/5/2020  Cycle #6 Carbo / Taxol     11/24/2020 : 11  1/26/2021 : 12  5/10/2021 : 11  6/8/2021 :  11     8/31/2021 CT CAP:  there are innumerable nodules in the lungs bilaterally, concerning for metastatic disease.  Left kidney cyst.  Additional findings as above.     9/13/2021  :  55    10/13/2021 :  152     10/13/2021 CT CAP: No local recurrence identified.  Multiple pulmonary nodules/opacities without significant interval change.  Random nodules could indicate metastatic disease but are nonspecific.  Several of the larger opacities have configuration suggestive of endobronchial material in dilated bronchi from airway infection/inflammation.  Also, clustered micro nodules are  consistent with small airway infection/inflammation.  A 1.2 cm hypodense lesion in left lobe of liver, unchanged and nonspecific.     11/24/2020 CT CAP:  there is no evidence of local disease recurrence.  There are multiple pulmonary nodules and irregular opacities scattered throughout both lungs, unchanged in appearance from prior exam 08/31/2020.  While some solid nodules may reflect metastatic disease, majority of the pulmonary opacities demonstrate tubular and/or branching configurations, which can be seen with mucoid impaction, and tree-in-bud configuration, suggesting small airways infection/inflammation (for example MAC infection, given the pattern).  Stable indeterminate 1.2 cm hepatic lesion in the caudate lobe.     12/9/2020  PET/CT:  In the abdomen and pelvis, there is physiologic tracer distribution within the abdominal organs.  There is mildly hypermetabolic solid nodules in both lungs equivocal for metastases.  Given indications of old granulomatous disease and clustered distribution of nodules in the anterior right upper and right lower lobes, noncalcified granulomas are a differential consideration and not mutually exclusive.  Tissue sampling would be required for definitive diagnosis, and the largest nodules in the lower lobes may be amenable to percutaneous biopsy.  Additional mildly FDG avid regions of ground-glass attenuation are identified in the right lung, possibly reflecting small airways infection or inflammation with additional metastatic foci not definitively excluded.      10/22/2021 CT CAP:  interval development of multiple hypodensities scattered along the periphery of the liver concerning for peritoneal implants.  Stable pulmonary nodules and irregular opacities scattered throughout both lungs.  While some nodules may reflect metastatic disease, majority demonstrate a tubular and branching pattern suggesting mucoid impaction in a pattern consistent with MAC infection.  Incisional  hernia at the midline of the lower abdomen containing a few loops of nonobstructed small bowel.     11/17/2021:  Cycle #1 Carbo/ Doxil  ( 11/5/2021 = 337) (Udyneca added for next)  12/29/2021:  Cycle #2 Carbo / Doxil ( 12/13/2021 = 105) (delayed for low ANC and Daryl  1/26/2022: Cycle #3 Carbo / Doxil ( 1/24/2021 = 33)     2/15/2022 CT CAP:  Decreased size of the previously described hepatic lesions/peritoneal implants in this patient with history of ovarian cancer and known metastases, consistent with response to treatment.  No definite new liver lesions.  Stable, 4.5 cm low-density lesion along the periphery of the posterior right hepatic lobe, concerning for peritoneal implant.  Stable pulmonary nodules and irregular opacities, majority of which are favored to represent a pattern consistent with mucoid impaction or MAC infection.  No definite new nodules allowing for widespread disease.  Small incisional hernia containing a few loops of nonobstructed bowel with the hernia sac slightly increased in size from prior exam.    3/28/2022 [facility] Therapy Started M-W-F    4/6/2022:  Cycle #4 Carbo/Doxil ( 4/4/2022 = 60)    OBJECTIVE      Physical Exam:   Constitutional: She is oriented to person, place, and time. She appears well-developed and well-nourished. No distress.    HENT:   Head: Normocephalic and atraumatic.    Eyes: Conjunctivae and EOM are normal.      Pulmonary/Chest: Effort normal. No respiratory distress.                      Neurological: She is alert and oriented to person, place, and time.    Skin: No rash noted.    Psychiatric: She has a normal mood and affect. Judgment and thought content normal.     ECOG status: 0    LABORATORY DATA  Lab data reviewed.    RADIOLOGICAL DATA  Radiology data reviewed.    ASSESSMENT    1. Malignant neoplasm of both ovaries    2. Examination prior to chemotherapy    Feeling well after treatment break.  She is ready to restart Carbo / Doxil on  Wednesday.      PLAN  1.  Proceed with next cycle of chemo 4/6/2022       Ghassan Treviño MD

## 2022-04-06 ENCOUNTER — INFUSION (OUTPATIENT)
Dept: INFUSION THERAPY | Facility: HOSPITAL | Age: 72
End: 2022-04-06
Payer: MEDICARE

## 2022-04-06 VITALS
HEART RATE: 72 BPM | WEIGHT: 175 LBS | SYSTOLIC BLOOD PRESSURE: 135 MMHG | TEMPERATURE: 99 F | OXYGEN SATURATION: 99 % | HEIGHT: 64 IN | DIASTOLIC BLOOD PRESSURE: 66 MMHG | BODY MASS INDEX: 29.88 KG/M2 | RESPIRATION RATE: 18 BRPM

## 2022-04-06 DIAGNOSIS — C56.3 MALIGNANT NEOPLASM OF BOTH OVARIES: Primary | ICD-10-CM

## 2022-04-06 PROCEDURE — 96417 CHEMO IV INFUS EACH ADDL SEQ: CPT

## 2022-04-06 PROCEDURE — 96367 TX/PROPH/DG ADDL SEQ IV INF: CPT

## 2022-04-06 PROCEDURE — 25000003 PHARM REV CODE 250: Performed by: OBSTETRICS & GYNECOLOGY

## 2022-04-06 PROCEDURE — 63600175 PHARM REV CODE 636 W HCPCS: Mod: JG | Performed by: OBSTETRICS & GYNECOLOGY

## 2022-04-06 PROCEDURE — 96413 CHEMO IV INFUSION 1 HR: CPT

## 2022-04-06 RX ORDER — SODIUM CHLORIDE 0.9 % (FLUSH) 0.9 %
10 SYRINGE (ML) INJECTION
Status: DISCONTINUED | OUTPATIENT
Start: 2022-04-06 | End: 2022-04-06 | Stop reason: HOSPADM

## 2022-04-06 RX ORDER — EPINEPHRINE 0.3 MG/.3ML
0.3 INJECTION SUBCUTANEOUS ONCE AS NEEDED
Status: DISCONTINUED | OUTPATIENT
Start: 2022-04-06 | End: 2022-04-06 | Stop reason: HOSPADM

## 2022-04-06 RX ORDER — HEPARIN 100 UNIT/ML
500 SYRINGE INTRAVENOUS
Status: DISCONTINUED | OUTPATIENT
Start: 2022-04-06 | End: 2022-04-06 | Stop reason: HOSPADM

## 2022-04-06 RX ORDER — DIPHENHYDRAMINE HYDROCHLORIDE 50 MG/ML
50 INJECTION INTRAMUSCULAR; INTRAVENOUS ONCE AS NEEDED
Status: DISCONTINUED | OUTPATIENT
Start: 2022-04-06 | End: 2022-04-06 | Stop reason: HOSPADM

## 2022-04-06 RX ADMIN — CARBOPLATIN 490 MG: 10 INJECTION INTRAVENOUS at 05:04

## 2022-04-06 RX ADMIN — PALONOSETRON HYDROCHLORIDE 0.25 MG: 0.25 INJECTION, SOLUTION INTRAVENOUS at 03:04

## 2022-04-06 RX ADMIN — SODIUM CHLORIDE: 0.9 INJECTION, SOLUTION INTRAVENOUS at 03:04

## 2022-04-06 RX ADMIN — DOXORUBICIN HYDROCHLORIDE 57 MG: 2 INJECTABLE, LIPOSOMAL INTRAVENOUS at 04:04

## 2022-04-06 NOTE — PLAN OF CARE
Patient tolerated treatment with no complications. Port deaccessed, + blood return noted, flushed and heparin locked. Patient ambulated away from clinic in NAD accompanied by family.

## 2022-04-06 NOTE — PLAN OF CARE
1500- Patient arrived to clinic for doxil/carbo infusion. Labs and assessment appropriate for treatment. Orders reviewed and signed by MD Hudson. Port accessed, + blood return noted, flushes easily.

## 2022-04-07 ENCOUNTER — PATIENT MESSAGE (OUTPATIENT)
Dept: GYNECOLOGIC ONCOLOGY | Facility: CLINIC | Age: 72
End: 2022-04-07
Payer: MEDICARE

## 2022-04-07 ENCOUNTER — INFUSION (OUTPATIENT)
Dept: INFUSION THERAPY | Facility: HOSPITAL | Age: 72
End: 2022-04-07
Attending: OBSTETRICS & GYNECOLOGY
Payer: MEDICARE

## 2022-04-07 DIAGNOSIS — C56.3 MALIGNANT NEOPLASM OF BOTH OVARIES: Primary | ICD-10-CM

## 2022-04-07 PROCEDURE — 96372 THER/PROPH/DIAG INJ SC/IM: CPT

## 2022-04-07 PROCEDURE — 63600175 PHARM REV CODE 636 W HCPCS: Mod: TB | Performed by: OBSTETRICS & GYNECOLOGY

## 2022-04-07 RX ADMIN — PEGFILGRASTIM-CBQV 6 MG: 6 INJECTION, SOLUTION SUBCUTANEOUS at 02:04

## 2022-04-08 ENCOUNTER — PATIENT MESSAGE (OUTPATIENT)
Dept: HEMATOLOGY/ONCOLOGY | Facility: CLINIC | Age: 72
End: 2022-04-08
Payer: MEDICARE

## 2022-05-02 ENCOUNTER — INFUSION (OUTPATIENT)
Dept: INFUSION THERAPY | Facility: HOSPITAL | Age: 72
End: 2022-05-02
Attending: OBSTETRICS & GYNECOLOGY
Payer: MEDICARE

## 2022-05-02 ENCOUNTER — PATIENT MESSAGE (OUTPATIENT)
Dept: GYNECOLOGIC ONCOLOGY | Facility: CLINIC | Age: 72
End: 2022-05-02
Payer: MEDICARE

## 2022-05-02 DIAGNOSIS — C56.3 MALIGNANT NEOPLASM OF BOTH OVARIES: Primary | ICD-10-CM

## 2022-05-02 LAB
ALBUMIN SERPL BCP-MCNC: 3.8 G/DL (ref 3.5–5.2)
ALP SERPL-CCNC: 92 U/L (ref 55–135)
ALT SERPL W/O P-5'-P-CCNC: 9 U/L (ref 10–44)
ANION GAP SERPL CALC-SCNC: 7 MMOL/L (ref 8–16)
AST SERPL-CCNC: 12 U/L (ref 10–40)
BILIRUB SERPL-MCNC: 0.2 MG/DL (ref 0.1–1)
BUN SERPL-MCNC: 10 MG/DL (ref 8–23)
CALCIUM SERPL-MCNC: 10 MG/DL (ref 8.7–10.5)
CANCER AG125 SERPL-ACNC: 16 U/ML (ref 0–30)
CHLORIDE SERPL-SCNC: 105 MMOL/L (ref 95–110)
CO2 SERPL-SCNC: 28 MMOL/L (ref 23–29)
CREAT SERPL-MCNC: 1.5 MG/DL (ref 0.5–1.4)
ERYTHROCYTE [DISTWIDTH] IN BLOOD BY AUTOMATED COUNT: 14.4 % (ref 11.5–14.5)
EST. GFR  (AFRICAN AMERICAN): 40.1 ML/MIN/1.73 M^2
EST. GFR  (NON AFRICAN AMERICAN): 34.8 ML/MIN/1.73 M^2
GLUCOSE SERPL-MCNC: 120 MG/DL (ref 70–110)
HCT VFR BLD AUTO: 29.2 % (ref 37–48.5)
HGB BLD-MCNC: 9.5 G/DL (ref 12–16)
IMM GRANULOCYTES # BLD AUTO: 0 K/UL (ref 0–0.04)
MCH RBC QN AUTO: 31.8 PG (ref 27–31)
MCHC RBC AUTO-ENTMCNC: 32.5 G/DL (ref 32–36)
MCV RBC AUTO: 98 FL (ref 82–98)
NEUTROPHILS # BLD AUTO: 1 K/UL (ref 1.8–7.7)
PLATELET # BLD AUTO: 103 K/UL (ref 150–450)
PMV BLD AUTO: 8.6 FL (ref 9.2–12.9)
POTASSIUM SERPL-SCNC: 4.2 MMOL/L (ref 3.5–5.1)
PROT SERPL-MCNC: 7.1 G/DL (ref 6–8.4)
RBC # BLD AUTO: 2.99 M/UL (ref 4–5.4)
SODIUM SERPL-SCNC: 140 MMOL/L (ref 136–145)
WBC # BLD AUTO: 2.61 K/UL (ref 3.9–12.7)

## 2022-05-02 PROCEDURE — 36591 DRAW BLOOD OFF VENOUS DEVICE: CPT

## 2022-05-02 PROCEDURE — 80053 COMPREHEN METABOLIC PANEL: CPT | Performed by: OBSTETRICS & GYNECOLOGY

## 2022-05-02 PROCEDURE — 63600175 PHARM REV CODE 636 W HCPCS: Performed by: OBSTETRICS & GYNECOLOGY

## 2022-05-02 PROCEDURE — 36415 COLL VENOUS BLD VENIPUNCTURE: CPT | Performed by: OBSTETRICS & GYNECOLOGY

## 2022-05-02 PROCEDURE — 25000003 PHARM REV CODE 250: Performed by: OBSTETRICS & GYNECOLOGY

## 2022-05-02 PROCEDURE — A4216 STERILE WATER/SALINE, 10 ML: HCPCS | Performed by: OBSTETRICS & GYNECOLOGY

## 2022-05-02 PROCEDURE — 86304 IMMUNOASSAY TUMOR CA 125: CPT | Performed by: OBSTETRICS & GYNECOLOGY

## 2022-05-02 PROCEDURE — 85027 COMPLETE CBC AUTOMATED: CPT | Performed by: OBSTETRICS & GYNECOLOGY

## 2022-05-02 RX ORDER — HEPARIN 100 UNIT/ML
500 SYRINGE INTRAVENOUS
Status: CANCELLED | OUTPATIENT
Start: 2022-05-02

## 2022-05-02 RX ORDER — SODIUM CHLORIDE 0.9 % (FLUSH) 0.9 %
10 SYRINGE (ML) INJECTION
Status: CANCELLED | OUTPATIENT
Start: 2022-05-02

## 2022-05-02 RX ORDER — SODIUM CHLORIDE 0.9 % (FLUSH) 0.9 %
10 SYRINGE (ML) INJECTION
Status: DISCONTINUED | OUTPATIENT
Start: 2022-05-02 | End: 2022-05-02 | Stop reason: HOSPADM

## 2022-05-02 RX ORDER — HEPARIN 100 UNIT/ML
500 SYRINGE INTRAVENOUS
Status: DISCONTINUED | OUTPATIENT
Start: 2022-05-02 | End: 2022-05-02 | Stop reason: HOSPADM

## 2022-05-02 RX ADMIN — HEPARIN SODIUM (PORCINE) LOCK FLUSH IV SOLN 100 UNIT/ML 500 UNITS: 100 SOLUTION at 03:05

## 2022-05-02 RX ADMIN — Medication 10 ML: at 03:05

## 2022-05-04 ENCOUNTER — OFFICE VISIT (OUTPATIENT)
Dept: GYNECOLOGIC ONCOLOGY | Facility: CLINIC | Age: 72
End: 2022-05-04
Payer: MEDICARE

## 2022-05-04 VITALS
WEIGHT: 180.13 LBS | SYSTOLIC BLOOD PRESSURE: 141 MMHG | DIASTOLIC BLOOD PRESSURE: 67 MMHG | HEART RATE: 75 BPM | BODY MASS INDEX: 30.91 KG/M2

## 2022-05-04 DIAGNOSIS — C56.3 MALIGNANT NEOPLASM OF BOTH OVARIES: Primary | ICD-10-CM

## 2022-05-04 DIAGNOSIS — Z01.818 EXAMINATION PRIOR TO CHEMOTHERAPY: ICD-10-CM

## 2022-05-04 PROCEDURE — 3078F PR MOST RECENT DIASTOLIC BLOOD PRESSURE < 80 MM HG: ICD-10-PCS | Mod: CPTII,S$GLB,, | Performed by: OBSTETRICS & GYNECOLOGY

## 2022-05-04 PROCEDURE — 99999 PR PBB SHADOW E&M-EST. PATIENT-LVL IV: CPT | Mod: PBBFAC,,, | Performed by: OBSTETRICS & GYNECOLOGY

## 2022-05-04 PROCEDURE — 99999 PR PBB SHADOW E&M-EST. PATIENT-LVL IV: ICD-10-PCS | Mod: PBBFAC,,, | Performed by: OBSTETRICS & GYNECOLOGY

## 2022-05-04 PROCEDURE — 3077F PR MOST RECENT SYSTOLIC BLOOD PRESSURE >= 140 MM HG: ICD-10-PCS | Mod: CPTII,S$GLB,, | Performed by: OBSTETRICS & GYNECOLOGY

## 2022-05-04 PROCEDURE — 3008F BODY MASS INDEX DOCD: CPT | Mod: CPTII,S$GLB,, | Performed by: OBSTETRICS & GYNECOLOGY

## 2022-05-04 PROCEDURE — 1125F AMNT PAIN NOTED PAIN PRSNT: CPT | Mod: CPTII,S$GLB,, | Performed by: OBSTETRICS & GYNECOLOGY

## 2022-05-04 PROCEDURE — 1101F PT FALLS ASSESS-DOCD LE1/YR: CPT | Mod: CPTII,S$GLB,, | Performed by: OBSTETRICS & GYNECOLOGY

## 2022-05-04 PROCEDURE — 1159F MED LIST DOCD IN RCRD: CPT | Mod: CPTII,S$GLB,, | Performed by: OBSTETRICS & GYNECOLOGY

## 2022-05-04 PROCEDURE — 3078F DIAST BP <80 MM HG: CPT | Mod: CPTII,S$GLB,, | Performed by: OBSTETRICS & GYNECOLOGY

## 2022-05-04 PROCEDURE — 3077F SYST BP >= 140 MM HG: CPT | Mod: CPTII,S$GLB,, | Performed by: OBSTETRICS & GYNECOLOGY

## 2022-05-04 PROCEDURE — 99215 OFFICE O/P EST HI 40 MIN: CPT | Mod: 24,S$GLB,, | Performed by: OBSTETRICS & GYNECOLOGY

## 2022-05-04 PROCEDURE — 3008F PR BODY MASS INDEX (BMI) DOCUMENTED: ICD-10-PCS | Mod: CPTII,S$GLB,, | Performed by: OBSTETRICS & GYNECOLOGY

## 2022-05-04 PROCEDURE — 1125F PR PAIN SEVERITY QUANTIFIED, PAIN PRESENT: ICD-10-PCS | Mod: CPTII,S$GLB,, | Performed by: OBSTETRICS & GYNECOLOGY

## 2022-05-04 PROCEDURE — 3288F FALL RISK ASSESSMENT DOCD: CPT | Mod: CPTII,S$GLB,, | Performed by: OBSTETRICS & GYNECOLOGY

## 2022-05-04 PROCEDURE — 1101F PR PT FALLS ASSESS DOC 0-1 FALLS W/OUT INJ PAST YR: ICD-10-PCS | Mod: CPTII,S$GLB,, | Performed by: OBSTETRICS & GYNECOLOGY

## 2022-05-04 PROCEDURE — 3288F PR FALLS RISK ASSESSMENT DOCUMENTED: ICD-10-PCS | Mod: CPTII,S$GLB,, | Performed by: OBSTETRICS & GYNECOLOGY

## 2022-05-04 PROCEDURE — 4010F ACE/ARB THERAPY RXD/TAKEN: CPT | Mod: CPTII,S$GLB,, | Performed by: OBSTETRICS & GYNECOLOGY

## 2022-05-04 PROCEDURE — 99215 PR OFFICE/OUTPT VISIT, EST, LEVL V, 40-54 MIN: ICD-10-PCS | Mod: 24,S$GLB,, | Performed by: OBSTETRICS & GYNECOLOGY

## 2022-05-04 PROCEDURE — 1159F PR MEDICATION LIST DOCUMENTED IN MEDICAL RECORD: ICD-10-PCS | Mod: CPTII,S$GLB,, | Performed by: OBSTETRICS & GYNECOLOGY

## 2022-05-04 PROCEDURE — 4010F PR ACE/ARB THEARPY RXD/TAKEN: ICD-10-PCS | Mod: CPTII,S$GLB,, | Performed by: OBSTETRICS & GYNECOLOGY

## 2022-05-04 RX ORDER — SYRINGE AND NEEDLE,INSULIN,1ML 30 G X1/2"
SYRINGE, EMPTY DISPOSABLE MISCELLANEOUS
Status: ON HOLD | COMMUNITY
Start: 2022-03-28 | End: 2023-04-12 | Stop reason: HOSPADM

## 2022-05-04 RX ORDER — PROMETHAZINE HYDROCHLORIDE AND CODEINE PHOSPHATE 6.25; 1 MG/5ML; MG/5ML
SOLUTION ORAL
COMMUNITY
Start: 2022-02-23 | End: 2023-03-09

## 2022-05-04 NOTE — PROGRESS NOTES
REFERRING PROVIDER  Francisco Foster     INTERVAL HISTORY  CC: Recurrent IIIC Ovarian Cancer Treatment Planning / Prechemo #5  Visit with her and her daughter Sue Gilmore is a 71 y.o. with recurrent Stage IIIC HGSOC.  She is s/p cycle #4 of Carboplatin and Doxil on  1/26/2021.  Cycle #2 was delayed for low ANC (and [place]).     She decided after cycle #3 to take a treatment break.  She went to [place] and started Mistletoe therapy in April.    Her abdominal pain has resolved.  She has no vaginal bleeding or discharge.  She is having no nausea or vomiting.  She has no bowel or bladder complaints.  Takes colace once a day.       She has also started meditation therapy with [facility].  She opted not to see Dr. Avalos and did not think acupuncture was helpful.  She is participating in group Yoga.     5/2/2022 Creatinine 1.5 (up from average of 1), ANC 1000, Plt 103     HISTORY OF PRESENT CONDITION  (As summarized in Dr. Foster's notes)  March 2020 patient began to notice some lower abdominal discomfort.  This was at the height of COVID-19 pandemic.  She was referred to a urologist who did a tele health visit.  An ultrasound in May 2020 showed a complex cyst in the middle pole of the kidney but a pelvic cyst as well.  The patient then returned to see her gynecologist Dr. Marily Alston in [place].  An ultrasound showed a complex multi-cystic mass posterior to the uterus; left adnexa 57 x 36 mm cyst and a 30 x 23 x 23 cm complex cyst; right adnexa with a 37 x 31 cm cyst; endometrium not clearly seen.  At that time she was having difficulty with bowel movements.     5/8/202 : 173     6/2/2020 CT AP:  Bilateral solid and cystic adnexal mass lesions (right 6.7 cm and left 6.3 cm) suspicious for primary ovarian malignancy along with innumerable bilateral lower lobe pulmonary nodules most consistent with metastatic disease.  Subcentimeter soft tissue nodular densities within the omentum as  above. These are indeterminate on this exam, however peritoneal metastatic implants cannot be excluded.      6/22/2020  Exploratory laparotomy, modified radical hysterectomy with radical resection of bilateral ovarian masses with optimal tumor reductive surgery including resection of pelvic disease, excision of numerous tumor implants, infracolic omentectomy with resection of a 2 cm omental nodule, bilateral pelvic and periaortic lymph node dissection and appendectomy.   (Dr. Мария Ratliff)  FINDINGS:  optimally resected to R0. The upper abdominal disease include omental nodule, peritoneal implants, bowel mesentery implants and right diaphragm implants, which were all excised.  PATHOLOGY:high grade serous ovarian ca bilaterally, numerous implants (omental, peritoneal, diaphragm, SB mesentery), negative nodes, positive washings. HER2 negative; MSI stable     7/1/2020 :  114     7/6/2020 CT Chest:  Bilateral pulmonary nodules as detailed most consistent with metastatic disease.  Peritoneal soft tissue nodularity in the left upper quadrant unchanged from the prior exam and again consistent with metastatic disease.  Bibasilar atelectasis.     7/16/2020 Cycle #1 Carbo / Taxol (transferred to [facility] -details in Dr. Foster's prior notes)  8/14/2020 Cycle #2 Carbo / Taxol (8/5/2020 :  25)  9/3/2020 Cycle #3 Carbo / Taxol (9/2/2020 : 12)  9/24/2020 Cycle #4 Carbo / Taxol (9/23/202 : 10)  10/16/2020 Cycle #5 Carbo / Taxol   11/5/2020  Cycle #6 Carbo / Taxol     11/24/2020 : 11  1/26/2021 : 12  5/10/2021 : 11  6/8/2021 :  11     8/31/2021 CT CAP:  there are innumerable nodules in the lungs bilaterally, concerning for metastatic disease.  Left kidney cyst.  Additional findings as above.     9/13/2021  :  55    10/13/2021 :  152     10/13/2021 CT CAP: No local recurrence identified.  Multiple pulmonary nodules/opacities without significant interval change.  Random nodules could  indicate metastatic disease but are nonspecific.  Several of the larger opacities have configuration suggestive of endobronchial material in dilated bronchi from airway infection/inflammation.  Also, clustered micro nodules are consistent with small airway infection/inflammation.  A 1.2 cm hypodense lesion in left lobe of liver, unchanged and nonspecific.     11/24/2020 CT CAP:  there is no evidence of local disease recurrence.  There are multiple pulmonary nodules and irregular opacities scattered throughout both lungs, unchanged in appearance from prior exam 08/31/2020.  While some solid nodules may reflect metastatic disease, majority of the pulmonary opacities demonstrate tubular and/or branching configurations, which can be seen with mucoid impaction, and tree-in-bud configuration, suggesting small airways infection/inflammation (for example MAC infection, given the pattern).  Stable indeterminate 1.2 cm hepatic lesion in the caudate lobe.     12/9/2020  PET/CT:  In the abdomen and pelvis, there is physiologic tracer distribution within the abdominal organs.  There is mildly hypermetabolic solid nodules in both lungs equivocal for metastases.  Given indications of old granulomatous disease and clustered distribution of nodules in the anterior right upper and right lower lobes, noncalcified granulomas are a differential consideration and not mutually exclusive.  Tissue sampling would be required for definitive diagnosis, and the largest nodules in the lower lobes may be amenable to percutaneous biopsy.  Additional mildly FDG avid regions of ground-glass attenuation are identified in the right lung, possibly reflecting small airways infection or inflammation with additional metastatic foci not definitively excluded.      10/22/2021 CT CAP:  interval development of multiple hypodensities scattered along the periphery of the liver concerning for peritoneal implants.  Stable pulmonary nodules and irregular opacities  scattered throughout both lungs.  While some nodules may reflect metastatic disease, majority demonstrate a tubular and branching pattern suggesting mucoid impaction in a pattern consistent with MAC infection.  Incisional hernia at the midline of the lower abdomen containing a few loops of nonobstructed small bowel.     11/17/2021:  Cycle #1 Carbo/ Doxil  ( 11/5/2021 = 337) (Udenyca added for next)  12/29/2021:  Cycle #2 Carbo / Doxil ( 12/13/2021 = 105) (delayed for low ANC and [place]  1/26/2022: Cycle #3 Carbo / Doxil ( 1/24/2021 = 33)     2/15/2022 CT CAP:  Decreased size of the previously described hepatic lesions/peritoneal implants in this patient with history of ovarian cancer and known metastases, consistent with response to treatment.  No definite new liver lesions.  Stable, 4.5 cm low-density lesion along the periphery of the posterior right hepatic lobe, concerning for peritoneal implant.  Stable pulmonary nodules and irregular opacities, majority of which are favored to represent a pattern consistent with mucoid impaction or MAC infection.  No definite new nodules allowing for widespread disease.  Small incisional hernia containing a few loops of nonobstructed bowel with the hernia sac slightly increased in size from prior exam.     3/28/2022 Mistletoe Therapy Started M-W-F 4/6/2022:  Cycle #4 Carbo/Doxil ( 4/4/2022 = 60)  5/11/2022:  Cycle #5 Carbo/Doxil (delayed for low ANC and platelets;  5/2/2022 = 16)     REVIEW OF SYSTEMS  Review of Systems   Constitutional: Positive for fatigue. Negative for appetite change, chills, fever and unexpected weight change.   HENT:   Negative for lump/mass and mouth sores.    Respiratory: Negative for chest tightness, cough and shortness of breath.    Cardiovascular: Negative for chest pain, leg swelling and palpitations.   Gastrointestinal: Negative for abdominal distention, abdominal pain, blood in stool, constipation, diarrhea, nausea  and vomiting.   Genitourinary: Negative for dysuria, frequency, vaginal bleeding and vaginal discharge.    Musculoskeletal: Negative for arthralgias and myalgias.   Skin: Positive for rash (local reaction at site of Mistletoe injections).   Neurological: Negative for dizziness, light-headedness and numbness.   Hematological: Negative for adenopathy.   Psychiatric/Behavioral: Negative for decreased concentration, depression and sleep disturbance. The patient is not nervous/anxious.      OBJECTIVE   Vitals:    05/04/22 1042   BP: (!) 141/67   Pulse: 75      Body mass index is 30.91 kg/m².     Physical Exam:   Constitutional: She is oriented to person, place, and time. She appears well-developed and well-nourished. No distress.    HENT:   Head: Normocephalic and atraumatic.    Eyes: Conjunctivae and EOM are normal.     Cardiovascular: Normal rate, regular rhythm and normal heart sounds.  Exam reveals no gallop, no friction rub, no clubbing and no cyanosis.    No murmur heard.   Pulmonary/Chest: Effort normal and breath sounds normal. No respiratory distress. She has no wheezes. She has no rales.        Abdominal: Soft.                 Neurological: She is alert and oriented to person, place, and time.    Skin: Rash (local rxn at mistletoe injection site) noted. No cyanosis. Nails show no clubbing.    Psychiatric: She has a normal mood and affect. Her behavior is normal.     ECOG status: 0    LABORATORY DATA  Lab data reviewed.      RADIOLOGICAL DATA  Radiology data reviewed.    PATHOLOGY DATA  Pathology data reviewed.    ASSESSMENT    1. Malignant neoplasm of both ovaries    2. Examination prior to chemotherapy    Doing well with Carbo Doxil with normalization of  but significant increase in creatinine of uncertain etiology.  ANC too low for chemo today and platelet borderline.       PLAN  1.  Hold chemo x 1 week.  Proceed with next cycle of chemo 5/11/2022 with 10% dose reduction.  2.  Hydrate well over next week  for recheck of renal function  3.  Patient will check with [facility] doctor about Mistletoe impact on kidney function  4.  Rediscuss with patient about whether or not to do CT after cycle #6        Ghassan Treviño MD

## 2022-05-07 DIAGNOSIS — C56.3 MALIGNANT NEOPLASM OF BOTH OVARIES: Primary | ICD-10-CM

## 2022-05-09 ENCOUNTER — INFUSION (OUTPATIENT)
Dept: INFUSION THERAPY | Facility: HOSPITAL | Age: 72
End: 2022-05-09
Attending: OBSTETRICS & GYNECOLOGY
Payer: MEDICARE

## 2022-05-09 ENCOUNTER — TELEPHONE (OUTPATIENT)
Dept: GYNECOLOGIC ONCOLOGY | Facility: CLINIC | Age: 72
End: 2022-05-09
Payer: MEDICARE

## 2022-05-09 DIAGNOSIS — C56.3 MALIGNANT NEOPLASM OF BOTH OVARIES: Primary | ICD-10-CM

## 2022-05-09 LAB
ALBUMIN SERPL BCP-MCNC: 4 G/DL (ref 3.5–5.2)
ALP SERPL-CCNC: 94 U/L (ref 55–135)
ALT SERPL W/O P-5'-P-CCNC: 12 U/L (ref 10–44)
ANION GAP SERPL CALC-SCNC: 10 MMOL/L (ref 8–16)
AST SERPL-CCNC: 15 U/L (ref 10–40)
BILIRUB SERPL-MCNC: 0.3 MG/DL (ref 0.1–1)
BUN SERPL-MCNC: 12 MG/DL (ref 8–23)
CALCIUM SERPL-MCNC: 9.8 MG/DL (ref 8.7–10.5)
CANCER AG125 SERPL-ACNC: 23 U/ML (ref 0–30)
CHLORIDE SERPL-SCNC: 104 MMOL/L (ref 95–110)
CO2 SERPL-SCNC: 26 MMOL/L (ref 23–29)
CREAT SERPL-MCNC: 1.1 MG/DL (ref 0.5–1.4)
ERYTHROCYTE [DISTWIDTH] IN BLOOD BY AUTOMATED COUNT: 15 % (ref 11.5–14.5)
EST. GFR  (AFRICAN AMERICAN): 58.4 ML/MIN/1.73 M^2
EST. GFR  (NON AFRICAN AMERICAN): 50.6 ML/MIN/1.73 M^2
GLUCOSE SERPL-MCNC: 110 MG/DL (ref 70–110)
HCT VFR BLD AUTO: 31 % (ref 37–48.5)
HGB BLD-MCNC: 9.9 G/DL (ref 12–16)
IMM GRANULOCYTES # BLD AUTO: 0.01 K/UL (ref 0–0.04)
MCH RBC QN AUTO: 31.9 PG (ref 27–31)
MCHC RBC AUTO-ENTMCNC: 31.9 G/DL (ref 32–36)
MCV RBC AUTO: 100 FL (ref 82–98)
NEUTROPHILS # BLD AUTO: 1.4 K/UL (ref 1.8–7.7)
PLATELET # BLD AUTO: 211 K/UL (ref 150–450)
PMV BLD AUTO: 8.3 FL (ref 9.2–12.9)
POTASSIUM SERPL-SCNC: 4.4 MMOL/L (ref 3.5–5.1)
PROT SERPL-MCNC: 7.1 G/DL (ref 6–8.4)
RBC # BLD AUTO: 3.1 M/UL (ref 4–5.4)
SODIUM SERPL-SCNC: 140 MMOL/L (ref 136–145)
WBC # BLD AUTO: 3.35 K/UL (ref 3.9–12.7)

## 2022-05-09 PROCEDURE — 85027 COMPLETE CBC AUTOMATED: CPT | Performed by: OBSTETRICS & GYNECOLOGY

## 2022-05-09 PROCEDURE — 63600175 PHARM REV CODE 636 W HCPCS: Performed by: OBSTETRICS & GYNECOLOGY

## 2022-05-09 PROCEDURE — 25000003 PHARM REV CODE 250: Performed by: OBSTETRICS & GYNECOLOGY

## 2022-05-09 PROCEDURE — 36591 DRAW BLOOD OFF VENOUS DEVICE: CPT

## 2022-05-09 PROCEDURE — 86304 IMMUNOASSAY TUMOR CA 125: CPT | Performed by: OBSTETRICS & GYNECOLOGY

## 2022-05-09 PROCEDURE — 80053 COMPREHEN METABOLIC PANEL: CPT | Performed by: OBSTETRICS & GYNECOLOGY

## 2022-05-09 PROCEDURE — A4216 STERILE WATER/SALINE, 10 ML: HCPCS | Performed by: OBSTETRICS & GYNECOLOGY

## 2022-05-09 RX ORDER — HEPARIN 100 UNIT/ML
500 SYRINGE INTRAVENOUS
Status: DISCONTINUED | OUTPATIENT
Start: 2022-05-09 | End: 2022-05-09 | Stop reason: HOSPADM

## 2022-05-09 RX ORDER — SODIUM CHLORIDE 0.9 % (FLUSH) 0.9 %
10 SYRINGE (ML) INJECTION
Status: CANCELLED | OUTPATIENT
Start: 2022-05-09

## 2022-05-09 RX ORDER — DIPHENHYDRAMINE HYDROCHLORIDE 50 MG/ML
50 INJECTION INTRAMUSCULAR; INTRAVENOUS ONCE AS NEEDED
Status: CANCELLED | OUTPATIENT
Start: 2022-05-09

## 2022-05-09 RX ORDER — HEPARIN 100 UNIT/ML
500 SYRINGE INTRAVENOUS
Status: CANCELLED | OUTPATIENT
Start: 2022-05-09

## 2022-05-09 RX ORDER — EPINEPHRINE 0.3 MG/.3ML
0.3 INJECTION SUBCUTANEOUS ONCE AS NEEDED
Status: CANCELLED | OUTPATIENT
Start: 2022-05-09

## 2022-05-09 RX ORDER — SODIUM CHLORIDE 0.9 % (FLUSH) 0.9 %
10 SYRINGE (ML) INJECTION
Status: DISCONTINUED | OUTPATIENT
Start: 2022-05-09 | End: 2022-05-09 | Stop reason: HOSPADM

## 2022-05-09 RX ADMIN — HEPARIN SODIUM (PORCINE) LOCK FLUSH IV SOLN 100 UNIT/ML 500 UNITS: 100 SOLUTION at 10:05

## 2022-05-09 RX ADMIN — SODIUM CHLORIDE, PRESERVATIVE FREE 10 ML: 5 INJECTION INTRAVENOUS at 10:05

## 2022-05-09 NOTE — TELEPHONE ENCOUNTER
----- Message from Ghassan Treviño MD sent at 5/9/2022  1:09 PM CDT -----  Please let Ms. Gilmore know that her ANC is still just a tad bit low, but OK to treat.  I am going to reduce the dose of both her Carboplatin and Doxil by 10%.

## 2022-05-09 NOTE — TELEPHONE ENCOUNTER
Pt advise of Dr. Fenton advice, verbalize understanding.    Pt inquiring about  level being a tad elevated compare to last week. Pt understand level is wnl but would like for Dr. Treviño to give a call regarding remission outcome.

## 2022-05-09 NOTE — NURSING
Pt here for lab draw from port. Labs drawn, port flushed and hep locked. Tolerated procedure without difficulty.

## 2022-05-10 DIAGNOSIS — C56.3 MALIGNANT NEOPLASM OF BOTH OVARIES: Primary | ICD-10-CM

## 2022-05-11 ENCOUNTER — PATIENT MESSAGE (OUTPATIENT)
Dept: GYNECOLOGIC ONCOLOGY | Facility: CLINIC | Age: 72
End: 2022-05-11
Payer: MEDICARE

## 2022-05-11 NOTE — TELEPHONE ENCOUNTER
Called Ms Gilmore and discussed her situation in detail.  I think she is low risk from her exposure as it was minimal between her and her daughter and one or the other of them were masked during that time.  Also, she is two weeks out from her second booster which is optimal timing for immune activity (acknowledging that she's been on chemo).  If she has a negative rapid test in the morning, I feel comfortable proceeding with chemo.  Though it is not entirely without risk, in shared decision making, we will proceed.

## 2022-05-12 ENCOUNTER — PATIENT MESSAGE (OUTPATIENT)
Dept: GYNECOLOGIC ONCOLOGY | Facility: CLINIC | Age: 72
End: 2022-05-12
Payer: MEDICARE

## 2022-05-12 ENCOUNTER — INFUSION (OUTPATIENT)
Dept: INFUSION THERAPY | Facility: HOSPITAL | Age: 72
End: 2022-05-12
Payer: MEDICARE

## 2022-05-12 VITALS
HEIGHT: 64 IN | BODY MASS INDEX: 30.56 KG/M2 | WEIGHT: 179 LBS | TEMPERATURE: 99 F | SYSTOLIC BLOOD PRESSURE: 144 MMHG | HEART RATE: 74 BPM | DIASTOLIC BLOOD PRESSURE: 66 MMHG | RESPIRATION RATE: 18 BRPM

## 2022-05-12 DIAGNOSIS — C56.3 MALIGNANT NEOPLASM OF BOTH OVARIES: Primary | ICD-10-CM

## 2022-05-12 PROCEDURE — 96367 TX/PROPH/DG ADDL SEQ IV INF: CPT

## 2022-05-12 PROCEDURE — 96413 CHEMO IV INFUSION 1 HR: CPT

## 2022-05-12 PROCEDURE — 96417 CHEMO IV INFUS EACH ADDL SEQ: CPT

## 2022-05-12 PROCEDURE — 25000003 PHARM REV CODE 250: Performed by: OBSTETRICS & GYNECOLOGY

## 2022-05-12 PROCEDURE — 63600175 PHARM REV CODE 636 W HCPCS: Performed by: OBSTETRICS & GYNECOLOGY

## 2022-05-12 PROCEDURE — A4216 STERILE WATER/SALINE, 10 ML: HCPCS | Performed by: OBSTETRICS & GYNECOLOGY

## 2022-05-12 RX ORDER — HEPARIN 100 UNIT/ML
500 SYRINGE INTRAVENOUS
Status: DISCONTINUED | OUTPATIENT
Start: 2022-05-12 | End: 2022-05-12 | Stop reason: HOSPADM

## 2022-05-12 RX ORDER — DIPHENHYDRAMINE HYDROCHLORIDE 50 MG/ML
50 INJECTION INTRAMUSCULAR; INTRAVENOUS ONCE AS NEEDED
Status: DISCONTINUED | OUTPATIENT
Start: 2022-05-12 | End: 2022-05-12 | Stop reason: HOSPADM

## 2022-05-12 RX ORDER — SODIUM CHLORIDE 0.9 % (FLUSH) 0.9 %
10 SYRINGE (ML) INJECTION
Status: DISCONTINUED | OUTPATIENT
Start: 2022-05-12 | End: 2022-05-12 | Stop reason: HOSPADM

## 2022-05-12 RX ORDER — EPINEPHRINE 0.3 MG/.3ML
0.3 INJECTION SUBCUTANEOUS ONCE AS NEEDED
Status: DISCONTINUED | OUTPATIENT
Start: 2022-05-12 | End: 2022-05-12 | Stop reason: HOSPADM

## 2022-05-12 RX ADMIN — Medication 10 ML: at 11:05

## 2022-05-12 RX ADMIN — SODIUM CHLORIDE: 0.9 INJECTION, SOLUTION INTRAVENOUS at 08:05

## 2022-05-12 RX ADMIN — PALONOSETRON HYDROCHLORIDE 0.25 MG: 0.25 INJECTION, SOLUTION INTRAVENOUS at 08:05

## 2022-05-12 RX ADMIN — CARBOPLATIN 415 MG: 10 INJECTION INTRAVENOUS at 11:05

## 2022-05-12 RX ADMIN — HEPARIN 500 UNITS: 100 SYRINGE at 11:05

## 2022-05-12 RX ADMIN — DOXORUBICIN HYDROCHLORIDE 50 MG: 2 INJECTABLE, LIPOSOMAL INTRAVENOUS at 09:05

## 2022-05-12 NOTE — PLAN OF CARE
0820 pt here for D1C5 Doxil/Carbo infusion, labs, hx, meds, allergies reviewed, pt with no new complaints at this time, reclined in chair, continue to monitor

## 2022-05-12 NOTE — PLAN OF CARE
Pt tolerated doxil and carbo infusion without issue, pt to rtc 5/13/22 for udenyca injection, no distress noted upon d/c to home

## 2022-05-13 ENCOUNTER — INFUSION (OUTPATIENT)
Dept: INFUSION THERAPY | Facility: HOSPITAL | Age: 72
End: 2022-05-13
Payer: MEDICARE

## 2022-05-13 ENCOUNTER — PATIENT MESSAGE (OUTPATIENT)
Dept: GYNECOLOGIC ONCOLOGY | Facility: CLINIC | Age: 72
End: 2022-05-13
Payer: MEDICARE

## 2022-05-13 DIAGNOSIS — C56.3 MALIGNANT NEOPLASM OF BOTH OVARIES: Primary | ICD-10-CM

## 2022-05-13 PROCEDURE — 96372 THER/PROPH/DIAG INJ SC/IM: CPT

## 2022-05-13 PROCEDURE — 63600175 PHARM REV CODE 636 W HCPCS: Mod: TB | Performed by: OBSTETRICS & GYNECOLOGY

## 2022-05-13 RX ADMIN — PEGFILGRASTIM-CBQV 6 MG: 6 INJECTION, SOLUTION SUBCUTANEOUS at 11:05

## 2022-05-13 NOTE — NURSING
Pt here for Udenyca.  No new events since yesterday.  Administered injection to right arm.  No questions or concerns.  Pt ambulated out of unit unassisted.

## 2022-05-16 ENCOUNTER — PATIENT MESSAGE (OUTPATIENT)
Dept: GYNECOLOGIC ONCOLOGY | Facility: CLINIC | Age: 72
End: 2022-05-16
Payer: MEDICARE

## 2022-05-16 ENCOUNTER — HOSPITAL ENCOUNTER (OUTPATIENT)
Dept: CARDIOLOGY | Facility: HOSPITAL | Age: 72
Discharge: HOME | End: 2022-05-16
Attending: OBSTETRICS & GYNECOLOGY
Payer: MEDICARE

## 2022-05-16 VITALS
SYSTOLIC BLOOD PRESSURE: 140 MMHG | BODY MASS INDEX: 30.56 KG/M2 | WEIGHT: 179 LBS | HEIGHT: 64 IN | DIASTOLIC BLOOD PRESSURE: 70 MMHG | HEART RATE: 70 BPM

## 2022-05-16 DIAGNOSIS — C56.3 MALIGNANT NEOPLASM OF BOTH OVARIES: ICD-10-CM

## 2022-05-16 LAB
ASCENDING AORTA: 2.45 CM
AV INDEX (PROSTH): 0.89
AV MEAN GRADIENT: 6 MMHG
AV PEAK GRADIENT: 13 MMHG
AV VALVE AREA: 2.8 CM2
AV VELOCITY RATIO: 0.78
BSA FOR ECHO PROCEDURE: 1.91 M2
CV ECHO LV RWT: 0.41 CM
DOP CALC AO PEAK VEL: 1.78 M/S
DOP CALC AO VTI: 29.04 CM
DOP CALC LVOT AREA: 3.1 CM2
DOP CALC LVOT DIAMETER: 2 CM
DOP CALC LVOT PEAK VEL: 1.39 M/S
DOP CALC LVOT STROKE VOLUME: 81.45 CM3
DOP CALCLVOT PEAK VEL VTI: 25.94 CM
E WAVE DECELERATION TIME: 185.4 MSEC
E/A RATIO: 1.01
E/E' RATIO: 10.77 M/S
ECHO LV POSTERIOR WALL: 0.84 CM (ref 0.6–1.1)
EJECTION FRACTION: 65 %
FRACTIONAL SHORTENING: 36 % (ref 28–44)
INTERVENTRICULAR SEPTUM: 0.81 CM (ref 0.6–1.1)
IVRT: 85.63 MSEC
LA MAJOR: 5.31 CM
LA MINOR: 5.05 CM
LA WIDTH: 3.3 CM
LEFT ATRIUM SIZE: 3.92 CM
LEFT ATRIUM VOLUME INDEX MOD: 27 ML/M2
LEFT ATRIUM VOLUME INDEX: 30.4 ML/M2
LEFT ATRIUM VOLUME MOD: 50.48 CM3
LEFT ATRIUM VOLUME: 56.92 CM3
LEFT INTERNAL DIMENSION IN SYSTOLE: 2.63 CM (ref 2.1–4)
LEFT VENTRICLE DIASTOLIC VOLUME INDEX: 40.36 ML/M2
LEFT VENTRICLE DIASTOLIC VOLUME: 75.48 ML
LEFT VENTRICLE MASS INDEX: 55 G/M2
LEFT VENTRICLE SYSTOLIC VOLUME INDEX: 13.6 ML/M2
LEFT VENTRICLE SYSTOLIC VOLUME: 25.36 ML
LEFT VENTRICULAR INTERNAL DIMENSION IN DIASTOLE: 4.13 CM (ref 3.5–6)
LEFT VENTRICULAR MASS: 102.65 G
LV LATERAL E/E' RATIO: 7.78 M/S
LV SEPTAL E/E' RATIO: 17.5 M/S
MV A" WAVE DURATION": 6.85 MSEC
MV PEAK A VEL: 0.69 M/S
MV PEAK E VEL: 0.7 M/S
MV STENOSIS PRESSURE HALF TIME: 53.77 MS
MV VALVE AREA P 1/2 METHOD: 4.09 CM2
PISA TR MAX VEL: 2.3 M/S
PULM VEIN S/D RATIO: 1.47
PV PEAK D VEL: 0.45 M/S
PV PEAK S VEL: 0.66 M/S
QEF: 62 %
RA MAJOR: 4.69 CM
RA PRESSURE: 3 MMHG
RA WIDTH: 3.43 CM
RIGHT VENTRICULAR END-DIASTOLIC DIMENSION: 3.48 CM
RV TISSUE DOPPLER FREE WALL SYSTOLIC VELOCITY 1 (APICAL 4 CHAMBER VIEW): 16.66 CM/S
SINUS: 2.86 CM
STJ: 2.32 CM
TDI LATERAL: 0.09 M/S
TDI SEPTAL: 0.04 M/S
TDI: 0.07 M/S
TR MAX PG: 21 MMHG
TRICUSPID ANNULAR PLANE SYSTOLIC EXCURSION: 1.79 CM
TV REST PULMONARY ARTERY PRESSURE: 24 MMHG

## 2022-05-16 PROCEDURE — 93356 ECHO (CUPID ONLY): ICD-10-PCS | Mod: ,,, | Performed by: INTERNAL MEDICINE

## 2022-05-16 PROCEDURE — 93356 MYOCRD STRAIN IMG SPCKL TRCK: CPT

## 2022-05-16 PROCEDURE — 93356 MYOCRD STRAIN IMG SPCKL TRCK: CPT | Mod: ,,, | Performed by: INTERNAL MEDICINE

## 2022-05-16 PROCEDURE — 93306 TTE W/DOPPLER COMPLETE: CPT | Mod: 26,,, | Performed by: INTERNAL MEDICINE

## 2022-05-16 PROCEDURE — 93306 ECHO (CUPID ONLY): ICD-10-PCS | Mod: 26,,, | Performed by: INTERNAL MEDICINE

## 2022-05-25 ENCOUNTER — PATIENT MESSAGE (OUTPATIENT)
Dept: GYNECOLOGIC ONCOLOGY | Facility: CLINIC | Age: 72
End: 2022-05-25
Payer: MEDICARE

## 2022-05-27 ENCOUNTER — TELEPHONE (OUTPATIENT)
Dept: CARDIOLOGY | Facility: CLINIC | Age: 72
End: 2022-05-27
Payer: MEDICARE

## 2022-05-27 ENCOUNTER — PATIENT MESSAGE (OUTPATIENT)
Dept: GYNECOLOGIC ONCOLOGY | Facility: CLINIC | Age: 72
End: 2022-05-27
Payer: MEDICARE

## 2022-05-27 NOTE — TELEPHONE ENCOUNTER
Returned call-Held on the phone for 8 minutes, no option to leave a message.      ----- Message from Margo Arechiga sent at 5/27/2022  3:48 PM CDT -----  Name of Who is Calling: Selvin marshall)         What is the request in detail:Selvin is calling in regards to missing information that is needing regarding the prior auth for pitavastatin calcium (LIVALO) 2 mg Tab tablet. Please advise          Can the clinic reply by [facility]: no         What Number to Call Back if not in [facility]: [phone]

## 2022-05-27 NOTE — TELEPHONE ENCOUNTER
Will fax request for PA.----- Message from Arlene Fuchs sent at 5/27/2022  1:21 PM CDT -----  Regarding: FW: Prior Authorization    ----- Message -----  From: Wanda Macias  Sent: 5/27/2022   1:13 PM CDT  To: Kris Thibodeaux Staff  Subject: Prior Authorization                                        Name of Who is Calling: [facility]     Who Left The Message:  [facility]           What is the request in detail:   Please provide a Prior Authorization with added information for the medication pitavastatin calcium (LIVALO) 2 mg Tab tablet.   Thank you!             Preferred [facility] Call Back :   [phone] /  ref# [id number]

## 2022-05-30 ENCOUNTER — TELEPHONE (OUTPATIENT)
Dept: CARDIOLOGY | Facility: CLINIC | Age: 72
End: 2022-05-30
Payer: MEDICARE

## 2022-05-30 NOTE — TELEPHONE ENCOUNTER
Reached out to patient, to schedule a f/u appt. Patient has changed cardiologist because Dr. Ponce is not a participating provider with [facility].

## 2022-06-08 ENCOUNTER — INFUSION (OUTPATIENT)
Dept: INFUSION THERAPY | Facility: HOSPITAL | Age: 72
End: 2022-06-08
Attending: OBSTETRICS & GYNECOLOGY
Payer: MEDICARE

## 2022-06-08 ENCOUNTER — OFFICE VISIT (OUTPATIENT)
Dept: GYNECOLOGIC ONCOLOGY | Facility: CLINIC | Age: 72
End: 2022-06-08
Payer: MEDICARE

## 2022-06-08 DIAGNOSIS — C56.3 MALIGNANT NEOPLASM OF BOTH OVARIES: Primary | ICD-10-CM

## 2022-06-08 DIAGNOSIS — Z01.818 EXAMINATION PRIOR TO CHEMOTHERAPY: ICD-10-CM

## 2022-06-08 LAB
ALBUMIN SERPL BCP-MCNC: 3.9 G/DL (ref 3.5–5.2)
ALP SERPL-CCNC: 89 U/L (ref 55–135)
ALT SERPL W/O P-5'-P-CCNC: 10 U/L (ref 10–44)
ANION GAP SERPL CALC-SCNC: 9 MMOL/L (ref 8–16)
AST SERPL-CCNC: 14 U/L (ref 10–40)
BASOPHILS # BLD AUTO: 0.02 K/UL (ref 0–0.2)
BASOPHILS NFR BLD: 0.6 % (ref 0–1.9)
BILIRUB SERPL-MCNC: 0.2 MG/DL (ref 0.1–1)
BUN SERPL-MCNC: 17 MG/DL (ref 8–23)
CALCIUM SERPL-MCNC: 9.6 MG/DL (ref 8.7–10.5)
CANCER AG125 SERPL-ACNC: 14 U/ML (ref 0–30)
CHLORIDE SERPL-SCNC: 106 MMOL/L (ref 95–110)
CO2 SERPL-SCNC: 26 MMOL/L (ref 23–29)
CREAT SERPL-MCNC: 1.1 MG/DL (ref 0.5–1.4)
DIFFERENTIAL METHOD: ABNORMAL
EOSINOPHIL # BLD AUTO: 0.1 K/UL (ref 0–0.5)
EOSINOPHIL NFR BLD: 3.5 % (ref 0–8)
ERYTHROCYTE [DISTWIDTH] IN BLOOD BY AUTOMATED COUNT: 16.4 % (ref 11.5–14.5)
EST. GFR  (AFRICAN AMERICAN): 58.4 ML/MIN/1.73 M^2
EST. GFR  (NON AFRICAN AMERICAN): 50.6 ML/MIN/1.73 M^2
GLUCOSE SERPL-MCNC: 103 MG/DL (ref 70–110)
HCT VFR BLD AUTO: 30.5 % (ref 37–48.5)
HGB BLD-MCNC: 9.6 G/DL (ref 12–16)
IMM GRANULOCYTES # BLD AUTO: 0.01 K/UL (ref 0–0.04)
IMM GRANULOCYTES NFR BLD AUTO: 0.3 % (ref 0–0.5)
LYMPHOCYTES # BLD AUTO: 1.5 K/UL (ref 1–4.8)
LYMPHOCYTES NFR BLD: 46.2 % (ref 18–48)
MCH RBC QN AUTO: 31.9 PG (ref 27–31)
MCHC RBC AUTO-ENTMCNC: 31.5 G/DL (ref 32–36)
MCV RBC AUTO: 101 FL (ref 82–98)
MONOCYTES # BLD AUTO: 0.4 K/UL (ref 0.3–1)
MONOCYTES NFR BLD: 11.4 % (ref 4–15)
NEUTROPHILS # BLD AUTO: 1.2 K/UL (ref 1.8–7.7)
NEUTROPHILS NFR BLD: 38 % (ref 38–73)
NRBC BLD-RTO: 0 /100 WBC
PLATELET # BLD AUTO: 134 K/UL (ref 150–450)
PMV BLD AUTO: 9.3 FL (ref 9.2–12.9)
POTASSIUM SERPL-SCNC: 4.4 MMOL/L (ref 3.5–5.1)
PROT SERPL-MCNC: 7 G/DL (ref 6–8.4)
RBC # BLD AUTO: 3.01 M/UL (ref 4–5.4)
SODIUM SERPL-SCNC: 141 MMOL/L (ref 136–145)
WBC # BLD AUTO: 3.16 K/UL (ref 3.9–12.7)

## 2022-06-08 PROCEDURE — 25000003 PHARM REV CODE 250: Performed by: OBSTETRICS & GYNECOLOGY

## 2022-06-08 PROCEDURE — 36415 COLL VENOUS BLD VENIPUNCTURE: CPT | Performed by: OBSTETRICS & GYNECOLOGY

## 2022-06-08 PROCEDURE — 80053 COMPREHEN METABOLIC PANEL: CPT | Performed by: OBSTETRICS & GYNECOLOGY

## 2022-06-08 PROCEDURE — 85025 COMPLETE CBC W/AUTO DIFF WBC: CPT | Performed by: OBSTETRICS & GYNECOLOGY

## 2022-06-08 PROCEDURE — 63600175 PHARM REV CODE 636 W HCPCS: Performed by: OBSTETRICS & GYNECOLOGY

## 2022-06-08 PROCEDURE — 99442 PR PHYSICIAN TELEPHONE EVALUATION 11-20 MIN: CPT | Mod: 95,,, | Performed by: OBSTETRICS & GYNECOLOGY

## 2022-06-08 PROCEDURE — 86304 IMMUNOASSAY TUMOR CA 125: CPT | Performed by: OBSTETRICS & GYNECOLOGY

## 2022-06-08 PROCEDURE — 4010F PR ACE/ARB THEARPY RXD/TAKEN: ICD-10-PCS | Mod: CPTII,95,, | Performed by: OBSTETRICS & GYNECOLOGY

## 2022-06-08 PROCEDURE — 4010F ACE/ARB THERAPY RXD/TAKEN: CPT | Mod: CPTII,95,, | Performed by: OBSTETRICS & GYNECOLOGY

## 2022-06-08 PROCEDURE — 36591 DRAW BLOOD OFF VENOUS DEVICE: CPT

## 2022-06-08 PROCEDURE — A4216 STERILE WATER/SALINE, 10 ML: HCPCS | Performed by: OBSTETRICS & GYNECOLOGY

## 2022-06-08 PROCEDURE — 99442 PR PHYSICIAN TELEPHONE EVALUATION 11-20 MIN: ICD-10-PCS | Mod: 95,,, | Performed by: OBSTETRICS & GYNECOLOGY

## 2022-06-08 RX ORDER — SODIUM CHLORIDE 0.9 % (FLUSH) 0.9 %
10 SYRINGE (ML) INJECTION
Status: DISCONTINUED | OUTPATIENT
Start: 2022-06-08 | End: 2022-06-08 | Stop reason: HOSPADM

## 2022-06-08 RX ORDER — HEPARIN 100 UNIT/ML
500 SYRINGE INTRAVENOUS
Status: DISCONTINUED | OUTPATIENT
Start: 2022-06-08 | End: 2022-06-08 | Stop reason: HOSPADM

## 2022-06-08 RX ORDER — SODIUM CHLORIDE 0.9 % (FLUSH) 0.9 %
10 SYRINGE (ML) INJECTION
Status: CANCELLED | OUTPATIENT
Start: 2022-06-08

## 2022-06-08 RX ORDER — HEPARIN 100 UNIT/ML
500 SYRINGE INTRAVENOUS
Status: CANCELLED | OUTPATIENT
Start: 2022-06-08

## 2022-06-08 RX ADMIN — HEPARIN 500 UNITS: 100 SYRINGE at 10:06

## 2022-06-08 RX ADMIN — SODIUM CHLORIDE, PRESERVATIVE FREE 10 ML: 5 INJECTION INTRAVENOUS at 10:06

## 2022-06-08 NOTE — PROGRESS NOTES
Established Patient - Audio Only Telehealth Visit     The patient location is: home  The chief complaint leading to consultation is: prechemo visit  Visit type: Virtual visit with audio only (telephone)  Total time spent with patient: 15 minutes       The reason for the audio only service rather than synchronous audio and video virtual visit was related to technical difficulties or patient preference/necessity.     Each patient to whom I provide medical services by telemedicine is:  (1) informed of the relationship between the physician and patient and the respective role of any other health care provider with respect to management of the patient; and (2) notified that they may decline to receive medical services by telemedicine and may withdraw from such care at any time. Patient verbally consented to receive this service via voice-only telephone call.    REFERRING PROVIDER  Francisco Foster     INTERVAL HISTORY  CC: Recurrent IIIC Ovarian Cancer Treatment Planning / Prechemo #5  Visit with her and her daughter Sue Gilmore is a 71 y.o. with recurrent Stage IIIC HGSOC.  She is s/p cycle #5 of Carboplatin and Doxil on 5/12/2022.  Cycle #2 was delayed for low ANC (and [place]).     She decided after cycle #3 to take a treatment break.  She went to [place] and started Mistletoe therapy in April.     Her abdominal pain has resolved.  She has no vaginal bleeding or discharge.  She is having no nausea or vomiting.  She has no bowel or bladder complaints.  Takes colace once a day.        HISTORY OF PRESENT CONDITION  (As summarized in Dr. Foster's notes)  March 2020 patient began to notice some lower abdominal discomfort.  This was at the height of COVID-19 pandemic.  She was referred to a urologist who did a tele health visit.  An ultrasound in May 2020 showed a complex cyst in the middle pole of the kidney but a pelvic cyst as well.  The patient then returned to see her gynecologist Dr. Marily Alston in [facility]  [place].  An ultrasound showed a complex multi-cystic mass posterior to the uterus; left adnexa 57 x 36 mm cyst and a 30 x 23 x 23 cm complex cyst; right adnexa with a 37 x 31 cm cyst; endometrium not clearly seen.  At that time she was having difficulty with bowel movements.     5/8/202 : 173     6/2/2020 CT AP:  Bilateral solid and cystic adnexal mass lesions (right 6.7 cm and left 6.3 cm) suspicious for primary ovarian malignancy along with innumerable bilateral lower lobe pulmonary nodules most consistent with metastatic disease.  Subcentimeter soft tissue nodular densities within the omentum as above. These are indeterminate on this exam, however peritoneal metastatic implants cannot be excluded.      6/22/2020  Exploratory laparotomy, modified radical hysterectomy with radical resection of bilateral ovarian masses with optimal tumor reductive surgery including resection of pelvic disease, excision of numerous tumor implants, infracolic omentectomy with resection of a 2 cm omental nodule, bilateral pelvic and periaortic lymph node dissection and appendectomy.   (Dr. Мария Ratliff)  FINDINGS:  optimally resected to R0. The upper abdominal disease include omental nodule, peritoneal implants, bowel mesentery implants and right diaphragm implants, which were all excised.  PATHOLOGY:high grade serous ovarian ca bilaterally, numerous implants (omental, peritoneal, diaphragm, SB mesentery), negative nodes, positive washings. HER2 negative; MSI stable     7/1/2020 :  114     7/6/2020 CT Chest:  Bilateral pulmonary nodules as detailed most consistent with metastatic disease.  Peritoneal soft tissue nodularity in the left upper quadrant unchanged from the prior exam and again consistent with metastatic disease.  Bibasilar atelectasis.     7/16/2020 Cycle #1 Carbo / Taxol (transferred to [facility] -details in Dr. Foster's prior notes)  8/14/2020 Cycle #2 Carbo / Taxol (8/5/2020 :  25)  9/3/2020 Cycle #3 Carbo /  Taxol (9/2/2020 : 12)  9/24/2020 Cycle #4 Carbo / Taxol (9/23/202 : 10)  10/16/2020 Cycle #5 Carbo / Taxol   11/5/2020  Cycle #6 Carbo / Taxol     11/24/2020 : 11  1/26/2021 : 12  5/10/2021 : 11  6/8/2021 :  11     8/31/2021 CT CAP:  there are innumerable nodules in the lungs bilaterally, concerning for metastatic disease.  Left kidney cyst.  Additional findings as above.     9/13/2021  :  55    10/13/2021 :  152     10/13/2021 CT CAP: No local recurrence identified.  Multiple pulmonary nodules/opacities without significant interval change.  Random nodules could indicate metastatic disease but are nonspecific.  Several of the larger opacities have configuration suggestive of endobronchial material in dilated bronchi from airway infection/inflammation.  Also, clustered micro nodules are consistent with small airway infection/inflammation.  A 1.2 cm hypodense lesion in left lobe of liver, unchanged and nonspecific.     11/24/2020 CT CAP:  there is no evidence of local disease recurrence.  There are multiple pulmonary nodules and irregular opacities scattered throughout both lungs, unchanged in appearance from prior exam 08/31/2020.  While some solid nodules may reflect metastatic disease, majority of the pulmonary opacities demonstrate tubular and/or branching configurations, which can be seen with mucoid impaction, and tree-in-bud configuration, suggesting small airways infection/inflammation (for example MAC infection, given the pattern).  Stable indeterminate 1.2 cm hepatic lesion in the caudate lobe.     12/9/2020  PET/CT:  In the abdomen and pelvis, there is physiologic tracer distribution within the abdominal organs.  There is mildly hypermetabolic solid nodules in both lungs equivocal for metastases.  Given indications of old granulomatous disease and clustered distribution of nodules in the anterior right upper and right lower lobes, noncalcified granulomas are a  differential consideration and not mutually exclusive.  Tissue sampling would be required for definitive diagnosis, and the largest nodules in the lower lobes may be amenable to percutaneous biopsy.  Additional mildly FDG avid regions of ground-glass attenuation are identified in the right lung, possibly reflecting small airways infection or inflammation with additional metastatic foci not definitively excluded.      10/22/2021 CT CAP:  interval development of multiple hypodensities scattered along the periphery of the liver concerning for peritoneal implants.  Stable pulmonary nodules and irregular opacities scattered throughout both lungs.  While some nodules may reflect metastatic disease, majority demonstrate a tubular and branching pattern suggesting mucoid impaction in a pattern consistent with MAC infection.  Incisional hernia at the midline of the lower abdomen containing a few loops of nonobstructed small bowel.     11/17/2021:  Cycle #1 Carbo/ Doxil  ( 11/5/2021 = 337) (Udenyca added for next)  12/29/2021:  Cycle #2 Carbo / Doxil ( 12/13/2021 = 105) (delayed for low ANC and [place]  1/26/2022: Cycle #3 Carbo / Doxil ( 1/24/2021 = 33)     2/15/2022 CT CAP:  Decreased size of the previously described hepatic lesions/peritoneal implants in this patient with history of ovarian cancer and known metastases, consistent with response to treatment.  No definite new liver lesions.  Stable, 4.5 cm low-density lesion along the periphery of the posterior right hepatic lobe, concerning for peritoneal implant.  Stable pulmonary nodules and irregular opacities, majority of which are favored to represent a pattern consistent with mucoid impaction or MAC infection.  No definite new nodules allowing for widespread disease.  Small incisional hernia containing a few loops of nonobstructed bowel with the hernia sac slightly increased in size from prior exam.     3/28/2022 Mistletoe Therapy Started  M-W-F     4/6/2022:  Cycle #4 Carbo/Doxil ( 4/4/2022 = 60)  5/12/2022:  Cycle #5 Carbo/Doxil (delayed for low ANC and platelets;  5/2/2022 = 16)     REVIEW OF SYSTEMS  Review of Systems   Constitutional: Positive for fatigue. Negative for appetite change, chills, fever and unexpected weight change.   HENT:   Negative for lump/mass and mouth sores.    Respiratory: Negative for chest tightness, cough and shortness of breath.    Cardiovascular: Negative for chest pain, leg swelling and palpitations.   Gastrointestinal: Negative for abdominal distention, abdominal pain, blood in stool, constipation, diarrhea, nausea and vomiting.   Genitourinary: Negative for dysuria, frequency, vaginal bleeding and vaginal discharge.    Musculoskeletal: Negative for arthralgias and myalgias.   Skin: Negative  Neurological: Negative for dizziness, light-headedness and numbness.   Hematological: Negative for adenopathy.   Psychiatric/Behavioral: Negative for decreased concentration, depression and sleep disturbance. The patient is not nervous/anxious.       LABORATORY DATA  Lab data reviewed.   14;  ANC 1.2 (6/8/2022)       RADIOLOGICAL DATA  Radiology data reviewed.     PATHOLOGY DATA  Pathology data reviewed.     ASSESSMENT    1. Malignant neoplasm of both ovaries    2. Examination prior to chemotherapy    Doing well with Carbo Doxil with normalization of .  ANC too low for chemo today so will delay 1 week.  We discussed the following options after this next cycle of chemo:  1.  Repeat PET/CT and decide on whether to do more chemo or not  2.  Start PARP inhibitor  3.  Stop active treatment and resume surveillance with  in a month with consideration of every 3 month visits and  at that time.       PLAN  1.  Hold chemo x 1 week.  Consider another dose reduction.  2.  After cycle 6, repeat  in 1 month and then likely restart active surveillance.         Ghassan Treviño MD        This service was not  originating from a related E/M service provided within the previous 7 days nor will  to an E/M service or procedure within the next 24 hours or my soonest available appointment.  Prevailing standard of care was able to be met in this audio-only visit.

## 2022-06-13 ENCOUNTER — PATIENT MESSAGE (OUTPATIENT)
Dept: GYNECOLOGIC ONCOLOGY | Facility: CLINIC | Age: 72
End: 2022-06-13
Payer: MEDICARE

## 2022-06-13 DIAGNOSIS — C56.3 MALIGNANT NEOPLASM OF BOTH OVARIES: Primary | ICD-10-CM

## 2022-06-13 DIAGNOSIS — C56.3 MALIGNANT NEOPLASM OF BOTH OVARIES: ICD-10-CM

## 2022-06-13 RX ORDER — LIDOCAINE AND PRILOCAINE 25; 25 MG/G; MG/G
CREAM TOPICAL
Qty: 30 G | Refills: 0 | Status: ON HOLD | OUTPATIENT
Start: 2022-06-13 | End: 2023-04-12 | Stop reason: CLARIF

## 2022-06-14 ENCOUNTER — PATIENT MESSAGE (OUTPATIENT)
Dept: GYNECOLOGIC ONCOLOGY | Facility: CLINIC | Age: 72
End: 2022-06-14
Payer: MEDICARE

## 2022-06-14 ENCOUNTER — INFUSION (OUTPATIENT)
Dept: INFUSION THERAPY | Facility: HOSPITAL | Age: 72
End: 2022-06-14
Attending: OBSTETRICS & GYNECOLOGY
Payer: MEDICARE

## 2022-06-14 DIAGNOSIS — C56.3 MALIGNANT NEOPLASM OF BOTH OVARIES: Primary | ICD-10-CM

## 2022-06-14 LAB
ALBUMIN SERPL BCP-MCNC: 3.8 G/DL (ref 3.5–5.2)
ALP SERPL-CCNC: 96 U/L (ref 55–135)
ALT SERPL W/O P-5'-P-CCNC: 9 U/L (ref 10–44)
ANION GAP SERPL CALC-SCNC: 9 MMOL/L (ref 8–16)
AST SERPL-CCNC: 14 U/L (ref 10–40)
BASOPHILS # BLD AUTO: 0.02 K/UL (ref 0–0.2)
BASOPHILS NFR BLD: 0.6 % (ref 0–1.9)
BILIRUB SERPL-MCNC: 0.3 MG/DL (ref 0.1–1)
BUN SERPL-MCNC: 13 MG/DL (ref 8–23)
CALCIUM SERPL-MCNC: 9.6 MG/DL (ref 8.7–10.5)
CANCER AG125 SERPL-ACNC: 15 U/ML (ref 0–30)
CHLORIDE SERPL-SCNC: 104 MMOL/L (ref 95–110)
CO2 SERPL-SCNC: 26 MMOL/L (ref 23–29)
CREAT SERPL-MCNC: 0.9 MG/DL (ref 0.5–1.4)
DIFFERENTIAL METHOD: ABNORMAL
EOSINOPHIL # BLD AUTO: 0 K/UL (ref 0–0.5)
EOSINOPHIL NFR BLD: 0.9 % (ref 0–8)
ERYTHROCYTE [DISTWIDTH] IN BLOOD BY AUTOMATED COUNT: 16.3 % (ref 11.5–14.5)
EST. GFR  (AFRICAN AMERICAN): >60 ML/MIN/1.73 M^2
EST. GFR  (NON AFRICAN AMERICAN): >60 ML/MIN/1.73 M^2
GLUCOSE SERPL-MCNC: 111 MG/DL (ref 70–110)
HCT VFR BLD AUTO: 29.5 % (ref 37–48.5)
HGB BLD-MCNC: 9.2 G/DL (ref 12–16)
IMM GRANULOCYTES # BLD AUTO: 0 K/UL (ref 0–0.04)
IMM GRANULOCYTES NFR BLD AUTO: 0 % (ref 0–0.5)
LYMPHOCYTES # BLD AUTO: 1 K/UL (ref 1–4.8)
LYMPHOCYTES NFR BLD: 30.3 % (ref 18–48)
MCH RBC QN AUTO: 31.8 PG (ref 27–31)
MCHC RBC AUTO-ENTMCNC: 31.2 G/DL (ref 32–36)
MCV RBC AUTO: 102 FL (ref 82–98)
MONOCYTES # BLD AUTO: 0.3 K/UL (ref 0.3–1)
MONOCYTES NFR BLD: 9.6 % (ref 4–15)
NEUTROPHILS # BLD AUTO: 2 K/UL (ref 1.8–7.7)
NEUTROPHILS NFR BLD: 58.6 % (ref 38–73)
NRBC BLD-RTO: 0 /100 WBC
PLATELET # BLD AUTO: 171 K/UL (ref 150–450)
PMV BLD AUTO: 8.9 FL (ref 9.2–12.9)
POTASSIUM SERPL-SCNC: 4.3 MMOL/L (ref 3.5–5.1)
PROT SERPL-MCNC: 7 G/DL (ref 6–8.4)
RBC # BLD AUTO: 2.89 M/UL (ref 4–5.4)
SODIUM SERPL-SCNC: 139 MMOL/L (ref 136–145)
WBC # BLD AUTO: 3.33 K/UL (ref 3.9–12.7)

## 2022-06-14 PROCEDURE — 80053 COMPREHEN METABOLIC PANEL: CPT | Performed by: OBSTETRICS & GYNECOLOGY

## 2022-06-14 PROCEDURE — 36591 DRAW BLOOD OFF VENOUS DEVICE: CPT

## 2022-06-14 PROCEDURE — A4216 STERILE WATER/SALINE, 10 ML: HCPCS | Performed by: OBSTETRICS & GYNECOLOGY

## 2022-06-14 PROCEDURE — 63600175 PHARM REV CODE 636 W HCPCS: Performed by: OBSTETRICS & GYNECOLOGY

## 2022-06-14 PROCEDURE — 36415 COLL VENOUS BLD VENIPUNCTURE: CPT | Performed by: OBSTETRICS & GYNECOLOGY

## 2022-06-14 PROCEDURE — 86304 IMMUNOASSAY TUMOR CA 125: CPT | Performed by: OBSTETRICS & GYNECOLOGY

## 2022-06-14 PROCEDURE — 85025 COMPLETE CBC W/AUTO DIFF WBC: CPT | Performed by: OBSTETRICS & GYNECOLOGY

## 2022-06-14 PROCEDURE — 25000003 PHARM REV CODE 250: Performed by: OBSTETRICS & GYNECOLOGY

## 2022-06-14 RX ORDER — SODIUM CHLORIDE 0.9 % (FLUSH) 0.9 %
10 SYRINGE (ML) INJECTION
Status: DISCONTINUED | OUTPATIENT
Start: 2022-06-14 | End: 2022-06-14 | Stop reason: HOSPADM

## 2022-06-14 RX ORDER — HYDROCODONE BITARTRATE AND ACETAMINOPHEN 5; 325 MG/1; MG/1
1 TABLET ORAL EVERY 6 HOURS PRN
Qty: 60 TABLET | Refills: 0 | Status: SHIPPED | OUTPATIENT
Start: 2022-06-14 | End: 2022-12-16

## 2022-06-14 RX ORDER — DIPHENHYDRAMINE HYDROCHLORIDE 50 MG/ML
50 INJECTION INTRAMUSCULAR; INTRAVENOUS ONCE AS NEEDED
Status: CANCELLED | OUTPATIENT
Start: 2022-06-14

## 2022-06-14 RX ORDER — SODIUM CHLORIDE 0.9 % (FLUSH) 0.9 %
10 SYRINGE (ML) INJECTION
Status: CANCELLED | OUTPATIENT
Start: 2022-06-14

## 2022-06-14 RX ORDER — HEPARIN 100 UNIT/ML
500 SYRINGE INTRAVENOUS
Status: DISCONTINUED | OUTPATIENT
Start: 2022-06-14 | End: 2022-06-14 | Stop reason: HOSPADM

## 2022-06-14 RX ORDER — EPINEPHRINE 0.3 MG/.3ML
0.3 INJECTION SUBCUTANEOUS ONCE AS NEEDED
Status: CANCELLED | OUTPATIENT
Start: 2022-06-14

## 2022-06-14 RX ORDER — HEPARIN 100 UNIT/ML
500 SYRINGE INTRAVENOUS
Status: CANCELLED | OUTPATIENT
Start: 2022-06-14

## 2022-06-14 RX ADMIN — HEPARIN 500 UNITS: 100 SYRINGE at 01:06

## 2022-06-14 RX ADMIN — Medication 10 ML: at 01:06

## 2022-06-15 ENCOUNTER — INFUSION (OUTPATIENT)
Dept: INFUSION THERAPY | Facility: HOSPITAL | Age: 72
End: 2022-06-15
Payer: MEDICARE

## 2022-06-15 VITALS
TEMPERATURE: 98 F | BODY MASS INDEX: 30.41 KG/M2 | HEART RATE: 87 BPM | RESPIRATION RATE: 18 BRPM | WEIGHT: 178.13 LBS | SYSTOLIC BLOOD PRESSURE: 138 MMHG | DIASTOLIC BLOOD PRESSURE: 66 MMHG | HEIGHT: 64 IN

## 2022-06-15 DIAGNOSIS — C56.3 MALIGNANT NEOPLASM OF BOTH OVARIES: Primary | ICD-10-CM

## 2022-06-15 PROCEDURE — 96367 TX/PROPH/DG ADDL SEQ IV INF: CPT

## 2022-06-15 PROCEDURE — 25000003 PHARM REV CODE 250: Performed by: OBSTETRICS & GYNECOLOGY

## 2022-06-15 PROCEDURE — A4216 STERILE WATER/SALINE, 10 ML: HCPCS | Performed by: OBSTETRICS & GYNECOLOGY

## 2022-06-15 PROCEDURE — 96413 CHEMO IV INFUSION 1 HR: CPT

## 2022-06-15 PROCEDURE — 63600175 PHARM REV CODE 636 W HCPCS: Mod: JG | Performed by: OBSTETRICS & GYNECOLOGY

## 2022-06-15 PROCEDURE — 96417 CHEMO IV INFUS EACH ADDL SEQ: CPT

## 2022-06-15 RX ORDER — EPINEPHRINE 0.3 MG/.3ML
0.3 INJECTION SUBCUTANEOUS ONCE AS NEEDED
Status: DISCONTINUED | OUTPATIENT
Start: 2022-06-15 | End: 2022-06-15 | Stop reason: HOSPADM

## 2022-06-15 RX ORDER — SODIUM CHLORIDE 0.9 % (FLUSH) 0.9 %
10 SYRINGE (ML) INJECTION
Status: DISCONTINUED | OUTPATIENT
Start: 2022-06-15 | End: 2022-06-15 | Stop reason: HOSPADM

## 2022-06-15 RX ORDER — HEPARIN 100 UNIT/ML
500 SYRINGE INTRAVENOUS
Status: DISCONTINUED | OUTPATIENT
Start: 2022-06-15 | End: 2022-06-15 | Stop reason: HOSPADM

## 2022-06-15 RX ORDER — DIPHENHYDRAMINE HYDROCHLORIDE 50 MG/ML
50 INJECTION INTRAMUSCULAR; INTRAVENOUS ONCE AS NEEDED
Status: DISCONTINUED | OUTPATIENT
Start: 2022-06-15 | End: 2022-06-15 | Stop reason: HOSPADM

## 2022-06-15 RX ADMIN — SODIUM CHLORIDE: 9 INJECTION, SOLUTION INTRAVENOUS at 08:06

## 2022-06-15 RX ADMIN — Medication 10 ML: at 12:06

## 2022-06-15 RX ADMIN — HEPARIN 500 UNITS: 100 SYRINGE at 12:06

## 2022-06-15 RX ADMIN — DOXORUBICIN HYDROCHLORIDE 50 MG: 2 INJECTABLE, LIPOSOMAL INTRAVENOUS at 10:06

## 2022-06-15 RX ADMIN — DEXAMETHASONE SODIUM PHOSPHATE 0.25 MG: 4 INJECTION, SOLUTION INTRA-ARTICULAR; INTRALESIONAL; INTRAMUSCULAR; INTRAVENOUS; SOFT TISSUE at 09:06

## 2022-06-15 RX ADMIN — CARBOPLATIN 370 MG: 10 INJECTION INTRAVENOUS at 11:06

## 2022-06-16 ENCOUNTER — INFUSION (OUTPATIENT)
Dept: INFUSION THERAPY | Facility: HOSPITAL | Age: 72
End: 2022-06-16
Attending: OBSTETRICS & GYNECOLOGY
Payer: MEDICARE

## 2022-06-16 DIAGNOSIS — C56.3 MALIGNANT NEOPLASM OF BOTH OVARIES: Primary | ICD-10-CM

## 2022-06-16 PROCEDURE — 96372 THER/PROPH/DIAG INJ SC/IM: CPT

## 2022-06-16 PROCEDURE — 63600175 PHARM REV CODE 636 W HCPCS: Mod: TB | Performed by: OBSTETRICS & GYNECOLOGY

## 2022-06-16 RX ADMIN — PEGFILGRASTIM-CBQV 6 MG: 6 INJECTION, SOLUTION SUBCUTANEOUS at 01:06

## 2022-06-16 NOTE — NURSING
Pt here for Udenyca injection.  No new events overnight.  Administered injection to back of right arm.  No questions or concerns.  Pt ambulated out of unit unassisted.

## 2022-06-17 ENCOUNTER — PATIENT MESSAGE (OUTPATIENT)
Dept: GYNECOLOGIC ONCOLOGY | Facility: CLINIC | Age: 72
End: 2022-06-17
Payer: MEDICARE

## 2022-07-01 ENCOUNTER — OFFICE VISIT (OUTPATIENT)
Dept: PSYCHIATRY | Facility: CLINIC | Age: 72
End: 2022-07-01
Payer: MEDICARE

## 2022-07-01 ENCOUNTER — PATIENT MESSAGE (OUTPATIENT)
Dept: PSYCHIATRY | Facility: CLINIC | Age: 72
End: 2022-07-01
Payer: MEDICARE

## 2022-07-01 DIAGNOSIS — F41.1 GENERALIZED ANXIETY DISORDER: ICD-10-CM

## 2022-07-01 DIAGNOSIS — F43.10 PTSD (POST-TRAUMATIC STRESS DISORDER): Primary | ICD-10-CM

## 2022-07-01 DIAGNOSIS — C56.3 MALIGNANT NEOPLASM OF BOTH OVARIES: ICD-10-CM

## 2022-07-01 DIAGNOSIS — F32.A DEPRESSION, UNSPECIFIED DEPRESSION TYPE: ICD-10-CM

## 2022-07-01 PROCEDURE — 1159F MED LIST DOCD IN RCRD: CPT | Mod: CPTII,S$GLB,, | Performed by: PSYCHOLOGIST

## 2022-07-01 PROCEDURE — 4010F PR ACE/ARB THEARPY RXD/TAKEN: ICD-10-PCS | Mod: CPTII,S$GLB,, | Performed by: PSYCHOLOGIST

## 2022-07-01 PROCEDURE — 1159F PR MEDICATION LIST DOCUMENTED IN MEDICAL RECORD: ICD-10-PCS | Mod: CPTII,S$GLB,, | Performed by: PSYCHOLOGIST

## 2022-07-01 PROCEDURE — 90834 PSYTX W PT 45 MINUTES: CPT | Mod: S$GLB,,, | Performed by: PSYCHOLOGIST

## 2022-07-01 PROCEDURE — 99999 PR PBB SHADOW E&M-EST. PATIENT-LVL II: ICD-10-PCS | Mod: PBBFAC,,, | Performed by: PSYCHOLOGIST

## 2022-07-01 PROCEDURE — 99999 PR PBB SHADOW E&M-EST. PATIENT-LVL II: CPT | Mod: PBBFAC,,, | Performed by: PSYCHOLOGIST

## 2022-07-01 PROCEDURE — 4010F ACE/ARB THERAPY RXD/TAKEN: CPT | Mod: CPTII,S$GLB,, | Performed by: PSYCHOLOGIST

## 2022-07-01 PROCEDURE — 90834 PR PSYCHOTHERAPY W/PATIENT, 45 MIN: ICD-10-PCS | Mod: S$GLB,,, | Performed by: PSYCHOLOGIST

## 2022-07-01 NOTE — PROGRESS NOTES
INFORMED CONSENT: Liat Gilmore   is known to this provider and identity was confirmed via NAME and .  The patient has been informed of the risks and benefits associated with engaging in psychotherapy, the handling of protected health information, the rights of privacy and the limits of confidentiality. The patient has also been informed of the importance of reporting any suicidal or homicidal ideation to this or any provider to ensure safety of all parties, and the Liat Gilmore expressed understanding. The patient was agreeable to these terms and freely participates in individual psychotherapy.    PSYCHO-ONCOLOGY NOTE/ Individual Psychotherapy     Date: 2022   [facility]        Therapeutic Intervention: Met with patient.  Outpatient - Behavior modifying psychotherapy 45 min - CPT code 16704      Patient was last seen by me on 2022    Problem list  Patient Active Problem List   Diagnosis    Supraventricular tachycardia    Hypertension    Hypercholesterolemia    Mild obesity    Fibromyalgia    Cough variant asthma    Malignant neoplasm of both ovaries    Generalized anxiety disorder    Lung mass    Reactive depression    Multiple lung nodules on CT    Herpes simplex vulvovaginitis    Elevated cancer antigen 125 (CA-125)       Chief complaint/reason for encounter: depression and anxiety   Met with patient to evaluate psychosocial adaptation to diagnosis/treatment/survivorship of Ovarian Cancer    Current Medications  Current Outpatient Medications   Medication    acyclovir (ZOVIRAX) 400 MG tablet    albuterol 90 mcg/actuation inhaler    ALPRAZolam (XANAX) 0.25 MG tablet    ascorbic acid, vitamin C, (VITAMIN C) 500 MG tablet    b complex vitamins tablet    BREO ELLIPTA 200-25 mcg/dose DsDv diskus inhaler    cyanocobalamin/cobamamide (B12 SL)    cycloSPORINE (RESTASIS) 0.05 % ophthalmic emulsion    diclofenac sodium (VOLTAREN) 1 % Gel    diltiaZEM (TIAZAC) 240 MG Cs24     "docosahexaenoic acid/epa (FISH OIL ORAL)    docusate sodium (COLACE ORAL)    EASY TOUCH INSULIN SYRINGE 1 mL 30 gauge x 1/2" Syrg    ergocalciferol (ERGOCALCIFEROL) 50,000 unit Cap     MISTLETOE SUBQ    fluticasone (FLONASE) 50 mcg/actuation nasal spray    HYDROcodone-acetaminophen (NORCO) 5-325 mg per tablet    LIDOcaine-prilocaine (EMLA) cream    losartan (COZAAR) 100 MG tablet    multivitamin capsule    ondansetron (ZOFRAN) 8 MG tablet    paroxetine (PAXIL) 10 MG tablet    pitavastatin calcium (LIVALO) 2 mg Tab tablet    promethazine (PHENERGAN) 6.25 mg/5 mL syrup    promethazine-codeine 6.25-10 mg/5 ml (PHENERGAN WITH CODEINE) 6.25-10 mg/5 mL syrup    senna (SENOKOT) 8.6 mg tablet    vitamin A 39298 UNIT capsule    zafirlukast (ACCOLATE) 20 MG tablet     No current facility-administered medications for this visit.     Facility-Administered Medications Ordered in Other Visits   Medication Frequency    heparin, porcine (PF) 100 unit/mL injection flush 500 Units PRN    sodium chloride 0.9% flush 10 mL PRN       Objective:  Liat Gilmore arrived promptly for the session.   Ms. Gilmore was independently ambulatory at the time of session. The patient was fully cooperative throughout the session.  Appearance: age appropriate, appropriately  dressed, adequately  groomed  Behavior/Cooperation: friendly and cooperative  Speech: normal in rate, volume, and tone and appropriate quality, quantity and organization of sentences  Mood: anxious  Affect: mood congruent  Thought Process: goal-directed, logical  Thought Content: normal,  No delusions or paranoia; did not appear to be responding to internal stimuli during the session  Orientation: grossly intact  Memory: Grossly intact  Attention Span/Concentration: Attends to session without distraction; reports no difficulty  Fund of Knowledge: average  Estimate of Intelligence: average from verbal skills and history  Cognition: grossly intact  Insight: " patient has awareness of illness; good insight into own behavior and behavior of others  Judgment: the patient's behavior is adequate to circumstances    Interval history and content of current session: patient detaield continued reaction to trauma from 2 years prior. Patient interested in trauma-focused treatment.  Discussed diagnosis, treatment, prognosis, current adaptation to disease and treatment status and family's adaptation to disease and treatment status. Reports to be coping with great difficulty. Evaluated cognitive response, paying particular attention to negative intrusive thoughts of a persistent and detrimental nature. Thoughts of this type are in evidence with severe distress. Provided cognitive behavioral therapy to address negative cognitions. Identified and evaluated psychosocial and environmental stressors secondary to diagnosis and treatment.  Examined proactive behaviors that may be implemented to minimize or ameliorate psychosocial stressors secondary to diagnosis and treatment.     Risk parameters:   Patient reports no suicidal ideation  Patient reports no homicidal ideation  Patient reports no self-injurious behavior  Patient reports no violent behavior   Safety needs:  None at this time      Verbal deficits: None     Patient's response to intervention:The patient's response to intervention is accepting.     Progress toward goals and other mental status changes:  The patient's progress toward goals is fair .      Progress to date:Revise Objectives (Goals) and Revise Interventions      Goals from last visit: n/a     Patient reported outcomes:    Distress Thermometer:   Distress Score    Distress Score: 8        Practical Problems Physical Problems                                                   Family Problems                                         Emotional Problems                                                         Spiritual/Religions Concerns     Spritual / Yazidism Concerns: No          Other Problems               PHQ ANSWERS    Q1. Little interest or pleasure in doing things: (P) More than half the days (07/01/22 1427)  Q2. Feeling down, depressed, or hopeless: (P) Several days (07/01/22 1427)  Q3. Trouble falling or staying asleep, or sleeping too much: (P) Nearly every day (07/01/22 1427)  Q4. Feeling tired or having little energy: (P) Nearly every day (07/01/22 1427)  Q5. Poor appetite or overeating: (P) Not at all (07/01/22 1427)  Q6. Feeling bad about yourself - or that you are a failure or have let yourself or your family down: (P) Not at all (07/01/22 1427)  Q7. Trouble concentrating on things, such as reading the newspaper or watching television: (P) Not at all (07/01/22 1427)  Q8. Moving or speaking so slowly that other people could have noticed. Or the opposite - being so fidgety or restless that you have been moving around a lot more than usual: (P) Not at all (07/01/22 1427)  Q9.  No SI    PHQ8 Score : (P) 9 (07/01/22 1427)  PHQ-9 Total Score: (P) 9 (07/01/22 1427)     CLOVER-7 Answers    GAD7 7/1/2022   1. Feeling nervous, anxious, or on edge? 3   2. Not being able to stop or control worrying? 3   3. Worrying too much about different things? 2   4. Trouble relaxing? 2   5. Being so restless that it is hard to sit still? 0   6. Becoming easily annoyed or irritable? 0   7. Feeling afraid as if something awful might happen? 0   CLOVER-7 Score 10     CLOVER-7 Score: (P) 10  Interpretation: (P) Moderate Anxiety     Client Strengths: verbal, intelligent, successful, good social support, good insight, commitment to wellness, strong suha, strong cultural traditions     Diagnosis:     ICD-10-CM ICD-9-CM   1. PTSD (post-traumatic stress disorder)  F43.10 309.81   2. Generalized anxiety disorder  F41.1 300.02   3. Malignant neoplasm of both ovaries  C56.3 183.0   4. Depression, unspecified depression type  F32.A 311     Treatment Plan:individual psychotherapy and medication management by  physician  · Target symptoms: depression, anxiety   · Why chosen therapy is appropriate versus another modality: relevant to diagnosis, patient responds to this modality, evidence based practice  · Outcome monitoring methods: self-report, observation, checklist/rating scale  · Therapeutic intervention type: behavior modifying psychotherapy  · Prognosis: Good      Behavioral goals:    Exercise:   Stress management: [facility] referral sent to gen psych   Social engagement:   Nutrition:   Smoking Cessation:   Therapy:  Increase daily self-care and attention to health management  Pleasant events scheduling and increased social interaction  Monitor stressors in writing and bring to next visit    Return to clinic: 3 weeks       Length of Service (minutes direct face-to-face contact): 45    Neetu Valdez, PhD  Clinical Psychologist  [place] License #[id number]  [place] License #[id number]

## 2022-07-08 ENCOUNTER — PATIENT MESSAGE (OUTPATIENT)
Dept: GYNECOLOGIC ONCOLOGY | Facility: CLINIC | Age: 72
End: 2022-07-08
Payer: MEDICARE

## 2022-07-14 ENCOUNTER — PATIENT MESSAGE (OUTPATIENT)
Dept: PSYCHIATRY | Facility: CLINIC | Age: 72
End: 2022-07-14
Payer: MEDICARE

## 2022-07-20 ENCOUNTER — PATIENT MESSAGE (OUTPATIENT)
Dept: PSYCHIATRY | Facility: CLINIC | Age: 72
End: 2022-07-20
Payer: MEDICARE

## 2022-07-20 ENCOUNTER — OFFICE VISIT (OUTPATIENT)
Dept: PSYCHIATRY | Facility: CLINIC | Age: 72
End: 2022-07-20
Payer: MEDICARE

## 2022-07-20 DIAGNOSIS — F43.10 PTSD (POST-TRAUMATIC STRESS DISORDER): Primary | ICD-10-CM

## 2022-07-20 DIAGNOSIS — F41.1 GENERALIZED ANXIETY DISORDER: ICD-10-CM

## 2022-07-20 PROCEDURE — 4010F PR ACE/ARB THEARPY RXD/TAKEN: ICD-10-PCS | Mod: CPTII,S$GLB,, | Performed by: PSYCHOLOGIST

## 2022-07-20 PROCEDURE — 4010F ACE/ARB THERAPY RXD/TAKEN: CPT | Mod: CPTII,S$GLB,, | Performed by: PSYCHOLOGIST

## 2022-07-20 PROCEDURE — 90791 PSYCH DIAGNOSTIC EVALUATION: CPT | Mod: S$GLB,,, | Performed by: PSYCHOLOGIST

## 2022-07-20 PROCEDURE — 90791 PR PSYCHIATRIC DIAGNOSTIC EVALUATION: ICD-10-PCS | Mod: S$GLB,,, | Performed by: PSYCHOLOGIST
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
## 2022-08-10 ENCOUNTER — INFUSION (OUTPATIENT)
Dept: INFUSION THERAPY | Facility: HOSPITAL | Age: 72
End: 2022-08-10
Attending: OBSTETRICS & GYNECOLOGY
Payer: MEDICARE

## 2022-08-10 DIAGNOSIS — C56.3 MALIGNANT NEOPLASM OF BOTH OVARIES: Primary | ICD-10-CM

## 2022-08-10 LAB
ALBUMIN SERPL BCP-MCNC: 4 G/DL (ref 3.5–5.2)
ALP SERPL-CCNC: 109 U/L (ref 55–135)
ALT SERPL W/O P-5'-P-CCNC: 11 U/L (ref 10–44)
ANION GAP SERPL CALC-SCNC: 8 MMOL/L (ref 8–16)
AST SERPL-CCNC: 15 U/L (ref 10–40)
BASOPHILS # BLD AUTO: 0.03 K/UL (ref 0–0.2)
BASOPHILS NFR BLD: 0.7 % (ref 0–1.9)
BILIRUB SERPL-MCNC: 0.3 MG/DL (ref 0.1–1)
BUN SERPL-MCNC: 11 MG/DL (ref 8–23)
CALCIUM SERPL-MCNC: 9.5 MG/DL (ref 8.7–10.5)
CANCER AG125 SERPL-ACNC: 22 U/ML (ref 0–30)
CHLORIDE SERPL-SCNC: 102 MMOL/L (ref 95–110)
CO2 SERPL-SCNC: 30 MMOL/L (ref 23–29)
CREAT SERPL-MCNC: 1 MG/DL (ref 0.5–1.4)
DIFFERENTIAL METHOD: ABNORMAL
EOSINOPHIL # BLD AUTO: 0.2 K/UL (ref 0–0.5)
EOSINOPHIL NFR BLD: 4.3 % (ref 0–8)
ERYTHROCYTE [DISTWIDTH] IN BLOOD BY AUTOMATED COUNT: 15.4 % (ref 11.5–14.5)
EST. GFR  (NO RACE VARIABLE): >60 ML/MIN/1.73 M^2
GLUCOSE SERPL-MCNC: 103 MG/DL (ref 70–110)
HCT VFR BLD AUTO: 31.3 % (ref 37–48.5)
HGB BLD-MCNC: 10.2 G/DL (ref 12–16)
IMM GRANULOCYTES # BLD AUTO: 0.02 K/UL (ref 0–0.04)
IMM GRANULOCYTES NFR BLD AUTO: 0.5 % (ref 0–0.5)
LYMPHOCYTES # BLD AUTO: 1.5 K/UL (ref 1–4.8)
LYMPHOCYTES NFR BLD: 34.5 % (ref 18–48)
MCH RBC QN AUTO: 32.4 PG (ref 27–31)
MCHC RBC AUTO-ENTMCNC: 32.6 G/DL (ref 32–36)
MCV RBC AUTO: 99 FL (ref 82–98)
MONOCYTES # BLD AUTO: 0.3 K/UL (ref 0.3–1)
MONOCYTES NFR BLD: 7.4 % (ref 4–15)
NEUTROPHILS # BLD AUTO: 2.2 K/UL (ref 1.8–7.7)
NEUTROPHILS NFR BLD: 52.6 % (ref 38–73)
NRBC BLD-RTO: 1 /100 WBC
PLATELET # BLD AUTO: 232 K/UL (ref 150–450)
PMV BLD AUTO: 8.6 FL (ref 9.2–12.9)
POTASSIUM SERPL-SCNC: 4.2 MMOL/L (ref 3.5–5.1)
PROT SERPL-MCNC: 6.8 G/DL (ref 6–8.4)
RBC # BLD AUTO: 3.15 M/UL (ref 4–5.4)
SODIUM SERPL-SCNC: 140 MMOL/L (ref 136–145)
WBC # BLD AUTO: 4.2 K/UL (ref 3.9–12.7)

## 2022-08-10 PROCEDURE — 86304 IMMUNOASSAY TUMOR CA 125: CPT | Performed by: OBSTETRICS & GYNECOLOGY

## 2022-08-10 PROCEDURE — A4216 STERILE WATER/SALINE, 10 ML: HCPCS | Performed by: OBSTETRICS & GYNECOLOGY

## 2022-08-10 PROCEDURE — 80053 COMPREHEN METABOLIC PANEL: CPT | Performed by: OBSTETRICS & GYNECOLOGY

## 2022-08-10 PROCEDURE — 36415 COLL VENOUS BLD VENIPUNCTURE: CPT | Performed by: OBSTETRICS & GYNECOLOGY

## 2022-08-10 PROCEDURE — 25000003 PHARM REV CODE 250: Performed by: OBSTETRICS & GYNECOLOGY

## 2022-08-10 PROCEDURE — 36591 DRAW BLOOD OFF VENOUS DEVICE: CPT

## 2022-08-10 PROCEDURE — 63600175 PHARM REV CODE 636 W HCPCS: Performed by: OBSTETRICS & GYNECOLOGY

## 2022-08-10 PROCEDURE — 85025 COMPLETE CBC W/AUTO DIFF WBC: CPT | Performed by: OBSTETRICS & GYNECOLOGY

## 2022-08-10 RX ORDER — SODIUM CHLORIDE 0.9 % (FLUSH) 0.9 %
10 SYRINGE (ML) INJECTION
Status: CANCELLED | OUTPATIENT
Start: 2022-08-10

## 2022-08-10 RX ORDER — HEPARIN 100 UNIT/ML
500 SYRINGE INTRAVENOUS
Status: DISCONTINUED | OUTPATIENT
Start: 2022-08-10 | End: 2022-08-10 | Stop reason: HOSPADM

## 2022-08-10 RX ORDER — SODIUM CHLORIDE 0.9 % (FLUSH) 0.9 %
10 SYRINGE (ML) INJECTION
Status: DISCONTINUED | OUTPATIENT
Start: 2022-08-10 | End: 2022-08-10 | Stop reason: HOSPADM

## 2022-08-10 RX ORDER — HEPARIN 100 UNIT/ML
500 SYRINGE INTRAVENOUS
Status: CANCELLED | OUTPATIENT
Start: 2022-08-10

## 2022-08-10 RX ADMIN — HEPARIN 500 UNITS: 100 SYRINGE at 11:08

## 2022-08-10 RX ADMIN — Medication 10 ML: at 11:08

## 2022-08-11 ENCOUNTER — PATIENT MESSAGE (OUTPATIENT)
Dept: PSYCHIATRY | Facility: CLINIC | Age: 72
End: 2022-08-11
Payer: MEDICARE

## 2022-08-11 ENCOUNTER — TELEPHONE (OUTPATIENT)
Dept: PSYCHIATRY | Facility: CLINIC | Age: 72
End: 2022-08-11
Payer: MEDICARE

## 2022-08-11 ENCOUNTER — PATIENT MESSAGE (OUTPATIENT)
Dept: GYNECOLOGIC ONCOLOGY | Facility: CLINIC | Age: 72
End: 2022-08-11
Payer: MEDICARE

## 2022-08-11 NOTE — TELEPHONE ENCOUNTER
I called Ms. Gilmore to inquire whether she would be willing to have a psychology resident observe her CPT therapy sessions.  She was given full autonomy for the decision and reminded that any decision is fine.  At this time she is disinterested in observations of her treatment.  I thanked her for her consideration and confirmed her appointment for Tuesday.

## 2022-08-15 PROBLEM — F43.10 PTSD (POST-TRAUMATIC STRESS DISORDER): Status: ACTIVE | Noted: 2022-08-15
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
## 2022-08-16 ENCOUNTER — OFFICE VISIT (OUTPATIENT)
Dept: PSYCHIATRY | Facility: CLINIC | Age: 72
End: 2022-08-16
Payer: MEDICARE

## 2022-08-16 DIAGNOSIS — F43.10 PTSD (POST-TRAUMATIC STRESS DISORDER): ICD-10-CM

## 2022-08-16 DIAGNOSIS — F41.1 GENERALIZED ANXIETY DISORDER: Primary | ICD-10-CM

## 2022-08-16 PROCEDURE — 4010F ACE/ARB THERAPY RXD/TAKEN: CPT | Mod: CPTII,S$GLB,, | Performed by: PSYCHOLOGIST

## 2022-08-16 PROCEDURE — 4010F PR ACE/ARB THEARPY RXD/TAKEN: ICD-10-PCS | Mod: CPTII,S$GLB,, | Performed by: PSYCHOLOGIST

## 2022-08-16 PROCEDURE — 90837 PR PSYCHOTHERAPY W/PATIENT, 60 MIN: ICD-10-PCS | Mod: S$GLB,,, | Performed by: PSYCHOLOGIST

## 2022-08-16 PROCEDURE — 90837 PSYTX W PT 60 MINUTES: CPT | Mod: S$GLB,,, | Performed by: PSYCHOLOGIST
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
## 2022-08-23 ENCOUNTER — OFFICE VISIT (OUTPATIENT)
Dept: PSYCHIATRY | Facility: CLINIC | Age: 72
End: 2022-08-23
Payer: MEDICARE

## 2022-08-23 DIAGNOSIS — F41.1 GENERALIZED ANXIETY DISORDER: ICD-10-CM

## 2022-08-23 DIAGNOSIS — F43.10 PTSD (POST-TRAUMATIC STRESS DISORDER): Primary | ICD-10-CM

## 2022-08-23 PROCEDURE — 4010F ACE/ARB THERAPY RXD/TAKEN: CPT | Mod: CPTII,S$GLB,, | Performed by: PSYCHOLOGIST

## 2022-08-23 PROCEDURE — 90837 PR PSYCHOTHERAPY W/PATIENT, 60 MIN: ICD-10-PCS | Mod: S$GLB,,, | Performed by: PSYCHOLOGIST

## 2022-08-23 PROCEDURE — 4010F PR ACE/ARB THEARPY RXD/TAKEN: ICD-10-PCS | Mod: CPTII,S$GLB,, | Performed by: PSYCHOLOGIST

## 2022-08-23 PROCEDURE — 90837 PSYTX W PT 60 MINUTES: CPT | Mod: S$GLB,,, | Performed by: PSYCHOLOGIST

## 2022-08-25 ENCOUNTER — OFFICE VISIT (OUTPATIENT)
Dept: GYNECOLOGIC ONCOLOGY | Facility: CLINIC | Age: 72
End: 2022-08-25
Payer: MEDICARE

## 2022-08-25 ENCOUNTER — PATIENT MESSAGE (OUTPATIENT)
Dept: GYNECOLOGIC ONCOLOGY | Facility: CLINIC | Age: 72
End: 2022-08-25

## 2022-08-25 VITALS
SYSTOLIC BLOOD PRESSURE: 142 MMHG | BODY MASS INDEX: 30.39 KG/M2 | HEIGHT: 64 IN | DIASTOLIC BLOOD PRESSURE: 63 MMHG | HEART RATE: 77 BPM | WEIGHT: 178 LBS

## 2022-08-25 DIAGNOSIS — C56.3 MALIGNANT NEOPLASM OF BOTH OVARIES: Primary | ICD-10-CM

## 2022-08-25 PROCEDURE — 3078F DIAST BP <80 MM HG: CPT | Mod: CPTII,S$GLB,, | Performed by: OBSTETRICS & GYNECOLOGY

## 2022-08-25 PROCEDURE — 1125F PR PAIN SEVERITY QUANTIFIED, PAIN PRESENT: ICD-10-PCS | Mod: CPTII,S$GLB,, | Performed by: OBSTETRICS & GYNECOLOGY

## 2022-08-25 PROCEDURE — 4010F ACE/ARB THERAPY RXD/TAKEN: CPT | Mod: CPTII,S$GLB,, | Performed by: OBSTETRICS & GYNECOLOGY

## 2022-08-25 PROCEDURE — 3008F PR BODY MASS INDEX (BMI) DOCUMENTED: ICD-10-PCS | Mod: CPTII,S$GLB,, | Performed by: OBSTETRICS & GYNECOLOGY

## 2022-08-25 PROCEDURE — 3077F PR MOST RECENT SYSTOLIC BLOOD PRESSURE >= 140 MM HG: ICD-10-PCS | Mod: CPTII,S$GLB,, | Performed by: OBSTETRICS & GYNECOLOGY

## 2022-08-25 PROCEDURE — 3078F PR MOST RECENT DIASTOLIC BLOOD PRESSURE < 80 MM HG: ICD-10-PCS | Mod: CPTII,S$GLB,, | Performed by: OBSTETRICS & GYNECOLOGY

## 2022-08-25 PROCEDURE — 1101F PT FALLS ASSESS-DOCD LE1/YR: CPT | Mod: CPTII,S$GLB,, | Performed by: OBSTETRICS & GYNECOLOGY

## 2022-08-25 PROCEDURE — 99214 OFFICE O/P EST MOD 30 MIN: CPT | Mod: S$GLB,,, | Performed by: OBSTETRICS & GYNECOLOGY

## 2022-08-25 PROCEDURE — 3288F PR FALLS RISK ASSESSMENT DOCUMENTED: ICD-10-PCS | Mod: CPTII,S$GLB,, | Performed by: OBSTETRICS & GYNECOLOGY

## 2022-08-25 PROCEDURE — 1125F AMNT PAIN NOTED PAIN PRSNT: CPT | Mod: CPTII,S$GLB,, | Performed by: OBSTETRICS & GYNECOLOGY

## 2022-08-25 PROCEDURE — 3288F FALL RISK ASSESSMENT DOCD: CPT | Mod: CPTII,S$GLB,, | Performed by: OBSTETRICS & GYNECOLOGY

## 2022-08-25 PROCEDURE — 1159F MED LIST DOCD IN RCRD: CPT | Mod: CPTII,S$GLB,, | Performed by: OBSTETRICS & GYNECOLOGY

## 2022-08-25 PROCEDURE — 4010F PR ACE/ARB THEARPY RXD/TAKEN: ICD-10-PCS | Mod: CPTII,S$GLB,, | Performed by: OBSTETRICS & GYNECOLOGY

## 2022-08-25 PROCEDURE — 3008F BODY MASS INDEX DOCD: CPT | Mod: CPTII,S$GLB,, | Performed by: OBSTETRICS & GYNECOLOGY

## 2022-08-25 PROCEDURE — 99999 PR PBB SHADOW E&M-EST. PATIENT-LVL III: ICD-10-PCS | Mod: PBBFAC,,, | Performed by: OBSTETRICS & GYNECOLOGY

## 2022-08-25 PROCEDURE — 99999 PR PBB SHADOW E&M-EST. PATIENT-LVL III: CPT | Mod: PBBFAC,,, | Performed by: OBSTETRICS & GYNECOLOGY

## 2022-08-25 PROCEDURE — 99214 PR OFFICE/OUTPT VISIT, EST, LEVL IV, 30-39 MIN: ICD-10-PCS | Mod: S$GLB,,, | Performed by: OBSTETRICS & GYNECOLOGY

## 2022-08-25 PROCEDURE — 3077F SYST BP >= 140 MM HG: CPT | Mod: CPTII,S$GLB,, | Performed by: OBSTETRICS & GYNECOLOGY

## 2022-08-25 PROCEDURE — 1101F PR PT FALLS ASSESS DOC 0-1 FALLS W/OUT INJ PAST YR: ICD-10-PCS | Mod: CPTII,S$GLB,, | Performed by: OBSTETRICS & GYNECOLOGY

## 2022-08-25 PROCEDURE — 1159F PR MEDICATION LIST DOCUMENTED IN MEDICAL RECORD: ICD-10-PCS | Mod: CPTII,S$GLB,, | Performed by: OBSTETRICS & GYNECOLOGY

## 2022-08-25 NOTE — PROGRESS NOTES
REFERRING PROVIDER  Francisco Foster     INTERVAL HISTORY  CC: Recurrent IIIC Ovarian Cancer Treatment Planning / Prechemo   Visit with her and her daughter Sue Gilmore is a 72 y.o. with recurrent Stage IIIC HGSOC.  She is s/p 6 cycles of Carboplatin and Doxil on 6/15/2022 and has been on a treatment break since then.  She went to [place] and started [facility] therapy in April which she has continued.  She is interested in high dose vitamin C.       She has had a cough and some shortness of breath x 3 weeks (she attributes to her MAC) and myalgias in her right glute which she thinks may be due to statins.  She has no vaginal bleeding or discharge.  She is having no nausea or vomiting.  She has no bowel or bladder complaints.  Takes colace every other day.        HISTORY OF PRESENT CONDITION  (As summarized in Dr. Foster's notes)  March 2020 patient began to notice some lower abdominal discomfort.  This was at the height of COVID-19 pandemic.  She was referred to a urologist who did a tele health visit.  An ultrasound in May 2020 showed a complex cyst in the middle pole of the kidney but a pelvic cyst as well.  The patient then returned to see her gynecologist Dr. Marily Alston in [place].  An ultrasound showed a complex multi-cystic mass posterior to the uterus; left adnexa 57 x 36 mm cyst and a 30 x 23 x 23 cm complex cyst; right adnexa with a 37 x 31 cm cyst; endometrium not clearly seen.  At that time she was having difficulty with bowel movements.     5/8/202 : 173     6/2/2020 CT AP:  Bilateral solid and cystic adnexal mass lesions (right 6.7 cm and left 6.3 cm) suspicious for primary ovarian malignancy along with innumerable bilateral lower lobe pulmonary nodules most consistent with metastatic disease.  Subcentimeter soft tissue nodular densities within the omentum as above. These are indeterminate on this exam, however peritoneal metastatic implants cannot be excluded.      6/22/2020   Exploratory laparotomy, modified radical hysterectomy with radical resection of bilateral ovarian masses with optimal tumor reductive surgery including resection of pelvic disease, excision of numerous tumor implants, infracolic omentectomy with resection of a 2 cm omental nodule, bilateral pelvic and periaortic lymph node dissection and appendectomy.   (Dr. Мария Ratliff)  FINDINGS:  optimally resected to R0. The upper abdominal disease include omental nodule, peritoneal implants, bowel mesentery implants and right diaphragm implants, which were all excised.  PATHOLOGY:high grade serous ovarian ca bilaterally, numerous implants (omental, peritoneal, diaphragm, SB mesentery), negative nodes, positive washings. HER2 negative; MSI stable     7/1/2020 :  114     7/6/2020 CT Chest:  Bilateral pulmonary nodules as detailed most consistent with metastatic disease.  Peritoneal soft tissue nodularity in the left upper quadrant unchanged from the prior exam and again consistent with metastatic disease.  Bibasilar atelectasis.     7/16/2020 Cycle #1 Carbo / Taxol (transferred to [facility] -details in Dr. Foster's prior notes)  8/14/2020 Cycle #2 Carbo / Taxol (8/5/2020 :  25)  9/3/2020 Cycle #3 Carbo / Taxol (9/2/2020 : 12)  9/24/2020 Cycle #4 Carbo / Taxol (9/23/202 : 10)  10/16/2020 Cycle #5 Carbo / Taxol   11/5/2020  Cycle #6 Carbo / Taxol     11/24/2020 : 11  1/26/2021 : 12  5/10/2021 : 11  6/8/2021 :  11     8/31/2021 CT CAP:  there are innumerable nodules in the lungs bilaterally, concerning for metastatic disease.  Left kidney cyst.  Additional findings as above.     9/13/2021  :  55    10/13/2021 :  152     10/13/2021 CT CAP: No local recurrence identified.  Multiple pulmonary nodules/opacities without significant interval change.  Random nodules could indicate metastatic disease but are nonspecific.  Several of the larger opacities have configuration suggestive of  endobronchial material in dilated bronchi from airway infection/inflammation.  Also, clustered micro nodules are consistent with small airway infection/inflammation.  A 1.2 cm hypodense lesion in left lobe of liver, unchanged and nonspecific.     11/24/2020 CT CAP:  there is no evidence of local disease recurrence.  There are multiple pulmonary nodules and irregular opacities scattered throughout both lungs, unchanged in appearance from prior exam 08/31/2020.  While some solid nodules may reflect metastatic disease, majority of the pulmonary opacities demonstrate tubular and/or branching configurations, which can be seen with mucoid impaction, and tree-in-bud configuration, suggesting small airways infection/inflammation (for example MAC infection, given the pattern).  Stable indeterminate 1.2 cm hepatic lesion in the caudate lobe.     12/9/2020  PET/CT:  In the abdomen and pelvis, there is physiologic tracer distribution within the abdominal organs.  There is mildly hypermetabolic solid nodules in both lungs equivocal for metastases.  Given indications of old granulomatous disease and clustered distribution of nodules in the anterior right upper and right lower lobes, noncalcified granulomas are a differential consideration and not mutually exclusive.  Tissue sampling would be required for definitive diagnosis, and the largest nodules in the lower lobes may be amenable to percutaneous biopsy.  Additional mildly FDG avid regions of ground-glass attenuation are identified in the right lung, possibly reflecting small airways infection or inflammation with additional metastatic foci not definitively excluded.      10/22/2021 CT CAP:  interval development of multiple hypodensities scattered along the periphery of the liver concerning for peritoneal implants.  Stable pulmonary nodules and irregular opacities scattered throughout both lungs.  While some nodules may reflect metastatic disease, majority demonstrate a  tubular and branching pattern suggesting mucoid impaction in a pattern consistent with MAC infection.  Incisional hernia at the midline of the lower abdomen containing a few loops of nonobstructed small bowel.     11/17/2021:  Cycle #1 Carbo/ Doxil  ( 11/5/2021 = 337) (Udenyca added for next)  12/29/2021:  Cycle #2 Carbo / Doxil ( 12/13/2021 = 105) (delayed for low ANC and Daryl  1/26/2022: Cycle #3 Carbo / Doxil ( 1/24/2021 = 33)     2/15/2022 CT CAP:  Decreased size of the previously described hepatic lesions/peritoneal implants in this patient with history of ovarian cancer and known metastases, consistent with response to treatment.  No definite new liver lesions.  Stable, 4.5 cm low-density lesion along the periphery of the posterior right hepatic lobe, concerning for peritoneal implant.  Stable pulmonary nodules and irregular opacities, majority of which are favored to represent a pattern consistent with mucoid impaction or MAC infection.  No definite new nodules allowing for widespread disease.  Small incisional hernia containing a few loops of nonobstructed bowel with the hernia sac slightly increased in size from prior exam.     3/28/2022 [facility] Started M-W-F 4/6/2022:  Cycle #4 Carbo/Doxil ( 4/4/2022 = 60)  5/12/2022:  Cycle #5 Carbo/Doxil (delayed for low ANC and platelets;  5/2/2022 = 16)  6/15/2022:  Cycle #6 Carbo / Doxil (delayed for low ANC;  6/14/2022 =  15)    8/10/2022  = 22    REVIEW OF SYSTEMS  Review of Systems   Constitutional:  Positive for fatigue (improved). Negative for appetite change, chills, fever and unexpected weight change.   HENT:   Negative for lump/mass and mouth sores.    Respiratory:  Positive for cough and shortness of breath (mild). Negative for chest tightness.    Cardiovascular:  Negative for chest pain, leg swelling and palpitations.   Gastrointestinal:  Positive for constipation (controlled with qod colace). Negative  for abdominal distention, abdominal pain, blood in stool, diarrhea, nausea and vomiting.   Genitourinary:  Negative for dysuria, frequency, vaginal bleeding and vaginal discharge.    Musculoskeletal:  Positive for myalgias (right glute). Negative for arthralgias.   Skin:  Negative for rash.   Neurological:  Negative for dizziness, light-headedness and numbness.   Hematological:  Negative for adenopathy.   Psychiatric/Behavioral:  Negative for decreased concentration, depression and sleep disturbance. The patient is not nervous/anxious.      OBJECTIVE   Vitals:    08/25/22 0910   BP: (!) 142/63   Pulse: 77      Body mass index is 30.55 kg/m².     Physical Exam:   Constitutional: She is oriented to person, place, and time. She appears well-developed and well-nourished. No distress.    HENT:   Head: Normocephalic and atraumatic.    Eyes: Conjunctivae and EOM are normal.      Pulmonary/Chest: Effort normal. No respiratory distress.                  Musculoskeletal: Moves all extremeties.       Neurological: She is alert and oriented to person, place, and time.    Skin: No rash noted.    Psychiatric: She has a normal mood and affect. Judgment and thought content normal.   ECOG status: 0    LABORATORY DATA  Lab data reviewed.    RADIOLOGICAL DATA  Radiology data reviewed.    PATHOLOGY DATA  Pathology data reviewed.    ASSESSMENT    1. Malignant neoplasm of both ovaries    Doing well with treatment break. She will see me back in October with a repeat  at that time.  We will make further treatment decisions based on how Ms. Gilmore feels clinically and her  trend.     PLAN  Follow-up with me in October   prior to next visit            Ghassan Treviño MD
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
## 2022-08-30 ENCOUNTER — OFFICE VISIT (OUTPATIENT)
Dept: PSYCHIATRY | Facility: CLINIC | Age: 72
End: 2022-08-30
Payer: MEDICARE

## 2022-08-30 DIAGNOSIS — F43.10 PTSD (POST-TRAUMATIC STRESS DISORDER): Primary | ICD-10-CM

## 2022-08-30 DIAGNOSIS — F41.1 GENERALIZED ANXIETY DISORDER: ICD-10-CM

## 2022-08-30 PROCEDURE — 4010F ACE/ARB THERAPY RXD/TAKEN: CPT | Mod: CPTII,S$GLB,, | Performed by: PSYCHOLOGIST

## 2022-08-30 PROCEDURE — 4010F PR ACE/ARB THEARPY RXD/TAKEN: ICD-10-PCS | Mod: CPTII,S$GLB,, | Performed by: PSYCHOLOGIST

## 2022-08-30 PROCEDURE — 90837 PR PSYCHOTHERAPY W/PATIENT, 60 MIN: ICD-10-PCS | Mod: S$GLB,,, | Performed by: PSYCHOLOGIST

## 2022-08-30 PROCEDURE — 90837 PSYTX W PT 60 MINUTES: CPT | Mod: S$GLB,,, | Performed by: PSYCHOLOGIST
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
## 2022-09-06 ENCOUNTER — OFFICE VISIT (OUTPATIENT)
Dept: PSYCHIATRY | Facility: CLINIC | Age: 72
End: 2022-09-06
Payer: MEDICARE

## 2022-09-06 DIAGNOSIS — F41.1 GENERALIZED ANXIETY DISORDER: ICD-10-CM

## 2022-09-06 DIAGNOSIS — F32.9 REACTIVE DEPRESSION: ICD-10-CM

## 2022-09-06 DIAGNOSIS — F43.10 PTSD (POST-TRAUMATIC STRESS DISORDER): Primary | ICD-10-CM

## 2022-09-06 PROCEDURE — 90837 PSYTX W PT 60 MINUTES: CPT | Mod: S$GLB,,, | Performed by: PSYCHOLOGIST

## 2022-09-06 PROCEDURE — 4010F PR ACE/ARB THEARPY RXD/TAKEN: ICD-10-PCS | Mod: CPTII,S$GLB,, | Performed by: PSYCHOLOGIST

## 2022-09-06 PROCEDURE — 90837 PR PSYCHOTHERAPY W/PATIENT, 60 MIN: ICD-10-PCS | Mod: S$GLB,,, | Performed by: PSYCHOLOGIST

## 2022-09-06 PROCEDURE — 4010F ACE/ARB THERAPY RXD/TAKEN: CPT | Mod: CPTII,S$GLB,, | Performed by: PSYCHOLOGIST
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
## 2022-09-13 ENCOUNTER — OFFICE VISIT (OUTPATIENT)
Dept: PSYCHIATRY | Facility: CLINIC | Age: 72
End: 2022-09-13
Payer: MEDICARE

## 2022-09-13 DIAGNOSIS — F43.10 PTSD (POST-TRAUMATIC STRESS DISORDER): Primary | ICD-10-CM

## 2022-09-13 DIAGNOSIS — F41.1 GENERALIZED ANXIETY DISORDER: ICD-10-CM

## 2022-09-13 PROCEDURE — 90837 PSYTX W PT 60 MINUTES: CPT | Mod: S$GLB,,, | Performed by: PSYCHOLOGIST

## 2022-09-13 PROCEDURE — 4010F PR ACE/ARB THEARPY RXD/TAKEN: ICD-10-PCS | Mod: CPTII,S$GLB,, | Performed by: PSYCHOLOGIST

## 2022-09-13 PROCEDURE — 90837 PR PSYCHOTHERAPY W/PATIENT, 60 MIN: ICD-10-PCS | Mod: S$GLB,,, | Performed by: PSYCHOLOGIST

## 2022-09-13 PROCEDURE — 4010F ACE/ARB THERAPY RXD/TAKEN: CPT | Mod: CPTII,S$GLB,, | Performed by: PSYCHOLOGIST

## 2022-09-20 ENCOUNTER — OFFICE VISIT (OUTPATIENT)
Dept: PSYCHIATRY | Facility: CLINIC | Age: 72
End: 2022-09-20
Payer: MEDICARE

## 2022-09-20 DIAGNOSIS — F43.10 PTSD (POST-TRAUMATIC STRESS DISORDER): Primary | ICD-10-CM

## 2022-09-20 DIAGNOSIS — F41.1 GENERALIZED ANXIETY DISORDER: ICD-10-CM

## 2022-09-20 PROCEDURE — 4010F PR ACE/ARB THEARPY RXD/TAKEN: ICD-10-PCS | Mod: CPTII,S$GLB,, | Performed by: PSYCHOLOGIST

## 2022-09-20 PROCEDURE — 90837 PSYTX W PT 60 MINUTES: CPT | Mod: S$GLB,,, | Performed by: PSYCHOLOGIST

## 2022-09-20 PROCEDURE — 4010F ACE/ARB THERAPY RXD/TAKEN: CPT | Mod: CPTII,S$GLB,, | Performed by: PSYCHOLOGIST

## 2022-09-20 PROCEDURE — 90837 PR PSYCHOTHERAPY W/PATIENT, 60 MIN: ICD-10-PCS | Mod: S$GLB,,, | Performed by: PSYCHOLOGIST
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
## 2022-09-27 ENCOUNTER — OFFICE VISIT (OUTPATIENT)
Dept: PSYCHIATRY | Facility: CLINIC | Age: 72
End: 2022-09-27
Payer: MEDICARE

## 2022-09-27 DIAGNOSIS — F41.1 GENERALIZED ANXIETY DISORDER: ICD-10-CM

## 2022-09-27 DIAGNOSIS — F43.10 PTSD (POST-TRAUMATIC STRESS DISORDER): Primary | ICD-10-CM

## 2022-09-27 PROCEDURE — 4010F ACE/ARB THERAPY RXD/TAKEN: CPT | Mod: CPTII,S$GLB,, | Performed by: PSYCHOLOGIST

## 2022-09-27 PROCEDURE — 90837 PR PSYCHOTHERAPY W/PATIENT, 60 MIN: ICD-10-PCS | Mod: S$GLB,,, | Performed by: PSYCHOLOGIST

## 2022-09-27 PROCEDURE — 4010F PR ACE/ARB THEARPY RXD/TAKEN: ICD-10-PCS | Mod: CPTII,S$GLB,, | Performed by: PSYCHOLOGIST

## 2022-09-27 PROCEDURE — 90837 PSYTX W PT 60 MINUTES: CPT | Mod: S$GLB,,, | Performed by: PSYCHOLOGIST

## 2022-10-03 ENCOUNTER — PATIENT MESSAGE (OUTPATIENT)
Dept: GYNECOLOGIC ONCOLOGY | Facility: CLINIC | Age: 72
End: 2022-10-03
Payer: MEDICARE

## 2022-10-03 ENCOUNTER — PATIENT MESSAGE (OUTPATIENT)
Dept: PSYCHIATRY | Facility: CLINIC | Age: 72
End: 2022-10-03
Payer: MEDICARE

## 2022-10-04 ENCOUNTER — OFFICE VISIT (OUTPATIENT)
Dept: PSYCHIATRY | Facility: CLINIC | Age: 72
End: 2022-10-04
Payer: MEDICARE

## 2022-10-04 DIAGNOSIS — F41.1 GENERALIZED ANXIETY DISORDER: ICD-10-CM

## 2022-10-04 DIAGNOSIS — F43.10 PTSD (POST-TRAUMATIC STRESS DISORDER): Primary | ICD-10-CM

## 2022-10-04 PROCEDURE — 4010F ACE/ARB THERAPY RXD/TAKEN: CPT | Mod: CPTII,S$GLB,, | Performed by: PSYCHOLOGIST

## 2022-10-04 PROCEDURE — 90837 PSYTX W PT 60 MINUTES: CPT | Mod: S$GLB,,, | Performed by: PSYCHOLOGIST

## 2022-10-04 PROCEDURE — 90837 PR PSYCHOTHERAPY W/PATIENT, 60 MIN: ICD-10-PCS | Mod: S$GLB,,, | Performed by: PSYCHOLOGIST

## 2022-10-04 PROCEDURE — 4010F PR ACE/ARB THEARPY RXD/TAKEN: ICD-10-PCS | Mod: CPTII,S$GLB,, | Performed by: PSYCHOLOGIST
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
## 2022-10-11 ENCOUNTER — OFFICE VISIT (OUTPATIENT)
Dept: PSYCHIATRY | Facility: CLINIC | Age: 72
End: 2022-10-11
Payer: MEDICARE

## 2022-10-11 DIAGNOSIS — F41.1 GENERALIZED ANXIETY DISORDER: ICD-10-CM

## 2022-10-11 DIAGNOSIS — F43.10 PTSD (POST-TRAUMATIC STRESS DISORDER): Primary | ICD-10-CM

## 2022-10-11 PROCEDURE — 4010F ACE/ARB THERAPY RXD/TAKEN: CPT | Mod: CPTII,S$GLB,, | Performed by: PSYCHOLOGIST

## 2022-10-11 PROCEDURE — 90837 PR PSYCHOTHERAPY W/PATIENT, 60 MIN: ICD-10-PCS | Mod: S$GLB,,, | Performed by: PSYCHOLOGIST

## 2022-10-11 PROCEDURE — 90837 PSYTX W PT 60 MINUTES: CPT | Mod: S$GLB,,, | Performed by: PSYCHOLOGIST

## 2022-10-11 PROCEDURE — 4010F PR ACE/ARB THEARPY RXD/TAKEN: ICD-10-PCS | Mod: CPTII,S$GLB,, | Performed by: PSYCHOLOGIST
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
## 2022-10-25 ENCOUNTER — OFFICE VISIT (OUTPATIENT)
Dept: PSYCHIATRY | Facility: CLINIC | Age: 72
End: 2022-10-25
Payer: MEDICARE

## 2022-10-25 DIAGNOSIS — F41.1 GENERALIZED ANXIETY DISORDER: ICD-10-CM

## 2022-10-25 DIAGNOSIS — F43.10 PTSD (POST-TRAUMATIC STRESS DISORDER): Primary | ICD-10-CM

## 2022-10-25 PROCEDURE — 4010F ACE/ARB THERAPY RXD/TAKEN: CPT | Mod: CPTII,S$GLB,, | Performed by: PSYCHOLOGIST

## 2022-10-25 PROCEDURE — 4010F PR ACE/ARB THEARPY RXD/TAKEN: ICD-10-PCS | Mod: CPTII,S$GLB,, | Performed by: PSYCHOLOGIST

## 2022-10-25 PROCEDURE — 90837 PR PSYCHOTHERAPY W/PATIENT, 60 MIN: ICD-10-PCS | Mod: S$GLB,,, | Performed by: PSYCHOLOGIST

## 2022-10-25 PROCEDURE — 90837 PSYTX W PT 60 MINUTES: CPT | Mod: S$GLB,,, | Performed by: PSYCHOLOGIST
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
## 2022-11-01 ENCOUNTER — OFFICE VISIT (OUTPATIENT)
Dept: PSYCHIATRY | Facility: CLINIC | Age: 72
End: 2022-11-01
Payer: MEDICARE

## 2022-11-01 DIAGNOSIS — F43.10 PTSD (POST-TRAUMATIC STRESS DISORDER): Primary | ICD-10-CM

## 2022-11-01 DIAGNOSIS — F41.1 GENERALIZED ANXIETY DISORDER: ICD-10-CM

## 2022-11-01 PROCEDURE — 4010F ACE/ARB THERAPY RXD/TAKEN: CPT | Mod: CPTII,S$GLB,, | Performed by: PSYCHOLOGIST

## 2022-11-01 PROCEDURE — 90837 PSYTX W PT 60 MINUTES: CPT | Mod: S$GLB,,, | Performed by: PSYCHOLOGIST

## 2022-11-01 PROCEDURE — 90837 PR PSYCHOTHERAPY W/PATIENT, 60 MIN: ICD-10-PCS | Mod: S$GLB,,, | Performed by: PSYCHOLOGIST

## 2022-11-01 PROCEDURE — 4010F PR ACE/ARB THEARPY RXD/TAKEN: ICD-10-PCS | Mod: CPTII,S$GLB,, | Performed by: PSYCHOLOGIST

## 2022-11-08 ENCOUNTER — INFUSION (OUTPATIENT)
Dept: INFUSION THERAPY | Facility: HOSPITAL | Age: 72
End: 2022-11-08
Attending: OBSTETRICS & GYNECOLOGY
Payer: MEDICARE

## 2022-11-08 DIAGNOSIS — C56.3 MALIGNANT NEOPLASM OF BOTH OVARIES: Primary | ICD-10-CM

## 2022-11-08 LAB — CANCER AG125 SERPL-ACNC: 550 U/ML (ref 0–30)

## 2022-11-08 PROCEDURE — 63600175 PHARM REV CODE 636 W HCPCS: Performed by: OBSTETRICS & GYNECOLOGY

## 2022-11-08 PROCEDURE — 86304 IMMUNOASSAY TUMOR CA 125: CPT | Performed by: OBSTETRICS & GYNECOLOGY

## 2022-11-08 PROCEDURE — 36591 DRAW BLOOD OFF VENOUS DEVICE: CPT

## 2022-11-08 PROCEDURE — 25000003 PHARM REV CODE 250: Performed by: OBSTETRICS & GYNECOLOGY

## 2022-11-08 PROCEDURE — A4216 STERILE WATER/SALINE, 10 ML: HCPCS | Performed by: OBSTETRICS & GYNECOLOGY

## 2022-11-08 RX ORDER — HEPARIN 100 UNIT/ML
500 SYRINGE INTRAVENOUS
Status: CANCELLED | OUTPATIENT
Start: 2022-11-08

## 2022-11-08 RX ORDER — HEPARIN 100 UNIT/ML
500 SYRINGE INTRAVENOUS
Status: DISCONTINUED | OUTPATIENT
Start: 2022-11-08 | End: 2022-11-08 | Stop reason: HOSPADM

## 2022-11-08 RX ORDER — SODIUM CHLORIDE 0.9 % (FLUSH) 0.9 %
10 SYRINGE (ML) INJECTION
Status: CANCELLED | OUTPATIENT
Start: 2022-11-08

## 2022-11-08 RX ORDER — SODIUM CHLORIDE 0.9 % (FLUSH) 0.9 %
10 SYRINGE (ML) INJECTION
Status: DISCONTINUED | OUTPATIENT
Start: 2022-11-08 | End: 2022-11-08 | Stop reason: HOSPADM

## 2022-11-08 RX ADMIN — Medication 10 ML: at 10:11

## 2022-11-08 RX ADMIN — HEPARIN 500 UNITS: 100 SYRINGE at 10:11

## 2022-11-09 ENCOUNTER — OFFICE VISIT (OUTPATIENT)
Dept: GYNECOLOGIC ONCOLOGY | Facility: CLINIC | Age: 72
End: 2022-11-09
Payer: MEDICARE

## 2022-11-09 VITALS
BODY MASS INDEX: 29.33 KG/M2 | HEART RATE: 83 BPM | DIASTOLIC BLOOD PRESSURE: 79 MMHG | WEIGHT: 170.88 LBS | SYSTOLIC BLOOD PRESSURE: 134 MMHG

## 2022-11-09 DIAGNOSIS — C56.3 MALIGNANT NEOPLASM OF BOTH OVARIES: Primary | ICD-10-CM

## 2022-11-09 PROCEDURE — 1125F AMNT PAIN NOTED PAIN PRSNT: CPT | Mod: CPTII,S$GLB,, | Performed by: OBSTETRICS & GYNECOLOGY

## 2022-11-09 PROCEDURE — 3008F PR BODY MASS INDEX (BMI) DOCUMENTED: ICD-10-PCS | Mod: CPTII,S$GLB,, | Performed by: OBSTETRICS & GYNECOLOGY

## 2022-11-09 PROCEDURE — 3078F DIAST BP <80 MM HG: CPT | Mod: CPTII,S$GLB,, | Performed by: OBSTETRICS & GYNECOLOGY

## 2022-11-09 PROCEDURE — 3008F BODY MASS INDEX DOCD: CPT | Mod: CPTII,S$GLB,, | Performed by: OBSTETRICS & GYNECOLOGY

## 2022-11-09 PROCEDURE — 4010F PR ACE/ARB THEARPY RXD/TAKEN: ICD-10-PCS | Mod: CPTII,S$GLB,, | Performed by: OBSTETRICS & GYNECOLOGY

## 2022-11-09 PROCEDURE — 1125F PR PAIN SEVERITY QUANTIFIED, PAIN PRESENT: ICD-10-PCS | Mod: CPTII,S$GLB,, | Performed by: OBSTETRICS & GYNECOLOGY

## 2022-11-09 PROCEDURE — 99999 PR PBB SHADOW E&M-EST. PATIENT-LVL IV: CPT | Mod: PBBFAC,,, | Performed by: OBSTETRICS & GYNECOLOGY

## 2022-11-09 PROCEDURE — 3078F PR MOST RECENT DIASTOLIC BLOOD PRESSURE < 80 MM HG: ICD-10-PCS | Mod: CPTII,S$GLB,, | Performed by: OBSTETRICS & GYNECOLOGY

## 2022-11-09 PROCEDURE — 3288F PR FALLS RISK ASSESSMENT DOCUMENTED: ICD-10-PCS | Mod: CPTII,S$GLB,, | Performed by: OBSTETRICS & GYNECOLOGY

## 2022-11-09 PROCEDURE — 1159F PR MEDICATION LIST DOCUMENTED IN MEDICAL RECORD: ICD-10-PCS | Mod: CPTII,S$GLB,, | Performed by: OBSTETRICS & GYNECOLOGY

## 2022-11-09 PROCEDURE — 1159F MED LIST DOCD IN RCRD: CPT | Mod: CPTII,S$GLB,, | Performed by: OBSTETRICS & GYNECOLOGY

## 2022-11-09 PROCEDURE — 1101F PT FALLS ASSESS-DOCD LE1/YR: CPT | Mod: CPTII,S$GLB,, | Performed by: OBSTETRICS & GYNECOLOGY

## 2022-11-09 PROCEDURE — 99214 PR OFFICE/OUTPT VISIT, EST, LEVL IV, 30-39 MIN: ICD-10-PCS | Mod: S$GLB,,, | Performed by: OBSTETRICS & GYNECOLOGY

## 2022-11-09 PROCEDURE — 99214 OFFICE O/P EST MOD 30 MIN: CPT | Mod: S$GLB,,, | Performed by: OBSTETRICS & GYNECOLOGY

## 2022-11-09 PROCEDURE — 4010F ACE/ARB THERAPY RXD/TAKEN: CPT | Mod: CPTII,S$GLB,, | Performed by: OBSTETRICS & GYNECOLOGY

## 2022-11-09 PROCEDURE — 1101F PR PT FALLS ASSESS DOC 0-1 FALLS W/OUT INJ PAST YR: ICD-10-PCS | Mod: CPTII,S$GLB,, | Performed by: OBSTETRICS & GYNECOLOGY

## 2022-11-09 PROCEDURE — 3288F FALL RISK ASSESSMENT DOCD: CPT | Mod: CPTII,S$GLB,, | Performed by: OBSTETRICS & GYNECOLOGY

## 2022-11-09 PROCEDURE — 99999 PR PBB SHADOW E&M-EST. PATIENT-LVL IV: ICD-10-PCS | Mod: PBBFAC,,, | Performed by: OBSTETRICS & GYNECOLOGY

## 2022-11-09 PROCEDURE — 3075F SYST BP GE 130 - 139MM HG: CPT | Mod: CPTII,S$GLB,, | Performed by: OBSTETRICS & GYNECOLOGY

## 2022-11-09 PROCEDURE — 3075F PR MOST RECENT SYSTOLIC BLOOD PRESS GE 130-139MM HG: ICD-10-PCS | Mod: CPTII,S$GLB,, | Performed by: OBSTETRICS & GYNECOLOGY

## 2022-11-09 NOTE — PROGRESS NOTES
REFERRING PROVIDER  Francisco Foster     INTERVAL HISTORY  CC: Recurrent IIIC Ovarian Cancer Treatment Planning / Prechemo   Visit with her and her daughter Sue Gilmore is a 72 y.o. with recurrent Stage IIIC HGSOC.  She is s/p 6 cycles and was in response to Carboplatin and Doxil on 6/15/2022 when she decided to take a treatment break.  She went to [place] and started [facility] in April which she has continued.        She has had a cough and some shortness of breath x 3 weeks (she attributes to her MAC).   She has no vaginal bleeding or discharge.  She is having no nausea or vomiting.  She has no bowel or bladder complaints.  Takes colace every other day.  She has significant fatigue and has started having pinching pain on both sides of abdomen with some low back and right upper back pain.  She continues to walk for exercise at [facility].        HISTORY OF PRESENT CONDITION  (As summarized in Dr. Foster's notes)  March 2020 patient began to notice some lower abdominal discomfort.  This was at the height of COVID-19 pandemic.  She was referred to a urologist who did a tele health visit.  An ultrasound in May 2020 showed a complex cyst in the middle pole of the kidney but a pelvic cyst as well.  The patient then returned to see her gynecologist Dr. Marily Alston in [place].  An ultrasound showed a complex multi-cystic mass posterior to the uterus; left adnexa 57 x 36 mm cyst and a 30 x 23 x 23 cm complex cyst; right adnexa with a 37 x 31 cm cyst; endometrium not clearly seen.  At that time she was having difficulty with bowel movements.     5/8/202 : 173     6/2/2020 CT AP:  Bilateral solid and cystic adnexal mass lesions (right 6.7 cm and left 6.3 cm) suspicious for primary ovarian malignancy along with innumerable bilateral lower lobe pulmonary nodules most consistent with metastatic disease.  Subcentimeter soft tissue nodular densities within the omentum as above. These are  indeterminate on this exam, however peritoneal metastatic implants cannot be excluded.      6/22/2020  Exploratory laparotomy, modified radical hysterectomy with radical resection of bilateral ovarian masses with optimal tumor reductive surgery including resection of pelvic disease, excision of numerous tumor implants, infracolic omentectomy with resection of a 2 cm omental nodule, bilateral pelvic and periaortic lymph node dissection and appendectomy.   (Dr. Мария Ratliff)  FINDINGS:  optimally resected to R0. The upper abdominal disease include omental nodule, peritoneal implants, bowel mesentery implants and right diaphragm implants, which were all excised.  PATHOLOGY:high grade serous ovarian ca bilaterally, numerous implants (omental, peritoneal, diaphragm, SB mesentery), negative nodes, positive washings. HER2 negative; MSI stable;  HRD ngeative.     7/1/2020 :  114     7/6/2020 CT Chest:  Bilateral pulmonary nodules as detailed most consistent with metastatic disease.  Peritoneal soft tissue nodularity in the left upper quadrant unchanged from the prior exam and again consistent with metastatic disease.  Bibasilar atelectasis.     7/16/2020 Cycle #1 Carbo / Taxol (transferred to [facility] -details in Dr. Foster's prior notes)  8/14/2020 Cycle #2 Carbo / Taxol (8/5/2020 :  25)  9/3/2020 Cycle #3 Carbo / Taxol (9/2/2020 : 12)  9/24/2020 Cycle #4 Carbo / Taxol (9/23/202 : 10)  10/16/2020 Cycle #5 Carbo / Taxol   11/5/2020  Cycle #6 Carbo / Taxol     11/24/2020 : 11  1/26/2021 : 12  5/10/2021 : 11  6/8/2021 :  11     8/31/2021 CT CAP:  there are innumerable nodules in the lungs bilaterally, concerning for metastatic disease.  Left kidney cyst.  Additional findings as above.     9/13/2021  :  55    10/13/2021 :  152     10/13/2021 CT CAP: No local recurrence identified.  Multiple pulmonary nodules/opacities without significant interval change.  Random nodules could  indicate metastatic disease but are nonspecific.  Several of the larger opacities have configuration suggestive of endobronchial material in dilated bronchi from airway infection/inflammation.  Also, clustered micro nodules are consistent with small airway infection/inflammation.  A 1.2 cm hypodense lesion in left lobe of liver, unchanged and nonspecific.     11/24/2020 CT CAP:  there is no evidence of local disease recurrence.  There are multiple pulmonary nodules and irregular opacities scattered throughout both lungs, unchanged in appearance from prior exam 08/31/2020.  While some solid nodules may reflect metastatic disease, majority of the pulmonary opacities demonstrate tubular and/or branching configurations, which can be seen with mucoid impaction, and tree-in-bud configuration, suggesting small airways infection/inflammation (for example MAC infection, given the pattern).  Stable indeterminate 1.2 cm hepatic lesion in the caudate lobe.     12/9/2020  PET/CT:  In the abdomen and pelvis, there is physiologic tracer distribution within the abdominal organs.  There is mildly hypermetabolic solid nodules in both lungs equivocal for metastases.  Given indications of old granulomatous disease and clustered distribution of nodules in the anterior right upper and right lower lobes, noncalcified granulomas are a differential consideration and not mutually exclusive.  Tissue sampling would be required for definitive diagnosis, and the largest nodules in the lower lobes may be amenable to percutaneous biopsy.  Additional mildly FDG avid regions of ground-glass attenuation are identified in the right lung, possibly reflecting small airways infection or inflammation with additional metastatic foci not definitively excluded.      10/22/2021 CT CAP:  interval development of multiple hypodensities scattered along the periphery of the liver concerning for peritoneal implants.  Stable pulmonary nodules and irregular opacities  scattered throughout both lungs.  While some nodules may reflect metastatic disease, majority demonstrate a tubular and branching pattern suggesting mucoid impaction in a pattern consistent with MAC infection.  Incisional hernia at the midline of the lower abdomen containing a few loops of nonobstructed small bowel.     11/17/2021:  Cycle #1 Carbo/ Doxil  ( 11/5/2021 = 337) (Udenyca added for next)  12/29/2021:  Cycle #2 Carbo / Doxil ( 12/13/2021 = 105) (delayed for low ANC and Daryl  1/26/2022: Cycle #3 Carbo / Doxil ( 1/24/2021 = 33)     2/15/2022 CT CAP:  Decreased size of the previously described hepatic lesions/peritoneal implants in this patient with history of ovarian cancer and known metastases, consistent with response to treatment.  No definite new liver lesions.  Stable, 4.5 cm low-density lesion along the periphery of the posterior right hepatic lobe, concerning for peritoneal implant.  Stable pulmonary nodules and irregular opacities, majority of which are favored to represent a pattern consistent with mucoid impaction or MAC infection.  No definite new nodules allowing for widespread disease.  Small incisional hernia containing a few loops of nonobstructed bowel with the hernia sac slightly increased in size from prior exam.     3/28/2022 [facility] Started M-W-F 4/6/2022:  Cycle #4 Carbo/Doxil ( 4/4/2022 = 60)  5/12/2022:  Cycle #5 Carbo/Doxil (delayed for low ANC and platelets;  5/2/2022 = 16)  6/15/2022:  Cycle #6 Carbo / Doxil (delayed for low ANC;  6/14/2022 =  15)     8/10/2022  = 22  11/8/2022  =  550     REVIEW OF SYSTEMS  Review of Systems   Constitutional:  Positive for fatigue. Negative for appetite change, chills, fever and unexpected weight change.   HENT:   Negative for lump/mass and mouth sores.    Respiratory:  Positive for cough (chronic). Negative for chest tightness and shortness of breath.    Cardiovascular:  Negative for chest  pain, leg swelling and palpitations.   Gastrointestinal:  Positive for abdominal pain (mild pinching bilateral) and constipation (controlled with qod colace). Negative for abdominal distention, blood in stool, diarrhea, nausea and vomiting.   Genitourinary:  Negative for dysuria, frequency, vaginal bleeding and vaginal discharge.    Musculoskeletal:  Positive for back pain. Negative for arthralgias and myalgias.   Skin:  Negative for rash.   Neurological:  Negative for dizziness, light-headedness and numbness.   Hematological:  Negative for adenopathy.   Psychiatric/Behavioral:  Negative for decreased concentration, depression and sleep disturbance. The patient is not nervous/anxious.      OBJECTIVE   Vitals:    11/09/22 1045   BP: 134/79   Pulse: 83      Body mass index is 29.33 kg/m².     Physical Exam:   Constitutional: She is oriented to person, place, and time. She appears well-developed and well-nourished. No distress.    HENT:   Head: Normocephalic and atraumatic.    Eyes: Conjunctivae and EOM are normal. No scleral icterus.    Neck: No thyromegaly present.    Cardiovascular:  Regular rhythm and normal heart sounds.      Exam reveals no gallop, no friction rub, no clubbing, no cyanosis and no edema.       No murmur heard.   Pulmonary/Chest: Effort normal and breath sounds normal. No respiratory distress. She has no wheezes. She has no rales. She exhibits no tenderness.        Abdominal: Soft. She exhibits abdominal incision. She exhibits no distension and no mass. There is no abdominal tenderness. There is no rebound and no guarding. No hernia.     Genitourinary:    Genitourinary Comments: Vulva - normal  Vagina - no lesions, vaginal cuff well healed  Cervix - surgically absent  Uterus - surgically absent  Adnexa - no masses                Lymphadenopathy:     She has no cervical adenopathy.    Neurological: She is alert and oriented to person, place, and time.    Skin: No rash noted. No cyanosis. Nails show  no clubbing.    Psychiatric: She has a normal mood and affect. Judgment and thought content normal.   ECOG status: 0    LABORATORY DATA  Lab data reviewed.    RADIOLOGICAL DATA  Radiology data reviewed.    PATHOLOGY DATA  Pathology data reviewed.    ASSESSMENT    1. Malignant neoplasm of both ovaries    Doing reasonably well on continued treatment break since her last chemo 6/15/2022.  We discussed that her elevated  likely represents progressive disease though the magnitude of the increase does not necessarily correlate to magnitude of disease, especially since she remains relatively asymptomatic.  Though no quite clear, I would still consider her platinum sensitive since she stopped treatment while in response.        She would like to explore clinical trials.  I will provide a referral to MD Reyes.  She is not enthusiastic about imaging (due in part to cost) or resuming cytotoxic chemotherapy (due to side effects).  She is likely to have imaging at [facility] in conjunction with her referral, will defer to them at the patient's request.     She requested referral to palliative care specifically at [facility].     PLAN  Refer to MD Reyes to explore clinical trial opportunities  Refer to palliative medicine at [facility]            Ghassan Treviño MD

## 2022-11-11 ENCOUNTER — PATIENT MESSAGE (OUTPATIENT)
Dept: GYNECOLOGIC ONCOLOGY | Facility: CLINIC | Age: 72
End: 2022-11-11
Payer: MEDICARE

## 2022-11-11 ENCOUNTER — DOCUMENTATION ONLY (OUTPATIENT)
Dept: GYNECOLOGIC ONCOLOGY | Facility: OTHER | Age: 72
End: 2022-11-11
Payer: MEDICARE

## 2022-11-11 NOTE — PROGRESS NOTES
"Your fax has been successfully sent to [phone] at [phone].  ------------------------------------------------------------  From: [email or web address]  ------------------------------------------------------------  11/11/2022 3:15:33 PM Transmission Record   Sent to [phone] with remote ID "[email or web address] "   Result: (0/339;0/0) Success   Page record: 1 - 29   Elapsed time: 13:24 on channel 17    Referral to MD Reyes initiated. Records were sent via fax and confirmation received (above).    Noris Crandall, JULISSAP-C, AOP  Gynecologic Oncology      "
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
## 2022-11-15 ENCOUNTER — OFFICE VISIT (OUTPATIENT)
Dept: PSYCHIATRY | Facility: CLINIC | Age: 72
End: 2022-11-15
Payer: MEDICARE

## 2022-11-15 ENCOUNTER — PATIENT MESSAGE (OUTPATIENT)
Dept: PSYCHIATRY | Facility: CLINIC | Age: 72
End: 2022-11-15
Payer: MEDICARE

## 2022-11-15 DIAGNOSIS — F41.1 GENERALIZED ANXIETY DISORDER: ICD-10-CM

## 2022-11-15 DIAGNOSIS — F43.10 PTSD (POST-TRAUMATIC STRESS DISORDER): Primary | ICD-10-CM

## 2022-11-15 PROCEDURE — 4010F PR ACE/ARB THEARPY RXD/TAKEN: ICD-10-PCS | Mod: CPTII,S$GLB,, | Performed by: PSYCHOLOGIST

## 2022-11-15 PROCEDURE — 4010F ACE/ARB THERAPY RXD/TAKEN: CPT | Mod: CPTII,S$GLB,, | Performed by: PSYCHOLOGIST

## 2022-11-15 PROCEDURE — 90837 PSYTX W PT 60 MINUTES: CPT | Mod: S$GLB,,, | Performed by: PSYCHOLOGIST

## 2022-11-15 PROCEDURE — 90837 PR PSYCHOTHERAPY W/PATIENT, 60 MIN: ICD-10-PCS | Mod: S$GLB,,, | Performed by: PSYCHOLOGIST

## 2022-11-15 NOTE — Clinical Note
Hello! Just wanted to let you know Ms. Gilmore completed CPT and has done a wonderful job! She is advocating for herself while traveling and attending appointments, and is planning to have a consult at [facility] with a new provider to discuss potential trials. She did amazing! Her PCL-5 score went from 46 to 13. I am going to give her resources for CBTi in case she might be interested in the future, and also told her to touch base with you and watch your [place] videos. Thanks for the referral!

## 2022-11-22 ENCOUNTER — PATIENT MESSAGE (OUTPATIENT)
Dept: GYNECOLOGIC ONCOLOGY | Facility: CLINIC | Age: 72
End: 2022-11-22
Payer: MEDICARE

## 2022-12-08 ENCOUNTER — PATIENT MESSAGE (OUTPATIENT)
Dept: GYNECOLOGIC ONCOLOGY | Facility: CLINIC | Age: 72
End: 2022-12-08
Payer: MEDICARE

## 2022-12-15 ENCOUNTER — TELEPHONE (OUTPATIENT)
Dept: PALLIATIVE MEDICINE | Facility: CLINIC | Age: 72
End: 2022-12-15
Payer: MEDICARE

## 2022-12-16 ENCOUNTER — OFFICE VISIT (OUTPATIENT)
Dept: PALLIATIVE MEDICINE | Facility: CLINIC | Age: 72
End: 2022-12-16
Payer: MEDICARE

## 2022-12-16 VITALS — HEART RATE: 73 BPM | DIASTOLIC BLOOD PRESSURE: 63 MMHG | SYSTOLIC BLOOD PRESSURE: 119 MMHG

## 2022-12-16 DIAGNOSIS — Z51.5 ENCOUNTER FOR PALLIATIVE CARE: Primary | ICD-10-CM

## 2022-12-16 DIAGNOSIS — R53.83 FATIGUE, UNSPECIFIED TYPE: ICD-10-CM

## 2022-12-16 DIAGNOSIS — M79.7 FIBROMYALGIA: ICD-10-CM

## 2022-12-16 DIAGNOSIS — K59.00 CONSTIPATION, UNSPECIFIED CONSTIPATION TYPE: ICD-10-CM

## 2022-12-16 DIAGNOSIS — F43.23 ADJUSTMENT DISORDER WITH MIXED ANXIETY AND DEPRESSED MOOD: ICD-10-CM

## 2022-12-16 DIAGNOSIS — Z71.89 ACP (ADVANCE CARE PLANNING): ICD-10-CM

## 2022-12-16 DIAGNOSIS — G89.3 CANCER RELATED PAIN: ICD-10-CM

## 2022-12-16 DIAGNOSIS — R52 PAIN: ICD-10-CM

## 2022-12-16 DIAGNOSIS — C56.3 MALIGNANT NEOPLASM OF BOTH OVARIES: ICD-10-CM

## 2022-12-16 DIAGNOSIS — F43.10 PTSD (POST-TRAUMATIC STRESS DISORDER): ICD-10-CM

## 2022-12-16 DIAGNOSIS — G47.00 INSOMNIA, UNSPECIFIED TYPE: ICD-10-CM

## 2022-12-16 PROCEDURE — 3078F PR MOST RECENT DIASTOLIC BLOOD PRESSURE < 80 MM HG: ICD-10-PCS | Mod: CPTII,S$GLB,, | Performed by: NURSE PRACTITIONER

## 2022-12-16 PROCEDURE — 4010F PR ACE/ARB THEARPY RXD/TAKEN: ICD-10-PCS | Mod: CPTII,S$GLB,, | Performed by: NURSE PRACTITIONER

## 2022-12-16 PROCEDURE — 99205 PR OFFICE/OUTPT VISIT, NEW, LEVL V, 60-74 MIN: ICD-10-PCS | Mod: S$GLB,,, | Performed by: NURSE PRACTITIONER

## 2022-12-16 PROCEDURE — 3078F DIAST BP <80 MM HG: CPT | Mod: CPTII,S$GLB,, | Performed by: NURSE PRACTITIONER

## 2022-12-16 PROCEDURE — 1125F PR PAIN SEVERITY QUANTIFIED, PAIN PRESENT: ICD-10-PCS | Mod: CPTII,S$GLB,, | Performed by: NURSE PRACTITIONER

## 2022-12-16 PROCEDURE — 4010F ACE/ARB THERAPY RXD/TAKEN: CPT | Mod: CPTII,S$GLB,, | Performed by: NURSE PRACTITIONER

## 2022-12-16 PROCEDURE — 99999 PR PBB SHADOW E&M-EST. PATIENT-LVL II: CPT | Mod: PBBFAC,,, | Performed by: NURSE PRACTITIONER

## 2022-12-16 PROCEDURE — 1125F AMNT PAIN NOTED PAIN PRSNT: CPT | Mod: CPTII,S$GLB,, | Performed by: NURSE PRACTITIONER

## 2022-12-16 PROCEDURE — 99205 OFFICE O/P NEW HI 60 MIN: CPT | Mod: S$GLB,,, | Performed by: NURSE PRACTITIONER

## 2022-12-16 PROCEDURE — 3074F SYST BP LT 130 MM HG: CPT | Mod: CPTII,S$GLB,, | Performed by: NURSE PRACTITIONER

## 2022-12-16 PROCEDURE — 99999 PR PBB SHADOW E&M-EST. PATIENT-LVL II: ICD-10-PCS | Mod: PBBFAC,,, | Performed by: NURSE PRACTITIONER

## 2022-12-16 PROCEDURE — 3074F PR MOST RECENT SYSTOLIC BLOOD PRESSURE < 130 MM HG: ICD-10-PCS | Mod: CPTII,S$GLB,, | Performed by: NURSE PRACTITIONER

## 2022-12-16 RX ORDER — TRAZODONE HYDROCHLORIDE 50 MG/1
50 TABLET ORAL NIGHTLY
Qty: 30 TABLET | Refills: 0 | Status: ON HOLD | OUTPATIENT
Start: 2022-12-16 | End: 2023-04-12 | Stop reason: CLARIF

## 2022-12-16 RX ORDER — NALOXONE HYDROCHLORIDE 4 MG/.1ML
1 SPRAY NASAL ONCE
Qty: 1 EACH | Refills: 0 | Status: SHIPPED | OUTPATIENT
Start: 2022-12-16 | End: 2022-12-16

## 2022-12-16 RX ORDER — OXYCODONE HYDROCHLORIDE 10 MG/1
10 TABLET ORAL EVERY 6 HOURS PRN
Qty: 120 TABLET | Refills: 0 | Status: SHIPPED | OUTPATIENT
Start: 2022-12-16 | End: 2023-02-03 | Stop reason: SDUPTHER
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
## 2022-12-28 ENCOUNTER — PATIENT MESSAGE (OUTPATIENT)
Dept: GYNECOLOGIC ONCOLOGY | Facility: CLINIC | Age: 72
End: 2022-12-28
Payer: MEDICARE

## 2023-01-08 ENCOUNTER — PATIENT MESSAGE (OUTPATIENT)
Dept: PSYCHIATRY | Facility: CLINIC | Age: 73
End: 2023-01-08
Payer: MEDICARE

## 2023-01-27 ENCOUNTER — TELEPHONE (OUTPATIENT)
Dept: PALLIATIVE MEDICINE | Facility: CLINIC | Age: 73
End: 2023-01-27
Payer: MEDICARE

## 2023-01-30 ENCOUNTER — OFFICE VISIT (OUTPATIENT)
Dept: PALLIATIVE MEDICINE | Facility: CLINIC | Age: 73
End: 2023-01-30
Payer: MEDICARE

## 2023-01-30 VITALS — SYSTOLIC BLOOD PRESSURE: 121 MMHG | DIASTOLIC BLOOD PRESSURE: 70 MMHG | HEART RATE: 88 BPM

## 2023-01-30 DIAGNOSIS — C56.3 MALIGNANT NEOPLASM OF BOTH OVARIES: Primary | ICD-10-CM

## 2023-01-30 DIAGNOSIS — M79.7 FIBROMYALGIA: ICD-10-CM

## 2023-01-30 DIAGNOSIS — R52 PAIN: ICD-10-CM

## 2023-01-30 DIAGNOSIS — F43.10 PTSD (POST-TRAUMATIC STRESS DISORDER): ICD-10-CM

## 2023-01-30 DIAGNOSIS — R53.83 FATIGUE, UNSPECIFIED TYPE: ICD-10-CM

## 2023-01-30 DIAGNOSIS — Z51.5 ENCOUNTER FOR PALLIATIVE CARE: ICD-10-CM

## 2023-01-30 DIAGNOSIS — G89.3 CANCER RELATED PAIN: ICD-10-CM

## 2023-01-30 DIAGNOSIS — F43.23 ADJUSTMENT DISORDER WITH MIXED ANXIETY AND DEPRESSED MOOD: ICD-10-CM

## 2023-01-30 DIAGNOSIS — G47.00 INSOMNIA, UNSPECIFIED TYPE: ICD-10-CM

## 2023-01-30 DIAGNOSIS — K59.00 CONSTIPATION, UNSPECIFIED CONSTIPATION TYPE: ICD-10-CM

## 2023-01-30 PROCEDURE — 99417 PROLNG OP E/M EACH 15 MIN: CPT | Mod: S$GLB,,, | Performed by: NURSE PRACTITIONER

## 2023-01-30 PROCEDURE — 3078F PR MOST RECENT DIASTOLIC BLOOD PRESSURE < 80 MM HG: ICD-10-PCS | Mod: CPTII,S$GLB,, | Performed by: NURSE PRACTITIONER

## 2023-01-30 PROCEDURE — 99999 PR PBB SHADOW E&M-EST. PATIENT-LVL IV: CPT | Mod: PBBFAC,,, | Performed by: NURSE PRACTITIONER

## 2023-01-30 PROCEDURE — 99999 PR PBB SHADOW E&M-EST. PATIENT-LVL IV: ICD-10-PCS | Mod: PBBFAC,,, | Performed by: NURSE PRACTITIONER

## 2023-01-30 PROCEDURE — 1125F PR PAIN SEVERITY QUANTIFIED, PAIN PRESENT: ICD-10-PCS | Mod: CPTII,S$GLB,, | Performed by: NURSE PRACTITIONER

## 2023-01-30 PROCEDURE — 99417 PR PROLONGED SVC, OUTPT, W/WO DIRECT PT CONTACT,  EA ADDTL 15 MIN: ICD-10-PCS | Mod: S$GLB,,, | Performed by: NURSE PRACTITIONER

## 2023-01-30 PROCEDURE — 99215 PR OFFICE/OUTPT VISIT, EST, LEVL V, 40-54 MIN: ICD-10-PCS | Mod: S$GLB,,, | Performed by: NURSE PRACTITIONER

## 2023-01-30 PROCEDURE — 1125F AMNT PAIN NOTED PAIN PRSNT: CPT | Mod: CPTII,S$GLB,, | Performed by: NURSE PRACTITIONER

## 2023-01-30 PROCEDURE — 3074F SYST BP LT 130 MM HG: CPT | Mod: CPTII,S$GLB,, | Performed by: NURSE PRACTITIONER

## 2023-01-30 PROCEDURE — 3078F DIAST BP <80 MM HG: CPT | Mod: CPTII,S$GLB,, | Performed by: NURSE PRACTITIONER

## 2023-01-30 PROCEDURE — 1159F PR MEDICATION LIST DOCUMENTED IN MEDICAL RECORD: ICD-10-PCS | Mod: CPTII,S$GLB,, | Performed by: NURSE PRACTITIONER

## 2023-01-30 PROCEDURE — 3074F PR MOST RECENT SYSTOLIC BLOOD PRESSURE < 130 MM HG: ICD-10-PCS | Mod: CPTII,S$GLB,, | Performed by: NURSE PRACTITIONER

## 2023-01-30 PROCEDURE — 1159F MED LIST DOCD IN RCRD: CPT | Mod: CPTII,S$GLB,, | Performed by: NURSE PRACTITIONER

## 2023-01-30 PROCEDURE — 99215 OFFICE O/P EST HI 40 MIN: CPT | Mod: S$GLB,,, | Performed by: NURSE PRACTITIONER

## 2023-01-30 RX ORDER — MORPHINE SULFATE 15 MG/1
15 TABLET, FILM COATED, EXTENDED RELEASE ORAL 2 TIMES DAILY
Qty: 60 TABLET | Refills: 0 | Status: SHIPPED | OUTPATIENT
Start: 2023-01-30 | End: 2023-03-20 | Stop reason: ALTCHOICE
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
## 2023-02-03 RX ORDER — OXYCODONE HYDROCHLORIDE 10 MG/1
10 TABLET ORAL EVERY 6 HOURS PRN
Qty: 120 TABLET | Refills: 0 | Status: SHIPPED | OUTPATIENT
Start: 2023-02-03 | End: 2023-03-10 | Stop reason: SDUPTHER

## 2023-02-22 ENCOUNTER — OFFICE VISIT (OUTPATIENT)
Dept: PSYCHIATRY | Facility: CLINIC | Age: 73
End: 2023-02-22
Payer: MEDICARE

## 2023-02-22 ENCOUNTER — PATIENT MESSAGE (OUTPATIENT)
Dept: PSYCHIATRY | Facility: CLINIC | Age: 73
End: 2023-02-22
Payer: MEDICARE

## 2023-02-22 DIAGNOSIS — F32.A DEPRESSION, UNSPECIFIED DEPRESSION TYPE: ICD-10-CM

## 2023-02-22 DIAGNOSIS — F41.1 GENERALIZED ANXIETY DISORDER: Primary | ICD-10-CM

## 2023-02-22 DIAGNOSIS — C56.3 MALIGNANT NEOPLASM OF BOTH OVARIES: ICD-10-CM

## 2023-02-22 PROCEDURE — 90837 PR PSYCHOTHERAPY W/PATIENT, 60 MIN: ICD-10-PCS | Mod: S$GLB,,, | Performed by: PSYCHOLOGIST

## 2023-02-22 PROCEDURE — 4010F ACE/ARB THERAPY RXD/TAKEN: CPT | Mod: CPTII,S$GLB,, | Performed by: PSYCHOLOGIST

## 2023-02-22 PROCEDURE — 4010F PR ACE/ARB THEARPY RXD/TAKEN: ICD-10-PCS | Mod: CPTII,S$GLB,, | Performed by: PSYCHOLOGIST

## 2023-02-22 PROCEDURE — 90837 PSYTX W PT 60 MINUTES: CPT | Mod: S$GLB,,, | Performed by: PSYCHOLOGIST

## 2023-02-22 PROCEDURE — 1159F PR MEDICATION LIST DOCUMENTED IN MEDICAL RECORD: ICD-10-PCS | Mod: CPTII,S$GLB,, | Performed by: PSYCHOLOGIST

## 2023-02-22 PROCEDURE — 1159F MED LIST DOCD IN RCRD: CPT | Mod: CPTII,S$GLB,, | Performed by: PSYCHOLOGIST

## 2023-02-22 PROCEDURE — 99999 PR PBB SHADOW E&M-EST. PATIENT-LVL I: ICD-10-PCS | Mod: PBBFAC,,, | Performed by: PSYCHOLOGIST

## 2023-02-22 PROCEDURE — 99999 PR PBB SHADOW E&M-EST. PATIENT-LVL I: CPT | Mod: PBBFAC,,, | Performed by: PSYCHOLOGIST

## 2023-02-22 NOTE — PROGRESS NOTES
INFORMED CONSENT: Patient was identified using two patient-identifiers. The patient has been informed of the risks and benefits associated with engaging in psychotherapy, the handling of protected health information, the rights of privacy and the limits of confidentiality. The patient has also been informed of the importance of reporting any suicidal or homicidal ideation to this or any provider to ensure safety of all parties, and the Liat Gilmore expressed understanding. The patient was agreeable to these terms and freely participates in individual psychotherapy.    PSYCHO-ONCOLOGY NOTE/ Individual Psychotherapy     Date: 2/22/2023   [facility]        Therapeutic Intervention: Met with patient.  Outpatient - Insight oriented psychotherapy 60 min - CPT code 28459      Patient was last seen by me on 7/1/2022    Problem list  Patient Active Problem List   Diagnosis    Supraventricular tachycardia    Hypertension    Hypercholesterolemia    Mild obesity    Fibromyalgia    Cough variant asthma    Malignant neoplasm of both ovaries    Generalized anxiety disorder    Lung mass    Multiple lung nodules on CT    Herpes simplex vulvovaginitis    Elevated cancer antigen 125 (CA-125)    PTSD (post-traumatic stress disorder)       Chief complaint/reason for encounter: interpersonal Stress   Met with patient to evaluate psychosocial adaptation to diagnosis/treatment/survivorship of Ovarian Cancer    Current Medications  Current Outpatient Medications   Medication    acyclovir (ZOVIRAX) 400 MG tablet    albuterol 90 mcg/actuation inhaler    ALPRAZolam (XANAX) 0.25 MG tablet    ascorbic acid, vitamin C, (VITAMIN C) 500 MG tablet    b complex vitamins tablet    BREO ELLIPTA 200-25 mcg/dose DsDv diskus inhaler    cyanocobalamin/cobamamide (B12 SL)    cycloSPORINE (RESTASIS) 0.05 % ophthalmic emulsion    diclofenac sodium (VOLTAREN) 1 % Gel    docosahexaenoic acid/epa (FISH OIL ORAL)    docusate sodium (COLACE ORAL)     "EASY TOUCH INSULIN SYRINGE 1 mL 30 gauge x 1/2" Syrg     MISTLETOE SUBQ    fluticasone (FLONASE) 50 mcg/actuation nasal spray    LIDOcaine-prilocaine (EMLA) cream    losartan (COZAAR) 100 MG tablet    morphine (MS CONTIN) 15 MG 12 hr tablet    multivitamin capsule    ondansetron (ZOFRAN) 8 MG tablet    oxyCODONE (ROXICODONE) 10 mg Tab immediate release tablet    paroxetine (PAXIL) 10 MG tablet    pitavastatin calcium (LIVALO) 2 mg Tab tablet    promethazine (PHENERGAN) 6.25 mg/5 mL syrup    promethazine-codeine 6.25-10 mg/5 ml (PHENERGAN WITH CODEINE) 6.25-10 mg/5 mL syrup    senna (SENOKOT) 8.6 mg tablet    TIADYLT  mg Cs24    traZODone (DESYREL) 50 MG tablet    vitamin A 08448 UNIT capsule    zafirlukast (ACCOLATE) 20 MG tablet     No current facility-administered medications for this visit.     Facility-Administered Medications Ordered in Other Visits   Medication Frequency    heparin, porcine (PF) 100 unit/mL injection flush 500 Units PRN    sodium chloride 0.9% flush 10 mL PRN       Objective:  Liat Gilmore arrived promptly for the session.  The patient was fully cooperative throughout the session.  Appearance: age appropriate, appropriately  dressed, adequately  groomed  Behavior/Cooperation: friendly and cooperative  Speech: normal in rate, volume, and tone and appropriate quality, quantity and organization of sentences  Mood: anxious, sad  Affect: mood congruent  Thought Process: goal-directed, logical  Thought Content: normal,  No delusions or paranoia; did not appear to be responding to internal stimuli during the session  Orientation: grossly intact  Memory: Grossly intact  Attention Span/Concentration: Attends to session without distraction; reports no difficulty  Fund of Knowledge: average  Estimate of Intelligence: average from verbal skills and history  Cognition: grossly intact  Insight: patient has awareness of illness; good insight into own behavior and behavior of others  Judgment: " the patient's behavior is adequate to circumstances    Interval history and content of current session: Patient returnes from [facility] after delcined for clinical trial. Ashlieen michel moving forward with quality of life and comfort care.   Discussed diagnosis, treatment, prognosis, current adaptation to disease and treatment status, and family's adaptation to disease and treatment status. Reports to be coping with great difficulty. Evaluated cognitive response, paying particular attention to negative intrusive thoughts of a persistent and detrimental nature. Thoughts of this type are in evidence with moderate distress. Provided cognitive behavioral therapy to address negative cognitions. Identified and evaluated psychosocial and environmental stressors secondary to diagnosis and treatment.  Examined proactive behaviors that may be implemented to minimize or ameliorate psychosocial stressors secondary to diagnosis and treatment.     Risk parameters:   Patient reports no suicidal ideation  Patient reports no homicidal ideation  Patient reports no self-injurious behavior  Patient reports no violent behavior   Safety needs:  None at this time      Verbal deficits: None     Patient's response to intervention:The patient's response to intervention is accepting.     Progress toward goals and other mental status changes:  The patient's progress toward goals is good.      Progress to date:Progress as Expected      Goals from last visit: Met     Patient reported outcomes:    Distress Thermometer:   Distress Score    Distress Score: 9        Practical Problems Physical Problems                                                   Family Problems                                         Emotional Problems                                                         Spiritual/Religions Concerns     Spiritual / Congregation Concerns: No         Other Problems               PHQ ANSWERS    Q1. Little interest or pleasure in doing things: (P) More  than half the days (02/20/23 1343)  Q2. Feeling down, depressed, or hopeless: (P) More than half the days (02/20/23 1343)  Q3. Trouble falling or staying asleep, or sleeping too much: (P) Not at all (02/20/23 1343)  Q4. Feeling tired or having little energy: (P) Several days (02/20/23 1343)  Q5. Poor appetite or overeating: (P) Not at all (02/20/23 1343)  Q6. Feeling bad about yourself - or that you are a failure or have let yourself or your family down: (P) More than half the days (02/20/23 1343)  Q7. Trouble concentrating on things, such as reading the newspaper or watching television: (P) Several days (02/20/23 1343)  Q8. Moving or speaking so slowly that other people could have noticed. Or the opposite - being so fidgety or restless that you have been moving around a lot more than usual: (P) Not at all (02/20/23 1343)  Q9.  0    PHQ8 Score : (P) 8 (02/20/23 1343)  PHQ-9 Total Score: (P) 8 (02/20/23 1343)     CLOVER-7 Answers    GAD7 2/20/2023   1. Feeling nervous, anxious, or on edge? 3   2. Not being able to stop or control worrying? 2   3. Worrying too much about different things? 0   4. Trouble relaxing? 2   5. Being so restless that it is hard to sit still? 0   6. Becoming easily annoyed or irritable? 0   7. Feeling afraid as if something awful might happen? 1   CLOVER-7 Score 8   Some encounter information is confidential and restricted. Go to Review [employer] activity to see all data.     CLOVER-7 Score: (P) 8  Interpretation: (P) Mild Anxiety     Client Strengths: verbal, intelligent, successful, good social support, good insight, commitment to wellness, strong suha, strong cultural traditions     Diagnosis:     ICD-10-CM ICD-9-CM   1. Generalized anxiety disorder  F41.1 300.02   2. Malignant neoplasm of both ovaries  C56.3 183.0   3. Depression, unspecified depression type  F32.A 311        Treatment Plan:individual psychotherapy and medication management by physician  Target symptoms: adjustment  Why chosen  therapy is appropriate versus another modality: relevant to diagnosis, patient responds to this modality, evidence based practice  Outcome monitoring methods: self-report, observation, checklist/rating scale  Therapeutic intervention type: insight oriented psychotherapy, behavior modifying psychotherapy  Prognosis: Good      Behavioral goals:    Exercise:   Stress management: Daughter to accompany patient to next appointment to discuss end of life    Social engagement:   Nutrition:   Smoking Cessation:   Therapy:  Increase daily self-care and attention to health management  Pleasant events scheduling and increased social interaction    Return to clinic: 1 week    Next Session:      Length of Service (minutes direct face-to-face contact): 60    Neetu Valdez, PhD  Clinical Psychologist  [place] License #[id number]  [place] License #[id number]

## 2023-02-28 ENCOUNTER — TELEPHONE (OUTPATIENT)
Dept: INFUSION THERAPY | Facility: HOSPITAL | Age: 73
End: 2023-02-28
Payer: MEDICARE

## 2023-03-03 ENCOUNTER — TELEPHONE (OUTPATIENT)
Dept: GYNECOLOGIC ONCOLOGY | Facility: CLINIC | Age: 73
End: 2023-03-03
Payer: MEDICARE

## 2023-03-03 NOTE — TELEPHONE ENCOUNTER
----- Message from Lexy Pate RN sent at 3/3/2023  3:51 PM CST -----    ----- Message -----  From: Rickey Lee  Sent: 3/3/2023   3:45 PM CST  To: [facility] Staff     Name of Who is Calling:     What is the request in detail:  patient request call back in reference to reschedule appointment Please contact to further discuss and advise      Can the clinic reply by [facility]:     What Number to Call Back if not in [facility]:  [phone]

## 2023-03-08 ENCOUNTER — OFFICE VISIT (OUTPATIENT)
Dept: PSYCHIATRY | Facility: CLINIC | Age: 73
End: 2023-03-08
Payer: MEDICARE

## 2023-03-08 DIAGNOSIS — C56.3 MALIGNANT NEOPLASM OF BOTH OVARIES: ICD-10-CM

## 2023-03-08 DIAGNOSIS — F32.A DEPRESSION, UNSPECIFIED DEPRESSION TYPE: ICD-10-CM

## 2023-03-08 DIAGNOSIS — F41.1 GENERALIZED ANXIETY DISORDER: Primary | ICD-10-CM

## 2023-03-08 PROCEDURE — 4010F ACE/ARB THERAPY RXD/TAKEN: CPT | Mod: CPTII,S$GLB,, | Performed by: PSYCHOLOGIST

## 2023-03-08 PROCEDURE — 99999 PR PBB SHADOW E&M-EST. PATIENT-LVL II: ICD-10-PCS | Mod: PBBFAC,,, | Performed by: PSYCHOLOGIST

## 2023-03-08 PROCEDURE — 99999 PR PBB SHADOW E&M-EST. PATIENT-LVL II: CPT | Mod: PBBFAC,,, | Performed by: PSYCHOLOGIST

## 2023-03-08 PROCEDURE — 90837 PR PSYCHOTHERAPY W/PATIENT, 60 MIN: ICD-10-PCS | Mod: S$GLB,,, | Performed by: PSYCHOLOGIST

## 2023-03-08 PROCEDURE — 4010F PR ACE/ARB THEARPY RXD/TAKEN: ICD-10-PCS | Mod: CPTII,S$GLB,, | Performed by: PSYCHOLOGIST

## 2023-03-08 PROCEDURE — 90837 PSYTX W PT 60 MINUTES: CPT | Mod: S$GLB,,, | Performed by: PSYCHOLOGIST

## 2023-03-08 NOTE — PROGRESS NOTES
INFORMED CONSENT: Patient was identified using two patient-identifiers. The patient has been informed of the risks and benefits associated with engaging in psychotherapy, the handling of protected health information, the rights of privacy and the limits of confidentiality. The patient has also been informed of the importance of reporting any suicidal or homicidal ideation to this or any provider to ensure safety of all parties, and the Liat Gilmore expressed understanding. The patient was agreeable to these terms and freely participates in individual psychotherapy.    PSYCHO-ONCOLOGY NOTE/ Individual Psychotherapy     Date: 3/8/2023   [facility]        Therapeutic Intervention: Met with patient and daughter.  Outpatient - Insight oriented psychotherapy 60 min - CPT code 59456      Patient was last seen by me on 2/22/2023    Problem list  Patient Active Problem List   Diagnosis    Supraventricular tachycardia    Hypertension    Hypercholesterolemia    Mild obesity    Fibromyalgia    Cough variant asthma    Malignant neoplasm of both ovaries    Generalized anxiety disorder    Lung mass    Multiple lung nodules on CT    Herpes simplex vulvovaginitis    Elevated cancer antigen 125 (CA-125)    PTSD (post-traumatic stress disorder)       Chief complaint/reason for encounter: anxiety and interpersonal   Met with patient to evaluate psychosocial adaptation to diagnosis/treatment/survivorship of Ovarian    Current Medications  Current Outpatient Medications   Medication    acyclovir (ZOVIRAX) 400 MG tablet    albuterol 90 mcg/actuation inhaler    ALPRAZolam (XANAX) 0.25 MG tablet    ascorbic acid, vitamin C, (VITAMIN C) 500 MG tablet    b complex vitamins tablet    BREO ELLIPTA 200-25 mcg/dose DsDv diskus inhaler    cyanocobalamin/cobamamide (B12 SL)    cycloSPORINE (RESTASIS) 0.05 % ophthalmic emulsion    diclofenac sodium (VOLTAREN) 1 % Gel    docosahexaenoic acid/epa (FISH OIL ORAL)    docusate sodium (COLACE  "ORAL)    EASY TOUCH INSULIN SYRINGE 1 mL 30 gauge x 1/2" Syrg     MISTLETOE SUBQ    fluticasone (FLONASE) 50 mcg/actuation nasal spray    LIDOcaine-prilocaine (EMLA) cream    losartan (COZAAR) 100 MG tablet    multivitamin capsule    ondansetron (ZOFRAN) 8 MG tablet    paroxetine (PAXIL) 10 MG tablet    pitavastatin calcium (LIVALO) 2 mg Tab tablet    promethazine (PHENERGAN) 6.25 mg/5 mL syrup    promethazine-codeine 6.25-10 mg/5 ml (PHENERGAN WITH CODEINE) 6.25-10 mg/5 mL syrup    senna (SENOKOT) 8.6 mg tablet    TIADYLT  mg Cs24    traZODone (DESYREL) 50 MG tablet    vitamin A 63320 UNIT capsule    zafirlukast (ACCOLATE) 20 MG tablet     No current facility-administered medications for this visit.     Facility-Administered Medications Ordered in Other Visits   Medication Frequency    heparin, porcine (PF) 100 unit/mL injection flush 500 Units PRN    sodium chloride 0.9% flush 10 mL PRN       Objective:  Liat Gilmore arrived promptly for the session. Her daughter was also present during the interview, with the consent of Liat Gilmore.    The patient was fully cooperative throughout the session.  Appearance: age appropriate, appropriately  dressed, adequately  groomed  Behavior/Cooperation: friendly and cooperative  Speech: normal in rate, volume, and tone and appropriate quality, quantity and organization of sentences  Mood: anxious, sad  Affect: mood congruent  Thought Process: goal-directed, logical  Thought Content: normal,  No delusions or paranoia; did not appear to be responding to internal stimuli during the session  Orientation: grossly intact  Memory: Grossly intact  Attention Span/Concentration: Attends to session without distraction; reports no difficulty  Fund of Knowledge: average  Estimate of Intelligence: average from verbal skills and history  Cognition: grossly intact  Insight: patient has awareness of illness; good insight into own behavior and behavior of others  Judgment: the " patient's behavior is adequate to circumstances    Interval history and content of current session: Discussed patients end of life concerns with daughter.  Discussed current adaptation to disease and treatment status and family's adaptation to disease and treatment status. Reports to be coping with great difficulty. Evaluated cognitive response, paying particular attention to negative intrusive thoughts of a persistent and detrimental nature. Thoughts of this type are in evidence with moderate distress. Provided cognitive behavioral therapy to address negative cognitions. Identified and evaluated psychosocial and environmental stressors secondary to diagnosis and treatment.  Examined proactive behaviors that may be implemented to minimize or ameliorate psychosocial stressors secondary to diagnosis and treatment.     Risk parameters:   Patient reports no suicidal ideation  Patient reports no homicidal ideation  Patient reports no self-injurious behavior  Patient reports no violent behavior   Safety needs:  None at this time      Verbal deficits: None     Patient's response to intervention:The patient's response to intervention is accepting.     Progress toward goals and other mental status changes:  The patient's progress toward goals is good.      Progress to date:Progress as Expected      Goals from last visit: Met     Patient reported outcomes:    Distress Thermometer:   DISTRESS SCREENING 3/8/2023 2/20/2023 7/1/2022 2/17/2022 1/21/2022 1/4/2022 12/1/2021   Distress Score 7 9 8 8 6 7 7   Practical Problems Treatment Decisions Treatment Decisions Insurance/Financial - - - -   Family Problems Dealing with Children;Family Health Issues Dealing with Children None of these - - - -   Emotional Problems Depression;Fears;Nervousness;Sadness;Worry;Loss of Interest;None of these Sadness Fears;Worry - - - -   Spiritual / Judaism Concerns No No No No - Yes No   Physical Problems Breathing;Changes in  urination;Constipation;Eating;Fatigue;Indigestion;Memory/Concentration;Nausea;Nose Dry;Pain;Skin Dry/Itchy Skin Dry/Itchy Breathing;Constipation;Fatigue;Mouth Sores - - - -         PHQ-9=    CLOVER-7=    PHQ ANSWERS  PHQ-9 Depression Patient Health Questionnaire 3/8/2023   Little interest or pleasure in doing things 1   Feeling down, depressed, or hopeless 2   Trouble falling or staying asleep, or sleeping too much 1   Feeling tired or having little energy 1   Poor appetite or overeating 1   Feeling bad about yourself - or that you are a failure or have let yourself or your family down 2   Trouble concentrating on things, such as reading the newspaper or watching television 0   Moving or speaking so slowly that other people could have noticed. Or the opposite - being so fidgety or restless that you have been moving around a lot more than usual 0   Some encounter information is confidential and restricted. Go to Review [employer] activity to see all data.        CLOVER-7 Answers  GAD7 3/8/2023 2/20/2023 7/1/2022   1. Feeling nervous, anxious, or on edge? 3 3 3   2. Not being able to stop or control worrying? 1 2 3   3. Worrying too much about different things? 1 0 2   4. Trouble relaxing? 1 2 2   5. Being so restless that it is hard to sit still? 0 0 0   6. Becoming easily annoyed or irritable? 0 0 0   7. Feeling afraid as if something awful might happen? 1 1 0   CLOVER-7 Score 7 8 10   Some encounter information is confidential and restricted. Go to Review [employer] activity to see all data.            Client Strengths: verbal, intelligent, successful, good social support, good insight, commitment to wellness, strong suha, strong cultural traditions     Diagnosis:     ICD-10-CM ICD-9-CM   1. Generalized anxiety disorder  F41.1 300.02   2. Malignant neoplasm of both ovaries  C56.3 183.0   3. Depression, unspecified depression type  F32.A 311       Treatment Plan:individual psychotherapy and medication management by  physician  Target symptoms: anxiety , adjustment, interpersonal  Why chosen therapy is appropriate versus another modality: relevant to diagnosis, patient responds to this modality, evidence based practice  Outcome monitoring methods: self-report, observation, checklist/rating scale  Therapeutic intervention type: insight oriented psychotherapy, behavior modifying psychotherapy  Prognosis: Good      Behavioral goals:    Exercise:   Stress management:   Social engagement:   Nutrition:   Smoking Cessation:   Therapy:  Increase daily self-care and attention to health management  Pleasant events scheduling and increased social interaction  Improve interpersonal effectiveness and communication skills    Return to clinic: as scheduled    Next Session:      Length of Service (minutes direct face-to-face contact): 60    Neetu Valdez, PhD  Clinical Psychologist  [place] License #[id number]  [place] License #[id number]

## 2023-03-09 ENCOUNTER — OFFICE VISIT (OUTPATIENT)
Dept: GYNECOLOGIC ONCOLOGY | Facility: CLINIC | Age: 73
End: 2023-03-09
Payer: MEDICARE

## 2023-03-09 VITALS
BODY MASS INDEX: 27.59 KG/M2 | WEIGHT: 160.69 LBS | DIASTOLIC BLOOD PRESSURE: 57 MMHG | SYSTOLIC BLOOD PRESSURE: 121 MMHG | HEART RATE: 84 BPM

## 2023-03-09 DIAGNOSIS — C56.3 MALIGNANT NEOPLASM OF BOTH OVARIES: Primary | ICD-10-CM

## 2023-03-09 PROCEDURE — 3078F DIAST BP <80 MM HG: CPT | Mod: CPTII,S$GLB,, | Performed by: OBSTETRICS & GYNECOLOGY

## 2023-03-09 PROCEDURE — 99999 PR PBB SHADOW E&M-EST. PATIENT-LVL IV: CPT | Mod: PBBFAC,,, | Performed by: OBSTETRICS & GYNECOLOGY

## 2023-03-09 PROCEDURE — 3078F PR MOST RECENT DIASTOLIC BLOOD PRESSURE < 80 MM HG: ICD-10-PCS | Mod: CPTII,S$GLB,, | Performed by: OBSTETRICS & GYNECOLOGY

## 2023-03-09 PROCEDURE — 3288F PR FALLS RISK ASSESSMENT DOCUMENTED: ICD-10-PCS | Mod: CPTII,S$GLB,, | Performed by: OBSTETRICS & GYNECOLOGY

## 2023-03-09 PROCEDURE — 4010F PR ACE/ARB THEARPY RXD/TAKEN: ICD-10-PCS | Mod: CPTII,S$GLB,, | Performed by: OBSTETRICS & GYNECOLOGY

## 2023-03-09 PROCEDURE — 1159F MED LIST DOCD IN RCRD: CPT | Mod: CPTII,S$GLB,, | Performed by: OBSTETRICS & GYNECOLOGY

## 2023-03-09 PROCEDURE — 3008F BODY MASS INDEX DOCD: CPT | Mod: CPTII,S$GLB,, | Performed by: OBSTETRICS & GYNECOLOGY

## 2023-03-09 PROCEDURE — 99024 POSTOP FOLLOW-UP VISIT: CPT | Mod: S$GLB,,, | Performed by: OBSTETRICS & GYNECOLOGY

## 2023-03-09 PROCEDURE — 3074F SYST BP LT 130 MM HG: CPT | Mod: CPTII,S$GLB,, | Performed by: OBSTETRICS & GYNECOLOGY

## 2023-03-09 PROCEDURE — 3074F PR MOST RECENT SYSTOLIC BLOOD PRESSURE < 130 MM HG: ICD-10-PCS | Mod: CPTII,S$GLB,, | Performed by: OBSTETRICS & GYNECOLOGY

## 2023-03-09 PROCEDURE — 3008F PR BODY MASS INDEX (BMI) DOCUMENTED: ICD-10-PCS | Mod: CPTII,S$GLB,, | Performed by: OBSTETRICS & GYNECOLOGY

## 2023-03-09 PROCEDURE — 99024 PR POST-OP FOLLOW-UP VISIT: ICD-10-PCS | Mod: S$GLB,,, | Performed by: OBSTETRICS & GYNECOLOGY

## 2023-03-09 PROCEDURE — 1159F PR MEDICATION LIST DOCUMENTED IN MEDICAL RECORD: ICD-10-PCS | Mod: CPTII,S$GLB,, | Performed by: OBSTETRICS & GYNECOLOGY

## 2023-03-09 PROCEDURE — 1126F AMNT PAIN NOTED NONE PRSNT: CPT | Mod: CPTII,S$GLB,, | Performed by: OBSTETRICS & GYNECOLOGY

## 2023-03-09 PROCEDURE — 3288F FALL RISK ASSESSMENT DOCD: CPT | Mod: CPTII,S$GLB,, | Performed by: OBSTETRICS & GYNECOLOGY

## 2023-03-09 PROCEDURE — 1101F PT FALLS ASSESS-DOCD LE1/YR: CPT | Mod: CPTII,S$GLB,, | Performed by: OBSTETRICS & GYNECOLOGY

## 2023-03-09 PROCEDURE — 4010F ACE/ARB THERAPY RXD/TAKEN: CPT | Mod: CPTII,S$GLB,, | Performed by: OBSTETRICS & GYNECOLOGY

## 2023-03-09 PROCEDURE — 1101F PR PT FALLS ASSESS DOC 0-1 FALLS W/OUT INJ PAST YR: ICD-10-PCS | Mod: CPTII,S$GLB,, | Performed by: OBSTETRICS & GYNECOLOGY

## 2023-03-09 PROCEDURE — 1126F PR PAIN SEVERITY QUANTIFIED, NO PAIN PRESENT: ICD-10-PCS | Mod: CPTII,S$GLB,, | Performed by: OBSTETRICS & GYNECOLOGY

## 2023-03-09 PROCEDURE — 99999 PR PBB SHADOW E&M-EST. PATIENT-LVL IV: ICD-10-PCS | Mod: PBBFAC,,, | Performed by: OBSTETRICS & GYNECOLOGY

## 2023-03-09 RX ORDER — OXYCODONE HYDROCHLORIDE 10 MG/1
1 TABLET ORAL EVERY 6 HOURS PRN
COMMUNITY
Start: 2023-02-03 | End: 2023-03-10

## 2023-03-09 RX ORDER — PITAVASTATIN MAGNESIUM 2 MG/1
2 TABLET, FILM COATED ORAL DAILY
COMMUNITY
Start: 2022-11-17 | End: 2023-04-04

## 2023-03-09 RX ORDER — ERGOCALCIFEROL 1.25 MG/1
1 CAPSULE ORAL
COMMUNITY
Start: 2022-10-24

## 2023-03-09 RX ORDER — NYSTATIN 100000 U/G
1 CREAM TOPICAL
Status: ON HOLD | COMMUNITY
Start: 2022-11-14 | End: 2023-04-12 | Stop reason: CLARIF

## 2023-03-09 NOTE — PROGRESS NOTES
PATIENT LEFT PRIOR TO BEING SEEN DUE TO OTHER MD APPOINTMENTS    REFERRING PROVIDER  Francisco Foster     INTERVAL HISTORY  CC: Recurrent IIIC Ovarian Cancer Treatment Planning / Prechemo      Liat Gilmore is a 72 y.o. with recurrent Stage IIIC HGSOC.  She is s/p 6 cycles and was in response to Carboplatin and Doxil on 6/15/2022 when she decided to take a treatment break.  She went to [place] and started [facility] therapy in April 2022.  She had a consultation with Dr. Mynor Contreras at [facility] 11/29/2022 and had 1 cycle of Cis / [place] 12/30/2022.             HISTORY OF PRESENT CONDITION  (As summarized in Dr. Foster's notes)  March 2020 patient began to notice some lower abdominal discomfort.  This was at the height of COVID-19 pandemic.  She was referred to a urologist who did a tele health visit.  An ultrasound in May 2020 showed a complex cyst in the middle pole of the kidney but a pelvic cyst as well.  The patient then returned to see her gynecologist Dr. Marliy Alston in [place].  An ultrasound showed a complex multi-cystic mass posterior to the uterus; left adnexa 57 x 36 mm cyst and a 30 x 23 x 23 cm complex cyst; right adnexa with a 37 x 31 cm cyst; endometrium not clearly seen.  At that time she was having difficulty with bowel movements.     5/8/202 : 173     6/2/2020 CT AP:  Bilateral solid and cystic adnexal mass lesions (right 6.7 cm and left 6.3 cm) suspicious for primary ovarian malignancy along with innumerable bilateral lower lobe pulmonary nodules most consistent with metastatic disease.  Subcentimeter soft tissue nodular densities within the omentum as above. These are indeterminate on this exam, however peritoneal metastatic implants cannot be excluded.      6/22/2020  Exploratory laparotomy, modified radical hysterectomy with radical resection of bilateral ovarian masses with optimal tumor reductive surgery including resection of pelvic disease, excision of numerous tumor implants,  infracolic omentectomy with resection of a 2 cm omental nodule, bilateral pelvic and periaortic lymph node dissection and appendectomy.   (Dr. Мария Ratliff)  FINDINGS:  optimally resected to R0. The upper abdominal disease include omental nodule, peritoneal implants, bowel mesentery implants and right diaphragm implants, which were all excised.  PATHOLOGY:high grade serous ovarian ca bilaterally, numerous implants (omental, peritoneal, diaphragm, SB mesentery), negative nodes, positive washings. HER2 negative; MSI stable;  HRD ngeative.     7/1/2020 :  114     7/6/2020 CT Chest:  Bilateral pulmonary nodules as detailed most consistent with metastatic disease.  Peritoneal soft tissue nodularity in the left upper quadrant unchanged from the prior exam and again consistent with metastatic disease.  Bibasilar atelectasis.     7/16/2020 Cycle #1 Carbo / Taxol (transferred to [facility] -details in Dr. Foster's prior notes)  8/14/2020 Cycle #2 Carbo / Taxol (8/5/2020 :  25)  9/3/2020 Cycle #3 Carbo / Taxol (9/2/2020 : 12)  9/24/2020 Cycle #4 Carbo / Taxol (9/23/202 : 10)  10/16/2020 Cycle #5 Carbo / Taxol   11/5/2020  Cycle #6 Carbo / Taxol     11/24/2020 : 11  1/26/2021 : 12  5/10/2021 : 11  6/8/2021 :  11     8/31/2021 CT CAP:  there are innumerable nodules in the lungs bilaterally, concerning for metastatic disease.  Left kidney cyst.  Additional findings as above.     9/13/2021  :  55    10/13/2021 :  152     10/13/2021 CT CAP: No local recurrence identified.  Multiple pulmonary nodules/opacities without significant interval change.  Random nodules could indicate metastatic disease but are nonspecific.  Several of the larger opacities have configuration suggestive of endobronchial material in dilated bronchi from airway infection/inflammation.  Also, clustered micro nodules are consistent with small airway infection/inflammation.  A 1.2 cm hypodense lesion in left lobe of  liver, unchanged and nonspecific.     11/24/2020 CT CAP:  there is no evidence of local disease recurrence.  There are multiple pulmonary nodules and irregular opacities scattered throughout both lungs, unchanged in appearance from prior exam 08/31/2020.  While some solid nodules may reflect metastatic disease, majority of the pulmonary opacities demonstrate tubular and/or branching configurations, which can be seen with mucoid impaction, and tree-in-bud configuration, suggesting small airways infection/inflammation (for example MAC infection, given the pattern).  Stable indeterminate 1.2 cm hepatic lesion in the caudate lobe.     12/9/2020  PET/CT:  In the abdomen and pelvis, there is physiologic tracer distribution within the abdominal organs.  There is mildly hypermetabolic solid nodules in both lungs equivocal for metastases.  Given indications of old granulomatous disease and clustered distribution of nodules in the anterior right upper and right lower lobes, noncalcified granulomas are a differential consideration and not mutually exclusive.  Tissue sampling would be required for definitive diagnosis, and the largest nodules in the lower lobes may be amenable to percutaneous biopsy.  Additional mildly FDG avid regions of ground-glass attenuation are identified in the right lung, possibly reflecting small airways infection or inflammation with additional metastatic foci not definitively excluded.      10/22/2021 CT CAP:  interval development of multiple hypodensities scattered along the periphery of the liver concerning for peritoneal implants.  Stable pulmonary nodules and irregular opacities scattered throughout both lungs.  While some nodules may reflect metastatic disease, majority demonstrate a tubular and branching pattern suggesting mucoid impaction in a pattern consistent with MAC infection.  Incisional hernia at the midline of the lower abdomen containing a few loops of nonobstructed small bowel.      11/17/2021:  Cycle #1 Carbo/ Doxil  ( 11/5/2021 = 337) (Udenyca added for next)  12/29/2021:  Cycle #2 Carbo / Doxil ( 12/13/2021 = 105) (delayed for low ANC and [place]  1/26/2022: Cycle #3 Carbo / Doxil ( 1/24/2021 = 33)     2/15/2022 CT CAP:  Decreased size of the previously described hepatic lesions/peritoneal implants in this patient with history of ovarian cancer and known metastases, consistent with response to treatment.  No definite new liver lesions.  Stable, 4.5 cm low-density lesion along the periphery of the posterior right hepatic lobe, concerning for peritoneal implant.  Stable pulmonary nodules and irregular opacities, majority of which are favored to represent a pattern consistent with mucoid impaction or MAC infection.  No definite new nodules allowing for widespread disease.  Small incisional hernia containing a few loops of nonobstructed bowel with the hernia sac slightly increased in size from prior exam.     3/28/2022 [facility] Therapy Started M-W-F 4/6/2022:  Cycle #4 Carbo/Doxil ( 4/4/2022 = 60)  5/12/2022:  Cycle #5 Carbo/Doxil (delayed for low ANC and platelets;  5/2/2022 = 16)  6/15/2022:  Cycle #6 Carbo / Doxil (delayed for low ANC;  6/14/2022 =  15)     8/10/2022  = 22  11/8/2022  =  550  11/29/2022  = 658.2 (MDA)    12/1/2022 CT CAP:  Pulmonary metastases are noted. The metastatic lesion in the left upper lobe (series 6 image 34) measures 1.1 x 0.9 cm. The metastatic lesion in the right lower lobe measures 1.9 x 1.2 cm. Atelectatic changes are noted within the lung bases.     A calcification is noted within the left breast. The tip of the Port-A-Cath terminates within the SVC.     Abdomen and pelvis: Right subdiaphragmatic implant is noted measures 7.0 x 4.1 cm. Hepatic metastases are identified. The metastatic lesion in segment IV measures 2.9 x 2.4 cm. The metastatic lesion in segment V measures 2.5 x 2.1 cm. There is no  intrahepatic or extra hepatic biliary ductal dilation.  The spleen, the adrenal glands and the pancreas are normal.  There is portacaval adenopathy and measures 2.6 x 1.8 cm. Hepatic artery adenopathy is noted measures 2.3 x 2.5 cm.  The kidneys demonstrate symmetric contrast enhancement and there is no evidence of hydronephrosis. A cyst is noted within the left kidney.  There is no retroperitoneal adenopathy. Postoperative changes related to retroperitoneal dissection are noted.  Sections through the pelvis demonstrate no fluid collections.  The uterus has been resected. No adnexal masses are noted. A ventral abdominal hernia containing small bowel loops is noted. There is no evidence of incarceration or strangulation.  Nonspecific thickening of the sigmoid colonic wall is noted, and serosal involvement is not excluded. The implant in the right lower quadrant measures 1.5 x 1.4 cm.  No skeletal metastases are identified.  Hepatic and pulmonary metastases are noted. Peritoneal carcinomatosis is identified. Right subdiaphragmatic implant is noted measures 7.0 x 4.1 cm.  Portacaval and hepatic artery adenopathy represents metastatic disease. A ventral abdominal hernia containing small bowel loops is noted. There is no evidence of incarceration or strangulation.    12/30/2022  = 1,089 (MDA)    12/30/2022 Cis/[place] #1 (MDA)    2/10/2023  = 976.7  2/17/2023  = 1,140     LABORATORY DATA  Lab data reviewed.    RADIOLOGICAL DATA  Radiology data reviewed.    PATHOLOGY DATA  Pathology data reviewed.    ASSESSMENT    1. Malignant neoplasm of both ovaries          PLAN

## 2023-03-10 ENCOUNTER — TELEPHONE (OUTPATIENT)
Dept: GYNECOLOGIC ONCOLOGY | Facility: CLINIC | Age: 73
End: 2023-03-10
Payer: MEDICARE

## 2023-03-10 ENCOUNTER — PATIENT MESSAGE (OUTPATIENT)
Dept: PALLIATIVE MEDICINE | Facility: CLINIC | Age: 73
End: 2023-03-10
Payer: MEDICARE

## 2023-03-10 NOTE — TELEPHONE ENCOUNTER
Called the patient about her reschedule appointment time on the 15th from 3:15pm to 1pm she ask that I give her a call back she was having a asthma attack

## 2023-03-17 ENCOUNTER — TELEPHONE (OUTPATIENT)
Dept: PALLIATIVE MEDICINE | Facility: CLINIC | Age: 73
End: 2023-03-17
Payer: MEDICARE

## 2023-03-20 ENCOUNTER — OFFICE VISIT (OUTPATIENT)
Dept: PALLIATIVE MEDICINE | Facility: CLINIC | Age: 73
End: 2023-03-20
Payer: MEDICARE

## 2023-03-20 VITALS
BODY MASS INDEX: 26.98 KG/M2 | DIASTOLIC BLOOD PRESSURE: 52 MMHG | SYSTOLIC BLOOD PRESSURE: 104 MMHG | WEIGHT: 158.06 LBS | OXYGEN SATURATION: 95 % | HEART RATE: 87 BPM | HEIGHT: 64 IN | TEMPERATURE: 98 F

## 2023-03-20 DIAGNOSIS — R11.0 NAUSEA: ICD-10-CM

## 2023-03-20 DIAGNOSIS — Z51.5 ENCOUNTER FOR PALLIATIVE CARE: Primary | ICD-10-CM

## 2023-03-20 DIAGNOSIS — G89.3 CANCER RELATED PAIN: ICD-10-CM

## 2023-03-20 DIAGNOSIS — F43.23 ADJUSTMENT DISORDER WITH MIXED ANXIETY AND DEPRESSED MOOD: ICD-10-CM

## 2023-03-20 DIAGNOSIS — K59.00 CONSTIPATION, UNSPECIFIED CONSTIPATION TYPE: ICD-10-CM

## 2023-03-20 DIAGNOSIS — C56.3 MALIGNANT NEOPLASM OF BOTH OVARIES: ICD-10-CM

## 2023-03-20 DIAGNOSIS — R53.83 FATIGUE, UNSPECIFIED TYPE: ICD-10-CM

## 2023-03-20 DIAGNOSIS — G47.00 INSOMNIA, UNSPECIFIED TYPE: ICD-10-CM

## 2023-03-20 PROCEDURE — 99215 PR OFFICE/OUTPT VISIT, EST, LEVL V, 40-54 MIN: ICD-10-PCS | Mod: S$GLB,,, | Performed by: NURSE PRACTITIONER

## 2023-03-20 PROCEDURE — 99417 PROLNG OP E/M EACH 15 MIN: CPT | Mod: S$GLB,,, | Performed by: NURSE PRACTITIONER

## 2023-03-20 PROCEDURE — 99999 PR PBB SHADOW E&M-EST. PATIENT-LVL IV: CPT | Mod: PBBFAC,,, | Performed by: NURSE PRACTITIONER

## 2023-03-20 PROCEDURE — 4010F ACE/ARB THERAPY RXD/TAKEN: CPT | Mod: CPTII,S$GLB,, | Performed by: NURSE PRACTITIONER

## 2023-03-20 PROCEDURE — 3008F BODY MASS INDEX DOCD: CPT | Mod: CPTII,S$GLB,, | Performed by: NURSE PRACTITIONER

## 2023-03-20 PROCEDURE — 1159F PR MEDICATION LIST DOCUMENTED IN MEDICAL RECORD: ICD-10-PCS | Mod: CPTII,S$GLB,, | Performed by: NURSE PRACTITIONER

## 2023-03-20 PROCEDURE — 3288F FALL RISK ASSESSMENT DOCD: CPT | Mod: CPTII,S$GLB,, | Performed by: NURSE PRACTITIONER

## 2023-03-20 PROCEDURE — 1101F PR PT FALLS ASSESS DOC 0-1 FALLS W/OUT INJ PAST YR: ICD-10-PCS | Mod: CPTII,S$GLB,, | Performed by: NURSE PRACTITIONER

## 2023-03-20 PROCEDURE — 1159F MED LIST DOCD IN RCRD: CPT | Mod: CPTII,S$GLB,, | Performed by: NURSE PRACTITIONER

## 2023-03-20 PROCEDURE — 3078F PR MOST RECENT DIASTOLIC BLOOD PRESSURE < 80 MM HG: ICD-10-PCS | Mod: CPTII,S$GLB,, | Performed by: NURSE PRACTITIONER

## 2023-03-20 PROCEDURE — 3078F DIAST BP <80 MM HG: CPT | Mod: CPTII,S$GLB,, | Performed by: NURSE PRACTITIONER

## 2023-03-20 PROCEDURE — 99999 PR PBB SHADOW E&M-EST. PATIENT-LVL IV: ICD-10-PCS | Mod: PBBFAC,,, | Performed by: NURSE PRACTITIONER

## 2023-03-20 PROCEDURE — 4010F PR ACE/ARB THEARPY RXD/TAKEN: ICD-10-PCS | Mod: CPTII,S$GLB,, | Performed by: NURSE PRACTITIONER

## 2023-03-20 PROCEDURE — 1125F AMNT PAIN NOTED PAIN PRSNT: CPT | Mod: CPTII,S$GLB,, | Performed by: NURSE PRACTITIONER

## 2023-03-20 PROCEDURE — 99417 PR PROLONGED SVC, OUTPT, W/WO DIRECT PT CONTACT,  EA ADDTL 15 MIN: ICD-10-PCS | Mod: S$GLB,,, | Performed by: NURSE PRACTITIONER

## 2023-03-20 PROCEDURE — 3074F PR MOST RECENT SYSTOLIC BLOOD PRESSURE < 130 MM HG: ICD-10-PCS | Mod: CPTII,S$GLB,, | Performed by: NURSE PRACTITIONER

## 2023-03-20 PROCEDURE — 99215 OFFICE O/P EST HI 40 MIN: CPT | Mod: S$GLB,,, | Performed by: NURSE PRACTITIONER

## 2023-03-20 PROCEDURE — 3074F SYST BP LT 130 MM HG: CPT | Mod: CPTII,S$GLB,, | Performed by: NURSE PRACTITIONER

## 2023-03-20 PROCEDURE — 3288F PR FALLS RISK ASSESSMENT DOCUMENTED: ICD-10-PCS | Mod: CPTII,S$GLB,, | Performed by: NURSE PRACTITIONER

## 2023-03-20 PROCEDURE — 1101F PT FALLS ASSESS-DOCD LE1/YR: CPT | Mod: CPTII,S$GLB,, | Performed by: NURSE PRACTITIONER

## 2023-03-20 PROCEDURE — 3008F PR BODY MASS INDEX (BMI) DOCUMENTED: ICD-10-PCS | Mod: CPTII,S$GLB,, | Performed by: NURSE PRACTITIONER

## 2023-03-20 PROCEDURE — 1125F PR PAIN SEVERITY QUANTIFIED, PAIN PRESENT: ICD-10-PCS | Mod: CPTII,S$GLB,, | Performed by: NURSE PRACTITIONER

## 2023-03-20 RX ORDER — NALOXONE HYDROCHLORIDE 4 MG/.1ML
SPRAY NASAL
Qty: 1 EACH | Refills: 11 | Status: SHIPPED | OUTPATIENT
Start: 2023-03-20

## 2023-03-20 RX ORDER — OXYCODONE HCL 20 MG/1
20 TABLET, FILM COATED, EXTENDED RELEASE ORAL EVERY 12 HOURS
Qty: 60 TABLET | Refills: 0 | Status: SHIPPED | OUTPATIENT
Start: 2023-03-20 | End: 2023-04-19

## 2023-03-20 NOTE — Clinical Note
Hello,   I rotated her from MS Contin to OxyContin for better pain control but also bc she's worried about her elevated creatinine. No other changes made today. She had lots of questions about hospice as she wants to start planning ahead.   Best, Hailey
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
## 2023-03-21 ENCOUNTER — TELEPHONE (OUTPATIENT)
Dept: PALLIATIVE MEDICINE | Facility: CLINIC | Age: 73
End: 2023-03-21
Payer: MEDICARE

## 2023-03-21 NOTE — TELEPHONE ENCOUNTER
SW placed call to patient at request of [facility] DNP.  SW answered patient's questions and provided additional information regarding home hospice care.  Patient thanked SW for the information  and noted she would relay this information to daughter. Patient denies further psychosocial concerns. SW remains available to provide assistance; will continue to follow.    Lady Gerber, [facility]  Outpatient   Palliative Medicine

## 2023-03-31 ENCOUNTER — OFFICE VISIT (OUTPATIENT)
Dept: PSYCHIATRY | Facility: CLINIC | Age: 73
End: 2023-03-31
Payer: MEDICARE

## 2023-03-31 ENCOUNTER — TELEPHONE (OUTPATIENT)
Dept: PALLIATIVE MEDICINE | Facility: CLINIC | Age: 73
End: 2023-03-31
Payer: MEDICARE

## 2023-03-31 DIAGNOSIS — F32.A DEPRESSION, UNSPECIFIED DEPRESSION TYPE: ICD-10-CM

## 2023-03-31 DIAGNOSIS — C56.3 MALIGNANT NEOPLASM OF BOTH OVARIES: ICD-10-CM

## 2023-03-31 DIAGNOSIS — F41.1 GENERALIZED ANXIETY DISORDER: Primary | ICD-10-CM

## 2023-03-31 PROCEDURE — 99999 PR PBB SHADOW E&M-EST. PATIENT-LVL II: ICD-10-PCS | Mod: PBBFAC,,, | Performed by: PSYCHOLOGIST

## 2023-03-31 PROCEDURE — 1159F MED LIST DOCD IN RCRD: CPT | Mod: CPTII,S$GLB,, | Performed by: PSYCHOLOGIST

## 2023-03-31 PROCEDURE — 1159F PR MEDICATION LIST DOCUMENTED IN MEDICAL RECORD: ICD-10-PCS | Mod: CPTII,S$GLB,, | Performed by: PSYCHOLOGIST

## 2023-03-31 PROCEDURE — 99999 PR PBB SHADOW E&M-EST. PATIENT-LVL II: CPT | Mod: PBBFAC,,, | Performed by: PSYCHOLOGIST

## 2023-03-31 PROCEDURE — 90837 PR PSYCHOTHERAPY W/PATIENT, 60 MIN: ICD-10-PCS | Mod: S$GLB,,, | Performed by: PSYCHOLOGIST

## 2023-03-31 PROCEDURE — 4010F PR ACE/ARB THEARPY RXD/TAKEN: ICD-10-PCS | Mod: CPTII,S$GLB,, | Performed by: PSYCHOLOGIST

## 2023-03-31 PROCEDURE — 90837 PSYTX W PT 60 MINUTES: CPT | Mod: S$GLB,,, | Performed by: PSYCHOLOGIST

## 2023-03-31 PROCEDURE — 4010F ACE/ARB THERAPY RXD/TAKEN: CPT | Mod: CPTII,S$GLB,, | Performed by: PSYCHOLOGIST

## 2023-03-31 NOTE — PROGRESS NOTES
"INFORMED CONSENT: Patient was identified using two patient-identifiers. The patient has been informed of the risks and benefits associated with engaging in psychotherapy, the handling of protected health information, the rights of privacy and the limits of confidentiality. The patient has also been informed of the importance of reporting any suicidal or homicidal ideation to this or any provider to ensure safety of all parties, and the Liat Gilmore expressed understanding. The patient was agreeable to these terms and freely participates in individual psychotherapy.    PSYCHO-ONCOLOGY NOTE/ Individual Psychotherapy     Date: 3/31/2023   [facility]        Therapeutic Intervention: Met with patient and daughter.  Outpatient - Insight oriented psychotherapy 60 min - CPT code 25617      Patient was last seen by me on 3/8/2023    Problem list  Patient Active Problem List   Diagnosis    Supraventricular tachycardia    Hypertension    Hypercholesterolemia    Mild obesity    Fibromyalgia    Cough variant asthma    Malignant neoplasm of both ovaries    Generalized anxiety disorder    Lung mass    Multiple lung nodules on CT    Herpes simplex vulvovaginitis    Elevated cancer antigen 125 (CA-125)    PTSD (post-traumatic stress disorder)       Chief complaint/reason for encounter: adjustment   Met with patient to evaluate psychosocial adaptation to diagnosis/treatment/survivorship of end of life, ovarian cancer    Current Medications  Current Outpatient Medications   Medication    albuterol 90 mcg/actuation inhaler    ALPRAZolam (XANAX) 0.25 MG tablet    ascorbic acid, vitamin C, (VITAMIN C) 500 MG tablet    b complex vitamins tablet    BREO ELLIPTA 200-25 mcg/dose DsDv diskus inhaler    cycloSPORINE (RESTASIS) 0.05 % ophthalmic emulsion    diclofenac sodium (VOLTAREN) 1 % Gel    docosahexaenoic acid/epa (FISH OIL ORAL)    docusate sodium (COLACE ORAL)    EASY TOUCH INSULIN SYRINGE 1 mL 30 gauge x 1/2" Syrg    " ergocalciferol (ERGOCALCIFEROL) 50,000 unit Cap     MISTLETOE SUBQ    fluticasone (FLONASE) 50 mcg/actuation nasal spray    LIDOcaine-prilocaine (EMLA) cream    losartan (COZAAR) 100 MG tablet    multivitamin capsule    naloxone (NARCAN) 4 mg/actuation Spry    nystatin (MYCOSTATIN) cream    ondansetron (ZOFRAN) 8 MG tablet    oxyCODONE (OXYCONTIN) 20 mg 12 hr tablet    oxyCODONE (ROXICODONE) 10 mg Tab immediate release tablet    pitavastatin magnesium (ZYPITAMAG) 2 mg Tab    promethazine (PHENERGAN) 6.25 mg/5 mL syrup    senna (SENOKOT) 8.6 mg tablet    TIADYLT  mg Cs24    traZODone (DESYREL) 50 MG tablet    vitamin A 57638 UNIT capsule    zafirlukast (ACCOLATE) 20 MG tablet     No current facility-administered medications for this visit.     Facility-Administered Medications Ordered in Other Visits   Medication Frequency    heparin, porcine (PF) 100 unit/mL injection flush 500 Units PRN    sodium chloride 0.9% flush 10 mL PRN       Objective:  Liat Gilmore arrived promptly for the session.  The patient was fully cooperative throughout the session.  Appearance: age appropriate, appropriately  dressed, adequately  groomed  Behavior/Cooperation: friendly and cooperative  Speech: normal in rate, volume, and tone and appropriate quality, quantity and organization of sentences  Mood: sad  Affect: mood congruent  Thought Process: goal-directed, logical  Thought Content: normal,  No delusions or paranoia; did not appear to be responding to internal stimuli during the session  Orientation: grossly intact  Memory: Grossly intact  Attention Span/Concentration: Attends to session without distraction; reports no difficulty  Fund of Knowledge: average  Estimate of Intelligence: average from verbal skills and history  Cognition: grossly intact  Insight: patient has awareness of illness; good insight into own behavior and behavior of others  Judgment: the patient's behavior is adequate to circumstances    Interval  history and content of current session: Introducted to storytelling and building a life narrative  Discussed diagnosis, treatment, prognosis, current adaptation to disease and treatment status, and family's adaptation to disease and treatment status. Reports to be coping with great difficulty. Evaluated cognitive response, paying particular attention to negative intrusive thoughts of a persistent and detrimental nature. Thoughts of this type are in evidence with moderate distress. Provided cognitive behavioral therapy to address negative cognitions. Identified and evaluated psychosocial and environmental stressors secondary to diagnosis and treatment.  Examined proactive behaviors that may be implemented to minimize or ameliorate psychosocial stressors secondary to diagnosis and treatment.     Risk parameters:   Patient reports no suicidal ideation  Patient reports no homicidal ideation  Patient reports no self-injurious behavior  Patient reports no violent behavior   Safety needs:  None at this time      Verbal deficits: None     Patient's response to intervention:The patient's response to intervention is accepting.     Progress toward goals and other mental status changes:  The patient's progress toward goals is good.      Progress to date:Progress as Expected      Goals from last visit: Met     Patient reported outcomes:    Distress Thermometer:   Distress Score             Practical Problems Physical Problems                                                   Family Problems                                         Emotional Problems                                                         Spiritual/Religions Concerns               Other Problems                PHQ-9=    CLOVER-7=    PHQ ANSWERS    Q1.    Q2.    Q3.    Q4.    Q5.    Q6.    Q7.    Q8.    Q9.               CLOVER-7 Answers  Client Strengths: verbal, intelligent, successful, good social support, good insight, commitment to wellness, strong suha, strong  cultural traditions     Diagnosis:     ICD-10-CM ICD-9-CM   1. Generalized anxiety disorder  F41.1 300.02   2. Malignant neoplasm of both ovaries  C56.3 183.0   3. Depression, unspecified depression type  F32.A 311        Treatment Plan:individual psychotherapy and medication management by physician  Target symptoms: depression, anxiety   Why chosen therapy is appropriate versus another modality: relevant to diagnosis, patient responds to this modality, evidence based practice  Outcome monitoring methods: self-report, observation, checklist/rating scale  Therapeutic intervention type: insight oriented psychotherapy, behavior modifying psychotherapy  Prognosis: Good      Behavioral goals:    Exercise:   Stress management: Engage in storytelling sessions   Social engagement:   Nutrition:   Smoking Cessation:   Therapy:  Increase daily self-care and attention to health management  Pleasant events scheduling and increased social interaction  Monitor stressors in writing and bring to next visit    Return to clinic: as scheduled    Next Session:      Length of Service (minutes direct face-to-face contact): 60    Neetu Valdez, PhD  Clinical Psychologist  [place] License #[id number]  [place] License #[id number]

## 2023-03-31 NOTE — TELEPHONE ENCOUNTER
Unable to reach pt regarding sooner appointment   I left a voicemail offering patient appointment for next week on Tuesday

## 2023-04-03 ENCOUNTER — TELEPHONE (OUTPATIENT)
Dept: PALLIATIVE MEDICINE | Facility: CLINIC | Age: 73
End: 2023-04-03
Payer: MEDICARE

## 2023-04-04 ENCOUNTER — PATIENT MESSAGE (OUTPATIENT)
Dept: PALLIATIVE MEDICINE | Facility: CLINIC | Age: 73
End: 2023-04-04
Payer: MEDICARE

## 2023-04-04 ENCOUNTER — HOSPITAL ENCOUNTER (INPATIENT)
Facility: HOSPITAL | Age: 73
LOS: 3 days | Discharge: HOME | DRG: 872 | End: 2023-04-08
Attending: INTERNAL MEDICINE | Admitting: STUDENT IN AN ORGANIZED HEALTH CARE EDUCATION/TRAINING PROGRAM
Payer: MEDICARE

## 2023-04-04 DIAGNOSIS — G89.3 CANCER RELATED PAIN: ICD-10-CM

## 2023-04-04 DIAGNOSIS — K81.9 CHOLECYSTITIS: Primary | ICD-10-CM

## 2023-04-04 DIAGNOSIS — K59.00 CONSTIPATION, UNSPECIFIED CONSTIPATION TYPE: ICD-10-CM

## 2023-04-04 DIAGNOSIS — R00.0 TACHYCARDIA: ICD-10-CM

## 2023-04-04 DIAGNOSIS — C56.3 MALIGNANT NEOPLASM OF BOTH OVARIES: ICD-10-CM

## 2023-04-04 DIAGNOSIS — K43.9 VENTRAL HERNIA WITHOUT OBSTRUCTION OR GANGRENE: ICD-10-CM

## 2023-04-04 DIAGNOSIS — R07.9 CHEST PAIN: ICD-10-CM

## 2023-04-04 PROBLEM — E66.9 MILD OBESITY: Status: RESOLVED | Noted: 2018-10-31 | Resolved: 2023-04-04

## 2023-04-04 PROBLEM — R65.20 SEVERE SEPSIS: Status: RESOLVED | Noted: 2023-04-04 | Resolved: 2023-04-04

## 2023-04-04 PROBLEM — K56.609 SMALL BOWEL OBSTRUCTION: Status: ACTIVE | Noted: 2023-04-04

## 2023-04-04 PROBLEM — R10.11 RIGHT UPPER QUADRANT ABDOMINAL PAIN: Status: ACTIVE | Noted: 2023-04-04

## 2023-04-04 PROBLEM — A41.9 SEVERE SEPSIS: Status: RESOLVED | Noted: 2023-04-04 | Resolved: 2023-04-04

## 2023-04-04 PROBLEM — R09.89 SUSPECTED PULMONARY EMBOLISM: Status: ACTIVE | Noted: 2023-04-04

## 2023-04-04 PROBLEM — R10.9 ABDOMINAL PAIN: Status: ACTIVE | Noted: 2023-04-04

## 2023-04-04 PROBLEM — R65.20 SEVERE SEPSIS: Status: ACTIVE | Noted: 2023-04-04

## 2023-04-04 PROBLEM — A41.9 SEVERE SEPSIS: Status: ACTIVE | Noted: 2023-04-04

## 2023-04-04 LAB
ALBUMIN SERPL BCP-MCNC: 2.8 G/DL (ref 3.5–5.2)
ALLENS TEST: ABNORMAL
ALLENS TEST: NORMAL
ALP SERPL-CCNC: 610 U/L (ref 55–135)
ALT SERPL W/O P-5'-P-CCNC: 102 U/L (ref 10–44)
ANION GAP SERPL CALC-SCNC: 11 MMOL/L (ref 8–16)
ANION GAP SERPL CALC-SCNC: 12 MMOL/L (ref 8–16)
APAP SERPL-MCNC: <3 UG/ML (ref 10–20)
AST SERPL-CCNC: 124 U/L (ref 10–40)
BASOPHILS # BLD AUTO: 0.02 K/UL (ref 0–0.2)
BASOPHILS NFR BLD: 0.2 % (ref 0–1.9)
BILIRUB SERPL-MCNC: 0.7 MG/DL (ref 0.1–1)
BILIRUB UR QL STRIP: NEGATIVE
BUN SERPL-MCNC: 10 MG/DL (ref 8–23)
BUN SERPL-MCNC: 10 MG/DL (ref 8–23)
CALCIUM SERPL-MCNC: 9.4 MG/DL (ref 8.7–10.5)
CALCIUM SERPL-MCNC: 9.5 MG/DL (ref 8.7–10.5)
CHLORIDE SERPL-SCNC: 97 MMOL/L (ref 95–110)
CHLORIDE SERPL-SCNC: 98 MMOL/L (ref 95–110)
CLARITY UR REFRACT.AUTO: ABNORMAL
CO2 SERPL-SCNC: 24 MMOL/L (ref 23–29)
CO2 SERPL-SCNC: 26 MMOL/L (ref 23–29)
COLOR UR AUTO: ABNORMAL
CREAT SERPL-MCNC: 1.4 MG/DL (ref 0.5–1.4)
CREAT SERPL-MCNC: 1.7 MG/DL (ref 0.5–1.4)
DIFFERENTIAL METHOD: ABNORMAL
EOSINOPHIL # BLD AUTO: 0 K/UL (ref 0–0.5)
EOSINOPHIL NFR BLD: 0 % (ref 0–8)
ERYTHROCYTE [DISTWIDTH] IN BLOOD BY AUTOMATED COUNT: 16.8 % (ref 11.5–14.5)
EST. GFR  (NO RACE VARIABLE): 31.9 ML/MIN/1.73 M^2
EST. GFR  (NO RACE VARIABLE): 40.2 ML/MIN/1.73 M^2
GLUCOSE SERPL-MCNC: 131 MG/DL (ref 70–110)
GLUCOSE SERPL-MCNC: 140 MG/DL (ref 70–110)
GLUCOSE UR QL STRIP: NEGATIVE
HAV IGM SERPL QL IA: NORMAL
HBV CORE IGM SERPL QL IA: NORMAL
HBV SURFACE AG SERPL QL IA: NORMAL
HCO3 UR-SCNC: 26 MMOL/L (ref 24–28)
HCT VFR BLD AUTO: 28.5 % (ref 37–48.5)
HCV AB SERPL QL IA: NORMAL
HGB BLD-MCNC: 8.6 G/DL (ref 12–16)
HGB UR QL STRIP: NEGATIVE
IMM GRANULOCYTES # BLD AUTO: 0.06 K/UL (ref 0–0.04)
IMM GRANULOCYTES NFR BLD AUTO: 0.5 % (ref 0–0.5)
INFLUENZA A, MOLECULAR: NEGATIVE
INFLUENZA B, MOLECULAR: NEGATIVE
KETONES UR QL STRIP: NEGATIVE
LACTATE SERPL-SCNC: 1.8 MMOL/L (ref 0.5–2.2)
LDH SERPL L TO P-CCNC: 1.34 MMOL/L (ref 0.5–2.2)
LEUKOCYTE ESTERASE UR QL STRIP: NEGATIVE
LIPASE SERPL-CCNC: 13 U/L (ref 4–60)
LYMPHOCYTES # BLD AUTO: 0.7 K/UL (ref 1–4.8)
LYMPHOCYTES NFR BLD: 5.6 % (ref 18–48)
MCH RBC QN AUTO: 26.8 PG (ref 27–31)
MCHC RBC AUTO-ENTMCNC: 30.2 G/DL (ref 32–36)
MCV RBC AUTO: 89 FL (ref 82–98)
MONOCYTES # BLD AUTO: 0.6 K/UL (ref 0.3–1)
MONOCYTES NFR BLD: 4.8 % (ref 4–15)
NEUTROPHILS # BLD AUTO: 11.7 K/UL (ref 1.8–7.7)
NEUTROPHILS NFR BLD: 88.9 % (ref 38–73)
NITRITE UR QL STRIP: NEGATIVE
NRBC BLD-RTO: 0 /100 WBC
PCO2 BLDA: 40.9 MMHG (ref 35–45)
PH SMN: 7.41 [PH] (ref 7.35–7.45)
PH UR STRIP: 6 [PH] (ref 5–8)
PLATELET # BLD AUTO: 343 K/UL (ref 150–450)
PMV BLD AUTO: 8.8 FL (ref 9.2–12.9)
PO2 BLDA: 33 MMHG (ref 40–60)
POC BE: 1 MMOL/L
POC SATURATED O2: 64 % (ref 95–100)
POC TCO2: 27 MMOL/L (ref 24–29)
POTASSIUM SERPL-SCNC: 4.2 MMOL/L (ref 3.5–5.1)
POTASSIUM SERPL-SCNC: 4.6 MMOL/L (ref 3.5–5.1)
PROT SERPL-MCNC: 7.8 G/DL (ref 6–8.4)
PROT UR QL STRIP: ABNORMAL
RBC # BLD AUTO: 3.21 M/UL (ref 4–5.4)
SAMPLE: ABNORMAL
SAMPLE: NORMAL
SARS-COV-2 RDRP RESP QL NAA+PROBE: NEGATIVE
SITE: ABNORMAL
SITE: NORMAL
SODIUM SERPL-SCNC: 134 MMOL/L (ref 136–145)
SODIUM SERPL-SCNC: 134 MMOL/L (ref 136–145)
SP GR UR STRIP: 1.02 (ref 1–1.03)
SPECIMEN SOURCE: NORMAL
TROPONIN I SERPL DL<=0.01 NG/ML-MCNC: 0.02 NG/ML (ref 0–0.03)
URN SPEC COLLECT METH UR: ABNORMAL
WBC # BLD AUTO: 13.12 K/UL (ref 3.9–12.7)

## 2023-04-04 PROCEDURE — 99232 SBSQ HOSP IP/OBS MODERATE 35: CPT | Mod: ,,, | Performed by: STUDENT IN AN ORGANIZED HEALTH CARE EDUCATION/TRAINING PROGRAM

## 2023-04-04 PROCEDURE — 99900035 HC TECH TIME PER 15 MIN (STAT)

## 2023-04-04 PROCEDURE — 80074 ACUTE HEPATITIS PANEL: CPT

## 2023-04-04 PROCEDURE — G0378 HOSPITAL OBSERVATION PER HR: HCPCS

## 2023-04-04 PROCEDURE — 80053 COMPREHEN METABOLIC PANEL: CPT | Performed by: STUDENT IN AN ORGANIZED HEALTH CARE EDUCATION/TRAINING PROGRAM

## 2023-04-04 PROCEDURE — 63600175 PHARM REV CODE 636 W HCPCS

## 2023-04-04 PROCEDURE — 99291 CRITICAL CARE FIRST HOUR: CPT | Mod: CS,,, | Performed by: EMERGENCY MEDICINE

## 2023-04-04 PROCEDURE — 96367 TX/PROPH/DG ADDL SEQ IV INF: CPT

## 2023-04-04 PROCEDURE — 99223 PR INITIAL HOSPITAL CARE,LEVL III: ICD-10-PCS | Mod: AI,,, | Performed by: STUDENT IN AN ORGANIZED HEALTH CARE EDUCATION/TRAINING PROGRAM

## 2023-04-04 PROCEDURE — 96376 TX/PRO/DX INJ SAME DRUG ADON: CPT

## 2023-04-04 PROCEDURE — 63600175 PHARM REV CODE 636 W HCPCS: Performed by: STUDENT IN AN ORGANIZED HEALTH CARE EDUCATION/TRAINING PROGRAM

## 2023-04-04 PROCEDURE — 82803 BLOOD GASES ANY COMBINATION: CPT

## 2023-04-04 PROCEDURE — U0002 COVID-19 LAB TEST NON-CDC: HCPCS

## 2023-04-04 PROCEDURE — 99291 PR CRITICAL CARE, E/M 30-74 MINUTES: ICD-10-PCS | Mod: CS,,, | Performed by: EMERGENCY MEDICINE

## 2023-04-04 PROCEDURE — 87040 BLOOD CULTURE FOR BACTERIA: CPT | Performed by: STUDENT IN AN ORGANIZED HEALTH CARE EDUCATION/TRAINING PROGRAM

## 2023-04-04 PROCEDURE — 99285 EMERGENCY DEPT VISIT HI MDM: CPT | Mod: 25

## 2023-04-04 PROCEDURE — 94761 N-INVAS EAR/PLS OXIMETRY MLT: CPT

## 2023-04-04 PROCEDURE — 93010 EKG 12-LEAD: ICD-10-PCS | Mod: ,,, | Performed by: INTERNAL MEDICINE

## 2023-04-04 PROCEDURE — 25000003 PHARM REV CODE 250: Performed by: STUDENT IN AN ORGANIZED HEALTH CARE EDUCATION/TRAINING PROGRAM

## 2023-04-04 PROCEDURE — 96365 THER/PROPH/DIAG IV INF INIT: CPT

## 2023-04-04 PROCEDURE — 84484 ASSAY OF TROPONIN QUANT: CPT | Performed by: EMERGENCY MEDICINE

## 2023-04-04 PROCEDURE — 83690 ASSAY OF LIPASE: CPT | Performed by: EMERGENCY MEDICINE

## 2023-04-04 PROCEDURE — 80048 BASIC METABOLIC PNL TOTAL CA: CPT | Performed by: STUDENT IN AN ORGANIZED HEALTH CARE EDUCATION/TRAINING PROGRAM

## 2023-04-04 PROCEDURE — 83605 ASSAY OF LACTIC ACID: CPT

## 2023-04-04 PROCEDURE — 93005 ELECTROCARDIOGRAM TRACING: CPT

## 2023-04-04 PROCEDURE — 80143 DRUG ASSAY ACETAMINOPHEN: CPT

## 2023-04-04 PROCEDURE — 85025 COMPLETE CBC W/AUTO DIFF WBC: CPT | Performed by: STUDENT IN AN ORGANIZED HEALTH CARE EDUCATION/TRAINING PROGRAM

## 2023-04-04 PROCEDURE — 99223 1ST HOSP IP/OBS HIGH 75: CPT | Mod: ,,,

## 2023-04-04 PROCEDURE — 96361 HYDRATE IV INFUSION ADD-ON: CPT

## 2023-04-04 PROCEDURE — 99223 PR INITIAL HOSPITAL CARE,LEVL III: ICD-10-PCS | Mod: ,,,

## 2023-04-04 PROCEDURE — 96366 THER/PROPH/DIAG IV INF ADDON: CPT

## 2023-04-04 PROCEDURE — 87502 INFLUENZA DNA AMP PROBE: CPT | Performed by: EMERGENCY MEDICINE

## 2023-04-04 PROCEDURE — 99223 1ST HOSP IP/OBS HIGH 75: CPT | Mod: AI,,, | Performed by: STUDENT IN AN ORGANIZED HEALTH CARE EDUCATION/TRAINING PROGRAM

## 2023-04-04 PROCEDURE — 96375 TX/PRO/DX INJ NEW DRUG ADDON: CPT

## 2023-04-04 PROCEDURE — 93010 ELECTROCARDIOGRAM REPORT: CPT | Mod: ,,, | Performed by: INTERNAL MEDICINE

## 2023-04-04 PROCEDURE — 25000003 PHARM REV CODE 250

## 2023-04-04 PROCEDURE — 83605 ASSAY OF LACTIC ACID: CPT | Performed by: STUDENT IN AN ORGANIZED HEALTH CARE EDUCATION/TRAINING PROGRAM

## 2023-04-04 PROCEDURE — 99232 PR SUBSEQUENT HOSPITAL CARE,LEVL II: ICD-10-PCS | Mod: ,,, | Performed by: STUDENT IN AN ORGANIZED HEALTH CARE EDUCATION/TRAINING PROGRAM

## 2023-04-04 PROCEDURE — 81003 URINALYSIS AUTO W/O SCOPE: CPT | Performed by: STUDENT IN AN ORGANIZED HEALTH CARE EDUCATION/TRAINING PROGRAM

## 2023-04-04 RX ORDER — OXYCODONE HCL 20 MG/1
20 TABLET, FILM COATED, EXTENDED RELEASE ORAL EVERY 12 HOURS
Status: DISCONTINUED | OUTPATIENT
Start: 2023-04-04 | End: 2023-04-04

## 2023-04-04 RX ORDER — DOCUSATE SODIUM 100 MG/1
100 CAPSULE, LIQUID FILLED ORAL DAILY PRN
Status: DISCONTINUED | OUTPATIENT
Start: 2023-04-04 | End: 2023-04-04

## 2023-04-04 RX ORDER — OXYCODONE HYDROCHLORIDE 5 MG/1
5 TABLET ORAL EVERY 6 HOURS PRN
Status: DISCONTINUED | OUTPATIENT
Start: 2023-04-04 | End: 2023-04-05

## 2023-04-04 RX ORDER — OXYCODONE HCL 20 MG/1
20 TABLET, FILM COATED, EXTENDED RELEASE ORAL EVERY 12 HOURS
Status: DISCONTINUED | OUTPATIENT
Start: 2023-04-05 | End: 2023-04-08 | Stop reason: HOSPADM

## 2023-04-04 RX ORDER — BENZONATATE 100 MG/1
100 CAPSULE ORAL 3 TIMES DAILY PRN
Status: DISCONTINUED | OUTPATIENT
Start: 2023-04-04 | End: 2023-04-08 | Stop reason: HOSPADM

## 2023-04-04 RX ORDER — SODIUM CHLORIDE 0.9 % (FLUSH) 0.9 %
10 SYRINGE (ML) INJECTION
Status: DISCONTINUED | OUTPATIENT
Start: 2023-04-04 | End: 2023-04-08 | Stop reason: HOSPADM

## 2023-04-04 RX ORDER — IBUPROFEN 200 MG
24 TABLET ORAL
Status: DISCONTINUED | OUTPATIENT
Start: 2023-04-04 | End: 2023-04-08 | Stop reason: HOSPADM

## 2023-04-04 RX ORDER — ONDANSETRON 2 MG/ML
8 INJECTION INTRAMUSCULAR; INTRAVENOUS EVERY 6 HOURS PRN
Status: DISCONTINUED | OUTPATIENT
Start: 2023-04-04 | End: 2023-04-06

## 2023-04-04 RX ORDER — ONDANSETRON 4 MG/1
8 TABLET, FILM COATED ORAL EVERY 8 HOURS PRN
Status: DISCONTINUED | OUTPATIENT
Start: 2023-04-04 | End: 2023-04-04

## 2023-04-04 RX ORDER — SODIUM CHLORIDE 0.9 % (FLUSH) 0.9 %
10 SYRINGE (ML) INJECTION EVERY 12 HOURS PRN
Status: DISCONTINUED | OUTPATIENT
Start: 2023-04-04 | End: 2023-04-05

## 2023-04-04 RX ORDER — DEXTROSE 40 %
30 GEL (GRAM) ORAL
Status: DISCONTINUED | OUTPATIENT
Start: 2023-04-04 | End: 2023-04-08 | Stop reason: HOSPADM

## 2023-04-04 RX ORDER — DILTIAZEM HYDROCHLORIDE 240 MG/1
240 CAPSULE, COATED, EXTENDED RELEASE ORAL DAILY
Status: DISCONTINUED | OUTPATIENT
Start: 2023-04-05 | End: 2023-04-08 | Stop reason: HOSPADM

## 2023-04-04 RX ORDER — FLUTICASONE PROPIONATE 50 MCG
1 SPRAY, SUSPENSION (ML) NASAL DAILY PRN
Status: DISCONTINUED | OUTPATIENT
Start: 2023-04-04 | End: 2023-04-08 | Stop reason: HOSPADM

## 2023-04-04 RX ORDER — TALC
6 POWDER (GRAM) TOPICAL NIGHTLY PRN
Status: DISCONTINUED | OUTPATIENT
Start: 2023-04-04 | End: 2023-04-08 | Stop reason: HOSPADM

## 2023-04-04 RX ORDER — NALOXONE HCL 0.4 MG/ML
0.02 VIAL (ML) INJECTION
Status: DISCONTINUED | OUTPATIENT
Start: 2023-04-04 | End: 2023-04-04

## 2023-04-04 RX ORDER — GLUCAGON 1 MG
1 KIT INJECTION
Status: DISCONTINUED | OUTPATIENT
Start: 2023-04-04 | End: 2023-04-08 | Stop reason: HOSPADM

## 2023-04-04 RX ORDER — POLYETHYLENE GLYCOL 3350 17 G/17G
17 POWDER, FOR SOLUTION ORAL DAILY PRN
Status: ON HOLD | COMMUNITY
End: 2023-04-12 | Stop reason: CLARIF

## 2023-04-04 RX ORDER — ACETAMINOPHEN 500 MG
1000 TABLET ORAL
Status: COMPLETED | OUTPATIENT
Start: 2023-04-04 | End: 2023-04-04

## 2023-04-04 RX ORDER — SENNOSIDES 8.6 MG/1
1 TABLET ORAL DAILY
Status: DISCONTINUED | OUTPATIENT
Start: 2023-04-04 | End: 2023-04-04

## 2023-04-04 RX ORDER — ALPRAZOLAM 0.25 MG/1
0.25 TABLET ORAL 2 TIMES DAILY PRN
Status: DISCONTINUED | OUTPATIENT
Start: 2023-04-04 | End: 2023-04-08 | Stop reason: HOSPADM

## 2023-04-04 RX ORDER — HYDROMORPHONE HYDROCHLORIDE 1 MG/ML
1 INJECTION, SOLUTION INTRAMUSCULAR; INTRAVENOUS; SUBCUTANEOUS
Status: COMPLETED | OUTPATIENT
Start: 2023-04-04 | End: 2023-04-04

## 2023-04-04 RX ORDER — TRAZODONE HYDROCHLORIDE 50 MG/1
50 TABLET ORAL NIGHTLY PRN
Status: DISCONTINUED | OUTPATIENT
Start: 2023-04-04 | End: 2023-04-08 | Stop reason: HOSPADM

## 2023-04-04 RX ORDER — PROCHLORPERAZINE EDISYLATE 5 MG/ML
10 INJECTION INTRAMUSCULAR; INTRAVENOUS EVERY 6 HOURS PRN
Status: DISCONTINUED | OUTPATIENT
Start: 2023-04-04 | End: 2023-04-06

## 2023-04-04 RX ORDER — ALBUTEROL SULFATE 90 UG/1
2 AEROSOL, METERED RESPIRATORY (INHALATION) EVERY 4 HOURS PRN
Status: DISCONTINUED | OUTPATIENT
Start: 2023-04-04 | End: 2023-04-08 | Stop reason: HOSPADM

## 2023-04-04 RX ORDER — IBUPROFEN 200 MG
16 TABLET ORAL
Status: DISCONTINUED | OUTPATIENT
Start: 2023-04-04 | End: 2023-04-08 | Stop reason: HOSPADM

## 2023-04-04 RX ORDER — HYDROMORPHONE HYDROCHLORIDE 1 MG/ML
0.5 INJECTION, SOLUTION INTRAMUSCULAR; INTRAVENOUS; SUBCUTANEOUS
Status: COMPLETED | OUTPATIENT
Start: 2023-04-04 | End: 2023-04-04

## 2023-04-04 RX ORDER — OXYCODONE HYDROCHLORIDE 10 MG/1
10 TABLET ORAL EVERY 4 HOURS PRN
Status: DISCONTINUED | OUTPATIENT
Start: 2023-04-04 | End: 2023-04-05

## 2023-04-04 RX ORDER — GLUCAGON 1 MG
1 KIT INJECTION
Status: DISCONTINUED | OUTPATIENT
Start: 2023-04-04 | End: 2023-04-05

## 2023-04-04 RX ORDER — DEXTROSE 40 %
15 GEL (GRAM) ORAL
Status: DISCONTINUED | OUTPATIENT
Start: 2023-04-04 | End: 2023-04-08 | Stop reason: HOSPADM

## 2023-04-04 RX ORDER — NALOXONE HCL 0.4 MG/ML
0.02 VIAL (ML) INJECTION
Status: DISCONTINUED | OUTPATIENT
Start: 2023-04-04 | End: 2023-04-08 | Stop reason: HOSPADM

## 2023-04-04 RX ORDER — HYDROMORPHONE HYDROCHLORIDE 1 MG/ML
0.5 INJECTION, SOLUTION INTRAMUSCULAR; INTRAVENOUS; SUBCUTANEOUS ONCE
Status: COMPLETED | OUTPATIENT
Start: 2023-04-04 | End: 2023-04-04

## 2023-04-04 RX ORDER — ACETAMINOPHEN 325 MG/1
650 TABLET ORAL EVERY 6 HOURS PRN
Status: DISCONTINUED | OUTPATIENT
Start: 2023-04-04 | End: 2023-04-08 | Stop reason: HOSPADM

## 2023-04-04 RX ORDER — METRONIDAZOLE 500 MG/100ML
500 INJECTION, SOLUTION INTRAVENOUS
Status: DISCONTINUED | OUTPATIENT
Start: 2023-04-04 | End: 2023-04-04

## 2023-04-04 RX ADMIN — ONDANSETRON 8 MG: 2 INJECTION INTRAMUSCULAR; INTRAVENOUS at 07:04

## 2023-04-04 RX ADMIN — HYDROMORPHONE HYDROCHLORIDE 0.5 MG: 1 INJECTION, SOLUTION INTRAMUSCULAR; INTRAVENOUS; SUBCUTANEOUS at 07:04

## 2023-04-04 RX ADMIN — CEFEPIME 1 G: 1 INJECTION, POWDER, FOR SOLUTION INTRAMUSCULAR; INTRAVENOUS at 12:04

## 2023-04-04 RX ADMIN — ENEMA 1 ENEMA: 19; 7 ENEMA RECTAL at 11:04

## 2023-04-04 RX ADMIN — ACETAMINOPHEN 1000 MG: 500 TABLET ORAL at 06:04

## 2023-04-04 RX ADMIN — HYDROMORPHONE HYDROCHLORIDE 0.5 MG: 1 INJECTION, SOLUTION INTRAMUSCULAR; INTRAVENOUS; SUBCUTANEOUS at 06:04

## 2023-04-04 RX ADMIN — SODIUM CHLORIDE, POTASSIUM CHLORIDE, SODIUM LACTATE AND CALCIUM CHLORIDE 1000 ML: 600; 310; 30; 20 INJECTION, SOLUTION INTRAVENOUS at 09:04

## 2023-04-04 RX ADMIN — PIPERACILLIN SODIUM AND TAZOBACTAM SODIUM 4.5 G: 4; .5 INJECTION, POWDER, FOR SOLUTION INTRAVENOUS at 06:04

## 2023-04-04 RX ADMIN — OXYCODONE HYDROCHLORIDE 10 MG: 10 TABLET ORAL at 12:04

## 2023-04-04 RX ADMIN — ACETAMINOPHEN 650 MG: 325 TABLET ORAL at 08:04

## 2023-04-04 RX ADMIN — HYDROMORPHONE HYDROCHLORIDE 1 MG: 1 INJECTION, SOLUTION INTRAMUSCULAR; INTRAVENOUS; SUBCUTANEOUS at 09:04

## 2023-04-04 RX ADMIN — VANCOMYCIN HYDROCHLORIDE 1000 MG: 1 INJECTION, POWDER, LYOPHILIZED, FOR SOLUTION INTRAVENOUS at 07:04

## 2023-04-04 RX ADMIN — OXYCODONE 5 MG: 5 TABLET ORAL at 03:04

## 2023-04-04 RX ADMIN — SODIUM CHLORIDE, POTASSIUM CHLORIDE, SODIUM LACTATE AND CALCIUM CHLORIDE 1000 ML: 600; 310; 30; 20 INJECTION, SOLUTION INTRAVENOUS at 06:04

## 2023-04-04 RX ADMIN — OXYCODONE HYDROCHLORIDE 10 MG: 10 TABLET ORAL at 11:04

## 2023-04-04 RX ADMIN — ONDANSETRON 8 MG: 4 TABLET ORAL at 12:04

## 2023-04-04 NOTE — ED NOTES
Pt complaining of 4/10 anterior CP that radiates to back, aching and constant. Will do EKG and notify provider.

## 2023-04-04 NOTE — HPI
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 Also reports constipation which is typical for her with her chronic opioids. Used OTC laxatives with minimal relief. Last BM was AM of 3/29 with use of suppository. Also reports "ball" near umbilicus that has been hard for some time. She denies fever, chills, n/v, decreased appetite, and all others symptoms.     In the ED, she is HDS. Leukocytosis to 13.1, AST//102, T bili wnl. Alk phos 610.   CT a/p noncon with ". Acute appearing inflammatory changes surrounding an enlarged and inflamed appearing gallbladder suggesting acute cholecystitis.  Neoplastic involvement if total occluded fluid by current unenhanced CT imaging. Redemonstrated ventral abdominal wall incisional hernia containing fecalized and mildly dilated small bowel loops, with similar appearance of upstream intra-abdominal small bowel loops, concerning for mild/early small-bowel obstruction."      "

## 2023-04-04 NOTE — ED NOTES
Patient identifiers have been checked and are correct.      LOC: The patient is awake, alert, and aware of environment. The patient is oriented x 3 and speaking appropriately.   APPEARANCE: No acute distress noted.   PSYCHOSOCIAL: Patient is calm and cooperative.   SKIN: The skin is warm, dry, and intact. No bruising/discolorations noted.  RESPIRATORY: Airway is open and patent. Bilateral chest rise and fall. Respirations are spontaneous, even and unlabored. Normal effort and rate noted. No accessory muscle use noted.   CARDIAC: Sinus tachycardia noted on cardiac monitor.   ABDOMEN: Soft and non tender to palpation. No distention noted. Bowel sounds present in all 4 quadrants.   URINARY: Voids independently.   NEUROLOGIC: Eyes open spontaneously. Speech clear. Tolerating saliva secretions well. Able to follow commands, demonstrating ability to actively and appropriately communicate within context of current conversation. Symmetrical facial muscles. Moving all extremities well with no noted weakness. Movement is purposeful.   MUSCULOSKELETAL: No obvious deformities noted.     Side rails up x2. Call light within reach. Family member at bedside. Updated on POC.

## 2023-04-04 NOTE — ASSESSMENT & PLAN NOTE
Concern for acute cholecystitis on CT AP.  WBC 13 with left shift. 7/10 stabbing pain, worsened with palpation, does not refer. Not post prandial. Alk Phos 610. Initially febrile to 100.8    Gen Surg consulted, recommend RUQ US to confirm. Recommend IR consult for cholecystostomy if confirmed.  - NPO for now.  - Continue Zosyn.

## 2023-04-04 NOTE — ASSESSMENT & PLAN NOTE
The patient has a known history of    She is s/p hysterectomy and oopherectomy in 2020 7/1/2020 :  114     7/6/2020 CT Chest:  Bilateral pulmonary nodules as detailed most consistent with metastatic disease.  Peritoneal soft tissue nodularity in the left upper quadrant unchanged from the prior exam and again consistent with metastatic disease.  Bibasilar atelectasis.     7/16/2020 Cycle #1 Carbo / Taxol (transferred to [facility] -details in Dr. Foster's prior notes)  8/14/2020 Cycle #2 Carbo / Taxol (8/5/2020 :  25)  9/3/2020 Cycle #3 Carbo / Taxol (9/2/2020 : 12)  9/24/2020 Cycle #4 Carbo / Taxol (9/23/202 : 10)  10/16/2020 Cycle #5 Carbo / Taxol   11/5/2020  Cycle #6 Carbo / Taxol     11/24/2020 : 11  1/26/2021 : 12  5/10/2021 : 11  6/8/2021 :  11     8/31/2021 CT CAP:  there are innumerable nodules in the lungs bilaterally, concerning for metastatic disease.  Left kidney cyst.  Additional findings as above.     9/13/2021  :  55    10/13/2021 :  152     10/13/2021 CT CAP: No local recurrence identified.  Multiple pulmonary nodules/opacities without significant interval change.  Random nodules could indicate metastatic disease but are nonspecific.  Several of the larger opacities have configuration suggestive of endobronchial material in dilated bronchi from airway infection/inflammation.  Also, clustered micro nodules are consistent with small airway infection/inflammation.  A 1.2 cm hypodense lesion in left lobe of liver, unchanged and nonspecific.     11/24/2020 CT CAP:  there is no evidence of local disease recurrence.  There are multiple pulmonary nodules and irregular opacities scattered throughout both lungs, unchanged in appearance from prior exam 08/31/2020.  While some solid nodules may reflect metastatic disease, majority of the pulmonary opacities demonstrate tubular and/or branching configurations, which can be seen with mucoid impaction, and tree-in-bud  configuration, suggesting small airways infection/inflammation (for example MAC infection, given the pattern).  Stable indeterminate 1.2 cm hepatic lesion in the caudate lobe.     12/9/2020  PET/CT:  In the abdomen and pelvis, there is physiologic tracer distribution within the abdominal organs.  There is mildly hypermetabolic solid nodules in both lungs equivocal for metastases.  Given indications of old granulomatous disease and clustered distribution of nodules in the anterior right upper and right lower lobes, noncalcified granulomas are a differential consideration and not mutually exclusive.  Tissue sampling would be required for definitive diagnosis, and the largest nodules in the lower lobes may be amenable to percutaneous biopsy.  Additional mildly FDG avid regions of ground-glass attenuation are identified in the right lung, possibly reflecting small airways infection or inflammation with additional metastatic foci not definitively excluded.      10/22/2021 CT CAP:  interval development of multiple hypodensities scattered along the periphery of the liver concerning for peritoneal implants.  Stable pulmonary nodules and irregular opacities scattered throughout both lungs.  While some nodules may reflect metastatic disease, majority demonstrate a tubular and branching pattern suggesting mucoid impaction in a pattern consistent with MAC infection.  Incisional hernia at the midline of the lower abdomen containing a few loops of nonobstructed small bowel.     11/17/2021:  Cycle #1 Carbo/ Doxil  ( 11/5/2021 = 337) (Udenyca added for next)  12/29/2021:  Cycle #2 Carbo / Doxil ( 12/13/2021 = 105) (delayed for low ANC and Daryl  1/26/2022: Cycle #3 Carbo / Doxil ( 1/24/2021 = 33)     2/15/2022 CT CAP:  Decreased size of the previously described hepatic lesions/peritoneal implants in this patient with history of ovarian cancer and known metastases, consistent with response to treatment.  No  definite new liver lesions.  Stable, 4.5 cm low-density lesion along the periphery of the posterior right hepatic lobe, concerning for peritoneal implant.  Stable pulmonary nodules and irregular opacities, majority of which are favored to represent a pattern consistent with mucoid impaction or MAC infection.  No definite new nodules allowing for widespread disease.  Small incisional hernia containing a few loops of nonobstructed bowel with the hernia sac slightly increased in size from prior exam.     3/28/2022 [facility] Started M-W-F 4/6/2022:  Cycle #4 Carbo/Doxil ( 4/4/2022 = 60)  5/12/2022:  Cycle #5 Carbo/Doxil (delayed for low ANC and platelets;  5/2/2022 = 16)  6/15/2022:  Cycle #6 Carbo / Doxil (delayed for low ANC;  6/14/2022 =  15)     8/10/2022  = 22  11/8/2022  =  550  11/29/2022  = 658.2 (MDA)     12/1/2022 CT CAP:  Pulmonary metastases are noted. The metastatic lesion in the left upper lobe (series 6 image 34) measures 1.1 x 0.9 cm. The metastatic lesion in the right lower lobe measures 1.9 x 1.2 cm. Atelectatic changes are noted within the lung bases.     1. Acute appearing inflammatory changes surrounding an enlarged and inflamed appearing gallbladder suggesting acute cholecystitis.  Neoplastic involvement if total occluded fluid by current unenhanced CT imaging.  2. Redemonstrated ventral abdominal wall incisional hernia containing fecalized and mildly dilated small bowel loops, with similar appearance of upstream intra-abdominal small bowel loops, concerning for mild/early small-bowel obstruction.  Clinical correlation be recommended, with assessment for possible incarceration.  3. While limited by lack of intravenous contrast, suggested progression of multiple hepatic lesions, possibly metastatic disease in the given context.  Attention on dedicated oncologic surveillance imaging recommended.  4. The visualized chest, there is a waxing and waning  appearance of nodule/nodular opacities in the lower lungs.  Differential considerations would include infectious and/or noninfectious inflammatory etiology, mucoid impaction within ectatic airways, and/or metastatic disease.  Attention on dedicated follow-up imaging would be recommended.  5. Newly evident round ovoid near fluid attenuating focus at the inferior margin the right hepatic lobe, possibly cystic peritoneal implant versus loculated ascites.  Tissue/fluid sampling could be considered to distinguish.    -Pain control with home oxy 10 and increase as needed  -Continue bowel regimen

## 2023-04-04 NOTE — PROGRESS NOTES
Pharmacokinetic Initial Assessment: IV Vancomycin    Assessment/Plan:    Patient is KAUSHAL. Her SCr baseline ~ 0.9-1.0. Will do Pulse dosing.   Intravenous vancomycin 1000 mg was given in ED  Draw vancomycin random level on 4/5 at 0530, close to 24hrs level. Redose when level < 20 mcg/ml  Desired empiric serum trough concentration is 15 to 20 mcg/mL    Pharmacy will continue to follow and monitor vancomycin.      Please contact pharmacy at extension [phone] with any questions regarding this assessment.     Thank you for the consult,   Thiago Bloom       Patient brief summary:  Liat Gilmore is a 72 y.o. female initiated on antimicrobial therapy with IV Vancomycin for treatment of suspected bacteremia    Drug Allergies:   Review of patient's allergies indicates:   Allergen Reactions    Erythromycin Other (See Comments)     Stomach Cramps       Actual Body Weight:   71.7 kg    Renal Function:   Estimated Creatinine Clearance: 29.5 mL/min (A) (based on SCr of 1.7 mg/dL (H)).,     Dialysis Method (if applicable):  N/A    CBC (last 72 hours):  Recent Labs   Lab Result Units 04/04/23  0606   WBC K/uL 13.12*   Hemoglobin g/dL 8.6*   Hematocrit % 28.5*   Platelets K/uL 343   Gran % % 88.9*   Lymph % % 5.6*   Mono % % 4.8   Eosinophil % % 0.0   Basophil % % 0.2   Differential Method  Automated       Metabolic Panel (last 72 hours):  Recent Labs   Lab Result Units 04/04/23  0606 04/04/23  0834   Sodium mmol/L 134*  --    Potassium mmol/L 4.2  --    Chloride mmol/L 98  --    CO2 mmol/L 24  --    Glucose mg/dL 140*  --    Glucose, UA   --  Negative   BUN mg/dL 10  --    Creatinine mg/dL 1.7*  --    Albumin g/dL 2.8*  --    Total Bilirubin mg/dL 0.7  --    Alkaline Phosphatase U/L 610*  --    AST U/L 124*  --    ALT U/L 102*  --        Drug levels (last 3 results):  No results for input(s): VANCOMYCINRA, VANCORANDOM, VANCOMYCINPE, VANCOPEAK, VANCOMYCINTR, VANCOTROUGH in the last 72 hours.    Microbiologic Results:  Microbiology Results  (last 7 days)       Procedure Component Value Units Date/Time    Influenza A & B by Molecular [[id number]] Collected: 04/04/23 0710    Order Status: Completed Specimen: Nasopharyngeal Swab Updated: 04/04/23 0749     Influenza A, Molecular Negative     Influenza B, Molecular Negative     Flu A & B Source NP    Blood culture x two cultures. Draw prior to antibiotics. [[id number]] Collected: 04/04/23 0606    Order Status: Sent Specimen: Blood from Peripheral, Antecubital, Left Updated: 04/04/23 0616    Blood culture x two cultures. Draw prior to antibiotics. [[id number]] Collected: 04/04/23 0606    Order Status: Sent Specimen: Blood from Peripheral, Antecubital, Right Updated: 04/04/23 0616

## 2023-04-04 NOTE — SUBJECTIVE & OBJECTIVE
"Current Facility-Administered Medications on File Prior to Encounter   Medication    heparin, porcine (PF) 100 unit/mL injection flush 500 Units    sodium chloride 0.9% flush 10 mL     Current Outpatient Medications on File Prior to Encounter   Medication Sig    albuterol 90 mcg/actuation inhaler INHALE 2 PUFFS INTO THE LUNGS EVERY 6 (SIX) HOURS AS NEEDED FOR WHEEZING.    ALPRAZolam (XANAX) 0.25 MG tablet TAKE ONE TABLET BY MOUTH TWICE DAILY AS NEEDED FOR ANXIETY    ascorbic acid, vitamin C, (VITAMIN C) 500 MG tablet Take 500 mg by mouth once daily.    b complex vitamins tablet Take 1 tablet by mouth once daily.    BREO ELLIPTA 200-25 mcg/dose DsDv diskus inhaler inhale ONE PUFF EVERY DAY    cycloSPORINE (RESTASIS) 0.05 % ophthalmic emulsion Place 1 drop into both eyes 2 (two) times daily.     diclofenac sodium (VOLTAREN) 1 % Gel Apply 2 g topically daily as needed.    docosahexaenoic acid/epa (FISH OIL ORAL) Take 1 capsule by mouth once daily.    docusate sodium (COLACE ORAL) Take 1 capsule by mouth daily as needed.     EASY TOUCH INSULIN SYRINGE 1 mL 30 gauge x 1/2" Syrg USE AS DIRECTED FOR SQ INJECTIONS THREE TIMES WEEKLY    ergocalciferol (ERGOCALCIFEROL) 50,000 unit Cap Take 1 capsule by mouth every 7 days.     MISTLETOE SUBQ Inject 1 Dose into the skin twice a week.    fluticasone (FLONASE) 50 mcg/actuation nasal spray 2 sprays (100 mcg total) by Each Nare route once daily. (Patient taking differently: 1 spray by Each Nostril route once daily.)    LIDOcaine-prilocaine (EMLA) cream Apply topically as needed (port access).    losartan (COZAAR) 100 MG tablet Take 1 tablet (100 mg total) by mouth once daily.    multivitamin capsule Take 1 capsule by mouth once daily.    naloxone (NARCAN) 4 mg/actuation Spry 4mg by nasal route as needed for opioid overdose; may repeat every 2-3 minutes in alternating nostrils until medical help arrives. Call 911    nystatin (MYCOSTATIN) cream Apply 1 g topically.    " ondansetron (ZOFRAN) 8 MG tablet Take 1 tablet (8 mg total) by mouth every 8 (eight) hours as needed for Nausea.    oxyCODONE (OXYCONTIN) 20 mg 12 hr tablet Take 1 tablet (20 mg total) by mouth every 12 (twelve) hours.    oxyCODONE (ROXICODONE) 10 mg Tab immediate release tablet Take 1 tablet (10 mg total) by mouth every 6 (six) hours as needed for Pain.    pitavastatin magnesium (ZYPITAMAG) 2 mg Tab 2 mg once daily.    promethazine (PHENERGAN) 6.25 mg/5 mL syrup Take by mouth every 6 (six) hours as needed for Nausea. Pt uses for cough    senna (SENOKOT) 8.6 mg tablet Take 1 tablet by mouth once daily.    TIADYLT  mg Cs24 TAKE ONE CAPSULE BY MOUTH EVERY DAY    traZODone (DESYREL) 50 MG tablet Take 1 tablet (50 mg total) by mouth every evening.    vitamin A 57391 UNIT capsule Take 10,000 Units by mouth once daily.    zafirlukast (ACCOLATE) 20 MG tablet Take 20 mg by mouth once daily.        Review of patient's allergies indicates:   Allergen Reactions    Erythromycin Other (See Comments)     Stomach Cramps       Past Medical History:   Diagnosis Date    Anxiety 8/7/2020    Asthma 10/31/2018    1985: Diagnosed. cough variant    Asthma 9/26/2013 9:37:32 AM    [facility] Historical - Respiratory: Asthma-No Additional Notes    Bronchiectasis     Complications affecting other specified body systems, hypertension 9/26/2013 9:40:45 AM    [facility] Historical - Cardiovascular: Hypertension-No Additional Notes    Dizziness and giddiness 8/30/2017 3:09:28 PM    [facility] Historical - Unknown: Vertigo-No Additional Notes    Elevated cancer antigen 125 (CA-125) 9/14/2021    Fibromyalgia 10/31/2018    2006: Diagnosed.    Herpes simplex vulvovaginitis 6/9/2021    Hx of psychiatric care     Hypercholesterolemia 10/31/2018    2010: Began statin.    Hyperlipidemia     Hypertension     Infective arthritis, multiple sites 6/23/2017 11:02:17 AM    [facility] Historical - Musculoskeletal: Arthritis-No Additional Notes     Lung mass 2020    Malignant neoplasm of both ovaries 2020    Myalgia and myositis 2017 11:02:26 AM    [facility] Historical - Musculoskeletal: Fibromyalgia-No Additional Notes    Neoplasm of other genitourinary organ 2020 9:58:16 AM    [facility] Historical - Unknown: Ovarian neoplasm-finished chemo 2020- Dr. Foster Stage 3    Other and unspecified ovarian cyst 2020 4:13:20 PM    [facility] Historical - Quick Add: Ovarian cyst, left-No Additional Notes    Other genital herpes 2017 1:15:51 PM    [facility] Historical - Infectious: Herpes, Genital-No Additional Notes    Overweight 10/31/2018    10/31/2018: Weight 75 kg. BMI 28.    Psychiatric problem     Pulmonary nodules 12/10/2020    Pure hypercholesterolemia 2017 11:01:53 AM    [facility] - Endocrine/Metabolic/Immune: Hypercholesterolemia-No Additional Notes    Reactive depression 2020    Renal failure 2019 11:55:24 AM    [facility] Historical - Urology: Renal Failure-Diagnosed @ ER when she was there for stomach pains    Supraventricular tachycardia     Tachycardia     Tachycardia 2013 9:37:10 AM    [facility] - Symptoms/Signs: Tachycardia-No Additional Notes    Therapy      Past Surgical History:   Procedure Laterality Date    APPENDECTOMY       SECTION      x 1    HYSTERECTOMY      LAPAROSCOPIC SURGICAL REMOVAL OF OMENTUM      PELVIC AND PARA-AORTIC LYMPH NODE DISSECTION      NE REMOVAL OF OVARY/TUBE(S)      TONSILLECTOMY      TRANSPHENOIDAL PITUITARY RESECTION  2000    TUMOR REMOVAL       Family History       Problem Relation (Age of Onset)    Angina Mother    Anxiety disorder Sister    Breast cancer Paternal Aunt    Colon cancer Sister (70), Brother (80)    Depression Sister    Drug abuse Brother    Heart disease Brother    Stroke Father          Tobacco Use    Smoking status: Never    Smokeless tobacco: Never   Substance and Sexual Activity    Alcohol use: No      Comment: Rarely    Drug use: No    Sexual activity: Not Currently     Review of Systems   Constitutional:  Negative for appetite change, chills, fatigue and fever.   Respiratory:  Negative for shortness of breath.    Cardiovascular:  Negative for chest pain.   Gastrointestinal:  Positive for abdominal pain and constipation. Negative for blood in stool, diarrhea, nausea and vomiting.   Genitourinary:  Negative for dysuria and hematuria.   Skin:  Negative for wound.   Neurological:  Negative for dizziness and headaches.   Psychiatric/Behavioral:  Negative for confusion. The patient is not nervous/anxious.    All other systems reviewed and are negative.  Objective:     Vital Signs (Most Recent):  Temp: 98.8 °F (37.1 °C) (04/04/23 1212)  Pulse: 74 (04/04/23 1212)  Resp: 15 (04/04/23 1229)  BP: (!) 103/53 (04/04/23 1212)  SpO2: 95 % (04/04/23 1212)   Vital Signs (24h Range):  Temp:  [98.4 °F (36.9 °C)-100.8 °F (38.2 °C)] 98.8 °F (37.1 °C)  Pulse:  [] 74  Resp:  [15-22] 15  SpO2:  [93 %-95 %] 95 %  BP: ()/(53-74) 103/53     Weight: 71.7 kg (158 lb)  Body mass index is 27.12 kg/m².    Physical Exam  Vitals and nursing note reviewed.   Constitutional:       General: She is not in acute distress.     Appearance: She is not diaphoretic.      Comments: Room air   HENT:      Head: Normocephalic and atraumatic.      Mouth/Throat:      Mouth: Mucous membranes are moist.      Pharynx: Oropharynx is clear.   Eyes:      Extraocular Movements: Extraocular movements intact.      Conjunctiva/sclera: Conjunctivae normal.   Cardiovascular:      Rate and Rhythm: Normal rate.   Pulmonary:      Effort: Pulmonary effort is normal. No respiratory distress.   Abdominal:      General: There is no distension.      Palpations: Abdomen is soft.      Tenderness: There is abdominal tenderness. There is no guarding or rebound.      Hernia: A hernia is present.      Comments: Periumbilical hernia that is TTP. No overlying skin changes.  Able to be reduced with effort. Remained reduced and then was not TTP.   No TTP in RUQ. Negative Cintron's signs  No peritonitic signs    Musculoskeletal:         General: No deformity.      Cervical back: Normal range of motion.   Skin:     General: Skin is warm and dry.   Neurological:      Mental Status: She is alert and oriented to person, place, and time.       Significant Labs:  I have reviewed all pertinent lab results within the past 24 hours.    Significant Diagnostics:  I have reviewed all pertinent imaging results/findings within the past 24 hours.

## 2023-04-04 NOTE — ASSESSMENT & PLAN NOTE
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 Recommend serial enemas, NPO and monitor for now.    - will trial CLD and ADAT pending RUQ for cholecystitis.  - Fleet enema ordered.

## 2023-04-04 NOTE — PROVIDER PROGRESS NOTES - EMERGENCY DEPT.
Encounter Date: 4/4/2023    ED Physician Progress Notes          Physician Attestation Statement for Resident:  As the supervising MD   Physician Attestation Statement: I have personally seen and examined this patient.       I agree with the history unless otherwise noted.     As the supervising MD I agree with the PE unless otherwise noted.      +periumbilical hernia, soft with large base, reducible, no overlying skin changes    I have reviewed and agree with the residents interpretation of the following unless otherwise noted:   I have personally reviewed and interpreted the patients laboratory studies: Remarkable for leukocytosis, transaminitis  I have personally reviewed and interpreted imaging studies: CT a/p with evidence of +periumbilical hernia and sig stool burden on my review  I have personally reviewed and interpreted the patient's EKG: sinus tachycardia, TWI inferior      I have also reviewed the following:     old imaging and results: echo 2022 with nl EF    As the supervising MD I agree with the treatment, course, plan, and disposition unless otherwise noted.    Critical Care   Date: 04/04/2023  Performed by: Judie Fontaine MD   Authorized by: Judie Fontaine MD      Total critical care time (exclusive of procedural time) : 45 minutes, exclusive of separately billable procedures and treating other patients and teaching time.    Critical care was necessary to treat or prevent imminent or life-threatening deterioration of the following conditions:  sepsis    Due to a high probability of clinically significant, life threatening deterioration, the patient required my highest level of preparedness to intervene emergently and I personally spent this critical care time directly and personally managing the patient. This critical care time included obtaining a history; examining the patient; pulse oximetry; ordering and review of studies; arranging urgent treatment with development of a management plan; evaluation of  patient's response to treatment; frequent reassessment, documentation, and, discussions with other providers, patient and family members.      This patient does have evidence of infective focus  My overall impression is sepsis.  Source: Abdominal  Antibiotics given-   Antibiotics (72h ago, onward)      None          Latest lactate reviewed-  No results for input(s): LACTATE in the last 72 hours.  Organ dysfunction indicated by Acute liver injury    Fluid challenge Other- Patient to receive less volume other than 30cc/kg due to Volume overload due to- h/o renal failure      Post- resuscitation assessment Yes Perfusion exam was performed within 6 hours of septic shock presentation after bolus shows Adequate tissue perfusion assessed by non-invasive monitoring       Will Not start Pressors- Levophed for MAP of 65  Source control achieved by: IV abx    Patient arrives febrile with +tachycardia, nl SBP in the ED, patient received IVF, IV abx empirically - patient with RUQ abd pain, transamnitis, leukocytosis concerning for biliary pathology with evidence of likely cholecystitis on CT. Concern was raised for possible incarceration of umbilical hernia, no clinical evidence of incarceration. D/w surgery who recommended RUQ US and will follow.

## 2023-04-04 NOTE — HPI
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 Pain is stabbing in nature, pleuritic, 7/10. Nonprandial, does not refer to shoulder. She says that she has had ongoing issues with constipation and initially stated her last bowel movement was on the 29th, later clarified her last BM was 2 days prior to presentation.  She says that she was tried multiple laxatives without relief, has required fleet enemas in the past. She says that she is been eating and drinking normal, but endorses early satiety. She says that she is a chronic cough but denies any recent fevers, shortness of breath, chest pain, nausea, vomiting, dysuria or new rashes. CT in ED demonstrated possible cholecystitis, large stool burden with early/mild SBO 2/2 incisional ventral hernia d/t omental debulking surgery, and known mets to liver and lungs. WBC 13. Lactate and lipase wnl. Received 2L LR, Vanc/cefepime/Zosyn in ED. Transiently febrile to 100.8, tachycardic, and tachypneic after fluids. V/Q scan w low probability of PE.  Comfortable, satting well on RA on exam.     Admitted to [facility] Medicine for mgmt of possible acute cholecystitis, SBO. Gen Surg consulted. NPO, fleet enema ordered.

## 2023-04-04 NOTE — PHARMACY MED REC
"Admission Medication History     The home medication history was taken by Jaime Cooper.    You may go to "Admission" then "Reconcile Home Medications" tabs to review and/or act upon these items.     The home medication list has been updated by the Pharmacy department.   Please read ALL comments highlighted in yellow.   Please address this information as you see fit.    Feel free to contact us if you have any questions or require assistance.      The medications listed below were removed from the home medication list. Please reorder if appropriate:  Patient reports no longer taking the following medication(s):  ASCORBIC ACID, VITAMIN C 500 MG  DOCOSAHEXAENOIC ACID/EPA (FISH OIL)  DOCUSATE SODIUM ORAL CAP  LOSARTAN 100 MG TAB  MULTIVITAMIN CAP  PITAVASTATIN MAGNESIUM 2 MG TAB  VITAMIN A 10,000 UNIT CAP      Medications listed below were obtained from: Patient  Current Outpatient Medications on File Prior to Encounter   Medication Sig    albuterol 90 mcg/actuation inhaler INHALE 2 PUFFS INTO THE LUNGS EVERY 6 (SIX) HOURS AS NEEDED FOR WHEEZING.      ALPRAZolam (XANAX) 0.25 MG tablet   TAKE ONE TABLET BY MOUTH TWICE DAILY AS NEEDED FOR ANXIETY    b complex vitamins tablet Take 1 tablet by mouth once daily.      BREO ELLIPTA 200-25 mcg/dose DsDv diskus inhaler   Inhale 1 puff into the lungs once daily.    cycloSPORINE (RESTASIS) 0.05 % ophthalmic emulsion   Place 1 drop into both eyes 2 (two) times daily.     diclofenac sodium (VOLTAREN) 1 % Gel   Apply 2 g topically daily as needed (Pain).    docusate sodium (COLACE ORAL)   Take 1 capsule by mouth daily as needed.     ergocalciferol (ERGOCALCIFEROL) 50,000 unit Cap   Take 1 capsule by mouth every 7 days.     MISTLETOE SUBQ   Inject 1 Dose into the skin twice a week.    fluticasone (FLONASE) 50 mcg/actuation nasal spray 1 spray by Each Nostril route daily as needed for Rhinitis.      nystatin (MYCOSTATIN) cream Apply 1 g topically twice a week as needed for rash.   " "   ondansetron (ZOFRAN) 8 MG tablet   Take 1 tablet (8 mg total) by mouth every 8 (eight) hours as needed for Nausea.    oxyCODONE (OXYCONTIN) 20 mg 12 hr tablet Take 20 mg by mouth every 12 (twelve) hours as needed for Pain.      oxyCODONE (ROXICODONE) 10 mg Tab immediate release tablet   Take 1 tablet (10 mg total) by mouth every 6 (six) hours as needed for Pain.    polyethylene glycol (MIRALAX) 17 gram PwPk   Take 17 g by mouth daily as needed (Constipation).    promethazine (PHENERGAN) 6.25 mg/5 mL syrup   Take by mouth every 6 (six) hours as needed for Nausea.    senna (SENOKOT) 8.6 mg tablet   Take 4 tablets by mouth once daily.    TIADYLT  mg Cs24 TAKE ONE CAPSULE BY MOUTH EVERY DAY      traZODone (DESYREL) 50 MG tablet Take 50 mg by mouth nightly as needed for Insomnia.      zafirlukast (ACCOLATE) 20 MG tablet   Take 20 mg by mouth once daily.     EASY TOUCH INSULIN SYRINGE 1 mL 30 gauge x 1/2" Syrg   USE AS DIRECTED FOR SQ INJECTIONS THREE TIMES WEEKLY    LIDOcaine-prilocaine (EMLA) cream   Apply topically as needed (port access).    naloxone (NARCAN) 4 mg/actuation Spry 4mg by nasal route as needed for opioid overdose; may repeat every 2-3 minutes in alternating nostrils until medical help arrives. Call 911                 Patrizialele Kenneth  EXT [id number]                  .        "

## 2023-04-04 NOTE — SUBJECTIVE & OBJECTIVE
Past Medical History:   Diagnosis Date    Anxiety 8/7/2020    Asthma 10/31/2018    1985: Diagnosed. cough variant    Asthma 9/26/2013 9:37:32 AM    [facility] Historical - Respiratory: Asthma-No Additional Notes    Bronchiectasis     Complications affecting other specified body systems, hypertension 9/26/2013 9:40:45 AM    [facility] Historical - Cardiovascular: Hypertension-No Additional Notes    Dizziness and giddiness 8/30/2017 3:09:28 PM    [facility] Historical - Unknown: Vertigo-No Additional Notes    Elevated cancer antigen 125 (CA-125) 9/14/2021    Fibromyalgia 10/31/2018    2006: Diagnosed.    Herpes simplex vulvovaginitis 6/9/2021    Hx of psychiatric care     Hypercholesterolemia 10/31/2018    2010: Began statin.    Hyperlipidemia     Hypertension     Infective arthritis, multiple sites 6/23/2017 11:02:17 AM    [facility] Historical - Musculoskeletal: Arthritis-No Additional Notes    Lung mass 9/1/2020    Malignant neoplasm of both ovaries 8/6/2020    Myalgia and myositis 6/23/2017 11:02:26 AM    [facility] Historical - Musculoskeletal: Fibromyalgia-No Additional Notes    Neoplasm of other genitourinary organ 11/6/2020 9:58:16 AM    [facility] Historical - Unknown: Ovarian neoplasm-finished chemo 11/2020- Dr. Foster Stage 3    Other and unspecified ovarian cyst 5/7/2020 4:13:20 PM    [facility] Historical - Quick Add: Ovarian cyst, left-No Additional Notes    Other genital herpes 7/6/2017 1:15:51 PM    [facility] Historical - Infectious: Herpes, Genital-No Additional Notes    Overweight 10/31/2018    10/31/2018: Weight 75 kg. BMI 28.    Psychiatric problem     Pulmonary nodules 12/10/2020    Pure hypercholesterolemia 6/23/2017 11:01:53 AM    [facility] Historical - Endocrine/Metabolic/Immune: Hypercholesterolemia-No Additional Notes    Reactive depression 9/2/2020    Renal failure 9/9/2019 11:55:24 AM    [facility] Historical - Urology: Renal Failure-Diagnosed @ ER when she was there  for stomach pains    Supraventricular tachycardia     Tachycardia     Tachycardia 2013 9:37:10 AM    [facility] Historical - Symptoms/Signs: Tachycardia-No Additional Notes    Therapy        Past Surgical History:   Procedure Laterality Date    APPENDECTOMY       SECTION      x 1    HYSTERECTOMY      LAPAROSCOPIC SURGICAL REMOVAL OF OMENTUM      PELVIC AND PARA-AORTIC LYMPH NODE DISSECTION      VA REMOVAL OF OVARY/TUBE(S)      TONSILLECTOMY      TRANSPHENOIDAL PITUITARY RESECTION  2000    TUMOR REMOVAL         Review of patient's allergies indicates:   Allergen Reactions    Erythromycin Other (See Comments)     Stomach Cramps       Current Facility-Administered Medications on File Prior to Encounter   Medication    heparin, porcine (PF) 100 unit/mL injection flush 500 Units    sodium chloride 0.9% flush 10 mL     Current Outpatient Medications on File Prior to Encounter   Medication Sig    albuterol 90 mcg/actuation inhaler INHALE 2 PUFFS INTO THE LUNGS EVERY 6 (SIX) HOURS AS NEEDED FOR WHEEZING.    ALPRAZolam (XANAX) 0.25 MG tablet TAKE ONE TABLET BY MOUTH TWICE DAILY AS NEEDED FOR ANXIETY    b complex vitamins tablet Take 1 tablet by mouth once daily.    BREO ELLIPTA 200-25 mcg/dose DsDv diskus inhaler Inhale 1 puff into the lungs once daily.    cycloSPORINE (RESTASIS) 0.05 % ophthalmic emulsion Place 1 drop into both eyes 2 (two) times daily.     diclofenac sodium (VOLTAREN) 1 % Gel Apply 2 g topically daily as needed (Pain).    docusate sodium (COLACE ORAL) Take 1 capsule by mouth daily as needed.     ergocalciferol (ERGOCALCIFEROL) 50,000 unit Cap Take 1 capsule by mouth every 7 days.     MISTLETOE SUBQ Inject 1 Dose into the skin twice a week.    fluticasone (FLONASE) 50 mcg/actuation nasal spray 2 sprays (100 mcg total) by Each Nare route once daily. (Patient taking differently: 1 spray by Each Nostril route daily as needed for Rhinitis.)    nystatin (MYCOSTATIN) cream Apply 1 g  "topically twice a week. As needed for rash    ondansetron (ZOFRAN) 8 MG tablet Take 1 tablet (8 mg total) by mouth every 8 (eight) hours as needed for Nausea.    oxyCODONE (OXYCONTIN) 20 mg 12 hr tablet Take 1 tablet (20 mg total) by mouth every 12 (twelve) hours. (Patient taking differently: Take 20 mg by mouth every 12 (twelve) hours as needed for Pain.)    oxyCODONE (ROXICODONE) 10 mg Tab immediate release tablet Take 1 tablet (10 mg total) by mouth every 6 (six) hours as needed for Pain.    polyethylene glycol (MIRALAX) 17 gram PwPk Take 17 g by mouth daily as needed (Constipation).    promethazine (PHENERGAN) 6.25 mg/5 mL syrup Take by mouth every 6 (six) hours as needed for Nausea.    senna (SENOKOT) 8.6 mg tablet Take 4 tablets by mouth once daily.    TIADYLT  mg Cs24 TAKE ONE CAPSULE BY MOUTH EVERY DAY    traZODone (DESYREL) 50 MG tablet Take 1 tablet (50 mg total) by mouth every evening. (Patient taking differently: Take 50 mg by mouth nightly as needed for Insomnia.)    zafirlukast (ACCOLATE) 20 MG tablet Take 20 mg by mouth once daily.     EASY TOUCH INSULIN SYRINGE 1 mL 30 gauge x 1/2" Syrg USE AS DIRECTED FOR SQ INJECTIONS THREE TIMES WEEKLY    LIDOcaine-prilocaine (EMLA) cream Apply topically as needed (port access).    naloxone (NARCAN) 4 mg/actuation Spry 4mg by nasal route as needed for opioid overdose; may repeat every 2-3 minutes in alternating nostrils until medical help arrives. Call 911    [DISCONTINUED] ascorbic acid, vitamin C, (VITAMIN C) 500 MG tablet Take 500 mg by mouth once daily.    [DISCONTINUED] docosahexaenoic acid/epa (FISH OIL ORAL) Take 1 capsule by mouth once daily.    [DISCONTINUED] losartan (COZAAR) 100 MG tablet Take 1 tablet (100 mg total) by mouth once daily.    [DISCONTINUED] multivitamin capsule Take 1 capsule by mouth once daily.    [DISCONTINUED] pitavastatin magnesium (ZYPITAMAG) 2 mg Tab 2 mg once daily.    [DISCONTINUED] vitamin A 62674 UNIT capsule Take 10,000 " Units by mouth once daily.     Family History       Problem Relation (Age of Onset)    Angina Mother    Anxiety disorder Sister    Breast cancer Paternal Aunt    Colon cancer Sister (70), Brother (80)    Depression Sister    Drug abuse Brother    Heart disease Brother    Stroke Father          Tobacco Use    Smoking status: Never    Smokeless tobacco: Never   Substance and Sexual Activity    Alcohol use: No     Comment: Rarely    Drug use: No    Sexual activity: Not Currently     Review of Systems   Constitutional:  Positive for appetite change. Negative for activity change, chills, diaphoresis and fever.   HENT:  Negative for sinus pressure, sinus pain and sore throat.    Eyes:  Negative for photophobia, redness and visual disturbance.   Respiratory:  Negative for cough, chest tightness and shortness of breath.    Cardiovascular:  Negative for chest pain, palpitations and leg swelling.   Gastrointestinal:  Positive for abdominal distention (reducible umbilical hernia present), abdominal pain and constipation. Negative for blood in stool, nausea and vomiting.   Endocrine: Negative for polydipsia, polyphagia and polyuria.   Genitourinary:  Negative for difficulty urinating, flank pain and hematuria.   Musculoskeletal:  Positive for arthralgias and back pain. Negative for joint swelling.   Skin:  Negative for pallor, rash and wound.   Neurological:  Negative for dizziness, facial asymmetry and headaches.   Psychiatric/Behavioral:  Positive for dysphoric mood. Negative for agitation and suicidal ideas (history of SI. denies at present). The patient is nervous/anxious. The patient is not hyperactive.    Objective:     Vital Signs (Most Recent):  Temp: 98.8 °F (37.1 °C) (04/04/23 1212)  Pulse: 74 (04/04/23 1212)  Resp: 15 (04/04/23 1229)  BP: (!) 103/53 (04/04/23 1212)  SpO2: 95 % (04/04/23 1212)   Vital Signs (24h Range):  Temp:  [98.4 °F (36.9 °C)-100.8 °F (38.2 °C)] 98.8 °F (37.1 °C)  Pulse:  [] 74  Resp:   [15-22] 15  SpO2:  [93 %-95 %] 95 %  BP: ()/(53-74) 103/53     Weight: 71.7 kg (158 lb)  Body mass index is 27.12 kg/m².    Physical Exam  Constitutional:       General: She is not in acute distress.     Appearance: Normal appearance. She is obese. She is not ill-appearing.   HENT:      Head: Normocephalic and atraumatic.      Nose: Nose normal. No congestion or rhinorrhea.      Mouth/Throat:      Mouth: Mucous membranes are moist.      Pharynx: No posterior oropharyngeal erythema.   Eyes:      General: No scleral icterus.        Right eye: No discharge.         Left eye: No discharge.      Extraocular Movements: Extraocular movements intact.   Neck:      Vascular: No carotid bruit.   Cardiovascular:      Rate and Rhythm: Normal rate and regular rhythm.      Pulses: Normal pulses.      Heart sounds: Normal heart sounds.   Pulmonary:      Effort: Pulmonary effort is normal. No respiratory distress.      Breath sounds: Normal breath sounds. No stridor. No rhonchi.   Abdominal:      Tenderness: There is abdominal tenderness. There is no right CVA tenderness, left CVA tenderness, guarding or rebound.      Hernia: A hernia (umbilical, reducible.) is present.      Comments: Hypoactive bowel sounds   Musculoskeletal:         General: No swelling, tenderness or deformity.      Cervical back: Normal range of motion.   Lymphadenopathy:      Cervical: No cervical adenopathy.   Skin:     General: Skin is warm and dry.      Capillary Refill: Capillary refill takes less than 2 seconds.      Coloration: Skin is not jaundiced.      Findings: No bruising.   Neurological:      General: No focal deficit present.      Mental Status: She is alert and oriented to person, place, and time.   Psychiatric:         Attention and Perception: Attention and perception normal.         Mood and Affect: Affect normal. Mood is anxious.         Speech: Speech normal.         Behavior: Behavior normal. Behavior is cooperative.         Thought  Content: Thought content normal. Thought content does not include suicidal ideation. Thought content does not include suicidal plan.           Significant Labs: Blood Culture:   Recent Labs   Lab 04/04/23  0606   LABBLOO No Growth to date  No Growth to date     CBC:   Recent Labs   Lab 04/04/23  0606   WBC 13.12*   HGB 8.6*   HCT 28.5*        CMP:   Recent Labs   Lab 04/04/23  0606   *   K 4.2   CL 98   CO2 24   *   BUN 10   CREATININE 1.7*   CALCIUM 9.5   PROT 7.8   ALBUMIN 2.8*   BILITOT 0.7   ALKPHOS 610*   *   *   ANIONGAP 12     Lactic Acid: No results for input(s): LACTATE in the last 48 hours.  Urine Studies:   Recent Labs   Lab 04/04/23  0834   COLORU Orange*   APPEARANCEUA Ex.Turbid   PHUR 6.0   SPECGRAV 1.020   PROTEINUA Trace*   GLUCUA Negative   KETONESU Negative   BILIRUBINUA Negative   OCCULTUA Negative   NITRITE Negative   LEUKOCYTESUR Negative       Significant Imaging: I have reviewed all pertinent imaging results/findings within the past 24 hours.

## 2023-04-04 NOTE — CONSULTS
[facility] - Emergency Dept  General Surgery  Consult Note    Patient Name: Liat Gilmore  MRN: [id number]  Code Status: Full Code  Admission Date: 4/4/2023  Hospital Length of Stay: 0 days  Attending Physician: Pearl Pang DO  Primary Care Provider: Shabana Dunbar MD    Patient information was obtained from patient, past medical records and ER records.     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    - Patient seen and examined. Labs and imaging reviewed. Case discussed with staff on call, Dr. Rubio  - Admit to medicine  - Hernia reduced at bedside  - Okay to hold off on NGT as patient has no n/v, hernia reduced. Okay for trial of CLD if remains without n/v   - Recommend enemas to clear colon burden   - US abd to eval for cholecystitis. Unclear if RUQ is 2/2 to biliary etiology or liver METs. 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 Also reports constipation which is typical for her with her chronic opioids. Used OTC laxatives with minimal relief. Last BM was AM of 3/29 with use of suppository. Also reports "ball" near umbilicus that has been hard for some time. She denies fever, chills, n/v, decreased appetite, and all others symptoms.     In the ED, she is HDS. Leukocytosis to 13.1, AST//102, T bili wnl. Alk phos 610. Lactate wnl  CT a/p noncon with ". Acute appearing inflammatory changes surrounding an enlarged and inflamed appearing gallbladder suggesting acute cholecystitis.  Neoplastic involvement if total occluded fluid by current unenhanced CT imaging. Redemonstrated ventral abdominal wall incisional hernia containing fecalized and mildly dilated small bowel loops, with similar appearance of upstream intra-abdominal small bowel loops, concerning for mild/early small-bowel obstruction."          Current Facility-Administered Medications on File Prior to Encounter   Medication    heparin, porcine (PF) 100 unit/mL injection flush 500 Units    sodium chloride 0.9% flush 10 mL     Current Outpatient Medications on File Prior to Encounter   Medication Sig    albuterol 90 mcg/actuation inhaler INHALE 2 PUFFS INTO THE LUNGS EVERY 6 (SIX) HOURS AS NEEDED FOR WHEEZING.    ALPRAZolam (XANAX) 0.25 MG tablet TAKE ONE TABLET BY MOUTH TWICE DAILY AS NEEDED FOR ANXIETY    ascorbic acid, vitamin C, (VITAMIN C) 500 MG tablet Take 500 mg by mouth once daily.    b complex vitamins tablet Take 1 tablet by mouth once daily.    BREO ELLIPTA 200-25 mcg/dose DsDv diskus inhaler inhale ONE PUFF EVERY DAY    cycloSPORINE (RESTASIS) 0.05 % ophthalmic emulsion Place 1 drop into both eyes 2 (two) times daily.     diclofenac sodium (VOLTAREN) 1 % Gel Apply 2 g topically daily as needed.    docosahexaenoic acid/epa (FISH OIL ORAL) Take 1 capsule by mouth once daily.    docusate sodium (COLACE ORAL) Take 1 capsule by mouth daily as needed.     " "EASY TOUCH INSULIN SYRINGE 1 mL 30 gauge x 1/2" Syrg USE AS DIRECTED FOR SQ INJECTIONS THREE TIMES WEEKLY    ergocalciferol (ERGOCALCIFEROL) 50,000 unit Cap Take 1 capsule by mouth every 7 days.     MISTLETOE SUBQ Inject 1 Dose into the skin twice a week.    fluticasone (FLONASE) 50 mcg/actuation nasal spray 2 sprays (100 mcg total) by Each Nare route once daily. (Patient taking differently: 1 spray by Each Nostril route once daily.)    LIDOcaine-prilocaine (EMLA) cream Apply topically as needed (port access).    losartan (COZAAR) 100 MG tablet Take 1 tablet (100 mg total) by mouth once daily.    multivitamin capsule Take 1 capsule by mouth once daily.    naloxone (NARCAN) 4 mg/actuation Spry 4mg by nasal route as needed for opioid overdose; may repeat every 2-3 minutes in alternating nostrils until medical help arrives. Call 911    nystatin (MYCOSTATIN) cream Apply 1 g topically.    ondansetron (ZOFRAN) 8 MG tablet Take 1 tablet (8 mg total) by mouth every 8 (eight) hours as needed for Nausea.    oxyCODONE (OXYCONTIN) 20 mg 12 hr tablet Take 1 tablet (20 mg total) by mouth every 12 (twelve) hours.    oxyCODONE (ROXICODONE) 10 mg Tab immediate release tablet Take 1 tablet (10 mg total) by mouth every 6 (six) hours as needed for Pain.    pitavastatin magnesium (ZYPITAMAG) 2 mg Tab 2 mg once daily.    promethazine (PHENERGAN) 6.25 mg/5 mL syrup Take by mouth every 6 (six) hours as needed for Nausea. Pt uses for cough    senna (SENOKOT) 8.6 mg tablet Take 1 tablet by mouth once daily.    TIADYLT  mg Cs24 TAKE ONE CAPSULE BY MOUTH EVERY DAY    traZODone (DESYREL) 50 MG tablet Take 1 tablet (50 mg total) by mouth every evening.    vitamin A 82288 UNIT capsule Take 10,000 Units by mouth once daily.    zafirlukast (ACCOLATE) 20 MG tablet Take 20 mg by mouth once daily.        Review of patient's allergies indicates:   Allergen Reactions    Erythromycin Other (See Comments)     Stomach Cramps       Past " Medical History:   Diagnosis Date    Anxiety 8/7/2020    Asthma 10/31/2018    1985: Diagnosed. cough variant    Asthma 9/26/2013 9:37:32 AM    [facility] Historical - Respiratory: Asthma-No Additional Notes    Bronchiectasis     Complications affecting other specified body systems, hypertension 9/26/2013 9:40:45 AM    [facility] Historical - Cardiovascular: Hypertension-No Additional Notes    Dizziness and giddiness 8/30/2017 3:09:28 PM    [facility] Historical - Unknown: Vertigo-No Additional Notes    Elevated cancer antigen 125 (CA-125) 9/14/2021    Fibromyalgia 10/31/2018    2006: Diagnosed.    Herpes simplex vulvovaginitis 6/9/2021    Hx of psychiatric care     Hypercholesterolemia 10/31/2018    2010: Began statin.    Hyperlipidemia     Hypertension     Infective arthritis, multiple sites 6/23/2017 11:02:17 AM    [facility] Historical - Musculoskeletal: Arthritis-No Additional Notes    Lung mass 9/1/2020    Malignant neoplasm of both ovaries 8/6/2020    Myalgia and myositis 6/23/2017 11:02:26 AM    [facility] Historical - Musculoskeletal: Fibromyalgia-No Additional Notes    Neoplasm of other genitourinary organ 11/6/2020 9:58:16 AM    [facility] Historical - Unknown: Ovarian neoplasm-finished chemo 11/2020- Dr. Foster Stage 3    Other and unspecified ovarian cyst 5/7/2020 4:13:20 PM    [facility] Historical - Quick Add: Ovarian cyst, left-No Additional Notes    Other genital herpes 7/6/2017 1:15:51 PM    [facility] Historical - Infectious: Herpes, Genital-No Additional Notes    Overweight 10/31/2018    10/31/2018: Weight 75 kg. BMI 28.    Psychiatric problem     Pulmonary nodules 12/10/2020    Pure hypercholesterolemia 6/23/2017 11:01:53 AM    [facility] Historical - Endocrine/Metabolic/Immune: Hypercholesterolemia-No Additional Notes    Reactive depression 9/2/2020    Renal failure 9/9/2019 11:55:24 AM    [facility] Historical - Urology: Renal Failure-Diagnosed @ ER when she was there for  stomach pains    Supraventricular tachycardia     Tachycardia     Tachycardia 2013 9:37:10 AM    [facility] Historical - Symptoms/Signs: Tachycardia-No Additional Notes    Therapy      Past Surgical History:   Procedure Laterality Date    APPENDECTOMY       SECTION      x 1    HYSTERECTOMY      LAPAROSCOPIC SURGICAL REMOVAL OF OMENTUM      PELVIC AND PARA-AORTIC LYMPH NODE DISSECTION      [facility] REMOVAL OF OVARY/TUBE(S)      TONSILLECTOMY      TRANSPHENOIDAL PITUITARY RESECTION  2000    TUMOR REMOVAL       Family History       Problem Relation (Age of Onset)    Angina Mother    Anxiety disorder Sister    Breast cancer Paternal Aunt    Colon cancer Sister (70), Brother (80)    Depression Sister    Drug abuse Brother    Heart disease Brother    Stroke Father          Tobacco Use    Smoking status: Never    Smokeless tobacco: Never   Substance and Sexual Activity    Alcohol use: No     Comment: Rarely    Drug use: No    Sexual activity: Not Currently     Review of Systems   Constitutional:  Negative for appetite change, chills, fatigue and fever.   Respiratory:  Negative for shortness of breath.    Cardiovascular:  Negative for chest pain.   Gastrointestinal:  Positive for abdominal pain and constipation. Negative for blood in stool, diarrhea, nausea and vomiting.   Genitourinary:  Negative for dysuria and hematuria.   Skin:  Negative for wound.   Neurological:  Negative for dizziness and headaches.   Psychiatric/Behavioral:  Negative for confusion. The patient is not nervous/anxious.    All other systems reviewed and are negative.  Objective:     Vital Signs (Most Recent):  Temp: 98.8 °F (37.1 °C) (23 1212)  Pulse: 74 (23 1212)  Resp: 15 (23 1229)  BP: (!) 103/53 (23 1212)  SpO2: 95 % (23 1212)   Vital Signs (24h Range):  Temp:  [98.4 °F (36.9 °C)-100.8 °F (38.2 °C)] 98.8 °F (37.1 °C)  Pulse:  [] 74  Resp:  [15-22] 15  SpO2:  [93 %-95 %] 95 %  BP: ()/(53-74) 103/53      Weight: 71.7 kg (158 lb)  Body mass index is 27.12 kg/m².    Physical Exam  Vitals and nursing note reviewed.   Constitutional:       General: She is not in acute distress.     Appearance: She is not diaphoretic.      Comments: Room air   HENT:      Head: Normocephalic and atraumatic.      Mouth/Throat:      Mouth: Mucous membranes are moist.      Pharynx: Oropharynx is clear.   Eyes:      Extraocular Movements: Extraocular movements intact.      Conjunctiva/sclera: Conjunctivae normal.   Cardiovascular:      Rate and Rhythm: Normal rate.   Pulmonary:      Effort: Pulmonary effort is normal. No respiratory distress.   Abdominal:      General: There is no distension.      Palpations: Abdomen is soft.      Tenderness: There is abdominal tenderness. There is no guarding or rebound.      Hernia: A hernia is present.      Comments: Periumbilical hernia that is TTP. No overlying skin changes. Able to be reduced with effort. Remained reduced and then was not TTP.   No TTP in RUQ. Negative Cintron's signs  No peritonitic signs    Musculoskeletal:         General: No deformity.      Cervical back: Normal range of motion.   Skin:     General: Skin is warm and dry.   Neurological:      Mental Status: She is alert and oriented to person, place, and time.       Significant Labs:  I have reviewed all pertinent lab results within the past 24 hours.    Significant Diagnostics:  I have reviewed all pertinent imaging results/findings within the past 24 hours.      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    - Patient seen and examined. Labs and imaging reviewed. Case discussed with staff on call, Dr. Rubio  - Admit to medicine  -  Hernia reduced at bedside  - Okay to hold off on NGT as patient has no n/v, hernia reduced. Okay for trial of CLD if remains without n/v   - Recommend enemas to clear colon burden   - US abd to eval for cholecystitis. Unclear if RUQ is 2/2 to biliary etiology or liver METs. If cholecystitis present, recommend IR consult for perc rachid tube as patient is not a surgical candidate given her terminal malignancy   - Serial abdominal exams  - Will continue to follow    - Remainder of care per primary team  - Please contact general surgery with any questions, concerns, or clinical status changes        VTE Risk Mitigation (From admission, onward)           Ordered     IP VTE HIGH RISK PATIENT  Once         04/04/23 0954     Place sequential compression device  Until discontinued         04/04/23 0954     Place sequential compression device  Until discontinued         04/04/23 0949     Reason for No Pharmacological VTE Prophylaxis  Once        Question:  Reasons:  Answer:  Risk of Bleeding  Comment:  Possibly operative    04/04/23 0949                    Thank you for your consult. I will follow-up with patient. Please contact us if you have any additional questions.    Rigoberto Beaulieu PA-C  General Surgery  [facility] - [facility] Dept

## 2023-04-04 NOTE — H&P
[facility] - Emergency Dept  [facility] Medicine  History & Physical    Patient Name: Liat Gilmore  MRN: [id number]  Patient Class: OP- Observation  Admission Date: 4/4/2023  Attending Physician: Pearl Pang DO   Primary Care Provider: Shabana Dunbar MD         Patient information was obtained from patient, past medical records and ER records.     Subjective:     Principal Problem:Cholecystitis    Chief Complaint:   Chief Complaint   Patient presents with    Abdominal Pain     Sharp RUQ abdominal pain since yesterday afternoon. Denies n/v. States last bowel movement was 3/29 which pt states is normal for her. Frequent constipation d/t medications. Denies fever or chills. Hx of stage 4 ovarian cancer with liver mets. 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 Pain is stabbing in nature, pleuritic, 7/10. Nonprandial, does not refer to shoulder. She says that she has had ongoing issues with constipation and initially stated her last bowel movement was on the 29th, later clarified her last BM was 2 days prior to presentation.  She says that she was tried multiple laxatives without relief, has required fleet enemas in the past. She says that she is been eating and drinking normal, but endorses early satiety. She says that she is a chronic cough but denies any recent fevers, shortness of breath, chest pain, nausea, vomiting, dysuria or new rashes. CT in ED demonstrated possible cholecystitis, large stool burden with early/mild SBO 2/2 incisional ventral hernia d/t omental debulking surgery, and known mets to liver and lungs. WBC 13. Lactate and lipase wnl. Received 2L LR, Vanc/cefepime/Zosyn in ED. Transiently febrile  to 100.8, tachycardic, and tachypneic after fluids. V/Q scan w low probability of PE.  Comfortable, satting well on RA on exam.     Admitted to [facility] Medicine for mgmt of possible acute cholecystitis, SBO. Gen Surg consulted. NPO, fleet enema ordered.      Past Medical History:   Diagnosis Date    Anxiety 8/7/2020    Asthma 10/31/2018    1985: Diagnosed. cough variant    Asthma 9/26/2013 9:37:32 AM    [facility] Historical - Respiratory: Asthma-No Additional Notes    Bronchiectasis     Complications affecting other specified body systems, hypertension 9/26/2013 9:40:45 AM    [facility] Historical - Cardiovascular: Hypertension-No Additional Notes    Dizziness and giddiness 8/30/2017 3:09:28 PM    [facility] Historical - Unknown: Vertigo-No Additional Notes    Elevated cancer antigen 125 (CA-125) 9/14/2021    Fibromyalgia 10/31/2018    2006: Diagnosed.    Herpes simplex vulvovaginitis 6/9/2021    Hx of psychiatric care     Hypercholesterolemia 10/31/2018    2010: Began statin.    Hyperlipidemia     Hypertension     Infective arthritis, multiple sites 6/23/2017 11:02:17 AM    [facility] Historical - Musculoskeletal: Arthritis-No Additional Notes    Lung mass 9/1/2020    Malignant neoplasm of both ovaries 8/6/2020    Myalgia and myositis 6/23/2017 11:02:26 AM    [facility] Historical - Musculoskeletal: Fibromyalgia-No Additional Notes    Neoplasm of other genitourinary organ 11/6/2020 9:58:16 AM    [facility] Historical - Unknown: Ovarian neoplasm-finished chemo 11/2020- Dr. Foster Stage 3    Other and unspecified ovarian cyst 5/7/2020 4:13:20 PM    [facility] Historical - Quick Add: Ovarian cyst, left-No Additional Notes    Other genital herpes 7/6/2017 1:15:51 PM    [facility] Historical - Infectious: Herpes, Genital-No Additional Notes    Overweight 10/31/2018    10/31/2018: Weight 75 kg. BMI 28.    Psychiatric problem     Pulmonary nodules 12/10/2020    Pure  hypercholesterolemia 2017 11:01:53 AM    [facility] Historical - Endocrine/Metabolic/Immune: Hypercholesterolemia-No Additional Notes    Reactive depression 2020    Renal failure 2019 11:55:24 AM    [facility] Historical - Urology: Renal Failure-Diagnosed @ ER when she was there for stomach pains    Supraventricular tachycardia     Tachycardia     Tachycardia 2013 9:37:10 AM    [facility] Historical - Symptoms/Signs: Tachycardia-No Additional Notes    Therapy        Past Surgical History:   Procedure Laterality Date    APPENDECTOMY       SECTION      x 1    HYSTERECTOMY      LAPAROSCOPIC SURGICAL REMOVAL OF OMENTUM      PELVIC AND PARA-AORTIC LYMPH NODE DISSECTION      DE REMOVAL OF OVARY/TUBE(S)      TONSILLECTOMY      TRANSPHENOIDAL PITUITARY RESECTION  2000    TUMOR REMOVAL         Review of patient's allergies indicates:   Allergen Reactions    Erythromycin Other (See Comments)     Stomach Cramps       Current Facility-Administered Medications on File Prior to Encounter   Medication    heparin, porcine (PF) 100 unit/mL injection flush 500 Units    sodium chloride 0.9% flush 10 mL     Current Outpatient Medications on File Prior to Encounter   Medication Sig    albuterol 90 mcg/actuation inhaler INHALE 2 PUFFS INTO THE LUNGS EVERY 6 (SIX) HOURS AS NEEDED FOR WHEEZING.    ALPRAZolam (XANAX) 0.25 MG tablet TAKE ONE TABLET BY MOUTH TWICE DAILY AS NEEDED FOR ANXIETY    b complex vitamins tablet Take 1 tablet by mouth once daily.    BREO ELLIPTA 200-25 mcg/dose DsDv diskus inhaler Inhale 1 puff into the lungs once daily.    cycloSPORINE (RESTASIS) 0.05 % ophthalmic emulsion Place 1 drop into both eyes 2 (two) times daily.     diclofenac sodium (VOLTAREN) 1 % Gel Apply 2 g topically daily as needed (Pain).    docusate sodium (COLACE ORAL) Take 1 capsule by mouth daily as needed.     ergocalciferol (ERGOCALCIFEROL) 50,000 unit Cap Take 1 capsule by mouth every 7  "days.     MISTLETOE SUBQ Inject 1 Dose into the skin twice a week.    fluticasone (FLONASE) 50 mcg/actuation nasal spray 2 sprays (100 mcg total) by Each Nare route once daily. (Patient taking differently: 1 spray by Each Nostril route daily as needed for Rhinitis.)    nystatin (MYCOSTATIN) cream Apply 1 g topically twice a week. As needed for rash    ondansetron (ZOFRAN) 8 MG tablet Take 1 tablet (8 mg total) by mouth every 8 (eight) hours as needed for Nausea.    oxyCODONE (OXYCONTIN) 20 mg 12 hr tablet Take 1 tablet (20 mg total) by mouth every 12 (twelve) hours. (Patient taking differently: Take 20 mg by mouth every 12 (twelve) hours as needed for Pain.)    oxyCODONE (ROXICODONE) 10 mg Tab immediate release tablet Take 1 tablet (10 mg total) by mouth every 6 (six) hours as needed for Pain.    polyethylene glycol (MIRALAX) 17 gram PwPk Take 17 g by mouth daily as needed (Constipation).    promethazine (PHENERGAN) 6.25 mg/5 mL syrup Take by mouth every 6 (six) hours as needed for Nausea.    senna (SENOKOT) 8.6 mg tablet Take 4 tablets by mouth once daily.    TIADYLT  mg Cs24 TAKE ONE CAPSULE BY MOUTH EVERY DAY    traZODone (DESYREL) 50 MG tablet Take 1 tablet (50 mg total) by mouth every evening. (Patient taking differently: Take 50 mg by mouth nightly as needed for Insomnia.)    zafirlukast (ACCOLATE) 20 MG tablet Take 20 mg by mouth once daily.     EASY TOUCH INSULIN SYRINGE 1 mL 30 gauge x 1/2" Syrg USE AS DIRECTED FOR SQ INJECTIONS THREE TIMES WEEKLY    LIDOcaine-prilocaine (EMLA) cream Apply topically as needed (port access).    naloxone (NARCAN) 4 mg/actuation Spry 4mg by nasal route as needed for opioid overdose; may repeat every 2-3 minutes in alternating nostrils until medical help arrives. Call 911    [DISCONTINUED] ascorbic acid, vitamin C, (VITAMIN C) 500 MG tablet Take 500 mg by mouth once daily.    [DISCONTINUED] docosahexaenoic acid/epa (FISH OIL ORAL) Take 1 capsule " by mouth once daily.    [DISCONTINUED] losartan (COZAAR) 100 MG tablet Take 1 tablet (100 mg total) by mouth once daily.    [DISCONTINUED] multivitamin capsule Take 1 capsule by mouth once daily.    [DISCONTINUED] pitavastatin magnesium (ZYPITAMAG) 2 mg Tab 2 mg once daily.    [DISCONTINUED] vitamin A 70619 UNIT capsule Take 10,000 Units by mouth once daily.     Family History       Problem Relation (Age of Onset)    Angina Mother    Anxiety disorder Sister    Breast cancer Paternal Aunt    Colon cancer Sister (70), Brother (80)    Depression Sister    Drug abuse Brother    Heart disease Brother    Stroke Father          Tobacco Use    Smoking status: Never    Smokeless tobacco: Never   Substance and Sexual Activity    Alcohol use: No     Comment: Rarely    Drug use: No    Sexual activity: Not Currently     Review of Systems   Constitutional:  Positive for appetite change. Negative for activity change, chills, diaphoresis and fever.   HENT:  Negative for sinus pressure, sinus pain and sore throat.    Eyes:  Negative for photophobia, redness and visual disturbance.   Respiratory:  Negative for cough, chest tightness and shortness of breath.    Cardiovascular:  Negative for chest pain, palpitations and leg swelling.   Gastrointestinal:  Positive for abdominal distention (reducible umbilical hernia present), abdominal pain and constipation. Negative for blood in stool, nausea and vomiting.   Endocrine: Negative for polydipsia, polyphagia and polyuria.   Genitourinary:  Negative for difficulty urinating, flank pain and hematuria.   Musculoskeletal:  Positive for arthralgias and back pain. Negative for joint swelling.   Skin:  Negative for pallor, rash and wound.   Neurological:  Negative for dizziness, facial asymmetry and headaches.   Psychiatric/Behavioral:  Positive for dysphoric mood. Negative for agitation and suicidal ideas (history of SI. denies at present). The patient is nervous/anxious. The patient is  not hyperactive.    Objective:     Vital Signs (Most Recent):  Temp: 98.8 °F (37.1 °C) (04/04/23 1212)  Pulse: 74 (04/04/23 1212)  Resp: 15 (04/04/23 1229)  BP: (!) 103/53 (04/04/23 1212)  SpO2: 95 % (04/04/23 1212)   Vital Signs (24h Range):  Temp:  [98.4 °F (36.9 °C)-100.8 °F (38.2 °C)] 98.8 °F (37.1 °C)  Pulse:  [] 74  Resp:  [15-22] 15  SpO2:  [93 %-95 %] 95 %  BP: ()/(53-74) 103/53     Weight: 71.7 kg (158 lb)  Body mass index is 27.12 kg/m².    Physical Exam  Constitutional:       General: She is not in acute distress.     Appearance: Normal appearance. She is obese. She is not ill-appearing.   HENT:      Head: Normocephalic and atraumatic.      Nose: Nose normal. No congestion or rhinorrhea.      Mouth/Throat:      Mouth: Mucous membranes are moist.      Pharynx: No posterior oropharyngeal erythema.   Eyes:      General: No scleral icterus.        Right eye: No discharge.         Left eye: No discharge.      Extraocular Movements: Extraocular movements intact.   Neck:      Vascular: No carotid bruit.   Cardiovascular:      Rate and Rhythm: Normal rate and regular rhythm.      Pulses: Normal pulses.      Heart sounds: Normal heart sounds.   Pulmonary:      Effort: Pulmonary effort is normal. No respiratory distress.      Breath sounds: Normal breath sounds. No stridor. No rhonchi.   Abdominal:      Tenderness: There is abdominal tenderness. There is no right CVA tenderness, left CVA tenderness, guarding or rebound.      Hernia: A hernia (umbilical, reducible.) is present.      Comments: Hypoactive bowel sounds   Musculoskeletal:         General: No swelling, tenderness or deformity.      Cervical back: Normal range of motion.   Lymphadenopathy:      Cervical: No cervical adenopathy.   Skin:     General: Skin is warm and dry.      Capillary Refill: Capillary refill takes less than 2 seconds.      Coloration: Skin is not jaundiced.      Findings: No bruising.   Neurological:      General: No focal  deficit present.      Mental Status: She is alert and oriented to person, place, and time.   Psychiatric:         Attention and Perception: Attention and perception normal.         Mood and Affect: Affect normal. Mood is anxious.         Speech: Speech normal.         Behavior: Behavior normal. Behavior is cooperative.         Thought Content: Thought content normal. Thought content does not include suicidal ideation. Thought content does not include suicidal plan.           Significant Labs: Blood Culture:   Recent Labs   Lab 04/04/23 0606   LABBLOO No Growth to date  No Growth to date     CBC:   Recent Labs   Lab 04/04/23 0606   WBC 13.12*   HGB 8.6*   HCT 28.5*        CMP:   Recent Labs   Lab 04/04/23 0606   *   K 4.2   CL 98   CO2 24   *   BUN 10   CREATININE 1.7*   CALCIUM 9.5   PROT 7.8   ALBUMIN 2.8*   BILITOT 0.7   ALKPHOS 610*   *   *   ANIONGAP 12     Lactic Acid: No results for input(s): LACTATE in the last 48 hours.  Urine Studies:   Recent Labs   Lab 04/04/23  0834   COLORU Orange*   APPEARANCEUA Ex.Turbid   PHUR 6.0   SPECGRAV 1.020   PROTEINUA Trace*   GLUCUA Negative   KETONESU Negative   BILIRUBINUA Negative   OCCULTUA Negative   NITRITE Negative   LEUKOCYTESUR Negative       Significant Imaging: I have reviewed all pertinent imaging results/findings within the past 24 hours.    Assessment/Plan:     * Cholecystitis  Concern for acute cholecystitis on CT AP.  WBC 13 with left shift. 7/10 stabbing pain, worsened with palpation, does not refer. Not post prandial. Alk Phos 610. Initially febrile to 100.8    Gen Surg consulted, recommend RUQ US to confirm. Recommend IR consult for cholecystostomy if confirmed.  - NPO for now.  - Continue Zosyn.      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 Recommend serial enemas, NPO and monitor for now.    - will trial CLD and ADAT pending RUQ for cholecystitis.  - Fleet enema ordered.    Right upper quadrant abdominal pain  See acute cholecystitis     Suspected pulmonary embolism  Briefly tachycardic, tachypneic on arrival. Likely 2/2 anxiety and iatrogenic fluid overload.   Underwent V/Q scan given KAUSHAL on CKD which demonstrated low probability of PE.  HDS, comfortable on RA on exam.       Small bowel obstruction  See "ventral hernia"      PTSD (post-traumatic stress disorder)  Continue to treat anxiety as needed      Lung mass  See malignant neoplasm of both ovaries      Generalized anxiety disorder  Consider home xanax as needed PRN      Malignant neoplasm of both ovaries  See onc hx    -Pain control with home oxy 10 and increase as needed  -Continue bowel regimen      Cough variant asthma  Home albuterol ordered      Fibromyalgia  See ovarian cancer      Hypercholesterolemia  Restart statin as clinically indicated      Hypertension  Hold home antihypertensives in the setting of possible sepsis      Supraventricular tachycardia  Telemetry monitoring  Continue home diltiazem ER 240mg qd  Troponin wnl      VTE Risk Mitigation (From admission, onward)         Ordered     IP VTE HIGH RISK PATIENT  Once         04/04/23 0954     Place sequential compression device  Until discontinued         04/04/23 0954     Place sequential compression device  Until discontinued         04/04/23 0949     Reason for No Pharmacological VTE Prophylaxis  Once        Question:  Reasons:  Answer:  Risk of Bleeding  Comment:  Possibly operative    04/04/23 0949                       On 04/04/2023, patient should be placed in hospital observation services under " my care in collaboration with Dr. Pang.    Madai Jacques MD  Department of Hospital Medicine  [facility] - Emergency Dept

## 2023-04-04 NOTE — ASSESSMENT & PLAN NOTE
Patient is a 72 year old female with history of stage IV metastatic ovarian cancer with METs to the liver, chemotherapy induced kidney disease, HTN, HLD, fibromyalgia, asthma, CLOVER, PTSD, chronic constipation who presents to the ED today complaining of RUQ abdominal pain and constipation.    - Patient seen and examined. Labs and imaging reviewed. Case discussed with staff on call, Dr. Rubio  - Admit to medicine  - Hernia reduced at bedside  - Okay to hold off on NGT as patient has no n/v, hernia reduced. Okay for trial of CLD if remains without n/v   - Recommend enemas to clear colon burden   - US abd to eval for cholecystitis. Unclear if RUQ is 2/2 to biliary etiology or liver METs. If cholecystitis present, recommend IR consult for perc rachid tube as patient is not a surgical candidate given her terminal malignancy   - Serial abdominal exams  - Will continue to follow    - Remainder of care per primary team  - Please contact general surgery with any questions, concerns, or clinical status changes

## 2023-04-04 NOTE — ED NOTES
Patient identifiers verified and correct for Liat Gilmore  PMHX ovarian cancer with mets to liver Recently stopped chemotherapy treatment  LOC: The patient is awake, alert and aware of environment with an appropriate affect, the patient is oriented x 3 and speaking appropriately.   APPEARANCE: Patient appears comfortable and in no acute distress, patient is clean and well groomed.  SKIN: The skin is warm and dry, color consistent with ethnicity, patient has normal skin turgor and moist mucus membranes, skin intact, no breakdown or bruising noted.   MUSCULOSKELETAL: Patient moving all extremities spontaneously, no swelling noted.  RESPIRATORY: Airway is open and patent, respirations are spontaneous, patient has a normal effort and rate, no accessory muscle use noted, pt placed on intermittent pulse ox with O2 sats noted at 97% on room air. Endorses a chronic cough that she has had for a couple of months. Occasional and non productive. Hx asthma  CARDIAC: Pt placed on ED telemetry due to being in hallway until bed assignment upstairs. Patient has a normal rate 81 and regular rhythm, no edema noted, capillary refill < 3 seconds. No chest pain reported at this moment  GASTRO:  tender to palpation to ruq, no distention noted, hypoactive bowel sounds present in all four quadrants. Pt states bowel movements are irregular. Normally constipated due to pain regimen at home. Last BM was Sunday night (2 days ago). Endorses mild nausea. NO active vomiting today. Abdomen is round. PAIN TO  RUQ RATED 5/10. No diarrhea  : Pt denies any pain or frequency with urination.  NEURO: Pt opens eyes spontaneously, behavior appropriate to situation, follows commands, facial expression symmetrical, bilateral hand grasp equal and even, purposeful motor response noted, normal sensation in all extremities when touched with a finger.GCS15

## 2023-04-04 NOTE — ED NOTES
IVF were not infusing properly. Pt reports one time she received too much IVF and became SOB. Confirmed with Dr. Fontaine if 2L needed to be infused, okay with just 1L to infuse. So patient will only get the 1L of LR of the 2L LR order.

## 2023-04-04 NOTE — ASSESSMENT & PLAN NOTE
Call regarding: MEDICATION ISSUE    Patients son calling because he wasn't to discuss some medications with medical staff. Son, Shmuel doesn't know the name of the medication, and doesn't want to disclose the symptoms.     Okay to leave detailed voice mail?  Yes    Ph: [phone]    Pharmacy information verified:  No      Hold home antihypertensives in the setting of possible sepsis

## 2023-04-04 NOTE — CONSULTS
Cholecystostomy Tube Placement Consult Note  Interventional Radiology    Consult Requested By: Madai Jacques MD   Reason for Consult: Acute cholecystitis, Pt NPO     SUBJECTIVE:     Chief Complaint:  acute cholecystitis    History of Present Illness:  Liat Gilmore is a 72 y.o. 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     Interventional Radiology has been consulted for US guided percutaneous cholecystostomy tube placement for management of acute cholecystitis.     Pt has had recent imaging including a CT a/p on 4/4/23 which revealed acute cholecystitis. The pt was evaluated by the surgery team and was deemed a poor candidate for cholecystectomy due to  metastatic ovarian cancer . The pt's WBC is 13 and she is afebrile but was febrile to 100.8F this morning. The pt is hemodynamically stable.     Review of Systems   Constitutional:  Positive for fever.   Respiratory: Negative.     Cardiovascular: Negative.    Gastrointestinal:  Positive for abdominal pain.   Musculoskeletal: Negative.    Skin: Negative.    Neurological: Negative.    Psychiatric/Behavioral: Negative.       Scheduled Meds:   ALPRAZolam  0.25 mg Oral BID    [START ON 4/5/2023] diltiaZEM  240 mg Oral Daily    piperacillin-tazobactam (ZOSYN) IVPB  4.5 g Intravenous Q8H    sodium phosphates  1 enema Rectal Once     Continuous Infusions:  PRN Meds:albuterol, dextrose 10%, dextrose 10%, dextrose 50%, dextrose 50%, fluticasone propionate, glucagon (human recombinant), glucagon (human recombinant), glucose, glucose, glucose, glucose, melatonin, naloxone, ondansetron, oxyCODONE, oxyCODONE, sodium chloride 0.9%, sodium chloride 0.9%, sodium chloride 0.9%, traZODone    Review of patient's allergies indicates:   Allergen Reactions    Erythromycin Other (See Comments)     Stomach Cramps       Past  Medical History:   Diagnosis Date    Anxiety 08/07/2020    Asthma 10/31/2018    1985: Diagnosed. cough variant    Asthma 9/26/2013 9:37:32 AM    [facility] Historical - Respiratory: Asthma-No Additional Notes    Bronchiectasis     Complications affecting other specified body systems, hypertension 9/26/2013 9:40:45 AM    [facility] Historical - Cardiovascular: Hypertension-No Additional Notes    Dizziness and giddiness 8/30/2017 3:09:28 PM    [facility] Historical - Unknown: Vertigo-No Additional Notes    Elevated cancer antigen 125 (CA-125) 09/14/2021    Fibromyalgia 10/31/2018    2006: Diagnosed.    Herpes simplex vulvovaginitis 06/09/2021    Hx of psychiatric care     Hypercholesterolemia 10/31/2018    2010: Began statin.    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 Foster Stage 3    Other and unspecified ovarian cyst 5/7/2020 4:13:20 PM    [facility] Historical - Quick Add: Ovarian cyst, left-No Additional Notes    Other genital herpes 7/6/2017 1:15:51 PM    [facility] Historical - Infectious: Herpes, Genital-No Additional Notes    Overweight 10/31/2018    10/31/2018: Weight 75 kg. BMI 28.    Psychiatric problem     Pulmonary nodules 12/10/2020    Pure hypercholesterolemia 6/23/2017 11:01:53 AM    [facility] Historical - Endocrine/Metabolic/Immune: Hypercholesterolemia-No Additional Notes    Reactive depression 09/02/2020    Renal failure 9/9/2019 11:55:24 AM    [facility] Historical - Urology: Renal Failure-Diagnosed @  [facility] when she was there for stomach pains    Supraventricular tachycardia     Tachycardia     Tachycardia 2013 9:37:10 AM    [facility] Historical - Symptoms/Signs: Tachycardia-No Additional Notes    Therapy      Past Surgical History:   Procedure Laterality Date    APPENDECTOMY       SECTION      x 1    HYSTERECTOMY      LAPAROSCOPIC SURGICAL REMOVAL OF OMENTUM      PELVIC AND PARA-AORTIC LYMPH NODE DISSECTION      AK REMOVAL OF OVARY/TUBE(S)      TONSILLECTOMY      TRANSPHENOIDAL PITUITARY RESECTION  2000    TUMOR REMOVAL       Family History   Problem Relation Age of Onset    Angina Mother     Stroke Father     Colon cancer Sister 70    Depression Sister     Anxiety disorder Sister     Colon cancer Brother 80    Heart disease Brother     Drug abuse Brother     Breast cancer Paternal Aunt     Ovarian cancer Neg Hx     Uterine cancer Neg Hx      Social History     Tobacco Use    Smoking status: Never    Smokeless tobacco: Never   Substance Use Topics    Alcohol use: No     Comment: Rarely    Drug use: No       OBJECTIVE:     Vital Signs (Most Recent)  Temp: 98.8 °F (37.1 °C) (23 1212)  Pulse: 74 (23 1212)  Resp: 15 (23 1547)  BP: (!) 103/53 (23 1212)  SpO2: 95 % (23 1212)    Physical Exam:   Physical Exam  HENT:      Head: Normocephalic.   Cardiovascular:      Rate and Rhythm: Normal rate.   Pulmonary:      Effort: Pulmonary effort is normal.   Abdominal:      Palpations: Abdomen is soft.   Musculoskeletal:         General: Normal range of motion.   Skin:     General: Skin is warm.   Neurological:      General: No focal deficit present.      Mental Status: She is alert.   Psychiatric:         Mood and Affect: Mood normal.       Laboratory  I have reviewed all pertinent lab results within the past 24 hours.  CBC:   Recent Labs   Lab 23  0606   WBC 13.12*   RBC 3.21*   HGB 8.6*   HCT 28.5*      MCV 89   MCH 26.8*   MCHC 30.2*     CMP:   Recent Labs   Lab  04/04/23  0606   *   CALCIUM 9.5   ALBUMIN 2.8*   PROT 7.8   *   K 4.2   CO2 24   CL 98   BUN 10   CREATININE 1.7*   ALKPHOS 610*   *   *   BILITOT 0.7     LFTs:   Recent Labs   Lab 04/04/23  0606   *   *   ALKPHOS 610*   BILITOT 0.7   PROT 7.8   ALBUMIN 2.8*     Coagulation: No results for input(s): LABPROT, INR, APTT in the last 168 hours.    ASA/Mallampati  ASA: 2  Mallampati: 2    Imaging:  Recent imaging studies including CT a/p on 4/4/23 which was independently reviewed by Anjali Kennedy MD.     ASSESSMENT/PLAN:     Assessment:  72 y.o. 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 The procedure was discussed in detail with the patient including thorough explanations of the potential risks and benefits of cholecystostomy tube placement. Risks include sepsis, severe infection, hemorrhage, biliary injury/biloma, bowel injury, seeding the tract given patient's metastatic disease, catheter dislodgement, and catheter blockage. The patient is a candidate for US guided percutaneous cholecystostomy tube placement under moderate sedation - however, patient is currently declining the procedure. She states she needs more time to decide if she wants to proceed with the rachid tube placement and needs to do research regarding the procedure. Plan discussed with ordering physician.    Plan:  Can proceed with US guided percutaneous cholecystostomy tube placement under moderate sedation today 4/4/2023 pending patient is amenable to the plan. At this time, patient is declining to proceed with rachid tube placement as she feels she needs more time to think about undergoing a procedure. Discussed the risks of not undergoing rachid tube  placement extensively with patient, including septic shock and death if left untreated. Patient verbalized understanding.   Additionally, discussed the risks including risk of seeding the tract when placing the rachid tube.  Continue keeping patient NPO.   Anticoagulation history reviewed.   Coagulation labs reviewed.  Thank you for the consult. Please contact with questions via [employer] secure chat.    Jeanne Davenport PA-C  Interventional Radiology  4/4/2023

## 2023-04-04 NOTE — ASSESSMENT & PLAN NOTE
This patient does have evidence of infective focus  My overall impression is sepsis.  Source: Abdominal  Antibiotics given-   Antibiotics (72h ago, onward)    Start     Stop Route Frequency Ordered    04/04/23 1100  ceFEPIme (MAXIPIME) 1 g in dextrose 5 % in water (D5W) 5 % 50 mL IVPB (MB+)         -- IV Every 24 hours (non-standard times) 04/04/23 0950    04/04/23 1049  vancomycin - pharmacy to dose  (vancomycin IVPB)        See Hyperspace for full Linked Orders Report.    -- IV pharmacy to manage frequency 04/04/23 0950        Latest lactate reviewed-  No results for input(s): LACTATE in the last 72 hours.  Organ dysfunction indicated by Acute kidney injury    -Blood cultures  -General surgery consult for possible cholecystectomy  -Vancomycin  -Cefepime  -Daily labs  -Continue to monitor blood pressure

## 2023-04-04 NOTE — ED NOTES
Report given to IR nurse. Questions answered. States she will call house sup about bed availability at this time

## 2023-04-04 NOTE — ASSESSMENT & PLAN NOTE
Briefly tachycardic, tachypneic on arrival. Likely 2/2 anxiety and iatrogenic fluid overload.   Underwent V/Q scan given KAUSHAL on CKD which demonstrated low probability of PE.  HDS, comfortable on RA on exam.

## 2023-04-05 LAB
ALBUMIN SERPL BCP-MCNC: 2.2 G/DL (ref 3.5–5.2)
ALP SERPL-CCNC: 502 U/L (ref 55–135)
ALT SERPL W/O P-5'-P-CCNC: 75 U/L (ref 10–44)
ANION GAP SERPL CALC-SCNC: 11 MMOL/L (ref 8–16)
AST SERPL-CCNC: 95 U/L (ref 10–40)
BILIRUB SERPL-MCNC: 1.1 MG/DL (ref 0.1–1)
BUN SERPL-MCNC: 9 MG/DL (ref 8–23)
CALCIUM SERPL-MCNC: 9.3 MG/DL (ref 8.7–10.5)
CHLORIDE SERPL-SCNC: 99 MMOL/L (ref 95–110)
CO2 SERPL-SCNC: 24 MMOL/L (ref 23–29)
CREAT SERPL-MCNC: 1.1 MG/DL (ref 0.5–1.4)
ERYTHROCYTE [DISTWIDTH] IN BLOOD BY AUTOMATED COUNT: 16.7 % (ref 11.5–14.5)
EST. GFR  (NO RACE VARIABLE): 53.7 ML/MIN/1.73 M^2
GLUCOSE SERPL-MCNC: 109 MG/DL (ref 70–110)
GRAM STN SPEC: NORMAL
HCT VFR BLD AUTO: 25.9 % (ref 37–48.5)
HGB BLD-MCNC: 8 G/DL (ref 12–16)
INR PPP: 1.4 (ref 0.8–1.2)
MAGNESIUM SERPL-MCNC: 1.5 MG/DL (ref 1.6–2.6)
MCH RBC QN AUTO: 27.5 PG (ref 27–31)
MCHC RBC AUTO-ENTMCNC: 30.9 G/DL (ref 32–36)
MCV RBC AUTO: 89 FL (ref 82–98)
PHOSPHATE SERPL-MCNC: 3.1 MG/DL (ref 2.7–4.5)
PLATELET # BLD AUTO: 284 K/UL (ref 150–450)
PMV BLD AUTO: 9.5 FL (ref 9.2–12.9)
POTASSIUM SERPL-SCNC: 4.3 MMOL/L (ref 3.5–5.1)
PROT SERPL-MCNC: 6.7 G/DL (ref 6–8.4)
PROTHROMBIN TIME: 14.3 SEC (ref 9–12.5)
RBC # BLD AUTO: 2.91 M/UL (ref 4–5.4)
SODIUM SERPL-SCNC: 134 MMOL/L (ref 136–145)
WBC # BLD AUTO: 13.45 K/UL (ref 3.9–12.7)

## 2023-04-05 PROCEDURE — 87070 CULTURE OTHR SPECIMN AEROBIC: CPT | Performed by: STUDENT IN AN ORGANIZED HEALTH CARE EDUCATION/TRAINING PROGRAM

## 2023-04-05 PROCEDURE — 87205 SMEAR GRAM STAIN: CPT | Performed by: STUDENT IN AN ORGANIZED HEALTH CARE EDUCATION/TRAINING PROGRAM

## 2023-04-05 PROCEDURE — 84100 ASSAY OF PHOSPHORUS: CPT | Performed by: STUDENT IN AN ORGANIZED HEALTH CARE EDUCATION/TRAINING PROGRAM

## 2023-04-05 PROCEDURE — 36415 COLL VENOUS BLD VENIPUNCTURE: CPT

## 2023-04-05 PROCEDURE — 25000003 PHARM REV CODE 250: Performed by: STUDENT IN AN ORGANIZED HEALTH CARE EDUCATION/TRAINING PROGRAM

## 2023-04-05 PROCEDURE — 63600175 PHARM REV CODE 636 W HCPCS

## 2023-04-05 PROCEDURE — 25500020 PHARM REV CODE 255: Performed by: STUDENT IN AN ORGANIZED HEALTH CARE EDUCATION/TRAINING PROGRAM

## 2023-04-05 PROCEDURE — 63600175 PHARM REV CODE 636 W HCPCS: Performed by: STUDENT IN AN ORGANIZED HEALTH CARE EDUCATION/TRAINING PROGRAM

## 2023-04-05 PROCEDURE — 80053 COMPREHEN METABOLIC PANEL: CPT | Performed by: STUDENT IN AN ORGANIZED HEALTH CARE EDUCATION/TRAINING PROGRAM

## 2023-04-05 PROCEDURE — 87102 FUNGUS ISOLATION CULTURE: CPT | Performed by: STUDENT IN AN ORGANIZED HEALTH CARE EDUCATION/TRAINING PROGRAM

## 2023-04-05 PROCEDURE — 96366 THER/PROPH/DIAG IV INF ADDON: CPT

## 2023-04-05 PROCEDURE — 85610 PROTHROMBIN TIME: CPT

## 2023-04-05 PROCEDURE — 96365 THER/PROPH/DIAG IV INF INIT: CPT | Mod: 59

## 2023-04-05 PROCEDURE — 85027 COMPLETE CBC AUTOMATED: CPT | Performed by: STUDENT IN AN ORGANIZED HEALTH CARE EDUCATION/TRAINING PROGRAM

## 2023-04-05 PROCEDURE — 25000003 PHARM REV CODE 250

## 2023-04-05 PROCEDURE — 21400001 HC TELEMETRY ROOM

## 2023-04-05 PROCEDURE — 87116 MYCOBACTERIA CULTURE: CPT | Performed by: STUDENT IN AN ORGANIZED HEALTH CARE EDUCATION/TRAINING PROGRAM

## 2023-04-05 PROCEDURE — 83735 ASSAY OF MAGNESIUM: CPT | Performed by: STUDENT IN AN ORGANIZED HEALTH CARE EDUCATION/TRAINING PROGRAM

## 2023-04-05 PROCEDURE — 36415 COLL VENOUS BLD VENIPUNCTURE: CPT | Performed by: STUDENT IN AN ORGANIZED HEALTH CARE EDUCATION/TRAINING PROGRAM

## 2023-04-05 PROCEDURE — 87075 CULTR BACTERIA EXCEPT BLOOD: CPT | Performed by: STUDENT IN AN ORGANIZED HEALTH CARE EDUCATION/TRAINING PROGRAM

## 2023-04-05 PROCEDURE — 99233 PR SUBSEQUENT HOSPITAL CARE,LEVL III: ICD-10-PCS | Mod: 95,,, | Performed by: STUDENT IN AN ORGANIZED HEALTH CARE EDUCATION/TRAINING PROGRAM

## 2023-04-05 PROCEDURE — 99233 SBSQ HOSP IP/OBS HIGH 50: CPT | Mod: 95,,, | Performed by: STUDENT IN AN ORGANIZED HEALTH CARE EDUCATION/TRAINING PROGRAM

## 2023-04-05 PROCEDURE — 87206 SMEAR FLUORESCENT/ACID STAI: CPT | Performed by: STUDENT IN AN ORGANIZED HEALTH CARE EDUCATION/TRAINING PROGRAM

## 2023-04-05 RX ORDER — ONDANSETRON 2 MG/ML
INJECTION INTRAMUSCULAR; INTRAVENOUS
Status: COMPLETED | OUTPATIENT
Start: 2023-04-05 | End: 2023-04-05

## 2023-04-05 RX ORDER — MORPHINE SULFATE 2 MG/ML
2 INJECTION, SOLUTION INTRAMUSCULAR; INTRAVENOUS EVERY 4 HOURS PRN
Status: DISCONTINUED | OUTPATIENT
Start: 2023-04-05 | End: 2023-04-08 | Stop reason: HOSPADM

## 2023-04-05 RX ORDER — MIDAZOLAM HYDROCHLORIDE 1 MG/ML
INJECTION INTRAMUSCULAR; INTRAVENOUS
Status: COMPLETED | OUTPATIENT
Start: 2023-04-05 | End: 2023-04-05

## 2023-04-05 RX ORDER — FENTANYL CITRATE 50 UG/ML
INJECTION, SOLUTION INTRAMUSCULAR; INTRAVENOUS
Status: COMPLETED | OUTPATIENT
Start: 2023-04-05 | End: 2023-04-05

## 2023-04-05 RX ORDER — ENOXAPARIN SODIUM 100 MG/ML
40 INJECTION SUBCUTANEOUS EVERY 24 HOURS
Status: DISCONTINUED | OUTPATIENT
Start: 2023-04-06 | End: 2023-04-08 | Stop reason: HOSPADM

## 2023-04-05 RX ORDER — MAGNESIUM SULFATE HEPTAHYDRATE 40 MG/ML
2 INJECTION, SOLUTION INTRAVENOUS ONCE
Status: COMPLETED | OUTPATIENT
Start: 2023-04-05 | End: 2023-04-05

## 2023-04-05 RX ADMIN — ONDANSETRON 4 MG: 2 INJECTION INTRAMUSCULAR; INTRAVENOUS at 10:04

## 2023-04-05 RX ADMIN — MIDAZOLAM HYDROCHLORIDE 1 MG: 1 INJECTION INTRAMUSCULAR; INTRAVENOUS at 10:04

## 2023-04-05 RX ADMIN — MORPHINE SULFATE 2 MG: 2 INJECTION, SOLUTION INTRAMUSCULAR; INTRAVENOUS at 07:04

## 2023-04-05 RX ADMIN — FENTANYL CITRATE 50 MCG: 0.05 INJECTION, SOLUTION INTRAMUSCULAR; INTRAVENOUS at 10:04

## 2023-04-05 RX ADMIN — MORPHINE SULFATE 2 MG: 2 INJECTION, SOLUTION INTRAMUSCULAR; INTRAVENOUS at 01:04

## 2023-04-05 RX ADMIN — DILTIAZEM HYDROCHLORIDE 240 MG: 240 CAPSULE, COATED, EXTENDED RELEASE ORAL at 12:04

## 2023-04-05 RX ADMIN — OXYCODONE HYDROCHLORIDE 10 MG: 10 TABLET ORAL at 12:04

## 2023-04-05 RX ADMIN — OXYCODONE HYDROCHLORIDE 10 MG: 10 TABLET ORAL at 05:04

## 2023-04-05 RX ADMIN — OXYCODONE HYDROCHLORIDE 20 MG: 20 TABLET, FILM COATED, EXTENDED RELEASE ORAL at 12:04

## 2023-04-05 RX ADMIN — PIPERACILLIN SODIUM AND TAZOBACTAM SODIUM 4.5 G: 4; .5 INJECTION, POWDER, FOR SOLUTION INTRAVENOUS at 12:04

## 2023-04-05 RX ADMIN — PIPERACILLIN SODIUM AND TAZOBACTAM SODIUM 4.5 G: 4; .5 INJECTION, POWDER, FOR SOLUTION INTRAVENOUS at 03:04

## 2023-04-05 RX ADMIN — ONDANSETRON 8 MG: 2 INJECTION INTRAMUSCULAR; INTRAVENOUS at 08:04

## 2023-04-05 RX ADMIN — MAGNESIUM SULFATE 2 G: 2 INJECTION INTRAVENOUS at 08:04

## 2023-04-05 RX ADMIN — DEXTROSE MONOHYDRATE 1 G: 5 INJECTION INTRAVENOUS at 10:04

## 2023-04-05 RX ADMIN — OXYCODONE HYDROCHLORIDE 20 MG: 20 TABLET, FILM COATED, EXTENDED RELEASE ORAL at 10:04

## 2023-04-05 RX ADMIN — IOHEXOL 15 ML: 300 INJECTION, SOLUTION INTRAVENOUS at 11:04

## 2023-04-05 RX ADMIN — PIPERACILLIN SODIUM AND TAZOBACTAM SODIUM 4.5 G: 4; .5 INJECTION, POWDER, FOR SOLUTION INTRAVENOUS at 10:04

## 2023-04-05 NOTE — NURSING
Bile drain insertion complete. Pt tolerated well. VSS. No signs or symptoms of distress noted.Pt will be transferred to MPU bed escorted by this RN and report to RN on arrival and called to floor RN.

## 2023-04-05 NOTE — ASSESSMENT & PLAN NOTE
Continue to treat anxiety as needed. Poorly tolerated paroxetine previously, will consider psych outpatient referral.

## 2023-04-05 NOTE — NURSING
Pt arrived to 189 for cholangiogram with drain placement. Pt oriented to unit and staff. Plan of care reviewed with patient, patient verbalizes understanding. Comfort measures utilized. Pt safely transferred from stretcher to procedural table. Fall risk reviewed with patient, fall risk interventions maintained with direct observation/attendance. Blankets applied. Pt prepped and draped utilizing standard sterile technique. Patient placed on continuous monitoring, as required by sedation policy. Timeouts completed utilizing standard universal time-out, per department and facility policy. RN to remain at bedside, continuous monitoring maintained. Pt resting comfortably. Denies pain/discomfort. Will continue to monitor. See flow sheets for monitoring, medication administration, and updates.

## 2023-04-05 NOTE — PROGRESS NOTES
Pharmacokinetic Assessment Follow Up: IV Vancomycin    Vancomycin order has been discontinued. Pharmacy will sign off. Please reconsult as needed! Thank you!    Please contact pharmacy for questions regarding this assessment.    Thank you for the consult,   Alice Rodriguez

## 2023-04-05 NOTE — SUBJECTIVE & OBJECTIVE
Interval History: Fevered to 103.1F overnight, improved with acetaminophen. Denies subjective fever. Patient agreed to and underwent cholecystostomy today via IR. 40cc of thick, bilious sludge drained. Pt c/o of pain following procedure, escalated pain regimen. Continuing zosyn. Monitoring transaminases. Creatinine improved overnight with IVF.    Received enema overnight without bowel movement. Patient passing flatus, tolerating full liquid diet.     Review of Systems   Constitutional:  Positive for appetite change. Negative for activity change, chills, diaphoresis and fever.   HENT:  Negative for sinus pressure, sinus pain and sore throat.    Eyes:  Negative for photophobia, redness and visual disturbance.   Respiratory:  Negative for cough, chest tightness and shortness of breath.    Cardiovascular:  Negative for chest pain, palpitations and leg swelling.   Gastrointestinal:  Positive for abdominal distention (reducible umbilical hernia present), abdominal pain and constipation. Negative for blood in stool, nausea and vomiting.   Endocrine: Negative for polydipsia, polyphagia and polyuria.   Genitourinary:  Negative for difficulty urinating, flank pain and hematuria.   Musculoskeletal:  Positive for arthralgias and back pain. Negative for joint swelling.   Skin:  Negative for pallor, rash and wound.   Neurological:  Negative for dizziness, facial asymmetry and headaches.   Psychiatric/Behavioral:  Positive for dysphoric mood. Negative for agitation and suicidal ideas (history of SI. denies at present). The patient is nervous/anxious. The patient is not hyperactive.    Objective:     Vital Signs (Most Recent):  Temp: 98.8 °F (37.1 °C) (04/05/23 1522)  Pulse: 106 (04/05/23 1522)  Resp: 16 (04/05/23 1522)  BP: (!) 113/58 (04/05/23 1522)  SpO2: (!) 90 % (04/05/23 1522)   Vital Signs (24h Range):  Temp:  [98.2 °F (36.8 °C)-103.1 °F (39.5 °C)] 98.8 °F (37.1 °C)  Pulse:  [] 106  Resp:  [16-30] 16  SpO2:  [90 %-100  %] 90 %  BP: (113-143)/(58-73) 113/58     Weight: 71 kg (156 lb 9.6 oz)  Body mass index is 27.74 kg/m².    Intake/Output Summary (Last 24 hours) at 4/5/2023 1645  Last data filed at 4/5/2023 1053  Gross per 24 hour   Intake 250 ml   Output 40 ml   Net 210 ml      Physical Exam  Constitutional:       General: She is not in acute distress.     Appearance: Normal appearance. She is obese. She is not ill-appearing.   HENT:      Head: Normocephalic and atraumatic.      Nose: Nose normal. No congestion or rhinorrhea.      Mouth/Throat:      Mouth: Mucous membranes are moist.      Pharynx: No posterior oropharyngeal erythema.   Eyes:      General: No scleral icterus.        Right eye: No discharge.         Left eye: No discharge.      Extraocular Movements: Extraocular movements intact.   Neck:      Vascular: No carotid bruit.   Cardiovascular:      Rate and Rhythm: Normal rate and regular rhythm.      Pulses: Normal pulses.      Heart sounds: Normal heart sounds.   Pulmonary:      Effort: Pulmonary effort is normal. No respiratory distress.      Breath sounds: Normal breath sounds. No stridor. No rhonchi.   Abdominal:      Tenderness: There is abdominal tenderness. There is no right CVA tenderness, left CVA tenderness, guarding or rebound.      Hernia: A hernia (umbilical, reducible.) is present.      Comments: Hypoactive bowel sounds   Musculoskeletal:         General: No swelling, tenderness or deformity.      Cervical back: Normal range of motion.   Lymphadenopathy:      Cervical: No cervical adenopathy.   Skin:     General: Skin is warm and dry.      Capillary Refill: Capillary refill takes less than 2 seconds.      Coloration: Skin is not jaundiced.      Findings: No bruising.   Neurological:      General: No focal deficit present.      Mental Status: She is alert and oriented to person, place, and time.   Psychiatric:         Attention and Perception: Attention and perception normal.         Mood and Affect: Affect  normal. Mood is anxious.         Speech: Speech normal.         Behavior: Behavior normal. Behavior is cooperative.         Thought Content: Thought content normal. Thought content does not include suicidal ideation. Thought content does not include suicidal plan.       Significant Labs: Bilirubin:   Recent Labs   Lab 04/04/23  0606 04/05/23  0454   BILITOT 0.7 1.1*     Blood Culture:   Recent Labs   Lab 04/04/23  0606   LABBLOO No Growth to date  No Growth to date  No Growth to date  No Growth to date     CBC:   Recent Labs   Lab 04/04/23  0606 04/05/23  0454   WBC 13.12* 13.45*   HGB 8.6* 8.0*   HCT 28.5* 25.9*    284     CMP:   Recent Labs   Lab 04/04/23  0606 04/04/23  1601 04/05/23  0454   * 134* 134*   K 4.2 4.6 4.3   CL 98 97 99   CO2 24 26 24   * 131* 109   BUN 10 10 9   CREATININE 1.7* 1.4 1.1   CALCIUM 9.5 9.4 9.3   PROT 7.8  --  6.7   ALBUMIN 2.8*  --  2.2*   BILITOT 0.7  --  1.1*   ALKPHOS 610*  --  502*   *  --  95*   *  --  75*   ANIONGAP 12 11 11     Lactic Acid:   Recent Labs   Lab 04/04/23  1601   LACTATE 1.8     Urine Studies:   Recent Labs   Lab 04/04/23  0834   COLORU Orange*   APPEARANCEUA Ex.Turbid   PHUR 6.0   SPECGRAV 1.020   PROTEINUA Trace*   GLUCUA Negative   KETONESU Negative   BILIRUBINUA Negative   OCCULTUA Negative   NITRITE Negative   LEUKOCYTESUR Negative       Significant Imaging: I have reviewed all pertinent imaging results/findings within the past 24 hours.

## 2023-04-05 NOTE — ASSESSMENT & PLAN NOTE
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 Recommend serial enemas, NPO and monitor for now.    - will trial CLD and ADAT pending RUQ for cholecystitis.  - Fleet enema ordered.    4/5: received fleet enema overnight with no subsequent BM. Passing flatus. Tolerating full liquid diet.   - Gen Surg: no acute surgical intervention. Will ADAT, monitor for now given lack of SBO symptomatology, and continue serial enemas as needed.

## 2023-04-05 NOTE — PLAN OF CARE
Patient arrived to MPU 7, no acute distress noted. Dressing CDI, report received from CIRO Chappell.

## 2023-04-05 NOTE — PROGRESS NOTES
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 Pain is stabbing in nature, pleuritic, 7/10. Nonprandial, does not refer to shoulder. She says that she has had ongoing issues with constipation and initially stated her last bowel movement was on the 29th, later clarified her last BM was 2 days prior to presentation.  She says that she was tried multiple laxatives without relief, has required fleet enemas in the past. She says that she is been eating and drinking normal, but endorses early satiety. She says that she is a chronic cough but denies any recent fevers, shortness of breath, chest pain, nausea, vomiting, dysuria or new rashes. CT in ED demonstrated possible cholecystitis, large stool burden with early/mild SBO 2/2 incisional ventral hernia d/t omental debulking surgery, and known mets to liver and lungs. WBC 13. Lactate and lipase wnl. Received 2L LR, Vanc/cefepime/Zosyn in ED. Transiently febrile to 100.8, tachycardic, and tachypneic after fluids. V/Q scan w low probability of PE.  Comfortable, satting well on RA on exam.     Admitted to Hosp Medicine for mgmt of possible acute cholecystitis, SBO. Gen Surg consulted. NPO, fleet enema ordered.      Overview/Hospital Course:  No notes on file    Interval History: Fevered to 103.1F overnight, improved with acetaminophen. Denies subjective fever. Patient agreed to and  underwent cholecystostomy today via IR. 40cc of thick, bilious sludge drained. Pt c/o of pain following procedure, escalated pain regimen. Continuing zosyn. Monitoring transaminases. Creatinine improved overnight with IVF.    Received enema overnight without bowel movement. Patient passing flatus, tolerating full liquid diet.     Review of Systems   Constitutional:  Positive for appetite change. Negative for activity change, chills, diaphoresis and fever.   HENT:  Negative for sinus pressure, sinus pain and sore throat.    Eyes:  Negative for photophobia, redness and visual disturbance.   Respiratory:  Negative for cough, chest tightness and shortness of breath.    Cardiovascular:  Negative for chest pain, palpitations and leg swelling.   Gastrointestinal:  Positive for abdominal distention (reducible umbilical hernia present), abdominal pain and constipation. Negative for blood in stool, nausea and vomiting.   Endocrine: Negative for polydipsia, polyphagia and polyuria.   Genitourinary:  Negative for difficulty urinating, flank pain and hematuria.   Musculoskeletal:  Positive for arthralgias and back pain. Negative for joint swelling.   Skin:  Negative for pallor, rash and wound.   Neurological:  Negative for dizziness, facial asymmetry and headaches.   Psychiatric/Behavioral:  Positive for dysphoric mood. Negative for agitation and suicidal ideas (history of SI. denies at present). The patient is nervous/anxious. The patient is not hyperactive.    Objective:     Vital Signs (Most Recent):  Temp: 98.8 °F (37.1 °C) (04/05/23 1522)  Pulse: 106 (04/05/23 1522)  Resp: 16 (04/05/23 1522)  BP: (!) 113/58 (04/05/23 1522)  SpO2: (!) 90 % (04/05/23 1522)   Vital Signs (24h Range):  Temp:  [98.2 °F (36.8 °C)-103.1 °F (39.5 °C)] 98.8 °F (37.1 °C)  Pulse:  [] 106  Resp:  [16-30] 16  SpO2:  [90 %-100 %] 90 %  BP: (113-143)/(58-73) 113/58     Weight: 71 kg (156 lb 9.6 oz)  Body mass index is 27.74 kg/m².    Intake/Output  Summary (Last 24 hours) at 4/5/2023 1645  Last data filed at 4/5/2023 1053  Gross per 24 hour   Intake 250 ml   Output 40 ml   Net 210 ml      Physical Exam  Constitutional:       General: She is not in acute distress.     Appearance: Normal appearance. She is obese. She is not ill-appearing.   HENT:      Head: Normocephalic and atraumatic.      Nose: Nose normal. No congestion or rhinorrhea.      Mouth/Throat:      Mouth: Mucous membranes are moist.      Pharynx: No posterior oropharyngeal erythema.   Eyes:      General: No scleral icterus.        Right eye: No discharge.         Left eye: No discharge.      Extraocular Movements: Extraocular movements intact.   Neck:      Vascular: No carotid bruit.   Cardiovascular:      Rate and Rhythm: Normal rate and regular rhythm.      Pulses: Normal pulses.      Heart sounds: Normal heart sounds.   Pulmonary:      Effort: Pulmonary effort is normal. No respiratory distress.      Breath sounds: Normal breath sounds. No stridor. No rhonchi.   Abdominal:      Tenderness: There is abdominal tenderness. There is no right CVA tenderness, left CVA tenderness, guarding or rebound.      Hernia: A hernia (umbilical, reducible.) is present.      Comments: Hypoactive bowel sounds   Musculoskeletal:         General: No swelling, tenderness or deformity.      Cervical back: Normal range of motion.   Lymphadenopathy:      Cervical: No cervical adenopathy.   Skin:     General: Skin is warm and dry.      Capillary Refill: Capillary refill takes less than 2 seconds.      Coloration: Skin is not jaundiced.      Findings: No bruising.   Neurological:      General: No focal deficit present.      Mental Status: She is alert and oriented to person, place, and time.   Psychiatric:         Attention and Perception: Attention and perception normal.         Mood and Affect: Affect normal. Mood is anxious.         Speech: Speech normal.         Behavior: Behavior normal. Behavior is cooperative.          Thought Content: Thought content normal. Thought content does not include suicidal ideation. Thought content does not include suicidal plan.       Significant Labs: Bilirubin:   Recent Labs   Lab 04/04/23  0606 04/05/23  0454   BILITOT 0.7 1.1*     Blood Culture:   Recent Labs   Lab 04/04/23  0606   LABBLOO No Growth to date  No Growth to date  No Growth to date  No Growth to date     CBC:   Recent Labs   Lab 04/04/23  0606 04/05/23  0454   WBC 13.12* 13.45*   HGB 8.6* 8.0*   HCT 28.5* 25.9*    284     CMP:   Recent Labs   Lab 04/04/23  0606 04/04/23  1601 04/05/23  0454   * 134* 134*   K 4.2 4.6 4.3   CL 98 97 99   CO2 24 26 24   * 131* 109   BUN 10 10 9   CREATININE 1.7* 1.4 1.1   CALCIUM 9.5 9.4 9.3   PROT 7.8  --  6.7   ALBUMIN 2.8*  --  2.2*   BILITOT 0.7  --  1.1*   ALKPHOS 610*  --  502*   *  --  95*   *  --  75*   ANIONGAP 12 11 11     Lactic Acid:   Recent Labs   Lab 04/04/23  1601   LACTATE 1.8     Urine Studies:   Recent Labs   Lab 04/04/23  0834   COLORU Orange*   APPEARANCEUA Ex.Turbid   PHUR 6.0   SPECGRAV 1.020   PROTEINUA Trace*   GLUCUA Negative   KETONESU Negative   BILIRUBINUA Negative   OCCULTUA Negative   NITRITE Negative   LEUKOCYTESUR Negative       Significant Imaging: I have reviewed all pertinent imaging results/findings within the past 24 hours.      Assessment/Plan:      * Cholecystitis  Presented with severe RUQ pain, concern for acute cholecystitis on CT AP and US. WBC 13 with left shift. 7/10 stabbing pain, worsened with palpation, does not refer. Not post prandial. Alk Phos 610. Initially febrile to 100.8   Gen Surg consulted, deemed poor surgical candidate given age, malignancy. Recommend edIR consult for cholecystostomy  - initially refused but underwent cholecystostomy on 4/5 with 50cc of thick sludge drained, cultures sent.  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 Recommend serial enemas, NPO and monitor for now.    - will trial CLD and ADAT pending RUQ for cholecystitis.  - Fleet enema ordered.    4/5: received fleet enema overnight with no subsequent BM. Passing flatus. Tolerating full liquid diet.   - Gen Surg: no acute surgical intervention. Will ADAT, monitor for now given lack of SBO symptomatology, and continue serial enemas as needed.    Constipation        Right upper quadrant abdominal pain  Concern for acute cholecystitis on CT AP.  WBC 13 with left shift. 7/10 stabbing pain, worsened with palpation, does not refer. Not post prandial. Alk Phos 610. Initially febrile to 100.8    Gen Surg consulted, recommend RUQ US to confirm. Recommend IR consult for cholecystostomy if confirmed.  - NPO for now.  - Continue Zosyn.    Suspected pulmonary embolism  Briefly tachycardic, tachypneic on arrival. Likely 2/2 anxiety and iatrogenic fluid overload.   Underwent V/Q scan given KAUSHAL on CKD which demonstrated low probability of PE.  HDS, comfortable on RA on exam.       Small bowel obstruction  See "ventral hernia"      PTSD (post-traumatic stress disorder)  Continue to treat anxiety as needed. Poorly tolerated paroxetine previously, will consider psych outpatient referral.     Lung mass  See malignant neoplasm of both ovaries      Generalized anxiety disorder  Restarted home xanax PRN, anxiety improving.        Malignant neoplasm of both ovaries  -Escalating home pain " regimen following cholecystostomy placement    Cough variant asthma  Home albuterol ordered, has not required since admit    Fibromyalgia  See ovarian cancer      Hypercholesterolemia  Restart statin as clinically indicated  - holding for now    Hypertension  Hold home antihypertensives in the setting of possible sepsis      Supraventricular tachycardia  Telemetry monitoring  Continue home diltiazem ER 240mg qd  Troponin wnl      VTE Risk Mitigation (From admission, onward)         Ordered     enoxaparin injection 40 mg  Daily         04/05/23 1651     IP VTE HIGH RISK PATIENT  Once         04/04/23 0954     Place sequential compression device  Until discontinued         04/04/23 0954     Place sequential compression device  Until discontinued         04/04/23 0949     Reason for No Pharmacological VTE Prophylaxis  Once        Question:  Reasons:  Answer:  Risk of Bleeding  Comment:  Possibly operative    04/04/23 0949                Discharge Planning   HORACE: 4/6/2023     Code Status: Full Code   Is the patient medically ready for discharge?: No    Reason for patient still in hospital (select all that apply): Consult recommendations             Madai Jacques MD  Department of Hospital Medicine   [facility] Surg

## 2023-04-05 NOTE — NURSING
Nurses Note -- 4 Eyes      4/5/2023   3:44 AM      Skin assessed during: Admit      [x] No Pressure Injuries Present    []Prevention Measures Documented      [] Yes- Altered Skin Integrity Present or Discovered   [] LDA Added if Not in Epic (Describe Wound)   [] New Altered Skin Integrity was Present on Admit and Documented in LDA   [] Wound Image Taken    Wound Care Consulted? No    Attending Nurse:  Adam Don RN     Second RN/Staff Member:  CIRO Benson

## 2023-04-05 NOTE — CARE UPDATE
General Surgery Update    Patient febrile overnight, concerning for interval development of cholangitis. Currently on Zosyn with leukocytosis of 13. Patient was offered IR perc rachid tube placement, however has not yet fully decided if this is in line with her current goals of care. Patient is not a surgical candidate and has stated to me on numerous occasions that she would not want a surgery even if it were offered.     - Recommend palliative care consult, patient has a very mature and appropriate understanding and attitude towards her stage IV cancer and could benefit from additional recommendations to make her more comfortable.   - No indications for surgical intervention at this time  - Would continue antibiotics for suspected cholangitis coverage    General Surgery will sign off at this time. Please call with questions or concerns.     Dahiana Pereira MD  General Surgery, PGY-IV  Pager: [phone]  4/5/2023 9:07 AM

## 2023-04-05 NOTE — H&P
Inpatient Radiology Pre-procedure Note    History of Present Illness:  Liat Gilmore is a 72 y.o. 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 Imaging was concerning for acute cholecystitis. Pt is not a surgical candidate due to her terminal malignancy per surgery. IR was consulted for per cholecystostomy. Pt initially declined the procedure yesterday but has changed her mind this morning and wishes to proceed with per rachid.     Admission H&P reviewed.  Past Medical History:   Diagnosis Date    Anxiety 08/07/2020    Asthma 10/31/2018    1985: Diagnosed. cough variant    Asthma 9/26/2013 9:37:32 AM    [facility] Historical - Respiratory: Asthma-No Additional Notes    Bronchiectasis     Complications affecting other specified body systems, hypertension 9/26/2013 9:40:45 AM    [facility] Historical - Cardiovascular: Hypertension-No Additional Notes    Dizziness and giddiness 8/30/2017 3:09:28 PM    [facility] Historical - Unknown: Vertigo-No Additional Notes    Elevated cancer antigen 125 (CA-125) 09/14/2021    Fibromyalgia 10/31/2018    2006: Diagnosed.    Herpes simplex vulvovaginitis 06/09/2021    Hx of psychiatric care     Hypercholesterolemia 10/31/2018    2010: Began statin.    Hyperlipidemia     Hypertension     Infective arthritis, multiple sites 6/23/2017 11:02:17 AM    [facility] Historical - Musculoskeletal: Arthritis-No Additional Notes    Lung mass 09/01/2020    Laughlin syndrome     Malignant neoplasm of both ovaries 08/06/2020    Myalgia and myositis 6/23/2017 11:02:26 AM    [facility] Historical - Musculoskeletal: Fibromyalgia-No Additional Notes    Neoplasm of other genitourinary organ 11/6/2020 9:58:16 AM    [facility] Historical - Unknown: Ovarian neoplasm-finished chemo 11/2020- Dr. Foster Stage 3    Other and unspecified ovarian cyst 5/7/2020 4:13:20 PM    [facility]  Historical - Quick Add: Ovarian cyst, left-No Additional Notes    Other genital herpes 2017 1:15:51 PM    [facility] Historical - Infectious: Herpes, Genital-No Additional Notes    Overweight 10/31/2018    10/31/2018: Weight 75 kg. BMI 28.    Psychiatric problem     Pulmonary nodules 12/10/2020    Pure hypercholesterolemia 2017 11:01:53 AM    [facility] Historical - Endocrine/Metabolic/Immune: Hypercholesterolemia-No Additional Notes    Reactive depression 2020    Renal failure 2019 11:55:24 AM    [facility] Historical - Urology: Renal Failure-Diagnosed @ ER when she was there for stomach pains    Supraventricular tachycardia     Tachycardia     Tachycardia 2013 9:37:10 AM    [facility] Historical - Symptoms/Signs: Tachycardia-No Additional Notes    Therapy      Past Surgical History:   Procedure Laterality Date    APPENDECTOMY       SECTION      x 1    HYSTERECTOMY      LAPAROSCOPIC SURGICAL REMOVAL OF OMENTUM      PELVIC AND PARA-AORTIC LYMPH NODE DISSECTION      KY REMOVAL OF OVARY/TUBE(S)      TONSILLECTOMY      TRANSPHENOIDAL PITUITARY RESECTION      TUMOR REMOVAL         Review of Systems:   As documented in primary team H&P    Home Meds:   Prior to Admission medications    Medication Sig Start Date End Date Taking? Authorizing Provider   albuterol 90 mcg/actuation inhaler INHALE 2 PUFFS INTO THE LUNGS EVERY 6 (SIX) HOURS AS NEEDED FOR WHEEZING. 12/2/15  Yes Historical Provider   ALPRAZolam (XANAX) 0.25 MG tablet TAKE ONE TABLET BY MOUTH TWICE DAILY AS NEEDED FOR ANXIETY 22  Yes Ghassan Treviño MD   b complex vitamins tablet Take 1 tablet by mouth once daily.   Yes Historical Provider   BREO ELLIPTA 200-25 mcg/dose DsDv diskus inhaler Inhale 1 puff into the lungs once daily. 21  Yes Historical Provider   cycloSPORINE (RESTASIS) 0.05 % ophthalmic emulsion Place 1 drop into both eyes 2 (two) times daily.  10/2/14  Yes Historical Provider   diclofenac sodium  (VOLTAREN) 1 % Gel Apply 2 g topically daily as needed (Pain).   Yes Historical Provider   docusate sodium (COLACE ORAL) Take 1 capsule by mouth daily as needed.    Yes Historical Provider   ergocalciferol (ERGOCALCIFEROL) 50,000 unit Cap Take 1 capsule by mouth every 7 days. 10/24/22  Yes Historical Provider    MISTLETOE SUBQ Inject 1 Dose into the skin twice a week. 3/28/22  Yes Historical Provider   fluticasone (FLONASE) 50 mcg/actuation nasal spray 2 sprays (100 mcg total) by Each Nare route once daily.  Patient taking differently: 1 spray by Each Nostril route daily as needed for Rhinitis. 10/27/18  Yes Preeti Randall NP   nystatin (MYCOSTATIN) cream Apply 1 g topically twice a week. As needed for rash 11/14/22 11/14/23 Yes Historical Provider   ondansetron (ZOFRAN) 8 MG tablet Take 1 tablet (8 mg total) by mouth every 8 (eight) hours as needed for Nausea. 11/15/21  Yes Ghassan Treviño MD   oxyCODONE (OXYCONTIN) 20 mg 12 hr tablet Take 1 tablet (20 mg total) by mouth every 12 (twelve) hours.  Patient taking differently: Take 20 mg by mouth every 12 (twelve) hours as needed for Pain. 3/20/23 4/19/23 Yes Hailey Thibodeaux DNP   oxyCODONE (ROXICODONE) 10 mg Tab immediate release tablet Take 1 tablet (10 mg total) by mouth every 6 (six) hours as needed for Pain. 3/10/23 4/9/23 Yes Hailey Thibodeaux DNP   polyethylene glycol (MIRALAX) 17 gram PwPk Take 17 g by mouth daily as needed (Constipation).   Yes Historical Provider   promethazine (PHENERGAN) 6.25 mg/5 mL syrup Take by mouth every 6 (six) hours as needed for Nausea.   Yes Historical Provider   senna (SENOKOT) 8.6 mg tablet Take 4 tablets by mouth once daily.   Yes Historical Provider   TIADYLT  mg Cs24 TAKE ONE CAPSULE BY MOUTH EVERY DAY 8/29/22  Yes Morelia Ponce MD   traZODone (DESYREL) 50 MG tablet Take 1 tablet (50 mg total) by mouth every evening.  Patient taking differently: Take 50 mg by mouth nightly as needed for Insomnia.  "12/16/22 4/4/23 Yes Hailey Thibodeaux DNP   EASY TOUCH INSULIN SYRINGE 1 mL 30 gauge x 1/2" Syrg USE AS DIRECTED FOR SQ INJECTIONS THREE TIMES WEEKLY 3/28/22   Historical Provider   LIDOcaine-prilocaine (EMLA) cream Apply topically as needed (port access). 6/13/22   Ghassan Treviño MD   naloxone (NARCAN) 4 mg/actuation Spry 4mg by nasal route as needed for opioid overdose; may repeat every 2-3 minutes in alternating nostrils until medical help arrives. Call 911 3/20/23   Hailey Thibodeaux DNP     Scheduled Meds:    diltiaZEM  240 mg Oral Daily    magnesium sulfate IVPB  2 g Intravenous Once    oxyCODONE  20 mg Oral Q12H    piperacillin-tazobactam (ZOSYN) IVPB  4.5 g Intravenous Q8H     Continuous Infusions:   PRN Meds:acetaminophen, albuterol, ALPRAZolam, benzonatate, dextrose 10%, dextrose 10%, dextrose, dextrose, fluticasone propionate, glucagon (human recombinant), glucose, glucose, melatonin, naloxone, ondansetron, oxyCODONE, oxyCODONE, prochlorperazine, sodium chloride 0.9%, traZODone  Anticoagulants/Antiplatelets: no anticoagulation    Allergies:   Review of patient's allergies indicates:   Allergen Reactions    Erythromycin Other (See Comments)     Stomach Cramps     Sedation Hx: have not been any systemic reactions    Labs:  Recent Labs   Lab 04/05/23  0642   INR 1.4*       Recent Labs   Lab 04/05/23  0454   WBC 13.45*   HGB 8.0*   HCT 25.9*   MCV 89         Recent Labs   Lab 04/05/23  0454      *   K 4.3   CL 99   CO2 24   BUN 9   CREATININE 1.1   CALCIUM 9.3   MG 1.5*   ALT 75*   AST 95*   ALBUMIN 2.2*   BILITOT 1.1*         Vitals:  Temp: (!) 100.5 °F (38.1 °C) (04/05/23 0743)  Pulse: 106 (04/05/23 0743)  Resp: 16 (04/05/23 0743)  BP: (!) 127/59 (04/05/23 0743)  SpO2: (!) 91 % (04/05/23 0743)     Physical Exam:  ASA: 3  Mallampati: 3    General: no acute distress  Mental Status: alert and oriented to person, place and time  HEENT: normocephalic, atraumatic  Chest: unlabored " breathing  Heart: regular heart rate  Abdomen: nondistended  Extremity: moves all extremities    Plan: Percutaneous cholecystostomy     Sedation Plan: moderate     Donita Chandler MD  PGY-5  Pager: [phone]

## 2023-04-05 NOTE — MEDICAL/APP STUDENT
[facility] Medicine Student   [place] Note  [facility] [facility]    Admit Date: 4/4/2023  Hospital Day: 0  04/05/2023  7:10 AM    SUBJECTIVE:   Ms. Liat Gilmore is a 72 y.o. 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    Interval history: No acute overnight events. Subjectively feels better today and endorses reduced anxiety. Patient denies subjective feelings of fever and chills. Patient complains of mild nausea and spitting up (no vomit). Patient received an enema yesterday that has yet to drain (fluid retained).       Review of Systems   Constitutional:  Positive for fever. Negative for chills, diaphoresis and malaise/fatigue.   Eyes:  Negative for blurred vision, double vision and photophobia.   Respiratory:  Positive for cough and shortness of breath. Negative for hemoptysis and sputum production.    Cardiovascular:  Negative for chest pain, palpitations and leg swelling.   Gastrointestinal:  Positive for abdominal pain, constipation and nausea. Negative for vomiting.   Genitourinary:  Positive for frequency. Negative for dysuria and urgency.   Musculoskeletal:  Positive for joint pain. Negative for neck pain.        Shoulder pain with extended flexion   Skin:  Negative for itching and rash.   Neurological:  Negative for dizziness and headaches.   Psychiatric/Behavioral:  Negative for suicidal ideas. The patient is nervous/anxious.         History of SI but currently not     Please refer to the H&P for past medical, family, and social history.    OBJECTIVE:     Vital Signs Recent:  Temp: 99.7 °F (37.6 °C) (04/04/23 2207)  Pulse: 102 (04/05/23 0534)  Resp: 16 (04/05/23 0543)  BP: 135/66 (04/04/23 2207)  SpO2: 95 % (04/04/23 2207)  Oxygen Documentation:                Device (Oxygen Therapy): room air         Vital Signs Range (Last 24H):  Temp:  [98.8 °F (37.1  °C)-103.1 °F (39.5 °C)]   Pulse:  []   Resp:  [15-22]   BP: (103-135)/(53-66)   SpO2:  [94 %-98 %]        I & O (Last 24H):  Intake/Output Summary (Last 24 hours) at 4/5/2023 0704  Last data filed at 4/4/2023 0928  Gross per 24 hour   Intake 1000 ml   Output --   Net 1000 ml        Physical Exam:  Physical Exam  Constitutional:       General: She is not in acute distress.     Appearance: Normal appearance. She is not ill-appearing or diaphoretic.   HENT:      Head: Normocephalic and atraumatic.   Eyes:      General: No scleral icterus.        Right eye: No discharge.         Left eye: No discharge.      Extraocular Movements: Extraocular movements intact.      Conjunctiva/sclera: Conjunctivae normal.      Pupils: Pupils are equal, round, and reactive to light.   Neck:      Vascular: No carotid bruit.   Cardiovascular:      Rate and Rhythm: Regular rhythm. Tachycardia present.      Pulses: Normal pulses.      Heart sounds: Normal heart sounds.   Pulmonary:      Effort: Pulmonary effort is normal. No respiratory distress.      Breath sounds: Normal breath sounds. No wheezing.   Chest:      Chest wall: No tenderness.   Abdominal:      General: Abdomen is flat. There is no distension.      Palpations: Abdomen is soft.      Tenderness: There is abdominal tenderness. There is no guarding or rebound.      Hernia: A hernia is present.   Musculoskeletal:         General: No deformity or signs of injury.      Cervical back: Normal range of motion and neck supple. No rigidity or tenderness.      Right lower leg: No edema.      Left lower leg: No edema.   Lymphadenopathy:      Cervical: No cervical adenopathy.   Skin:     General: Skin is warm and dry.      Coloration: Skin is not jaundiced or pale.      Findings: No bruising or erythema.   Neurological:      General: No focal deficit present.      Mental Status: She is alert and oriented to person, place, and time.   Psychiatric:         Mood and Affect: Mood normal.          Behavior: Behavior normal.         Thought Content: Thought content normal.         Judgment: Judgment normal.       Labs:   Recent Labs   Lab 04/04/23  0606 04/04/23  1601 04/05/23  0454   * 134* 134*   K 4.2 4.6 4.3   CL 98 97 99   CO2 24 26 24   BUN 10 10 9   CREATININE 1.7* 1.4 1.1   * 131* 109   CALCIUM 9.5 9.4 9.3   MG  --   --  1.5*   PHOS  --   --  3.1     Recent Labs   Lab 04/04/23  0606 04/05/23  0454 04/05/23  0642   ALKPHOS 610* 502*  --    * 75*  --    * 95*  --    ALBUMIN 2.8* 2.2*  --    PROT 7.8 6.7  --    BILITOT 0.7 1.1*  --    INR  --   --  1.4*     Recent Labs   Lab 04/04/23  0606 04/05/23 0454   WBC 13.12* 13.45*   HGB 8.6* 8.0*   HCT 28.5* 25.9*    284       Diagnostic Results:  No culture growth to date.     Scheduled Meds:   diltiaZEM  240 mg Oral Daily    magnesium sulfate IVPB  2 g Intravenous Once    oxyCODONE  20 mg Oral Q12H    piperacillin-tazobactam (ZOSYN) IVPB  4.5 g Intravenous Q8H     Continuous Infusions:  PRN Meds:acetaminophen, albuterol, ALPRAZolam, benzonatate, dextrose 10%, dextrose 10%, dextrose, dextrose, fluticasone propionate, glucagon (human recombinant), glucose, glucose, melatonin, naloxone, ondansetron, oxyCODONE, oxyCODONE, prochlorperazine, sodium chloride 0.9%, traZODone    ASSESSMENT/PLAN:   Ms. Liat Gilmore is a 72 y.o. 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    Active Hospital Problems    Diagnosis  POA    *Cholecystitis [K81.9]  Unknown    Small bowel obstruction [K56.609]  Yes    Suspected pulmonary embolism [R09.89]  Unknown    Right upper quadrant abdominal pain [R10.11]  Yes    Constipation [K59.00]  Unknown    Ventral hernia without obstruction or gangrene [K43.9]  Unknown    PTSD (post-traumatic stress disorder) [F43.10]  Yes    Lung mass [R91.8]  Yes     Generalized anxiety disorder [F41.1]  Yes    Malignant neoplasm of both ovaries [C56.3]  Yes     6/22/2020: Hysterectomy and oophorectomy.      Hypertension [I10]  Yes     2005: Diagnosed.        Hypercholesterolemia [E78.00]  Yes     2010: Began statin.        Fibromyalgia [M79.7]  Yes     2006: Diagnosed.        Cough variant asthma [J45.991]  Yes     1985: Diagnosed.  Patient should consider albuterol prior to any procedure or activity that may induce cough.  May also use cough suppressant prior.  Continue accolate for allergic component  Continue breo daily  Continue flonase for allergic componine        Supraventricular tachycardia [I47.1]  Yes     1966: First episode.  2003: ER: SVT.          Resolved Hospital Problems    Diagnosis Date Resolved POA    Severe sepsis [A41.9, R65.20] 04/04/2023 Unknown    Mild obesity [E66.9] 04/04/2023 Yes     10/31/2018: Weight 75 kg. BMI 28.  3/18/2021: Weight 89 kg. BMI 34.           Assessment/Plan  1.Acute Cholecystitis  ASSESSMENT: Severe RUQ pain on admission. IR cholecystostomy with drain tube (gravity drainage) completed 4/05 with 50 cc thick sludge, cultures sent.   PLAN:  -Continue zosyn for now  -Flush drain with 10 cc of saline every 12 hours  -F/U blood cultures   -F/U in 3 weeks for catheter evaluation for removal     2. Ventral Hernia w/o Obstruction  ASSESSMENT: Ventral hernia from 2020 omental debulking procedure from her serous ovarian cancer. Hernia is reducible. No SBO symptoms but CT AP showed large stool burden. Fleet enema ordered but did not result in an BM. Currently passing flatus.  PLAN:  -Monitor for symptoms of SBO  -Continue serial enemas PRN    3. Possible PE  ASSESSMENT: Tachycardic and tachypneic on arrival to ED. V/Q scan showed low probability of PE. Possibly from IVF overload and anxiety. Currently comfortable on RA.  PLAN:  -Monitor sats and give oxygen as needed    4. Generalized Anxiety  Home xanax resumed and anxiety is improving. Trial  of paroxetine did not provide benefit.     5. Metastatic Ovarian Cancer  Escalating home pain regimen s/p cholecystostomy tube placement.      6. Couch variant asthma  Home albuterol ordered. Has not needed it yet.    7. Hypercholesteremia  Restart home statin when LFTs normalize (holding for now)    8. HTN  Hold home medications for HTN due to possible sepsis    9. Supraventricular tachycardia  Continue tele monitoring and home diltiazem  mg qd. Troponin normal.    10. VTE Risk Mitigation      enoxaparin injection 40 mg  Daily            04/05/23 1651      IP VTE HIGH RISK PATIENT  Once         04/04/23 0954      Place sequential compression device  Until discontinued         04/04/23 0954      Place sequential compression device  Until discontinued         04/04/23 0949      Reason for No Pharmacological VTE Prophylaxis  Once        Question:  Reasons:  Answer:  Risk of Bleeding  Comment:  Possibly operative    04/04/23 0949     Discharge planning:   Estimated discharge: 4/6/2023  Code Status: Full Code  Is the patient medically ready for discharge?: No  Reason for patient still in hospital (select all that apply): Consult recommendations

## 2023-04-05 NOTE — PROCEDURES
IR POST PROCEDURE NOTE    Date of Procedure: 4/5/2023    Procedure: Cholecystostomy    Pre-Procedure Diagnosis: Acute cholecystitis    Post-Procedure Diagnosis: Same    Procedure Personnel: Francisco Gonzalez MD and Girma Ellis MD    Written Informed Consent Obtained: Yes    Specimen Removed: 40cc bilious fluid    Estimated Blood Loss: Minimal    Findings: Under direct US and fluoroscopic guidance, 8 Fr cholecystostomy inserted. 40cc of thick, bilious fluid was sent for laboratory testing. Catheter sutured into place.    Complications: None immediate      Girma Ellis MD  Fellow, Dept. Of Interventional Radiology  [facility]

## 2023-04-05 NOTE — ASSESSMENT & PLAN NOTE
Presented with severe RUQ pain, concern for acute cholecystitis on CT AP and US. WBC 13 with left shift. 7/10 stabbing pain, worsened with palpation, does not refer. Not post prandial. Alk Phos 610. Initially febrile to 100.8   Gen Surg consulted, deemed poor surgical candidate given age, malignancy. Recommend edIR consult for cholecystostomy  - initially refused but underwent cholecystostomy on 4/5 with 50cc of thick sludge drained, cultures sent.  - Continue Zosyn, will discuss further coverage with ID  - pain worsening following procedure, escalating pain regimen  - Blood cx NGTD 24 hrs  - gentle IVF  - Full liquid diet

## 2023-04-06 ENCOUNTER — TELEPHONE (OUTPATIENT)
Dept: GYNECOLOGIC ONCOLOGY | Facility: CLINIC | Age: 73
End: 2023-04-06
Payer: MEDICARE

## 2023-04-06 LAB
ALBUMIN SERPL BCP-MCNC: 2.2 G/DL (ref 3.5–5.2)
ALP SERPL-CCNC: 429 U/L (ref 55–135)
ALT SERPL W/O P-5'-P-CCNC: 57 U/L (ref 10–44)
ANION GAP SERPL CALC-SCNC: 10 MMOL/L (ref 8–16)
ANION GAP SERPL CALC-SCNC: 11 MMOL/L (ref 8–16)
AST SERPL-CCNC: 48 U/L (ref 10–40)
BILIRUB SERPL-MCNC: 0.6 MG/DL (ref 0.1–1)
BUN SERPL-MCNC: 14 MG/DL (ref 8–23)
BUN SERPL-MCNC: 14 MG/DL (ref 8–23)
CALCIUM SERPL-MCNC: 8.8 MG/DL (ref 8.7–10.5)
CALCIUM SERPL-MCNC: 9.3 MG/DL (ref 8.7–10.5)
CHLORIDE SERPL-SCNC: 97 MMOL/L (ref 95–110)
CHLORIDE SERPL-SCNC: 97 MMOL/L (ref 95–110)
CO2 SERPL-SCNC: 25 MMOL/L (ref 23–29)
CO2 SERPL-SCNC: 25 MMOL/L (ref 23–29)
CREAT SERPL-MCNC: 1.2 MG/DL (ref 0.5–1.4)
CREAT SERPL-MCNC: 1.4 MG/DL (ref 0.5–1.4)
ERYTHROCYTE [DISTWIDTH] IN BLOOD BY AUTOMATED COUNT: 17.1 % (ref 11.5–14.5)
EST. GFR  (NO RACE VARIABLE): 40.2 ML/MIN/1.73 M^2
EST. GFR  (NO RACE VARIABLE): 48.4 ML/MIN/1.73 M^2
GLUCOSE SERPL-MCNC: 100 MG/DL (ref 70–110)
GLUCOSE SERPL-MCNC: 105 MG/DL (ref 70–110)
HCT VFR BLD AUTO: 25.5 % (ref 37–48.5)
HGB BLD-MCNC: 7.8 G/DL (ref 12–16)
MAGNESIUM SERPL-MCNC: 2.1 MG/DL (ref 1.6–2.6)
MCH RBC QN AUTO: 27 PG (ref 27–31)
MCHC RBC AUTO-ENTMCNC: 30.6 G/DL (ref 32–36)
MCV RBC AUTO: 88 FL (ref 82–98)
PHOSPHATE SERPL-MCNC: 3.3 MG/DL (ref 2.7–4.5)
PLATELET # BLD AUTO: 337 K/UL (ref 150–450)
PMV BLD AUTO: 9 FL (ref 9.2–12.9)
POTASSIUM SERPL-SCNC: 3.8 MMOL/L (ref 3.5–5.1)
POTASSIUM SERPL-SCNC: 3.9 MMOL/L (ref 3.5–5.1)
PROT SERPL-MCNC: 7 G/DL (ref 6–8.4)
RBC # BLD AUTO: 2.89 M/UL (ref 4–5.4)
SODIUM SERPL-SCNC: 132 MMOL/L (ref 136–145)
SODIUM SERPL-SCNC: 133 MMOL/L (ref 136–145)
WBC # BLD AUTO: 14.97 K/UL (ref 3.9–12.7)

## 2023-04-06 PROCEDURE — 21400001 HC TELEMETRY ROOM

## 2023-04-06 PROCEDURE — 63600175 PHARM REV CODE 636 W HCPCS: Performed by: STUDENT IN AN ORGANIZED HEALTH CARE EDUCATION/TRAINING PROGRAM

## 2023-04-06 PROCEDURE — 25000003 PHARM REV CODE 250: Performed by: INTERNAL MEDICINE

## 2023-04-06 PROCEDURE — 36415 COLL VENOUS BLD VENIPUNCTURE: CPT | Performed by: STUDENT IN AN ORGANIZED HEALTH CARE EDUCATION/TRAINING PROGRAM

## 2023-04-06 PROCEDURE — 25000003 PHARM REV CODE 250

## 2023-04-06 PROCEDURE — 25000003 PHARM REV CODE 250: Performed by: STUDENT IN AN ORGANIZED HEALTH CARE EDUCATION/TRAINING PROGRAM

## 2023-04-06 PROCEDURE — 99223 PR INITIAL HOSPITAL CARE,LEVL III: ICD-10-PCS | Mod: ,,, | Performed by: INTERNAL MEDICINE

## 2023-04-06 PROCEDURE — 85027 COMPLETE CBC AUTOMATED: CPT | Performed by: STUDENT IN AN ORGANIZED HEALTH CARE EDUCATION/TRAINING PROGRAM

## 2023-04-06 PROCEDURE — 63600175 PHARM REV CODE 636 W HCPCS

## 2023-04-06 PROCEDURE — 99233 PR SUBSEQUENT HOSPITAL CARE,LEVL III: ICD-10-PCS | Mod: 95,,, | Performed by: STUDENT IN AN ORGANIZED HEALTH CARE EDUCATION/TRAINING PROGRAM

## 2023-04-06 PROCEDURE — 83735 ASSAY OF MAGNESIUM: CPT | Performed by: STUDENT IN AN ORGANIZED HEALTH CARE EDUCATION/TRAINING PROGRAM

## 2023-04-06 PROCEDURE — 99223 1ST HOSP IP/OBS HIGH 75: CPT | Mod: ,,, | Performed by: INTERNAL MEDICINE

## 2023-04-06 PROCEDURE — 80048 BASIC METABOLIC PNL TOTAL CA: CPT | Mod: XB | Performed by: STUDENT IN AN ORGANIZED HEALTH CARE EDUCATION/TRAINING PROGRAM

## 2023-04-06 PROCEDURE — 99233 SBSQ HOSP IP/OBS HIGH 50: CPT | Mod: 95,,, | Performed by: STUDENT IN AN ORGANIZED HEALTH CARE EDUCATION/TRAINING PROGRAM

## 2023-04-06 PROCEDURE — 84100 ASSAY OF PHOSPHORUS: CPT | Performed by: STUDENT IN AN ORGANIZED HEALTH CARE EDUCATION/TRAINING PROGRAM

## 2023-04-06 PROCEDURE — 80053 COMPREHEN METABOLIC PANEL: CPT | Performed by: STUDENT IN AN ORGANIZED HEALTH CARE EDUCATION/TRAINING PROGRAM

## 2023-04-06 RX ORDER — SENNOSIDES 8.6 MG/1
8.6 TABLET ORAL DAILY
Status: DISCONTINUED | OUTPATIENT
Start: 2023-04-06 | End: 2023-04-07

## 2023-04-06 RX ORDER — POLYETHYLENE GLYCOL 3350 17 G/17G
17 POWDER, FOR SOLUTION ORAL 2 TIMES DAILY
Status: DISCONTINUED | OUTPATIENT
Start: 2023-04-06 | End: 2023-04-07

## 2023-04-06 RX ORDER — SODIUM CHLORIDE, SODIUM LACTATE, POTASSIUM CHLORIDE, CALCIUM CHLORIDE 600; 310; 30; 20 MG/100ML; MG/100ML; MG/100ML; MG/100ML
INJECTION, SOLUTION INTRAVENOUS CONTINUOUS
Status: DISCONTINUED | OUTPATIENT
Start: 2023-04-06 | End: 2023-04-06

## 2023-04-06 RX ORDER — POLYETHYLENE GLYCOL 3350 17 G/17G
17 POWDER, FOR SOLUTION ORAL DAILY
Status: DISCONTINUED | OUTPATIENT
Start: 2023-04-06 | End: 2023-04-06

## 2023-04-06 RX ORDER — AMOXICILLIN AND CLAVULANATE POTASSIUM 875; 125 MG/1; MG/1
1 TABLET, FILM COATED ORAL EVERY 12 HOURS
Status: DISCONTINUED | OUTPATIENT
Start: 2023-04-06 | End: 2023-04-08 | Stop reason: HOSPADM

## 2023-04-06 RX ORDER — AMOXICILLIN 250 MG
1 CAPSULE ORAL DAILY
Status: DISCONTINUED | OUTPATIENT
Start: 2023-04-06 | End: 2023-04-06

## 2023-04-06 RX ORDER — ONDANSETRON 2 MG/ML
8 INJECTION INTRAMUSCULAR; INTRAVENOUS EVERY 6 HOURS PRN
Status: DISCONTINUED | OUTPATIENT
Start: 2023-04-06 | End: 2023-04-08 | Stop reason: HOSPADM

## 2023-04-06 RX ORDER — BISACODYL 10 MG
10 SUPPOSITORY, RECTAL RECTAL DAILY PRN
Status: DISCONTINUED | OUTPATIENT
Start: 2023-04-06 | End: 2023-04-08 | Stop reason: HOSPADM

## 2023-04-06 RX ORDER — AMOXICILLIN AND CLAVULANATE POTASSIUM 875; 125 MG/1; MG/1
1 TABLET, FILM COATED ORAL EVERY 12 HOURS
Status: CANCELLED | OUTPATIENT
Start: 2023-04-06

## 2023-04-06 RX ORDER — PROMETHAZINE HYDROCHLORIDE 6.25 MG/5ML
12.5 SYRUP ORAL EVERY 6 HOURS PRN
Status: DISCONTINUED | OUTPATIENT
Start: 2023-04-06 | End: 2023-04-08 | Stop reason: HOSPADM

## 2023-04-06 RX ADMIN — MORPHINE SULFATE 2 MG: 2 INJECTION, SOLUTION INTRAMUSCULAR; INTRAVENOUS at 03:04

## 2023-04-06 RX ADMIN — ENOXAPARIN SODIUM 40 MG: 40 INJECTION SUBCUTANEOUS at 08:04

## 2023-04-06 RX ADMIN — OXYCODONE HYDROCHLORIDE 20 MG: 20 TABLET, FILM COATED, EXTENDED RELEASE ORAL at 09:04

## 2023-04-06 RX ADMIN — SENNOSIDES 8.6 MG: 8.6 TABLET, FILM COATED ORAL at 03:04

## 2023-04-06 RX ADMIN — SODIUM CHLORIDE, POTASSIUM CHLORIDE, SODIUM LACTATE AND CALCIUM CHLORIDE: 600; 310; 30; 20 INJECTION, SOLUTION INTRAVENOUS at 10:04

## 2023-04-06 RX ADMIN — AMOXICILLIN AND CLAVULANATE POTASSIUM 1 TABLET: 875; 125 TABLET, FILM COATED ORAL at 09:04

## 2023-04-06 RX ADMIN — DILTIAZEM HYDROCHLORIDE 240 MG: 240 CAPSULE, COATED, EXTENDED RELEASE ORAL at 08:04

## 2023-04-06 RX ADMIN — SENNOSIDES AND DOCUSATE SODIUM 1 TABLET: 50; 8.6 TABLET ORAL at 12:04

## 2023-04-06 RX ADMIN — PIPERACILLIN SODIUM AND TAZOBACTAM SODIUM 4.5 G: 4; .5 INJECTION, POWDER, FOR SOLUTION INTRAVENOUS at 02:04

## 2023-04-06 RX ADMIN — Medication 1 ENEMA: at 01:04

## 2023-04-06 RX ADMIN — PIPERACILLIN SODIUM AND TAZOBACTAM SODIUM 4.5 G: 4; .5 INJECTION, POWDER, FOR SOLUTION INTRAVENOUS at 05:04

## 2023-04-06 RX ADMIN — SODIUM CHLORIDE, POTASSIUM CHLORIDE, SODIUM LACTATE AND CALCIUM CHLORIDE: 600; 310; 30; 20 INJECTION, SOLUTION INTRAVENOUS at 02:04

## 2023-04-06 RX ADMIN — POLYETHYLENE GLYCOL 3350 17 G: 17 POWDER, FOR SOLUTION ORAL at 09:04

## 2023-04-06 RX ADMIN — OXYCODONE HYDROCHLORIDE 20 MG: 20 TABLET, FILM COATED, EXTENDED RELEASE ORAL at 08:04

## 2023-04-06 RX ADMIN — BISACODYL 10 MG: 10 SUPPOSITORY RECTAL at 02:04

## 2023-04-06 NOTE — SUBJECTIVE & OBJECTIVE
Past Medical History:   Diagnosis Date    Anxiety 08/07/2020    Asthma 10/31/2018    1985: Diagnosed. cough variant    Asthma 9/26/2013 9:37:32 AM    [facility] Historical - Respiratory: Asthma-No Additional Notes    Bronchiectasis     Complications affecting other specified body systems, hypertension 9/26/2013 9:40:45 AM    [facility] Historical - Cardiovascular: Hypertension-No Additional Notes    Dizziness and giddiness 8/30/2017 3:09:28 PM    [facility] Historical - Unknown: Vertigo-No Additional Notes    Elevated cancer antigen 125 (CA-125) 09/14/2021    Fibromyalgia 10/31/2018    2006: Diagnosed.    Herpes simplex vulvovaginitis 06/09/2021    Hx of psychiatric care     Hypercholesterolemia 10/31/2018    2010: Began statin.    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 Foster Stage 3    Other and unspecified ovarian cyst 5/7/2020 4:13:20 PM    [facility] Historical - Quick Add: Ovarian cyst, left-No Additional Notes    Other genital herpes 7/6/2017 1:15:51 PM    [facility] Historical - Infectious: Herpes, Genital-No Additional Notes    Overweight 10/31/2018    10/31/2018: Weight 75 kg. BMI 28.    Psychiatric problem     Pulmonary nodules 12/10/2020    Pure hypercholesterolemia 6/23/2017 11:01:53 AM    [facility] Historical - Endocrine/Metabolic/Immune: Hypercholesterolemia-No Additional Notes    Reactive depression 09/02/2020    Renal failure 9/9/2019 11:55:24 AM    [facility] Historical - Urology: Renal  Failure-Diagnosed @ ER when she was there for stomach pains    Supraventricular tachycardia     Tachycardia     Tachycardia 2013 9:37:10 AM    [facility] Historical - Symptoms/Signs: Tachycardia-No Additional Notes    Therapy        Past Surgical History:   Procedure Laterality Date    APPENDECTOMY       SECTION      x 1    HYSTERECTOMY      LAPAROSCOPIC SURGICAL REMOVAL OF OMENTUM      PELVIC AND PARA-AORTIC LYMPH NODE DISSECTION      CT REMOVAL OF OVARY/TUBE(S)      TONSILLECTOMY      TRANSPHENOIDAL PITUITARY RESECTION  2000    TUMOR REMOVAL         Review of patient's allergies indicates:   Allergen Reactions    Erythromycin Other (See Comments)     Stomach Cramps       Medications:  Medications Prior to Admission   Medication Sig    albuterol 90 mcg/actuation inhaler INHALE 2 PUFFS INTO THE LUNGS EVERY 6 (SIX) HOURS AS NEEDED FOR WHEEZING.    ALPRAZolam (XANAX) 0.25 MG tablet TAKE ONE TABLET BY MOUTH TWICE DAILY AS NEEDED FOR ANXIETY    b complex vitamins tablet Take 1 tablet by mouth once daily.    BREO ELLIPTA 200-25 mcg/dose DsDv diskus inhaler Inhale 1 puff into the lungs once daily.    cycloSPORINE (RESTASIS) 0.05 % ophthalmic emulsion Place 1 drop into both eyes 2 (two) times daily.     diclofenac sodium (VOLTAREN) 1 % Gel Apply 2 g topically daily as needed (Pain).    docusate sodium (COLACE ORAL) Take 1 capsule by mouth daily as needed.     ergocalciferol (ERGOCALCIFEROL) 50,000 unit Cap Take 1 capsule by mouth every 7 days.     MISTLETOE SUBQ Inject 1 Dose into the skin twice a week.    fluticasone (FLONASE) 50 mcg/actuation nasal spray 2 sprays (100 mcg total) by Each Nare route once daily. (Patient taking differently: 1 spray by Each Nostril route daily as needed for Rhinitis.)    nystatin (MYCOSTATIN) cream Apply 1 g topically twice a week. As needed for rash    ondansetron (ZOFRAN) 8 MG tablet Take 1 tablet (8 mg total) by mouth every 8 (eight) hours as needed for Nausea.     "oxyCODONE (OXYCONTIN) 20 mg 12 hr tablet Take 1 tablet (20 mg total) by mouth every 12 (twelve) hours. (Patient taking differently: Take 20 mg by mouth every 12 (twelve) hours as needed for Pain.)    oxyCODONE (ROXICODONE) 10 mg Tab immediate release tablet Take 1 tablet (10 mg total) by mouth every 6 (six) hours as needed for Pain.    polyethylene glycol (MIRALAX) 17 gram PwPk Take 17 g by mouth daily as needed (Constipation).    promethazine (PHENERGAN) 6.25 mg/5 mL syrup Take by mouth every 6 (six) hours as needed for Nausea.    senna (SENOKOT) 8.6 mg tablet Take 4 tablets by mouth once daily.    TIADYLT  mg Cs24 TAKE ONE CAPSULE BY MOUTH EVERY DAY    traZODone (DESYREL) 50 MG tablet Take 1 tablet (50 mg total) by mouth every evening. (Patient taking differently: Take 50 mg by mouth nightly as needed for Insomnia.)    EASY TOUCH INSULIN SYRINGE 1 mL 30 gauge x 1/2" Syrg USE AS DIRECTED FOR SQ INJECTIONS THREE TIMES WEEKLY    LIDOcaine-prilocaine (EMLA) cream Apply topically as needed (port access).    naloxone (NARCAN) 4 mg/actuation Spry 4mg by nasal route as needed for opioid overdose; may repeat every 2-3 minutes in alternating nostrils until medical help arrives. Call 911     Antibiotics (From admission, onward)      Start     Stop Route Frequency Ordered    04/06/23 2200  amoxicillin-clavulanate 875-125mg per tablet 1 tablet         04/12 2059 Oral Every 12 hours 04/06/23 1506    04/04/23 1830  piperacillin-tazobactam (ZOSYN) 4.5 g in dextrose 5 % in water (D5W) 5 % 100 mL IVPB (MB+)         04/06 2159 IV Every 8 hours (non-standard times) 04/04/23 1528          Antifungals (From admission, onward)      None          Antivirals (From admission, onward)      None             Immunization History   Administered Date(s) Administered    COVID-19, MRNA, LN-S, PF (MODERNA FULL 0.5 ML DOSE) 02/14/2021, 03/16/2021, 08/17/2021, 04/25/2022    COVID-19, mRNA, LNP-S, bivalent booster, PF (PFIZER OMICRON) " 09/29/2022       Family History       Problem Relation (Age of Onset)    Angina Mother    Anxiety disorder Sister    Breast cancer Paternal Aunt    Colon cancer Sister (70), Brother (80)    Depression Sister    Drug abuse Brother    Heart disease Brother    Stroke Father          Social History     Socioeconomic History    Marital status:     Number of children: 1   Tobacco Use    Smoking status: Never    Smokeless tobacco: Never   Substance and Sexual Activity    Alcohol use: No     Comment: Rarely    Drug use: No    Sexual activity: Not Currently     Social Determinants of Health     Financial Resource Strain: Unknown    Difficulty of Paying Living Expenses: Patient refused   Transportation Needs: No Transportation Needs    Lack of Transportation (Medical): No    Lack of Transportation (Non-Medical): No   Physical Activity: Unknown    Days of Exercise per Week: 3 days   Social Connections: Unknown    Frequency of Communication with Friends and Family: More than three times a week    Frequency of Social Gatherings with Friends and Family: More than three times a week    Marital Status:    Housing Stability: Unknown    Unable to Pay for Housing in the Last Year: No    Unstable Housing in the Last Year: No     Review of Systems   Constitutional:  Negative for chills, diaphoresis and fever.   HENT:  Negative for rhinorrhea and sore throat.    Respiratory:  Negative for cough and shortness of breath.    Cardiovascular:  Negative for chest pain and leg swelling.   Gastrointestinal:  Positive for abdominal pain and constipation. Negative for diarrhea, nausea and vomiting.   Genitourinary:  Negative for dysuria and hematuria.   Musculoskeletal:  Negative for arthralgias and myalgias.   Skin:  Negative for rash.   Neurological:  Negative for headaches.   Objective:     Vital Signs (Most Recent):  Temp: 97.2 °F (36.2 °C) (04/06/23 1702)  Pulse: 85 (04/06/23 1702)  Resp: 18 (04/06/23 1702)  BP: (!) 95/53  (04/06/23 1702)  SpO2: (!) 93 % (04/06/23 1702)   Vital Signs (24h Range):  Temp:  [97 °F (36.1 °C)-97.9 °F (36.6 °C)] 97.2 °F (36.2 °C)  Pulse:  [78-94] 85  Resp:  [17-20] 18  SpO2:  [90 %-93 %] 93 %  BP: ()/(50-58) 95/53     Weight: 71 kg (156 lb 9.6 oz)  Body mass index is 27.74 kg/m².    Estimated Creatinine Clearance: 40.6 mL/min (based on SCr of 1.2 mg/dL).    Physical Exam  Constitutional:       General: She is not in acute distress.     Appearance: She is well-developed. She is not diaphoretic.   HENT:      Head: Normocephalic and atraumatic.      Nose: Nose normal.   Eyes:      Conjunctiva/sclera: Conjunctivae normal.   Pulmonary:      Effort: Pulmonary effort is normal. No respiratory distress.   Abdominal:      General: Abdomen is flat. There is no distension.      Comments: RUQ drain with dark serosanginous fluid   Musculoskeletal:      Cervical back: Normal range of motion.      Right lower leg: No edema.      Left lower leg: No edema.   Skin:     General: Skin is warm and dry.      Findings: No erythema or rash.   Neurological:      Mental Status: She is alert.   Psychiatric:         Behavior: Behavior normal.       Significant Labs: All pertinent labs within the past 24 hours have been reviewed.    Significant Imaging: I have reviewed all pertinent imaging results/findings within the past 24 hours.

## 2023-04-06 NOTE — PLAN OF CARE
[facility]  Initial Discharge Assessment       Primary Care Provider: Shabana Dunbar MD    Admission Diagnosis: Ventral hernia without obstruction or gangrene [K43.9]  Cholecystitis [K81.9]  Tachycardia [R00.0]  Chest pain [R07.9]  Constipation, unspecified constipation type [K59.00]    Admission Date: 4/4/2023  Expected Discharge Date: 4/7/2023    Discharge Barriers Identified: None    Payor: HUMANA MANAGED MEDICARE / Plan: HUMANA MEDICARE PPO / Product Type: Medicare Advantage /     Extended Emergency Contact Information  Primary Emergency Contact: Sue Avalos           [facility]  Mobile Phone: [phone]  Relation: Daughter    Discharge Plan A: Home with family  Discharge Plan B: Home Health      [facility] [place] - [address]  [address]  [place] [address]  Phone: [phone] Fax: [phone]      Initial Assessment (most recent)       Adult Discharge Assessment - 04/06/23 1447          Discharge Assessment    Assessment Type Discharge Planning Assessment     Confirmed/corrected address, phone number and insurance Yes     Confirmed Demographics Correct on Facesheet     Source of Information patient     Communicated HORACE with patient/caregiver Date not available/Unable to determine     Reason For Admission Cholecystitis     People in Home alone     Do you expect to return to your current living situation? Yes     Do you have help at home or someone to help you manage your care at home? Yes     Who are your caregiver(s) and their phone number(s)? Sue Avalos [phone]     Prior to hospitilization cognitive status: Alert/Oriented     Current cognitive status: Alert/Oriented     Home Layout Able to live on 1st floor;Other (see comments)   The patient reported that she uses the side enterance to her one and there is four steps to enter.    Equipment Currently Used at Home walker, rolling     Patient currently  being followed by outpatient case management? No     Do you currently have service(s) that help you manage your care at home? No     Do you take prescription medications? Yes     Do you have prescription coverage? Yes     Coverage HUMANA MANAGED MEDICARE - HU[employer]NA MEDICARE PPO     Is the patient taking medications as prescribed? yes     Who is going to help you get home at discharge? Suemago Avalos     Are you on dialysis? No     Do you take coumadin? No     Discharge Plan A Home with family     Discharge Plan B Home Health     DME Needed Upon Discharge  other (see comments)   TBD    Discharge Plan discussed with: Patient     Discharge Barriers Identified None        Physical Activity    On average, how many days per week do you engage in moderate to strenuous exercise (like a brisk walk)? 3 days        Financial Resource Strain    How hard is it for you to pay for the very basics like food, housing, medical care, and heating? Patient refused        Housing Stability    In the last 12 months, was there a time when you were not able to pay the mortgage or rent on time? No     In the last 12 months, was there a time when you did not have a steady place to sleep or slept in a shelter (including now)? No        Transportation Needs    In the past 12 months, has lack of transportation kept you from medical appointments or from getting medications? No     In the past 12 months, has lack of transportation kept you from meetings, work, or from getting things needed for daily living? No        Social Connections    In a typical week, how many times do you talk on the phone with family, friends, or neighbors? More than three times a week     How often do you get together with friends or relatives? More than three times a week     Are you , , , , never , or living with a partner?                         This SW met with patient at bedside to complete DPA. Questions answered /  contact numbers provided.  Use PREFERRED PHARMACY / BEDSIDE DELIVERY for any necessary medications at time of discharge. The patient is independent with all ADLs - does use a walker, is not on HD, BTs or home oxygen.The patient's daughter will be assisting with help upon discharge. The patient's daughter  will be providing transportation home. Hospital follow up will be scheduled with PCP. Will continue to follow for course of hospitalization.     Latricia Harrison LMSW  Case Management [facility]  Ext: [phone]

## 2023-04-06 NOTE — HPI
72-year-old female with history of metastatic ovarian cancer (lung, liver), presented with acute cholecystitis. She presented with fever to 103, leukocytosis to 13K. CT abd/pelvis with cholecystitis. IR placed perc rachid tube 4/5/2023. Patient was started empirically on pip-tazo. Patient doing well, ongoing abdominal pain, but improved compared to on admission. Main complaint is constipation.

## 2023-04-06 NOTE — SUBJECTIVE & OBJECTIVE
Interval History: Underwent cholecystostomy tube placement yesterday. Initially having severe RUQ pain, improving with escalated pain regimen. Remains afebrile. Hypotensive to 100s/50s, continuing IVF. Pending ID eval. Drain output charted at 50cc yesterday.    Received fleet enema overnight without BM. Starting PO bowel regimen. Tolerating full liquid diet w/o nausea/emesis. Advancing to renal diet.    Switching Zosyn to 1 week Augementin course.    Continuing IVF for KAUSHAL and poor PO intake.     Review of Systems   Constitutional:  Positive for appetite change. Negative for activity change, chills, diaphoresis and fever.   HENT:  Negative for sinus pressure, sinus pain and sore throat.    Eyes:  Negative for photophobia, redness and visual disturbance.   Respiratory:  Negative for cough, chest tightness and shortness of breath.    Cardiovascular:  Negative for chest pain, palpitations and leg swelling.   Gastrointestinal:  Positive for abdominal pain and constipation. Negative for abdominal distention, blood in stool, nausea and vomiting.   Endocrine: Negative for polydipsia, polyphagia and polyuria.   Genitourinary:  Negative for difficulty urinating, flank pain and hematuria.   Musculoskeletal:  Positive for arthralgias and back pain. Negative for joint swelling.   Skin:  Negative for pallor, rash and wound.   Neurological:  Negative for dizziness, facial asymmetry and headaches.   Psychiatric/Behavioral:  Positive for dysphoric mood. Negative for agitation and suicidal ideas (history of SI. denies at present). The patient is not nervous/anxious and is not hyperactive.    Objective:     Vital Signs (Most Recent):  Temp: 97.5 °F (36.4 °C) (04/06/23 1127)  Pulse: 88 (04/06/23 1217)  Resp: 18 (04/06/23 1127)  BP: (!) 98/53 (04/06/23 1127)  SpO2: (!) 92 % (04/06/23 1127)   Vital Signs (24h Range):  Temp:  [97 °F (36.1 °C)-98.8 °F (37.1 °C)] 97.5 °F (36.4 °C)  Pulse:  [] 88  Resp:  [16-20] 18  SpO2:  [90 %-93 %]  92 %  BP: ()/(50-58) 98/53     Weight: 71 kg (156 lb 9.6 oz)  Body mass index is 27.74 kg/m².    Intake/Output Summary (Last 24 hours) at 4/6/2023 1411  Last data filed at 4/6/2023 0550  Gross per 24 hour   Intake --   Output 50 ml   Net -50 ml      Physical Exam  Constitutional:       General: She is not in acute distress.     Appearance: Normal appearance. She is obese. She is not ill-appearing.   HENT:      Head: Normocephalic and atraumatic.      Nose: Nose normal. No congestion or rhinorrhea.      Mouth/Throat:      Mouth: Mucous membranes are moist.      Pharynx: No posterior oropharyngeal erythema.   Eyes:      General: No scleral icterus.        Right eye: No discharge.         Left eye: No discharge.      Extraocular Movements: Extraocular movements intact.   Neck:      Vascular: No carotid bruit.   Cardiovascular:      Rate and Rhythm: Normal rate and regular rhythm.      Pulses: Normal pulses.      Heart sounds: Normal heart sounds.   Pulmonary:      Effort: Pulmonary effort is normal. No respiratory distress.      Breath sounds: Normal breath sounds. No stridor. No rhonchi.   Abdominal:      Tenderness: There is abdominal tenderness. There is no right CVA tenderness, left CVA tenderness, guarding or rebound.      Hernia: No hernia (umbilical, reducible.) is present.      Comments: Cholecystostomy tube present at RUQ. Dressings clean, dry, and intact without surrounding erythema and warmth.   Musculoskeletal:         General: No swelling, tenderness or deformity.      Cervical back: Normal range of motion.   Lymphadenopathy:      Cervical: No cervical adenopathy.   Skin:     General: Skin is warm and dry.      Capillary Refill: Capillary refill takes less than 2 seconds.      Coloration: Skin is not jaundiced.      Findings: No bruising.   Neurological:      General: No focal deficit present.      Mental Status: She is alert and oriented to person, place, and time.   Psychiatric:         Attention  and Perception: Attention and perception normal.         Mood and Affect: Affect normal. Mood is anxious.         Speech: Speech normal.         Behavior: Behavior normal. Behavior is cooperative.         Thought Content: Thought content normal. Thought content does not include suicidal ideation. Thought content does not include suicidal plan.       Significant Labs: CBC:   Recent Labs   Lab 04/05/23 0454 04/06/23  0534   WBC 13.45* 14.97*   HGB 8.0* 7.8*   HCT 25.9* 25.5*    337     CMP:   Recent Labs   Lab 04/05/23 0454 04/06/23  0534 04/06/23  1054   * 133* 132*   K 4.3 3.9 3.8   CL 99 97 97   CO2 24 25 25    105 100   BUN 9 14 14   CREATININE 1.1 1.4 1.2   CALCIUM 9.3 9.3 8.8   PROT 6.7 7.0  --    ALBUMIN 2.2* 2.2*  --    BILITOT 1.1* 0.6  --    ALKPHOS 502* 429*  --    AST 95* 48*  --    ALT 75* 57*  --    ANIONGAP 11 11 10         Significant Imaging: I have reviewed all pertinent imaging results/findings within the past 24 hours.

## 2023-04-06 NOTE — PLAN OF CARE
Pt had drain placed today and arrived back to unit. Continue with IV antibiotics and pain control.   Problem: Adult Inpatient Plan of Care  Goal: Plan of Care Review  Outcome: Ongoing, Progressing  Goal: Patient-Specific Goal (Individualized)  Outcome: Ongoing, Progressing  Goal: Absence of Hospital-Acquired Illness or Injury  Outcome: Ongoing, Progressing  Goal: Optimal Comfort and Wellbeing  Outcome: Ongoing, Progressing  Goal: Readiness for Transition of Care  Outcome: Ongoing, Progressing     Problem: Infection  Goal: Absence of Infection Signs and Symptoms  Outcome: Ongoing, Progressing

## 2023-04-06 NOTE — ASSESSMENT & PLAN NOTE
72-year-old female with history of metastatic ovarian cancer (lung, liver), presented with acute cholecystitis, s/p IR perc rachid tube placement 4/5/2023.    Recommendations:  - Follow-up cultures  - Okay to continue pip-tazo IV for now  - If plans for discharge, okay to de-escalate to amox-clav 875 mg PO BID x7 days, last day 4/12/2023

## 2023-04-06 NOTE — MEDICAL/APP STUDENT
[facility] Medicine Student   Progress Note  [facility] HOSP MED 5    Admit Date: 4/4/2023  Hospital Day: 1  04/06/2023  9:18 AM    SUBJECTIVE:   Ms. Liat Gilmore is a 72 y.o. female with a relevant medical history of metastatic ovarian carcinoma with known METs to liver and lung and cisplatin-induced kidney disease who is being followed up for Cholecystitis.    Interval history: No acute overnight events. Patient yet to have a bowel movement since her last enema. Also continues to have frequent but small volume urination. Slight nausea but denies fever, chills, and vomiting. Patient continues to have RUQ pain but feels subjectively better than yesterday. Pain is exacerbated by movement and her cough but is currently satisfied with her pain regimen. Patient reports that she was able to get up to go to the bathroom but required assistance from her daughter to get off of the toilet and back into bed. Patient's daughter is bringing in patient's home asthma medications and an oral laxative (mirolax). Patient also requesting her cholecystostomy tube be drained every 12 hours.      Review of Systems   Constitutional:  Negative for chills, diaphoresis, fever and malaise/fatigue.   Eyes:  Negative for blurred vision and double vision.   Cardiovascular:  Negative for leg swelling.   Gastrointestinal:  Positive for abdominal pain, constipation and nausea. Negative for vomiting.   Genitourinary:  Positive for frequency.   Musculoskeletal:  Positive for joint pain. Negative for back pain, myalgias and neck pain.   Skin:  Negative for itching and rash.   Neurological:  Negative for dizziness, tingling, sensory change, weakness and headaches.   Psychiatric/Behavioral:  Negative for suicidal ideas.         Hx of suicidal ideation and anxiety but not currently anxious or having SI     Please refer to the H&P for past medical, family, and social history.    OBJECTIVE:     Vital Signs Recent:  Temp: 97.9 °F (36.6 °C) (04/06/23 0725)  Pulse:  78 (04/06/23 0725)  Resp: 17 (04/06/23 0852)  BP: (!) 104/56 (04/06/23 0725)  SpO2: (!) 93 % (04/06/23 0725)  Oxygen Documentation:    Flow (L/min): 2           Device (Oxygen Therapy): nasal cannula         Vital Signs Range (Last 24H):  Temp:  [97 °F (36.1 °C)-98.8 °F (37.1 °C)]   Pulse:  []   Resp:  [16-30]   BP: ()/(50-73)   SpO2:  [90 %-100 %]        I & O (Last 24H):  Intake/Output Summary (Last 24 hours) at 4/6/2023 0918  Last data filed at 4/6/2023 0550  Gross per 24 hour   Intake 250 ml   Output 90 ml   Net 160 ml        Physical Exam:  Physical Exam  Constitutional:       General: She is not in acute distress.     Appearance: Normal appearance. She is not ill-appearing or diaphoretic.   HENT:      Head: Normocephalic and atraumatic.   Eyes:      General: No scleral icterus.     Extraocular Movements: Extraocular movements intact.      Conjunctiva/sclera: Conjunctivae normal.      Pupils: Pupils are equal, round, and reactive to light.   Neck:      Vascular: No carotid bruit.   Cardiovascular:      Rate and Rhythm: Regular rhythm. Tachycardia present.      Pulses: Normal pulses.      Heart sounds: Normal heart sounds.   Pulmonary:      Effort: Pulmonary effort is normal.      Breath sounds: Wheezing present.      Comments: Hx of cough variant asthma  Abdominal:      General: Abdomen is flat. There is no distension.      Palpations: Abdomen is soft.      Tenderness: There is abdominal tenderness. There is no guarding or rebound.      Hernia: A hernia is present.   Musculoskeletal:      Cervical back: Normal range of motion and neck supple. No rigidity or tenderness.      Right lower leg: No edema.      Left lower leg: No edema.   Lymphadenopathy:      Cervical: No cervical adenopathy.   Skin:     General: Skin is warm and dry.      Coloration: Skin is not jaundiced or pale.      Findings: No erythema.   Neurological:      General: No focal deficit present.      Mental Status: She is alert and  oriented to person, place, and time.      Sensory: No sensory deficit.      Motor: No weakness.   Psychiatric:         Mood and Affect: Mood normal.         Behavior: Behavior normal.         Thought Content: Thought content normal.         Judgment: Judgment normal.       Labs:   Recent Labs   Lab 04/04/23  1601 04/05/23  0454 04/06/23  0534   * 134* 133*   K 4.6 4.3 3.9   CL 97 99 97   CO2 26 24 25   BUN 10 9 14   CREATININE 1.4 1.1 1.4   * 109 105   CALCIUM 9.4 9.3 9.3   MG  --  1.5* 2.1   PHOS  --  3.1 3.3     Recent Labs   Lab 04/04/23  0606 04/05/23  0454 04/05/23  0642 04/06/23  0534   ALKPHOS 610* 502*  --  429*   * 75*  --  57*   * 95*  --  48*   ALBUMIN 2.8* 2.2*  --  2.2*   PROT 7.8 6.7  --  7.0   BILITOT 0.7 1.1*  --  0.6   INR  --   --  1.4*  --      Recent Labs   Lab 04/04/23  0606 04/05/23  0454 04/06/23  0534   WBC 13.12* 13.45* 14.97*   HGB 8.6* 8.0* 7.8*   HCT 28.5* 25.9* 25.5*    284 337       Diagnostic Results:  No growth to date (48 hours) for blood cultures. No growth to date for bile cultures (24 hours). Gram stain of bile sample showed no WBC's, epithelial cells, or organisms.    Scheduled Meds:   diltiaZEM  240 mg Oral Daily    enoxaparin  40 mg Subcutaneous Daily    oxyCODONE  20 mg Oral Q12H    piperacillin-tazobactam (ZOSYN) IVPB  4.5 g Intravenous Q8H     Continuous Infusions:   lactated ringers       PRN Meds:acetaminophen, albuterol, ALPRAZolam, benzonatate, dextrose 10%, dextrose 10%, dextrose, dextrose, fluticasone propionate, glucagon (human recombinant), glucose, glucose, melatonin, morphine, naloxone, ondansetron, prochlorperazine, sodium chloride 0.9%, traZODone    ASSESSMENT/PLAN:   Ms. Liat Gilmore is a 72 y.o. female with a relevant medical history of *** who is being followed up for Cholecystitis.    Active Hospital Problems    Diagnosis  POA    *Cholecystitis [K81.9]  Unknown    Small bowel obstruction [K56.609]  Yes    Suspected  pulmonary embolism [R09.89]  Unknown    Right upper quadrant abdominal pain [R10.11]  Yes    Constipation [K59.00]  Unknown    Ventral hernia without obstruction or gangrene [K43.9]  Unknown    PTSD (post-traumatic stress disorder) [F43.10]  Yes    Lung mass [R91.8]  Yes    Generalized anxiety disorder [F41.1]  Yes    Malignant neoplasm of both ovaries [C56.3]  Yes     6/22/2020: Hysterectomy and oophorectomy.      Hypertension [I10]  Yes     2005: Diagnosed.        Hypercholesterolemia [E78.00]  Yes     2010: Began statin.        Fibromyalgia [M79.7]  Yes     2006: Diagnosed.        Cough variant asthma [J45.991]  Yes     1985: Diagnosed.  Patient should consider albuterol prior to any procedure or activity that may induce cough.  May also use cough suppressant prior.  Continue accolate for allergic component  Continue breo daily  Continue flonase for allergic componine        Supraventricular tachycardia [I47.1]  Yes     1966: First episode.  2003: ER: SVT.          Resolved Hospital Problems    Diagnosis Date Resolved POA    Severe sepsis [A41.9, R65.20] 04/04/2023 Unknown    Mild obesity [E66.9] 04/04/2023 Yes     10/31/2018: Weight 75 kg. BMI 28.  3/18/2021: Weight 89 kg. BMI 34.           Assessment/Plan    1.Acute Cholecystitis  Severe RUQ pain on admission. Currently  in same area but subjectively improving. Pending bile fluid cultures from cholecystostomy drain tube placed 04/05.  PLAN:  -Continue Zosyn until ID provides recommendation for home antibiotics  -Flush cholecystostomy tube with 10 cc saline every 12 hours  -F/U bile and blood cultures (no growth to date for either)  -F/U outpatient with gen surg in 3 weeks for evaluation for rachid tube removal    2. Ventral Hernia with Possible SBO  Ventral hernia from omental debulking surgery in 2020. Reducible. Possible SBO. CT AP revealed large stool burden. Passing flatus.  PLAN:  -Advance diet as tolerated   -Start oral laxatives PRN  -Continue  enemas PRN    3. KAUSHAL on Background of Cisplatin Kidney Disease  Baseline Cr is 1.1. Went up to 1.4 today from 1.1 yesterday.  PLAN:  -Continue IVF and oral hydration as tolerated  -Trend daily creatinine in BMPs    4. Cough Variant Asthma  PLAN:  -Home albuterol ordered  -what treatments or tests you are doing today in outline    5. Metastatic Ovarian Carcinoma  PLAN:  -Continue current pain management regimen  -Outpatient f/u for continued pain management and possible palliative care referral     6. Anxiety  PLAN:  -Home xanax adequately controlling anxiety     7. Supraventricular Tachycardia  PLAN:  -Continue diltiazem 240 mg daily   -Continue telemetry     8. Hypertension  BP is currently on the lower end at 98/53.  PLAN:  -Hold home antihypertensives until BP stabilizes    9. Hypercholesteremia  PLAN:  -Hold home statin until liver enzymes have normalized (possibly in outpatient f/u)    10. VTE Risk Mitigation  -Continue daily enoxaparin 40 mg injection (Received 04/06 at 0851)  -Sequential compression device currently in place (until discontinued)    Discharge planning:   HORACE: 04/06/23  Code Status: Full Code  Is the patient medically ready for discharge?: No  Reason for patient still in hospital (select all that apply): ID and IR consult recommendations

## 2023-04-06 NOTE — TELEPHONE ENCOUNTER
----- Message from Preeti Reese RN sent at 4/6/2023  2:51 PM CDT -----  Regarding: FW: Pt admitted  I forgot to add you to the original message.    Preeti  ----- Message -----  From: Preeti Reese RN  Sent: 4/6/2023   2:48 PM CDT  To: Ghassan Treviño MD, [facility] Staff  Subject: Pt admitted                                      Hi,  Attached pt is admitted  at [facility] (Med-Surg) for acute cholecystitis. She was not a surgical candidate for obvious reasons, but she underwent a cholecystostomy tube placement with IR. The internal med resident placed a referral for gyn/onc. I'm not sure if anyone reached out to you Dr. Treviño, but I would imagine if they wanted to consult gyn/onc they know how to properly place a inpt consult. So I think it's safe to assume they placed the referral for her to f/u with us upon discharge and I am cancelling the referral.    Dr. Treviño, when would you like to bring her back in?  Ms. Calhoun, please schedule her for a [facility] appt when Dr. Treviño responds. (We may want to wait until she is discharged to call her to schedule it)    Preeti

## 2023-04-06 NOTE — CONSULTS
[facility] Surg  Infectious Disease  Consult Note    Patient Name: Liat Gilmore  MRN: [id number]  Admission Date: 4/4/2023  Hospital Length of Stay: 1 days  Attending Physician: Pearl Pang DO  Primary Care Provider: Shabana Dunbar MD     Isolation Status: No active isolations      Inpatient consult to Infectious Diseases  Consult performed by: Edel Wallis MD  Consult ordered by: Pearl Pang DO        Consult received, full consult to follow

## 2023-04-06 NOTE — CONSULTS
[facility]  Infectious Disease  Consult Note    Patient Name: Liat Gilmore  MRN: [id number]  Admission Date: 4/4/2023  Hospital Length of Stay: 1 days  Attending Physician: Pearl Pang DO  Primary Care Provider: Shabana Dunbar MD     Isolation Status: No active isolations    Patient information was obtained from patient and past medical records.      Consults  Assessment/Plan:     GI  * Cholecystitis  72-year-old female with history of metastatic ovarian cancer (lung, liver), presented with acute cholecystitis, s/p IR perc rachid tube placement 4/5/2023.    Recommendations:  - Follow-up cultures  - Okay to continue pip-tazo IV for now  - If plans for discharge, okay to de-escalate to amox-clav 875 mg PO BID x7 days, last day 4/12/2023        Thank you for your consult. I will follow-up with patient. Please contact us if you have any additional questions.    Edel Wallis MD  Infectious Disease  [facility] Surg    Subjective:     Principal Problem: Cholecystitis    HPI: 72-year-old female with history of metastatic ovarian cancer (lung, liver), presented with acute cholecystitis. She presented with fever to 103, leukocytosis to 13K. CT abd/pelvis with cholecystitis. IR placed perc rachid tube 4/5/2023. Patient was started empirically on pip-tazo. Patient doing well, ongoing abdominal pain, but improved compared to on admission. Main complaint is constipation.      Past Medical History:   Diagnosis Date    Anxiety 08/07/2020    Asthma 10/31/2018    1985: Diagnosed. cough variant    Asthma 9/26/2013 9:37:32 AM    [facility] Historical - Respiratory: Asthma-No Additional Notes    Bronchiectasis     Complications affecting other specified body systems, hypertension 9/26/2013 9:40:45 AM    [facility] Historical - Cardiovascular: Hypertension-No Additional Notes    Dizziness and giddiness 8/30/2017 3:09:28 PM    [facility] Historical - Unknown: Vertigo-No Additional Notes    Elevated cancer  antigen 125 (CA-125) 2021    Fibromyalgia 10/31/2018    2006: Diagnosed.    Herpes simplex vulvovaginitis 2021    Hx of psychiatric care     Hypercholesterolemia 10/31/2018    2010: Began statin.    Hyperlipidemia     Hypertension     Infective arthritis, multiple sites 2017 11:02:17 AM    [facility] Historical - Musculoskeletal: Arthritis-No Additional Notes    Lung mass 2020    Laughlin syndrome     Malignant neoplasm of both ovaries 2020    Myalgia and myositis 2017 11:02:26 AM    [facility] Historical - Musculoskeletal: Fibromyalgia-No Additional Notes    Neoplasm of other genitourinary organ 2020 9:58:16 AM    [facility] Historical - Unknown: Ovarian neoplasm-finished chemo 2020- Dr. Foster Stage 3    Other and unspecified ovarian cyst 2020 4:13:20 PM    [facility] Historical - Quick Add: Ovarian cyst, left-No Additional Notes    Other genital herpes 2017 1:15:51 PM    [facility] Historical - Infectious: Herpes, Genital-No Additional Notes    Overweight 10/31/2018    10/31/2018: Weight 75 kg. BMI 28.    Psychiatric problem     Pulmonary nodules 12/10/2020    Pure hypercholesterolemia 2017 11:01:53 AM    [facility] Historical - Endocrine/Metabolic/Immune: Hypercholesterolemia-No Additional Notes    Reactive depression 2020    Renal failure 2019 11:55:24 AM    [facility] Historical - Urology: Renal Failure-Diagnosed @ ER when she was there for stomach pains    Supraventricular tachycardia     Tachycardia     Tachycardia 2013 9:37:10 AM    [facility] Historical - Symptoms/Signs: Tachycardia-No Additional Notes    Therapy        Past Surgical History:   Procedure Laterality Date    APPENDECTOMY       SECTION      x 1    HYSTERECTOMY      LAPAROSCOPIC SURGICAL REMOVAL OF OMENTUM      PELVIC AND PARA-AORTIC LYMPH NODE DISSECTION      WI REMOVAL OF OVARY/TUBE(S)      TONSILLECTOMY       TRANSPHENOIDAL PITUITARY RESECTION  2000    TUMOR REMOVAL         Review of patient's allergies indicates:   Allergen Reactions    Erythromycin Other (See Comments)     Stomach Cramps       Medications:  Medications Prior to Admission   Medication Sig    albuterol 90 mcg/actuation inhaler INHALE 2 PUFFS INTO THE LUNGS EVERY 6 (SIX) HOURS AS NEEDED FOR WHEEZING.    ALPRAZolam (XANAX) 0.25 MG tablet TAKE ONE TABLET BY MOUTH TWICE DAILY AS NEEDED FOR ANXIETY    b complex vitamins tablet Take 1 tablet by mouth once daily.    BREO ELLIPTA 200-25 mcg/dose DsDv diskus inhaler Inhale 1 puff into the lungs once daily.    cycloSPORINE (RESTASIS) 0.05 % ophthalmic emulsion Place 1 drop into both eyes 2 (two) times daily.     diclofenac sodium (VOLTAREN) 1 % Gel Apply 2 g topically daily as needed (Pain).    docusate sodium (COLACE ORAL) Take 1 capsule by mouth daily as needed.     ergocalciferol (ERGOCALCIFEROL) 50,000 unit Cap Take 1 capsule by mouth every 7 days.     MISTLETOE SUBQ Inject 1 Dose into the skin twice a week.    fluticasone (FLONASE) 50 mcg/actuation nasal spray 2 sprays (100 mcg total) by Each Nare route once daily. (Patient taking differently: 1 spray by Each Nostril route daily as needed for Rhinitis.)    nystatin (MYCOSTATIN) cream Apply 1 g topically twice a week. As needed for rash    ondansetron (ZOFRAN) 8 MG tablet Take 1 tablet (8 mg total) by mouth every 8 (eight) hours as needed for Nausea.    oxyCODONE (OXYCONTIN) 20 mg 12 hr tablet Take 1 tablet (20 mg total) by mouth every 12 (twelve) hours. (Patient taking differently: Take 20 mg by mouth every 12 (twelve) hours as needed for Pain.)    oxyCODONE (ROXICODONE) 10 mg Tab immediate release tablet Take 1 tablet (10 mg total) by mouth every 6 (six) hours as needed for Pain.    polyethylene glycol (MIRALAX) 17 gram PwPk Take 17 g by mouth daily as needed (Constipation).    promethazine (PHENERGAN) 6.25 mg/5 mL syrup Take by  "mouth every 6 (six) hours as needed for Nausea.    senna (SENOKOT) 8.6 mg tablet Take 4 tablets by mouth once daily.    TIADYLT  mg Cs24 TAKE ONE CAPSULE BY MOUTH EVERY DAY    traZODone (DESYREL) 50 MG tablet Take 1 tablet (50 mg total) by mouth every evening. (Patient taking differently: Take 50 mg by mouth nightly as needed for Insomnia.)    EASY TOUCH INSULIN SYRINGE 1 mL 30 gauge x 1/2" Syrg USE AS DIRECTED FOR SQ INJECTIONS THREE TIMES WEEKLY    LIDOcaine-prilocaine (EMLA) cream Apply topically as needed (port access).    naloxone (NARCAN) 4 mg/actuation Spry 4mg by nasal route as needed for opioid overdose; may repeat every 2-3 minutes in alternating nostrils until medical help arrives. Call 911     Antibiotics (From admission, onward)      Start     Stop Route Frequency Ordered    04/06/23 2200  amoxicillin-clavulanate 875-125mg per tablet 1 tablet         04/12 2059 Oral Every 12 hours 04/06/23 1506    04/04/23 1830  piperacillin-tazobactam (ZOSYN) 4.5 g in dextrose 5 % in water (D5W) 5 % 100 mL IVPB (MB+)         04/06 2159 IV Every 8 hours (non-standard times) 04/04/23 1528          Antifungals (From admission, onward)      None          Antivirals (From admission, onward)      None             Immunization History   Administered Date(s) Administered    COVID-19, MRNA, LN-S, PF (MODERNA FULL 0.5 ML DOSE) 02/14/2021, 03/16/2021, 08/17/2021, 04/25/2022    COVID-19, mRNA, LNP-S, bivalent booster, PF (PFIZER OMICRON) 09/29/2022       Family History       Problem Relation (Age of Onset)    Angina Mother    Anxiety disorder Sister    Breast cancer Paternal Aunt    Colon cancer Sister (70), Brother (80)    Depression Sister    Drug abuse Brother    Heart disease Brother    Stroke Father          Social History     Socioeconomic History    Marital status:     Number of children: 1   Tobacco Use    Smoking status: Never    Smokeless tobacco: Never   Substance and Sexual Activity    " Alcohol use: No     Comment: Rarely    Drug use: No    Sexual activity: Not Currently     Social Determinants of Health     Financial Resource Strain: Unknown    Difficulty of Paying Living Expenses: Patient refused   Transportation Needs: No Transportation Needs    Lack of Transportation (Medical): No    Lack of Transportation (Non-Medical): No   Physical Activity: Unknown    Days of Exercise per Week: 3 days   Social Connections: Unknown    Frequency of Communication with Friends and Family: More than three times a week    Frequency of Social Gatherings with Friends and Family: More than three times a week    Marital Status:    Housing Stability: Unknown    Unable to Pay for Housing in the Last Year: No    Unstable Housing in the Last Year: No     Review of Systems   Constitutional:  Negative for chills, diaphoresis and fever.   HENT:  Negative for rhinorrhea and sore throat.    Respiratory:  Negative for cough and shortness of breath.    Cardiovascular:  Negative for chest pain and leg swelling.   Gastrointestinal:  Positive for abdominal pain and constipation. Negative for diarrhea, nausea and vomiting.   Genitourinary:  Negative for dysuria and hematuria.   Musculoskeletal:  Negative for arthralgias and myalgias.   Skin:  Negative for rash.   Neurological:  Negative for headaches.   Objective:     Vital Signs (Most Recent):  Temp: 97.2 °F (36.2 °C) (04/06/23 1702)  Pulse: 85 (04/06/23 1702)  Resp: 18 (04/06/23 1702)  BP: (!) 95/53 (04/06/23 1702)  SpO2: (!) 93 % (04/06/23 1702)   Vital Signs (24h Range):  Temp:  [97 °F (36.1 °C)-97.9 °F (36.6 °C)] 97.2 °F (36.2 °C)  Pulse:  [78-94] 85  Resp:  [17-20] 18  SpO2:  [90 %-93 %] 93 %  BP: ()/(50-58) 95/53     Weight: 71 kg (156 lb 9.6 oz)  Body mass index is 27.74 kg/m².    Estimated Creatinine Clearance: 40.6 mL/min (based on SCr of 1.2 mg/dL).    Physical Exam  Constitutional:       General: She is not in acute distress.     Appearance: She  is well-developed. She is not diaphoretic.   HENT:      Head: Normocephalic and atraumatic.      Nose: Nose normal.   Eyes:      Conjunctiva/sclera: Conjunctivae normal.   Pulmonary:      Effort: Pulmonary effort is normal. No respiratory distress.   Abdominal:      General: Abdomen is flat. There is no distension.      Comments: RUQ drain with dark serosanginous fluid   Musculoskeletal:      Cervical back: Normal range of motion.      Right lower leg: No edema.      Left lower leg: No edema.   Skin:     General: Skin is warm and dry.      Findings: No erythema or rash.   Neurological:      Mental Status: She is alert.   Psychiatric:         Behavior: Behavior normal.       Significant Labs: All pertinent labs within the past 24 hours have been reviewed.    Significant Imaging: I have reviewed all pertinent imaging results/findings within the past 24 hours.

## 2023-04-07 LAB
ALBUMIN SERPL BCP-MCNC: 2.2 G/DL (ref 3.5–5.2)
ALP SERPL-CCNC: 488 U/L (ref 55–135)
ALT SERPL W/O P-5'-P-CCNC: 49 U/L (ref 10–44)
ANION GAP SERPL CALC-SCNC: 14 MMOL/L (ref 8–16)
AST SERPL-CCNC: 57 U/L (ref 10–40)
BILIRUB SERPL-MCNC: 0.5 MG/DL (ref 0.1–1)
BUN SERPL-MCNC: 13 MG/DL (ref 8–23)
CALCIUM SERPL-MCNC: 9.7 MG/DL (ref 8.7–10.5)
CHLORIDE SERPL-SCNC: 98 MMOL/L (ref 95–110)
CO2 SERPL-SCNC: 22 MMOL/L (ref 23–29)
CREAT SERPL-MCNC: 1.1 MG/DL (ref 0.5–1.4)
ERYTHROCYTE [DISTWIDTH] IN BLOOD BY AUTOMATED COUNT: 17.1 % (ref 11.5–14.5)
EST. GFR  (NO RACE VARIABLE): 53.7 ML/MIN/1.73 M^2
GLUCOSE SERPL-MCNC: 94 MG/DL (ref 70–110)
HCT VFR BLD AUTO: 28.3 % (ref 37–48.5)
HGB BLD-MCNC: 8.5 G/DL (ref 12–16)
MAGNESIUM SERPL-MCNC: 2.1 MG/DL (ref 1.6–2.6)
MCH RBC QN AUTO: 26.8 PG (ref 27–31)
MCHC RBC AUTO-ENTMCNC: 30 G/DL (ref 32–36)
MCV RBC AUTO: 89 FL (ref 82–98)
PHOSPHATE SERPL-MCNC: 3 MG/DL (ref 2.7–4.5)
PLATELET # BLD AUTO: 398 K/UL (ref 150–450)
PMV BLD AUTO: 9.5 FL (ref 9.2–12.9)
POTASSIUM SERPL-SCNC: 4.2 MMOL/L (ref 3.5–5.1)
PROT SERPL-MCNC: 7.2 G/DL (ref 6–8.4)
RBC # BLD AUTO: 3.17 M/UL (ref 4–5.4)
SODIUM SERPL-SCNC: 134 MMOL/L (ref 136–145)
WBC # BLD AUTO: 8.88 K/UL (ref 3.9–12.7)

## 2023-04-07 PROCEDURE — 63600175 PHARM REV CODE 636 W HCPCS

## 2023-04-07 PROCEDURE — 99233 PR SUBSEQUENT HOSPITAL CARE,LEVL III: ICD-10-PCS | Mod: 95,,, | Performed by: STUDENT IN AN ORGANIZED HEALTH CARE EDUCATION/TRAINING PROGRAM

## 2023-04-07 PROCEDURE — 11000001 HC ACUTE MED/SURG PRIVATE ROOM

## 2023-04-07 PROCEDURE — 63600175 PHARM REV CODE 636 W HCPCS: Performed by: STUDENT IN AN ORGANIZED HEALTH CARE EDUCATION/TRAINING PROGRAM

## 2023-04-07 PROCEDURE — 83735 ASSAY OF MAGNESIUM: CPT | Performed by: STUDENT IN AN ORGANIZED HEALTH CARE EDUCATION/TRAINING PROGRAM

## 2023-04-07 PROCEDURE — 85027 COMPLETE CBC AUTOMATED: CPT | Performed by: STUDENT IN AN ORGANIZED HEALTH CARE EDUCATION/TRAINING PROGRAM

## 2023-04-07 PROCEDURE — 25000003 PHARM REV CODE 250: Performed by: STUDENT IN AN ORGANIZED HEALTH CARE EDUCATION/TRAINING PROGRAM

## 2023-04-07 PROCEDURE — 84100 ASSAY OF PHOSPHORUS: CPT | Performed by: STUDENT IN AN ORGANIZED HEALTH CARE EDUCATION/TRAINING PROGRAM

## 2023-04-07 PROCEDURE — 25000003 PHARM REV CODE 250: Performed by: INTERNAL MEDICINE

## 2023-04-07 PROCEDURE — 80053 COMPREHEN METABOLIC PANEL: CPT | Performed by: STUDENT IN AN ORGANIZED HEALTH CARE EDUCATION/TRAINING PROGRAM

## 2023-04-07 PROCEDURE — 36415 COLL VENOUS BLD VENIPUNCTURE: CPT | Performed by: STUDENT IN AN ORGANIZED HEALTH CARE EDUCATION/TRAINING PROGRAM

## 2023-04-07 PROCEDURE — 99233 SBSQ HOSP IP/OBS HIGH 50: CPT | Mod: 95,,, | Performed by: STUDENT IN AN ORGANIZED HEALTH CARE EDUCATION/TRAINING PROGRAM

## 2023-04-07 RX ORDER — POLYETHYLENE GLYCOL 3350 17 G/17G
17 POWDER, FOR SOLUTION ORAL
Status: DISCONTINUED | OUTPATIENT
Start: 2023-04-07 | End: 2023-04-07

## 2023-04-07 RX ORDER — SYRING-NEEDL,DISP,INSUL,0.3 ML 29 G X1/2"
296 SYRINGE, EMPTY DISPOSABLE MISCELLANEOUS
Status: DISCONTINUED | OUTPATIENT
Start: 2023-04-07 | End: 2023-04-07

## 2023-04-07 RX ORDER — SODIUM CHLORIDE, SODIUM LACTATE, POTASSIUM CHLORIDE, CALCIUM CHLORIDE 600; 310; 30; 20 MG/100ML; MG/100ML; MG/100ML; MG/100ML
INJECTION, SOLUTION INTRAVENOUS CONTINUOUS
Status: ACTIVE | OUTPATIENT
Start: 2023-04-07 | End: 2023-04-08

## 2023-04-07 RX ORDER — POLYETHYLENE GLYCOL 3350 17 G/17G
17 POWDER, FOR SOLUTION ORAL EVERY 4 HOURS PRN
Status: DISCONTINUED | OUTPATIENT
Start: 2023-04-07 | End: 2023-04-08 | Stop reason: HOSPADM

## 2023-04-07 RX ORDER — AMOXICILLIN AND CLAVULANATE POTASSIUM 875; 125 MG/1; MG/1
1 TABLET, FILM COATED ORAL EVERY 12 HOURS
Qty: 12 TABLET | Refills: 0 | Status: ON HOLD | OUTPATIENT
Start: 2023-04-07 | End: 2023-04-12 | Stop reason: HOSPADM

## 2023-04-07 RX ORDER — LACTULOSE 10 G/15ML
200 SOLUTION ORAL; RECTAL EVERY 6 HOURS PRN
Status: DISCONTINUED | OUTPATIENT
Start: 2023-04-07 | End: 2023-04-08 | Stop reason: HOSPADM

## 2023-04-07 RX ORDER — LACTULOSE 10 G/15ML
200 SOLUTION ORAL; RECTAL 3 TIMES DAILY
Status: DISCONTINUED | OUTPATIENT
Start: 2023-04-07 | End: 2023-04-07

## 2023-04-07 RX ORDER — SENNOSIDES 8.6 MG/1
8.6 TABLET ORAL 2 TIMES DAILY
Status: DISCONTINUED | OUTPATIENT
Start: 2023-04-07 | End: 2023-04-08 | Stop reason: HOSPADM

## 2023-04-07 RX ORDER — PSEUDOEPHEDRINE/ACETAMINOPHEN 30MG-500MG
100 TABLET ORAL
Status: DISPENSED | OUTPATIENT
Start: 2023-04-07 | End: 2023-04-07

## 2023-04-07 RX ORDER — LACTULOSE 10 G/15ML
30 SOLUTION ORAL
Status: COMPLETED | OUTPATIENT
Start: 2023-04-07 | End: 2023-04-08

## 2023-04-07 RX ORDER — BISACODYL 10 MG
10 SUPPOSITORY, RECTAL RECTAL ONCE
Status: DISCONTINUED | OUTPATIENT
Start: 2023-04-07 | End: 2023-04-08 | Stop reason: HOSPADM

## 2023-04-07 RX ADMIN — MORPHINE SULFATE 2 MG: 2 INJECTION, SOLUTION INTRAMUSCULAR; INTRAVENOUS at 08:04

## 2023-04-07 RX ADMIN — POLYETHYLENE GLYCOL 3350 17 G: 17 POWDER, FOR SOLUTION ORAL at 09:04

## 2023-04-07 RX ADMIN — LACTULOSE 30 G: 20 SOLUTION ORAL at 05:04

## 2023-04-07 RX ADMIN — OXYCODONE HYDROCHLORIDE 20 MG: 20 TABLET, FILM COATED, EXTENDED RELEASE ORAL at 08:04

## 2023-04-07 RX ADMIN — SENNOSIDES 8.6 MG: 8.6 TABLET, FILM COATED ORAL at 08:04

## 2023-04-07 RX ADMIN — LACTULOSE 30 G: 20 SOLUTION ORAL at 08:04

## 2023-04-07 RX ADMIN — SODIUM CHLORIDE, POTASSIUM CHLORIDE, SODIUM LACTATE AND CALCIUM CHLORIDE: 600; 310; 30; 20 INJECTION, SOLUTION INTRAVENOUS at 10:04

## 2023-04-07 RX ADMIN — MORPHINE SULFATE 2 MG: 2 INJECTION, SOLUTION INTRAMUSCULAR; INTRAVENOUS at 10:04

## 2023-04-07 RX ADMIN — AMOXICILLIN AND CLAVULANATE POTASSIUM 1 TABLET: 875; 125 TABLET, FILM COATED ORAL at 08:04

## 2023-04-07 RX ADMIN — ENOXAPARIN SODIUM 40 MG: 40 INJECTION SUBCUTANEOUS at 05:04

## 2023-04-07 RX ADMIN — LACTULOSE 200 G: 10 SOLUTION ORAL at 03:04

## 2023-04-07 RX ADMIN — LACTULOSE 30 G: 20 SOLUTION ORAL at 10:04

## 2023-04-07 RX ADMIN — POLYETHYLENE GLYCOL 3350 17 G: 17 POWDER, FOR SOLUTION ORAL at 08:04

## 2023-04-07 NOTE — HOSPITAL COURSE
Started on Zosyn for acute cholecystitis. Gen Surg evaluated, not a candidate for cholecystectomy. Hernia not requiring surgical intervention, recommended serial enemas and advancing diet as tolerated. IR consulted, underwent cholecystostomy placement. Tolerated procedure well. Converted to PO augmentin per id recs. Aggressive bowel regimen initiation. Patient tolerating diet well. Given several enemas. Patient passing flatus, had large BM. At this time the patient was deemed medically ready for discharge home with home health services for the maintenance of the cholecystostomy tube and with the medications and follow ups as listed below.

## 2023-04-07 NOTE — NURSING
Patient wishes to hold Bisacodyl supp. Due to not being effective at home and during visit.     Understanding verbalized nurse.

## 2023-04-07 NOTE — PLAN OF CARE
Pt continue to receive IV antibiotics and attempt bowel regimen as ordered. Otherwise, pt continues to receive pain medication but has asked for it only once during day shift today. Otherwise no major events.   Problem: Adult Inpatient Plan of Care  Goal: Plan of Care Review  Outcome: Ongoing, Progressing  Goal: Patient-Specific Goal (Individualized)  Outcome: Ongoing, Progressing  Goal: Absence of Hospital-Acquired Illness or Injury  Outcome: Ongoing, Progressing  Goal: Optimal Comfort and Wellbeing  Outcome: Ongoing, Progressing  Goal: Readiness for Transition of Care  Outcome: Ongoing, Progressing     Problem: Infection  Goal: Absence of Infection Signs and Symptoms  Outcome: Ongoing, Progressing

## 2023-04-07 NOTE — ASSESSMENT & PLAN NOTE
Presented with severe RUQ pain, concern for acute cholecystitis on CT AP and US. WBC 13 with left shift. 7/10 stabbing pain, worsened with palpation, does not refer. Not post prandial. Alk Phos 610. Initially febrile to 100.8   Gen Surg consulted, deemed poor surgical candidate given age, malignancy. Recommend edIR consult for cholecystostomy  - initially refused but underwent cholecystostomy on 4/5 with 50cc of thick sludge drained, cultures sent.  - Discussed with ID, will swap Zosyn for 7 day Augmentin 750-125mg BID course.   - IR: Drain to remain in place for ~6 months. 1 month IR follow-up.   - pain controlled on current regimen.  - Blood cx NGTD 24 hrs  - Bile cultures pending  - gentle IVF  - Advanced to renal diet.

## 2023-04-07 NOTE — NURSING
"Pt summoned me to room.Pt stated "I was looking over into the bsc.I thought I saw blood thought I was bleeding.I was looking down into BSC and lost my balance  grabbed onto arm of the recliner lean back into recliner Notified [facility] team 5 Md returned call and listen  why the  pt explained incident to him .Pt did experience no injury Pt stated "I realized it might be my hemorrhoids that causing the blood in the bsc."Md did asked did pt needed anything for her hemorrhoids.Pt replied "No!"Tele sitter was placed in pt's  room for her safety.  "

## 2023-04-07 NOTE — PLAN OF CARE
[facility] - [facility]      HOME HEALTH ORDERS  FACE TO FACE ENCOUNTER    Patient Name: Liat Gilmore  YOB: 1950    PCP: Shabana Dunbar MD   PCP Address: [address] [place] [address] [address] / [place] [address]  PCP Phone Number: [phone]  PCP Fax: [phone]    Encounter Date: 4/4/23    Admit to Home Health    Diagnoses:  Active Hospital Problems    Diagnosis  POA    *Cholecystitis [K81.9]  Yes    Small bowel obstruction [K56.609]  Yes    Suspected pulmonary embolism [R09.89]  Unknown    Right upper quadrant abdominal pain [R10.11]  Yes    Constipation [K59.00]  Unknown    Ventral hernia without obstruction or gangrene [K43.9]  Unknown    PTSD (post-traumatic stress disorder) [F43.10]  Yes    Lung mass [R91.8]  Yes    Generalized anxiety disorder [F41.1]  Yes    Malignant neoplasm of both ovaries [C56.3]  Yes     6/22/2020: Hysterectomy and oophorectomy.      Hypertension [I10]  Yes     2005: Diagnosed.        Hypercholesterolemia [E78.00]  Yes     2010: Began statin.        Fibromyalgia [M79.7]  Yes     2006: Diagnosed.        Cough variant asthma [J45.991]  Yes     1985: Diagnosed.  Patient should consider albuterol prior to any procedure or activity that may induce cough.  May also use cough suppressant prior.  Continue accolate for allergic component  Continue breo daily  Continue flonase for allergic componine        Supraventricular tachycardia [I47.1]  Yes     1966: First episode.  2003: ER: SVT.          Resolved Hospital Problems    Diagnosis Date Resolved POA    Severe sepsis [A41.9, R65.20] 04/04/2023 Unknown    Mild obesity [E66.9] 04/04/2023 Yes     10/31/2018: Weight 75 kg. BMI 28.  3/18/2021: Weight 89 kg. BMI 34.           Follow Up Appointments:  Future Appointments   Date Time Provider Department Center   4/20/2023 10:00 AM Hailey Thibodeaux DNP [facility]       Allergies:  Review of patient's allergies indicates:   Allergen Reactions    Erythromycin Other (See  Comments)     Stomach Cramps       Medications: Review discharge medications with patient and family and provide education.    Current Facility-Administered Medications   Medication Dose Route Frequency Provider Last Rate Last Admin    acetaminophen tablet 650 mg  650 mg Oral Q6H PRN Madai Jacques MD   650 mg at 04/04/23 2041    albuterol inhaler 2 puff  2 puff Inhalation Q4H PRN Fredy Montanez MD        ALPRAZolam tablet 0.25 mg  0.25 mg Oral BID PRN Madai Jacques MD        amoxicillin-clavulanate 875-125mg per tablet 1 tablet  1 tablet Oral Q12H Edel Wallis MD   1 tablet at 04/07/23 0839    benzonatate capsule 100 mg  100 mg Oral TID PRN Madai Jacques MD        bisacodyL suppository 10 mg  10 mg Rectal Daily PRN Madai Jacques MD   10 mg at 04/06/23 1455    dextrose 10% bolus 125 mL 125 mL  12.5 g Intravenous PRN Pearl Winfield, DO        dextrose 10% bolus 250 mL 250 mL  25 g Intravenous PRN Pearl Winfield, DO        dextrose 40 % gel 15,000 mg  15 g Oral PRN Fredy Montanez MD        dextrose 40 % gel 30,000 mg  30 g Oral PRN Fredy Montanez MD        diltiaZEM 24 hr capsule 240 mg  240 mg Oral Daily Madai Jacques MD   240 mg at 04/06/23 0851    enoxaparin injection 40 mg  40 mg Subcutaneous Daily Madai Jacques MD   40 mg at 04/06/23 0851    fluticasone propionate 50 mcg/actuation nasal spray 50 mcg  1 spray Each Nostril Daily PRN Madai Jacques MD        glucagon (human recombinant) injection 1 mg  1 mg Intramuscular PRN Fredy Montanez MD        glucose chewable tablet 16 g  16 g Oral PRN Fredy Montanez MD        glucose chewable tablet 24 g  24 g Oral PRN Fredy Montanez MD        glycerin 99.5% topical solution 100 mL  100 mL Rectal ED 1 Time Fredy Montanez MD        lactulose 10 gram/15 mL enema solution (inpatient use) 200 g  200 g Rectal TID Fredy Montanez MD        melatonin tablet 6 mg  6 mg Oral Nightly PRN Narciso Aguilar MD        morphine injection 2 mg  2 mg Intravenous Q4H PRN Fredy Montanez MD    2 mg at 04/07/23 0840    naloxone 0.4 mg/mL injection 0.02 mg  0.02 mg Intravenous PRN Fredy Montanez MD        ondansetron injection 8 mg  8 mg Intravenous Q6H PRN Madai Jacques MD        oxyCODONE 12 hr tablet 20 mg  20 mg Oral Q12H Fredy Montanez MD   20 mg at 04/07/23 0840    polyethylene glycol packet 17 g  17 g Oral BID Mohammad Waltham, DO   17 g at 04/07/23 0839    polyethylene glycol packet 17 g  17 g Oral Q2H Fredy Montanez MD   17 g at 04/07/23 0951    promethazine 6.25 mg/5 mL syrup 12.5 mg  12.5 mg Oral Q6H PRN Madai Jacques MD        senna tablet 8.6 mg  8.6 mg Oral Daily Mohammad Waltham, DO   8.6 mg at 04/07/23 0841    sodium chloride 0.9% flush 10 mL  10 mL Intravenous PRN Narciso Aguilar MD        traZODone tablet 50 mg  50 mg Oral Nightly PRN Madai Jacques MD         Facility-Administered Medications Ordered in Other Encounters   Medication Dose Route Frequency Provider Last Rate Last Admin    heparin, porcine (PF) 100 unit/mL injection flush 500 Units  500 Units Intravenous PRN Francisco Foster MD        sodium chloride 0.9% flush 10 mL  10 mL Intravenous PRN Francisco Foster MD            Medications:  Reconciled Home Medications:      Medication List        START taking these medications      amoxicillin-clavulanate 875-125mg 875-125 mg per tablet  Commonly known as: AUGMENTIN  Take 1 tablet by mouth every 12 (twelve) hours.            CHANGE how you take these medications      fluticasone propionate 50 mcg/actuation nasal spray  Commonly known as: FLONASE  2 sprays (100 mcg total) by Each Nare route once daily.  What changed:   how much to take  when to take this  reasons to take this     * oxyCODONE 10 mg Tab immediate release tablet  Commonly known as: ROXICODONE  Take 1 tablet (10 mg total) by mouth every 6 (six) hours as needed for Pain.  What changed: Another medication with the same name was changed. Make sure you understand how and when to take each.     * oxyCODONE 20 mg 12 hr  "tablet  Commonly known as: OxyCONTIN  Take 1 tablet (20 mg total) by mouth every 12 (twelve) hours.  What changed:   when to take this  reasons to take this     traZODone 50 MG tablet  Commonly known as: DESYREL  Take 1 tablet (50 mg total) by mouth every evening.  What changed:   when to take this  reasons to take this           * This list has 2 medication(s) that are the same as other medications prescribed for you. Read the directions carefully, and ask your doctor or other care provider to review them with you.                CONTINUE taking these medications      albuterol 90 mcg/actuation inhaler  Commonly known as: PROVENTIL/VENTOLIN HFA  INHALE 2 PUFFS INTO THE LUNGS EVERY 6 (SIX) HOURS AS NEEDED FOR WHEEZING.     ALPRAZolam 0.25 MG tablet  Commonly known as: XANAX  TAKE ONE TABLET BY MOUTH TWICE DAILY AS NEEDED FOR ANXIETY     b complex vitamins tablet  Take 1 tablet by mouth once daily.     BREO ELLIPTA 200-25 mcg/dose Dsdv diskus inhaler  Generic drug: fluticasone furoate-vilanteroL  Inhale 1 puff into the lungs once daily.     COLACE ORAL  Take 1 capsule by mouth daily as needed.     cycloSPORINE 0.05 % ophthalmic emulsion  Commonly known as: RESTASIS  Place 1 drop into both eyes 2 (two) times daily.     diclofenac sodium 1 % Gel  Commonly known as: VOLTAREN  Apply 2 g topically daily as needed (Pain).     EASY TOUCH INSULIN SYRINGE 1 mL 30 gauge x 1/2" Syrg  Generic drug: insulin syringe-needle U-100  USE AS DIRECTED FOR SQ INJECTIONS THREE TIMES WEEKLY     ergocalciferol 50,000 unit Cap  Commonly known as: ERGOCALCIFEROL  Take 1 capsule by mouth every 7 days.      MISTLETOE SUBQ  Inject 1 Dose into the skin twice a week.     LIDOcaine-prilocaine cream  Commonly known as: EMLA  Apply topically as needed (port access).     MIRALAX 17 gram Pwpk  Generic drug: polyethylene glycol  Take 17 g by mouth daily as needed (Constipation).     naloxone 4 mg/actuation Spry  Commonly known as: NARCAN  4mg by " nasal route as needed for opioid overdose; may repeat every 2-3 minutes in alternating nostrils until medical help arrives. Call 911     nystatin cream  Commonly known as: MYCOSTATIN  Apply 1 g topically twice a week. As needed for rash     ondansetron 8 MG tablet  Commonly known as: ZOFRAN  Take 1 tablet (8 mg total) by mouth every 8 (eight) hours as needed for Nausea.     promethazine 6.25 mg/5 mL syrup  Commonly known as: PHENERGAN  Take by mouth every 6 (six) hours as needed for Nausea.     senna 8.6 mg tablet  Commonly known as: SENOKOT  Take 4 tablets by mouth once daily.     TIADYLT  MG Cs24  Generic drug: diltiaZEM  TAKE ONE CAPSULE BY MOUTH EVERY DAY                  I have seen and examined this patient within the last 30 days. My clinical findings that support the need for the home health skilled services and home bound status are the following:no   Medical restrictions requiring assistance of another human to leave home due to  newly placed cholecystomy tube.     Diet:   regular diet    Labs:  Report Lab results to PCP.        Activities:   activity as tolerated    Nursing:   Agency to admit patient within 24 hours of hospital discharge unless specified on physician order or at patient request    SN to complete comprehensive assessment including routine vital signs. Instruct on disease process and s/s of complications to report to MD. Review/verify medication list sent home with the patient at time of discharge  and instruct patient/caregiver as needed. Frequency may be adjusted depending on start of care date.     Skilled nurse to perform up to 3 visits PRN for symptoms related to diagnosis    Notify MD if SBP > 160 or < 90; DBP > 90 or < 50; HR > 120 or < 50; Temp > 101; O2 < 88%; Other:       Ok to schedule additional visits based on staff availability and patient request on consecutive days within the home health episode.    When multiple disciplines ordered:    Start of Care occurs on Sunday -  Wednesday schedule remaining discipline evaluations as ordered on separate consecutive days following the start of care.    Thursday SOC -schedule subsequent evaluations Friday and Monday the following week.     Friday - Saturday SOC - schedule subsequent discipline evaluations on consecutive days starting Monday of the following week.    For all post-discharge communication and subsequent orders please contact patient's primary care physician. If unable to reach primary care physician or do not receive response within 30 minutes, please contact [facility] for clinical staff order clarification    Miscellaneous   Home Infusion Therapy:   Care involving cholecystostomy tube    Home Health Aide:  Nursing Monday, Wednesday and Friday    Wound Care Orders  no    I certify that this patient is confined to her home and needs intermittent skilled nursing care.

## 2023-04-07 NOTE — PROGRESS NOTES
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 Pain is stabbing in nature, pleuritic, 7/10. Nonprandial, does not refer to shoulder. She says that she has had ongoing issues with constipation and initially stated her last bowel movement was on the 29th, later clarified her last BM was 2 days prior to presentation.  She says that she was tried multiple laxatives without relief, has required fleet enemas in the past. She says that she is been eating and drinking normal, but endorses early satiety. She says that she is a chronic cough but denies any recent fevers, shortness of breath, chest pain, nausea, vomiting, dysuria or new rashes. CT in ED demonstrated possible cholecystitis, large stool burden with early/mild SBO 2/2 incisional ventral hernia d/t omental debulking surgery, and known mets to liver and lungs. WBC 13. Lactate and lipase wnl. Received 2L LR, Vanc/cefepime/Zosyn in ED. Transiently febrile to 100.8, tachycardic, and tachypneic after fluids. V/Q scan w low probability of PE.  Comfortable, satting well on RA on exam.     Admitted to Hosp Medicine for mgmt of possible acute cholecystitis, SBO. Gen Surg consulted. NPO, fleet enema ordered.      Overview/Hospital Course:  Started on Zosyn for acute cholecystitis. Gen Surg evaluated, not a candidate for cholecystectomy. Hernia not requiring surgical intervention,  recommended serial enemas and advancing diet as tolerated. IR consulted, underwent cholecystostomy placement. Tolerated procedure well. Converted to PO augmentin per id recs. Aggressive bowel regimen initiation. Patient tolerating diet well      Interval History: Still without BM, Regimen titrated, transitioned to augmentin, tolerating diet    Review of Systems   Constitutional:  Negative for chills, diaphoresis and fever.   HENT:  Negative for rhinorrhea and sore throat.    Respiratory:  Negative for cough and shortness of breath.    Cardiovascular:  Negative for chest pain and leg swelling.   Gastrointestinal:  Positive for abdominal pain and constipation. Negative for diarrhea, nausea and vomiting.   Genitourinary:  Negative for dysuria and hematuria.   Musculoskeletal:  Negative for arthralgias and myalgias.   Skin:  Negative for rash.   Neurological:  Negative for headaches.   Objective:     Vital Signs (Most Recent):  Temp: 98.6 °F (37 °C) (04/07/23 0802)  Pulse: 94 (04/07/23 0802)  Resp: 18 (04/07/23 0840)  BP: (!) 113/58 (04/07/23 0802)  SpO2: (!) 92 % (04/07/23 0802)   Vital Signs (24h Range):  Temp:  [97.2 °F (36.2 °C)-98.6 °F (37 °C)] 98.6 °F (37 °C)  Pulse:  [80-94] 94  Resp:  [18-19] 18  SpO2:  [92 %-95 %] 92 %  BP: ()/(53-62) 113/58     Weight: 71 kg (156 lb 9.6 oz)  Body mass index is 27.74 kg/m².  No intake or output data in the 24 hours ending 04/07/23 0938   Physical Exam  Constitutional:       General: She is not in acute distress.     Appearance: Normal appearance. She is obese. She is not ill-appearing.   HENT:      Head: Normocephalic and atraumatic.      Nose: Nose normal. No congestion or rhinorrhea.      Mouth/Throat:      Mouth: Mucous membranes are moist.      Pharynx: No posterior oropharyngeal erythema.   Eyes:      General: No scleral icterus.        Right eye: No discharge.         Left eye: No discharge.      Extraocular Movements: Extraocular movements intact.   Neck:       Vascular: No carotid bruit.   Cardiovascular:      Rate and Rhythm: Normal rate and regular rhythm.      Pulses: Normal pulses.      Heart sounds: Normal heart sounds.   Pulmonary:      Effort: Pulmonary effort is normal. No respiratory distress.      Breath sounds: Normal breath sounds. No stridor. No rhonchi.   Abdominal:      Tenderness: There is abdominal tenderness. There is no right CVA tenderness, left CVA tenderness, guarding or rebound.      Hernia: No hernia (umbilical, reducible.) is present.      Comments: Cholecystostomy tube present at RUQ. Dressings clean, dry, and intact without surrounding erythema and warmth.   Musculoskeletal:         General: No swelling, tenderness or deformity.      Cervical back: Normal range of motion.   Lymphadenopathy:      Cervical: No cervical adenopathy.   Skin:     General: Skin is warm and dry.      Capillary Refill: Capillary refill takes less than 2 seconds.      Coloration: Skin is not jaundiced.      Findings: No bruising.   Neurological:      General: No focal deficit present.      Mental Status: She is alert and oriented to person, place, and time.   Psychiatric:         Attention and Perception: Attention and perception normal.         Mood and Affect: Affect normal. Mood is anxious.         Speech: Speech normal.         Behavior: Behavior normal. Behavior is cooperative.         Thought Content: Thought content normal. Thought content does not include suicidal ideation. Thought content does not include suicidal plan.       Significant Labs: All pertinent labs within the past 24 hours have been reviewed.    Significant Imaging: I have reviewed all pertinent imaging results/findings within the past 24 hours.      Assessment/Plan:      * Cholecystitis  Presented with severe RUQ pain, concern for acute cholecystitis on CT AP and US. WBC 13 with left shift. 7/10 stabbing pain, worsened with palpation, does not refer. Not post prandial. Alk Phos 610. Initially  "febrile to 100.8   Gen Surg consulted, deemed poor surgical candidate given age, malignancy. Recommend edIR consult for cholecystostomy  - initially refused but underwent cholecystostomy on 4/5 with 50cc of thick sludge drained, cultures sent.  - Discussed with ID, will swap Zosyn for 7 day Augmentin 750-125mg BID course.   - IR: Drain to remain in place for ~6 months. 1 month IR follow-up.   - pain controlled on current regimen.  - Blood cx NGTD 24 hrs  - Bile cultures pending  - gentle IVF  - Advanced to renal diet.    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 Recommend serial enemas, NPO and monitor for now.    - will trial CLD and ADAT pending RUQ for cholecystitis.  - Fleet enema ordered.    4/5: received fleet enema overnight with no subsequent BM. Passing flatus. Tolerating full liquid diet.   - Gen Surg: no acute surgical intervention. Will ADAT, monitor for now given lack of SBO symptomatology, and continue serial enemas as needed.  4/6: has not yet had an enema. Aggressive bowel regimen. Advanced to renal diet.    Constipation  Patient still with severe constipation  Enemas PRN  Miralax      Right upper quadrant abdominal pain  See cholecystitis    Suspected pulmonary embolism  Briefly tachycardic, tachypneic on arrival. Likely 2/2 anxiety and iatrogenic fluid overload.   Underwent V/Q scan given KAUSHAL on CKD which demonstrated low probability of PE.  HDS, comfortable on RA on exam.       Small bowel obstruction  See "ventral hernia"      PTSD (post-traumatic stress disorder)  Continue to treat anxiety as " needed. Poorly tolerated paroxetine previously, will consider psych outpatient referral.     Lung mass  See malignant neoplasm of both ovaries      Generalized anxiety disorder  Restarted home xanax PRN, anxiety improving.        Malignant neoplasm of both ovaries  -Escalating home pain regimen following cholecystostomy placement  Continuing to monitor for signs of obstruction  Patient to follow up with heme onc  Patient to follow up with palliative care    Cough variant asthma  Home albuterol ordered, has not required since admit  Pt prefers to use home inhaler    Fibromyalgia  See ovarian cancer      Hypercholesterolemia  Restart statin as clinically indicated  - holding for now    Hypertension  On home diltiazem      Supraventricular tachycardia  Telemetry monitoring  Continue home diltiazem ER 240mg qd  Troponin wnl  Curently NSR      VTE Risk Mitigation (From admission, onward)           Ordered     enoxaparin injection 40 mg  Daily         04/05/23 1651     IP VTE HIGH RISK PATIENT  Once         04/04/23 0954     Place sequential compression device  Until discontinued         04/04/23 0954     Place sequential compression device  Until discontinued         04/04/23 0949     Reason for No Pharmacological VTE Prophylaxis  Once        Question:  Reasons:  Answer:  Risk of Bleeding  Comment:  Possibly operative    04/04/23 0949                    Discharge Planning   HORACE: 4/7/2023     Code Status: Full Code   Is the patient medically ready for discharge?: Yes    Reason for patient still in hospital (select all that apply): Treatment  Discharge Plan A: Home with family                  Fredy Montanez MD  Department of Hospital Medicine   [facility]

## 2023-04-07 NOTE — PROGRESS NOTES
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 Pain is stabbing in nature, pleuritic, 7/10. Nonprandial, does not refer to shoulder. She says that she has had ongoing issues with constipation and initially stated her last bowel movement was on the 29th, later clarified her last BM was 2 days prior to presentation.  She says that she was tried multiple laxatives without relief, has required fleet enemas in the past. She says that she is been eating and drinking normal, but endorses early satiety. She says that she is a chronic cough but denies any recent fevers, shortness of breath, chest pain, nausea, vomiting, dysuria or new rashes. CT in ED demonstrated possible cholecystitis, large stool burden with early/mild SBO 2/2 incisional ventral hernia d/t omental debulking surgery, and known mets to liver and lungs. WBC 13. Lactate and lipase wnl. Received 2L LR, Vanc/cefepime/Zosyn in ED. Transiently febrile to 100.8, tachycardic, and tachypneic after fluids. V/Q scan w low probability of PE.  Comfortable, satting well on RA on exam.     Admitted to [facility] Medicine for mgmt of possible acute cholecystitis, SBO. Gen Surg consulted. NPO, fleet enema ordered.      Overview/Hospital Course:  Started on Zosyn for acute cholecystitis. Gen Surg evaluated, not a candidate for cholecystectomy. Hernia not requiring surgical intervention,  recommended serial enemas and advancing diet as tolerated. IR consulted, underwent cholecystostomy placement. Tolerated procedure well.      Interval History: Underwent cholecystostomy tube placement yesterday. Initially having severe RUQ pain, improving with escalated pain regimen. Remains afebrile. Hypotensive to 100s/50s, continuing IVF. Pending ID eval. Drain output charted at 50cc yesterday.    Received fleet enema overnight without BM. Starting PO bowel regimen. Tolerating full liquid diet w/o nausea/emesis. Advancing to renal diet.    Switching Zosyn to 1 week Augementin course.    Continuing IVF for KAUSHAL and poor PO intake.     Review of Systems   Constitutional:  Positive for appetite change. Negative for activity change, chills, diaphoresis and fever.   HENT:  Negative for sinus pressure, sinus pain and sore throat.    Eyes:  Negative for photophobia, redness and visual disturbance.   Respiratory:  Negative for cough, chest tightness and shortness of breath.    Cardiovascular:  Negative for chest pain, palpitations and leg swelling.   Gastrointestinal:  Positive for abdominal pain and constipation. Negative for abdominal distention, blood in stool, nausea and vomiting.   Endocrine: Negative for polydipsia, polyphagia and polyuria.   Genitourinary:  Negative for difficulty urinating, flank pain and hematuria.   Musculoskeletal:  Positive for arthralgias and back pain. Negative for joint swelling.   Skin:  Negative for pallor, rash and wound.   Neurological:  Negative for dizziness, facial asymmetry and headaches.   Psychiatric/Behavioral:  Positive for dysphoric mood. Negative for agitation and suicidal ideas (history of SI. denies at present). The patient is not nervous/anxious and is not hyperactive.    Objective:     Vital Signs (Most Recent):  Temp: 97.5 °F (36.4 °C) (04/06/23 1127)  Pulse: 88 (04/06/23 1217)  Resp: 18 (04/06/23 1127)  BP: (!) 98/53 (04/06/23 1127)  SpO2: (!) 92 % (04/06/23 1127)   Vital  Signs (24h Range):  Temp:  [97 °F (36.1 °C)-98.8 °F (37.1 °C)] 97.5 °F (36.4 °C)  Pulse:  [] 88  Resp:  [16-20] 18  SpO2:  [90 %-93 %] 92 %  BP: ()/(50-58) 98/53     Weight: 71 kg (156 lb 9.6 oz)  Body mass index is 27.74 kg/m².    Intake/Output Summary (Last 24 hours) at 4/6/2023 1411  Last data filed at 4/6/2023 0550  Gross per 24 hour   Intake --   Output 50 ml   Net -50 ml      Physical Exam  Constitutional:       General: She is not in acute distress.     Appearance: Normal appearance. She is obese. She is not ill-appearing.   HENT:      Head: Normocephalic and atraumatic.      Nose: Nose normal. No congestion or rhinorrhea.      Mouth/Throat:      Mouth: Mucous membranes are moist.      Pharynx: No posterior oropharyngeal erythema.   Eyes:      General: No scleral icterus.        Right eye: No discharge.         Left eye: No discharge.      Extraocular Movements: Extraocular movements intact.   Neck:      Vascular: No carotid bruit.   Cardiovascular:      Rate and Rhythm: Normal rate and regular rhythm.      Pulses: Normal pulses.      Heart sounds: Normal heart sounds.   Pulmonary:      Effort: Pulmonary effort is normal. No respiratory distress.      Breath sounds: Normal breath sounds. No stridor. No rhonchi.   Abdominal:      Tenderness: There is abdominal tenderness. There is no right CVA tenderness, left CVA tenderness, guarding or rebound.      Hernia: No hernia (umbilical, reducible.) is present.      Comments: Cholecystostomy tube present at [facility]. Dressings clean, dry, and intact without surrounding erythema and warmth.   Musculoskeletal:         General: No swelling, tenderness or deformity.      Cervical back: Normal range of motion.   Lymphadenopathy:      Cervical: No cervical adenopathy.   Skin:     General: Skin is warm and dry.      Capillary Refill: Capillary refill takes less than 2 seconds.      Coloration: Skin is not jaundiced.      Findings: No bruising.   Neurological:       General: No focal deficit present.      Mental Status: She is alert and oriented to person, place, and time.   Psychiatric:         Attention and Perception: Attention and perception normal.         Mood and Affect: Affect normal. Mood is anxious.         Speech: Speech normal.         Behavior: Behavior normal. Behavior is cooperative.         Thought Content: Thought content normal. Thought content does not include suicidal ideation. Thought content does not include suicidal plan.       Significant Labs: CBC:   Recent Labs   Lab 04/05/23 0454 04/06/23  0534   WBC 13.45* 14.97*   HGB 8.0* 7.8*   HCT 25.9* 25.5*    337     CMP:   Recent Labs   Lab 04/05/23 0454 04/06/23  0534 04/06/23  1054   * 133* 132*   K 4.3 3.9 3.8   CL 99 97 97   CO2 24 25 25    105 100   BUN 9 14 14   CREATININE 1.1 1.4 1.2   CALCIUM 9.3 9.3 8.8   PROT 6.7 7.0  --    ALBUMIN 2.2* 2.2*  --    BILITOT 1.1* 0.6  --    ALKPHOS 502* 429*  --    AST 95* 48*  --    ALT 75* 57*  --    ANIONGAP 11 11 10         Significant Imaging: I have reviewed all pertinent imaging results/findings within the past 24 hours.      Assessment/Plan:      * Cholecystitis  Presented with severe RUQ pain, concern for acute cholecystitis on CT AP and US. WBC 13 with left shift. 7/10 stabbing pain, worsened with palpation, does not refer. Not post prandial. Alk Phos 610. Initially febrile to 100.8   Gen Surg consulted, deemed poor surgical candidate given age, malignancy. Recommend edIR consult for cholecystostomy  - initially refused but underwent cholecystostomy on 4/5 with 50cc of thick sludge drained, cultures sent.  - Discussed with ID, will swap Zosyn for 7 day Augmentin 750-125mg BID course.   - IR: Drain to remain in place for ~6 months. 1 month IR follow-up.   - pain controlled on current regimen.  - Blood cx NGTD 24 hrs  - Bile cultures pending  - gentle IVF  - Advanced to renal diet.    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 Recommend serial enemas, NPO and monitor for now.    - will trial CLD and ADAT pending RUQ for cholecystitis.  - Fleet enema ordered.    4/5: received fleet enema overnight with no subsequent BM. Passing flatus. Tolerating full liquid diet.   - Gen Surg: no acute surgical intervention. Will ADAT, monitor for now given lack of SBO symptomatology, and continue serial enemas as needed.  4/6: has not yet had an enema. Aggressive bowel regimen. Advanced to renal diet.    Constipation        Right upper quadrant abdominal pain  Concern for acute cholecystitis on CT AP.  WBC 13 with left shift. 7/10 stabbing pain, worsened with palpation, does not refer. Not post prandial. Alk Phos 610. Initially febrile to 100.8    Gen Surg consulted, recommend RUQ US to confirm. Recommend IR consult for cholecystostomy if confirmed.  - NPO for now.  - Continue Zosyn.    Suspected pulmonary embolism  Briefly tachycardic, tachypneic on arrival. Likely 2/2 anxiety and iatrogenic fluid overload.   Underwent V/Q scan given KAUSHAL on CKD which demonstrated low probability of PE.  HDS, comfortable on RA on exam.       Small bowel obstruction  See "ventral hernia"      PTSD (post-traumatic stress disorder)  Continue to treat anxiety as needed. Poorly tolerated paroxetine previously, will consider psych outpatient referral.     Lung mass  See malignant neoplasm of both ovaries      Generalized anxiety disorder  Restarted home xanax PRN, anxiety improving.        Malignant neoplasm of both ovaries  -Escalating home pain regimen following cholecystostomy " placement    Cough variant asthma  Home albuterol ordered, has not required since admit  Pt prefers to use home inhaler    Fibromyalgia  See ovarian cancer      Hypercholesterolemia  Restart statin as clinically indicated  - holding for now    Hypertension  Hold home antihypertensives in the setting of possible sepsis      Supraventricular tachycardia  Telemetry monitoring  Continue home diltiazem ER 240mg qd  Troponin wnl      VTE Risk Mitigation (From admission, onward)         Ordered     enoxaparin injection 40 mg  Daily         04/05/23 1651     IP VTE HIGH RISK PATIENT  Once         04/04/23 0954     Place sequential compression device  Until discontinued         04/04/23 0954     Place sequential compression device  Until discontinued         04/04/23 0949     Reason for No Pharmacological VTE Prophylaxis  Once        Question:  Reasons:  Answer:  Risk of Bleeding  Comment:  Possibly operative    04/04/23 0949                Discharge Planning   HORACE: 4/7/2023     Code Status: Full Code   Is the patient medically ready for discharge?: No    Reason for patient still in hospital (select all that apply): Consult recommendations  Discharge Plan A: Home with family          Madai Jacques MD  Department of Hospital Medicine   [facility] Surg

## 2023-04-07 NOTE — DISCHARGE SUMMARY
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 Pain is stabbing in nature, pleuritic, 7/10. Nonprandial, does not refer to shoulder. She says that she has had ongoing issues with constipation and initially stated her last bowel movement was on the 29th, later clarified her last BM was 2 days prior to presentation.  She says that she was tried multiple laxatives without relief, has required fleet enemas in the past. She says that she is been eating and drinking normal, but endorses early satiety. She says that she is a chronic cough but denies any recent fevers, shortness of breath, chest pain, nausea, vomiting, dysuria or new rashes. CT in ED demonstrated possible cholecystitis, large stool burden with early/mild SBO 2/2 incisional ventral hernia d/t omental debulking surgery, and known mets to liver and lungs. WBC 13. Lactate and lipase wnl. Received 2L LR, Vanc/cefepime/Zosyn in ED. Transiently febrile to 100.8, tachycardic, and tachypneic after fluids. V/Q scan w low probability of PE.  Comfortable, satting well on RA on exam.     Admitted to [facility] Medicine for mgmt of possible acute cholecystitis, SBO. Gen Surg consulted. NPO, fleet enema ordered.      * No surgery  found *      Hospital Course:   Started on Zosyn for acute cholecystitis. Gen Surg evaluated, not a candidate for cholecystectomy. Hernia not requiring surgical intervention, recommended serial enemas and advancing diet as tolerated. IR consulted, underwent cholecystostomy placement. Tolerated procedure well. Converted to PO augmentin per id recs. Aggressive bowel regimen initiation. Patient tolerating diet well. Given several enemas. Patient passing flatus. At this time the patient was deemed medically ready for discharge home with home health services for the maintenance of the cholecystostomy tube and with the medications and follow ups as listed below       Goals of Care Treatment Preferences:  Code Status: Full Code          What is most important right now is to focus on curative/life-prolongation (regardless of treatment burdens).  Accordingly, we have decided that the best plan to meet the patient's goals includes continuing with palliative care.      Consults:   Consults (From admission, onward)        Status Ordering Provider     Inpatient consult to Infectious Diseases  Once        Provider:  (Not yet assigned)    Completed ELLI TALAVERA     Inpatient consult to Interventional Radiology  Once        Provider:  (Not yet assigned)    Completed DARINEL GHOSH     Inpatient consult to General surgery  Once        Provider:  (Not yet assigned)    Completed DARINEL GHOSH          Psychiatric  PTSD (post-traumatic stress disorder)  Continue to treat anxiety as needed. Poorly tolerated paroxetine previously, will consider psych outpatient referral.     Generalized anxiety disorder  Restarted home xanax PRN, anxiety improving.        Pulmonary  Lung mass  See malignant neoplasm of both ovaries      Cough variant asthma  Home albuterol ordered, has not required since admit  Pt prefers to use home inhaler    Cardiac/Vascular  Hypercholesterolemia  Restart statin as clinically indicated  - holding for  now    Hypertension  On home diltiazem      Supraventricular tachycardia  Telemetry monitoring  Continue home diltiazem ER 240mg qd  Troponin wnl  Curently NSR    Oncology  Malignant neoplasm of both ovaries  -Escalating home pain regimen following cholecystostomy placement  Continuing to monitor for signs of obstruction  Patient to follow up with heme onc  Patient to follow up with palliative care    GI  * Cholecystitis  Presented with severe RUQ pain, concern for acute cholecystitis on CT AP and US. WBC 13 with left shift. 7/10 stabbing pain, worsened with palpation, does not refer. Not post prandial. Alk Phos 610. Initially febrile to 100.8   Gen Surg consulted, deemed poor surgical candidate given age, malignancy. Recommend edIR consult for cholecystostomy  - initially refused but underwent cholecystostomy on 4/5 with 50cc of thick sludge drained, cultures sent.  - Discussed with ID, will swap Zosyn for 7 day Augmentin 750-125mg BID course.   - IR: Drain to remain in place for ~6 months. 1 month IR follow-up.   - pain controlled on current regimen.  - Blood cx NGTD 24 hrs  - Bile cultures pending  - gentle IVF  - Advanced to renal diet.    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 Recommend serial enemas, NPO and monitor for now.    - will trial CLD and ADAT pending RUQ for cholecystitis.  - Fleet enema ordered.    4/5: received fleet enema overnight with no subsequent BM. Passing flatus. Tolerating full liquid diet.   - Gen Surg: no acute surgical intervention. Will ADAT, monitor for now given lack of SBO  "symptomatology, and continue serial enemas as needed.  4/6: has not yet had an enema. Aggressive bowel regimen. Advanced to renal diet.    Constipation  Uptitrating Miralax to bowel movement      Right upper quadrant abdominal pain  See cholecystitis    Small bowel obstruction  See "ventral hernia"      Orthopedic  Fibromyalgia  See ovarian cancer        Final Active Diagnoses:    Diagnosis Date Noted POA    PRINCIPAL PROBLEM:  Cholecystitis [K81.9] 04/04/2023 Yes    Small bowel obstruction [K56.609] 04/04/2023 Yes    Suspected pulmonary embolism [R09.89] 04/04/2023 Unknown    Right upper quadrant abdominal pain [R10.11] 04/04/2023 Yes    Constipation [K59.00] 04/04/2023 Unknown    Ventral hernia without obstruction or gangrene [K43.9] 04/04/2023 Unknown    PTSD (post-traumatic stress disorder) [F43.10] 08/15/2022 Yes    Lung mass [R91.8] 09/01/2020 Yes    Generalized anxiety disorder [F41.1] 08/07/2020 Yes    Malignant neoplasm of both ovaries [C56.3] 08/06/2020 Yes    Hypertension [I10] 10/31/2018 Yes    Hypercholesterolemia [E78.00] 10/31/2018 Yes    Fibromyalgia [M79.7] 10/31/2018 Yes    Cough variant asthma [J45.991] 10/31/2018 Yes    Supraventricular tachycardia [I47.1] 04/06/2017 Yes      Problems Resolved During this Admission:    Diagnosis Date Noted Date Resolved POA    Severe sepsis [A41.9, R65.20] 04/04/2023 04/04/2023 Unknown    Mild obesity [E66.9] 10/31/2018 04/04/2023 Yes     Interval History: Still without BM, Regimen titrated, transitioned to augmentin, tolerating diet    Review of Systems   Constitutional:  Negative for chills, diaphoresis and fever.   HENT:  Negative for rhinorrhea and sore throat.    Respiratory:  Negative for cough and shortness of breath.    Cardiovascular:  Negative for chest pain and leg swelling.   Gastrointestinal:  Positive for abdominal pain and constipation. Negative for diarrhea, nausea and vomiting.   Genitourinary:  Negative for dysuria and " hematuria.   Musculoskeletal:  Negative for arthralgias and myalgias.   Skin:  Negative for rash.   Neurological:  Negative for headaches.   Objective:     Vital Signs (Most Recent):  Temp: 98.6 °F (37 °C) (04/07/23 0802)  Pulse: 94 (04/07/23 0802)  Resp: 18 (04/07/23 0840)  BP: (!) 113/58 (04/07/23 0802)  SpO2: (!) 92 % (04/07/23 0802)   Vital Signs (24h Range):  Temp:  [97.2 °F (36.2 °C)-98.6 °F (37 °C)] 98.6 °F (37 °C)  Pulse:  [80-94] 94  Resp:  [18-19] 18  SpO2:  [92 %-95 %] 92 %  BP: ()/(53-62) 113/58     Weight: 71 kg (156 lb 9.6 oz)  Body mass index is 27.74 kg/m².  No intake or output data in the 24 hours ending 04/07/23 0938   Physical Exam  Constitutional:       General: She is not in acute distress.     Appearance: Normal appearance. She is obese. She is not ill-appearing.   HENT:      Head: Normocephalic and atraumatic.      Nose: Nose normal. No congestion or rhinorrhea.      Mouth/Throat:      Mouth: Mucous membranes are moist.      Pharynx: No posterior oropharyngeal erythema.   Eyes:      General: No scleral icterus.        Right eye: No discharge.         Left eye: No discharge.      Extraocular Movements: Extraocular movements intact.   Neck:      Vascular: No carotid bruit.   Cardiovascular:      Rate and Rhythm: Normal rate and regular rhythm.      Pulses: Normal pulses.      Heart sounds: Normal heart sounds.   Pulmonary:      Effort: Pulmonary effort is normal. No respiratory distress.      Breath sounds: Normal breath sounds. No stridor. No rhonchi.   Abdominal:      Tenderness: There is abdominal tenderness. There is no right CVA tenderness, left CVA tenderness, guarding or rebound.      Hernia: No hernia (umbilical, reducible.) is present.      Comments: Cholecystostomy tube present at RU. Dressings clean, dry, and intact without surrounding erythema and warmth.   Musculoskeletal:         General: No swelling, tenderness or deformity.      Cervical back: Normal range of motion.    Lymphadenopathy:      Cervical: No cervical adenopathy.   Skin:     General: Skin is warm and dry.      Capillary Refill: Capillary refill takes less than 2 seconds.      Coloration: Skin is not jaundiced.      Findings: No bruising.   Neurological:      General: No focal deficit present.      Mental Status: She is alert and oriented to person, place, and time.   Psychiatric:         Attention and Perception: Attention and perception normal.         Mood and Affect: Affect normal. Mood is anxious.         Speech: Speech normal.         Behavior: Behavior normal. Behavior is cooperative.         Thought Content: Thought content normal. Thought content does not include suicidal ideation. Thought content does not include suicidal plan.       Significant Labs: All pertinent labs within the past 24 hours have been reviewed.    Significant Imaging: I have reviewed all pertinent imaging results/findings within the past 24 hours.    Discharged Condition: good    Disposition:     Follow Up:    Patient Instructions:      Ambulatory referral/consult to Gynecologic Oncology   Standing Status: Future   Referral Priority: Urgent Referral Type: Consultation   Referral Reason: Specialty Services Required   Requested Specialty: Gynecologic Oncology   Number of Visits Requested: 1       Significant Diagnostic Studies: Labs:   BMP:   Recent Labs   Lab 04/06/23  0534 04/06/23  1054 04/07/23  0531    100 94   * 132* 134*   K 3.9 3.8 4.2   CL 97 97 98   CO2 25 25 22*   BUN 14 14 13   CREATININE 1.4 1.2 1.1   CALCIUM 9.3 8.8 9.7   MG 2.1  --  2.1       Pending Diagnostic Studies:     None         Medications:  Reconciled Home Medications:      Medication List      START taking these medications    amoxicillin-clavulanate 875-125mg 875-125 mg per tablet  Commonly known as: AUGMENTIN  Take 1 tablet by mouth every 12 (twelve) hours.        CHANGE how you take these medications    fluticasone propionate 50 mcg/actuation nasal  "spray  Commonly known as: FLONASE  2 sprays (100 mcg total) by Each Nare route once daily.  What changed:   · how much to take  · when to take this  · reasons to take this     * oxyCODONE 10 mg Tab immediate release tablet  Commonly known as: ROXICODONE  Take 1 tablet (10 mg total) by mouth every 6 (six) hours as needed for Pain.  What changed: Another medication with the same name was changed. Make sure you understand how and when to take each.     * oxyCODONE 20 mg 12 hr tablet  Commonly known as: OxyCONTIN  Take 1 tablet (20 mg total) by mouth every 12 (twelve) hours.  What changed:   · when to take this  · reasons to take this     traZODone 50 MG tablet  Commonly known as: DESYREL  Take 1 tablet (50 mg total) by mouth every evening.  What changed:   · when to take this  · reasons to take this         * This list has 2 medication(s) that are the same as other medications prescribed for you. Read the directions carefully, and ask your doctor or other care provider to review them with you.            CONTINUE taking these medications    albuterol 90 mcg/actuation inhaler  Commonly known as: PROVENTIL/VENTOLIN HFA  INHALE 2 PUFFS INTO THE LUNGS EVERY 6 (SIX) HOURS AS NEEDED FOR WHEEZING.     ALPRAZolam 0.25 MG tablet  Commonly known as: XANAX  TAKE ONE TABLET BY MOUTH TWICE DAILY AS NEEDED FOR ANXIETY     b complex vitamins tablet  Take 1 tablet by mouth once daily.     BREO ELLIPTA 200-25 mcg/dose Dsdv diskus inhaler  Generic drug: fluticasone furoate-vilanteroL  Inhale 1 puff into the lungs once daily.     COLACE ORAL  Take 1 capsule by mouth daily as needed.     cycloSPORINE 0.05 % ophthalmic emulsion  Commonly known as: RESTASIS  Place 1 drop into both eyes 2 (two) times daily.     diclofenac sodium 1 % Gel  Commonly known as: VOLTAREN  Apply 2 g topically daily as needed (Pain).     EASY TOUCH INSULIN SYRINGE 1 mL 30 gauge x 1/2" Syrg  Generic drug: insulin syringe-needle U-100  USE AS DIRECTED FOR SQ " INJECTIONS THREE TIMES WEEKLY     ergocalciferol 50,000 unit Cap  Commonly known as: ERGOCALCIFEROL  Take 1 capsule by mouth every 7 days.      MISTLETOE SUBQ  Inject 1 Dose into the skin twice a week.     LIDOcaine-prilocaine cream  Commonly known as: EMLA  Apply topically as needed (port access).     MIRALAX 17 gram Pwpk  Generic drug: polyethylene glycol  Take 17 g by mouth daily as needed (Constipation).     naloxone 4 mg/actuation Spry  Commonly known as: NARCAN  4mg by nasal route as needed for opioid overdose; may repeat every 2-3 minutes in alternating nostrils until medical help arrives. Call 911     nystatin cream  Commonly known as: MYCOSTATIN  Apply 1 g topically twice a week. As needed for rash     ondansetron 8 MG tablet  Commonly known as: ZOFRAN  Take 1 tablet (8 mg total) by mouth every 8 (eight) hours as needed for Nausea.     promethazine 6.25 mg/5 mL syrup  Commonly known as: PHENERGAN  Take by mouth every 6 (six) hours as needed for Nausea.     senna 8.6 mg tablet  Commonly known as: SENOKOT  Take 4 tablets by mouth once daily.     TIADYLT  MG Cs24  Generic drug: diltiaZEM  TAKE ONE CAPSULE BY MOUTH EVERY DAY            Indwelling Lines/Drains at time of discharge:   Lines/Drains/Airways     Central Venous Catheter Line  Duration           Port A Cath Single Lumen -- days    Port A Cath Single Lumen right subclavian -- days          Drain  Duration                Biliary Tube 04/05/23 1043 VTCB biliary 8 Fr. RUQ 2 days                Time spent on the discharge of patient: 45 minutes         Fredy Montanez MD  Department of Hospital Medicine  [facility]

## 2023-04-07 NOTE — SUBJECTIVE & OBJECTIVE
Interval History: Still without BM, Regimen titrated, transitioned to augmentin, tolerating diet    Review of Systems   Constitutional:  Negative for chills, diaphoresis and fever.   HENT:  Negative for rhinorrhea and sore throat.    Respiratory:  Negative for cough and shortness of breath.    Cardiovascular:  Negative for chest pain and leg swelling.   Gastrointestinal:  Positive for abdominal pain and constipation. Negative for diarrhea, nausea and vomiting.   Genitourinary:  Negative for dysuria and hematuria.   Musculoskeletal:  Negative for arthralgias and myalgias.   Skin:  Negative for rash.   Neurological:  Negative for headaches.   Objective:     Vital Signs (Most Recent):  Temp: 98.6 °F (37 °C) (04/07/23 0802)  Pulse: 94 (04/07/23 0802)  Resp: 18 (04/07/23 0840)  BP: (!) 113/58 (04/07/23 0802)  SpO2: (!) 92 % (04/07/23 0802)   Vital Signs (24h Range):  Temp:  [97.2 °F (36.2 °C)-98.6 °F (37 °C)] 98.6 °F (37 °C)  Pulse:  [80-94] 94  Resp:  [18-19] 18  SpO2:  [92 %-95 %] 92 %  BP: ()/(53-62) 113/58     Weight: 71 kg (156 lb 9.6 oz)  Body mass index is 27.74 kg/m².  No intake or output data in the 24 hours ending 04/07/23 0938   Physical Exam  Constitutional:       General: She is not in acute distress.     Appearance: Normal appearance. She is obese. She is not ill-appearing.   HENT:      Head: Normocephalic and atraumatic.      Nose: Nose normal. No congestion or rhinorrhea.      Mouth/Throat:      Mouth: Mucous membranes are moist.      Pharynx: No posterior oropharyngeal erythema.   Eyes:      General: No scleral icterus.        Right eye: No discharge.         Left eye: No discharge.      Extraocular Movements: Extraocular movements intact.   Neck:      Vascular: No carotid bruit.   Cardiovascular:      Rate and Rhythm: Normal rate and regular rhythm.      Pulses: Normal pulses.      Heart sounds: Normal heart sounds.   Pulmonary:      Effort: Pulmonary effort is normal. No respiratory distress.       Breath sounds: Normal breath sounds. No stridor. No rhonchi.   Abdominal:      Tenderness: There is abdominal tenderness. There is no right CVA tenderness, left CVA tenderness, guarding or rebound.      Hernia: No hernia (umbilical, reducible.) is present.      Comments: Cholecystostomy tube present at RUQ. Dressings clean, dry, and intact without surrounding erythema and warmth.   Musculoskeletal:         General: No swelling, tenderness or deformity.      Cervical back: Normal range of motion.   Lymphadenopathy:      Cervical: No cervical adenopathy.   Skin:     General: Skin is warm and dry.      Capillary Refill: Capillary refill takes less than 2 seconds.      Coloration: Skin is not jaundiced.      Findings: No bruising.   Neurological:      General: No focal deficit present.      Mental Status: She is alert and oriented to person, place, and time.   Psychiatric:         Attention and Perception: Attention and perception normal.         Mood and Affect: Affect normal. Mood is anxious.         Speech: Speech normal.         Behavior: Behavior normal. Behavior is cooperative.         Thought Content: Thought content normal. Thought content does not include suicidal ideation. Thought content does not include suicidal plan.       Significant Labs: All pertinent labs within the past 24 hours have been reviewed.    Significant Imaging: I have reviewed all pertinent imaging results/findings within the past 24 hours.

## 2023-04-07 NOTE — ASSESSMENT & PLAN NOTE
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 Recommend serial enemas, NPO and monitor for now.    - will trial CLD and ADAT pending RUQ for cholecystitis.  - Fleet enema ordered.    4/5: received fleet enema overnight with no subsequent BM. Passing flatus. Tolerating full liquid diet.   - Gen Surg: no acute surgical intervention. Will ADAT, monitor for now given lack of SBO symptomatology, and continue serial enemas as needed.  4/6: has not yet had an enema. Aggressive bowel regimen. Advanced to renal diet.

## 2023-04-07 NOTE — ASSESSMENT & PLAN NOTE
-Escalating home pain regimen following cholecystostomy placement  Continuing to monitor for signs of obstruction  Patient to follow up with heme onc  Patient to follow up with palliative care

## 2023-04-08 VITALS
DIASTOLIC BLOOD PRESSURE: 76 MMHG | WEIGHT: 156.63 LBS | HEIGHT: 63 IN | SYSTOLIC BLOOD PRESSURE: 129 MMHG | HEART RATE: 92 BPM | OXYGEN SATURATION: 91 % | RESPIRATION RATE: 16 BRPM | BODY MASS INDEX: 27.75 KG/M2 | TEMPERATURE: 98 F

## 2023-04-08 LAB
ALBUMIN SERPL BCP-MCNC: 2.2 G/DL (ref 3.5–5.2)
ALP SERPL-CCNC: 681 U/L (ref 55–135)
ALT SERPL W/O P-5'-P-CCNC: 91 U/L (ref 10–44)
ANION GAP SERPL CALC-SCNC: 12 MMOL/L (ref 8–16)
AST SERPL-CCNC: 177 U/L (ref 10–40)
BACTERIA SPEC AEROBE CULT: NO GROWTH
BILIRUB SERPL-MCNC: 0.5 MG/DL (ref 0.1–1)
BUN SERPL-MCNC: 14 MG/DL (ref 8–23)
CALCIUM SERPL-MCNC: 9.4 MG/DL (ref 8.7–10.5)
CHLORIDE SERPL-SCNC: 101 MMOL/L (ref 95–110)
CO2 SERPL-SCNC: 25 MMOL/L (ref 23–29)
CREAT SERPL-MCNC: 0.9 MG/DL (ref 0.5–1.4)
ERYTHROCYTE [DISTWIDTH] IN BLOOD BY AUTOMATED COUNT: 17.1 % (ref 11.5–14.5)
EST. GFR  (NO RACE VARIABLE): >60 ML/MIN/1.73 M^2
GLUCOSE SERPL-MCNC: 112 MG/DL (ref 70–110)
HCT VFR BLD AUTO: 25.9 % (ref 37–48.5)
HGB BLD-MCNC: 8 G/DL (ref 12–16)
MCH RBC QN AUTO: 27.2 PG (ref 27–31)
MCHC RBC AUTO-ENTMCNC: 30.9 G/DL (ref 32–36)
MCV RBC AUTO: 88 FL (ref 82–98)
PLATELET # BLD AUTO: 353 K/UL (ref 150–450)
PMV BLD AUTO: 8.8 FL (ref 9.2–12.9)
POTASSIUM SERPL-SCNC: 3.5 MMOL/L (ref 3.5–5.1)
PROT SERPL-MCNC: 6.8 G/DL (ref 6–8.4)
RBC # BLD AUTO: 2.94 M/UL (ref 4–5.4)
SODIUM SERPL-SCNC: 138 MMOL/L (ref 136–145)
WBC # BLD AUTO: 7.42 K/UL (ref 3.9–12.7)

## 2023-04-08 PROCEDURE — 25000003 PHARM REV CODE 250: Performed by: STUDENT IN AN ORGANIZED HEALTH CARE EDUCATION/TRAINING PROGRAM

## 2023-04-08 PROCEDURE — 63600175 PHARM REV CODE 636 W HCPCS: Performed by: STUDENT IN AN ORGANIZED HEALTH CARE EDUCATION/TRAINING PROGRAM

## 2023-04-08 PROCEDURE — 80053 COMPREHEN METABOLIC PANEL: CPT | Performed by: STUDENT IN AN ORGANIZED HEALTH CARE EDUCATION/TRAINING PROGRAM

## 2023-04-08 PROCEDURE — 25000003 PHARM REV CODE 250

## 2023-04-08 PROCEDURE — 36415 COLL VENOUS BLD VENIPUNCTURE: CPT | Performed by: STUDENT IN AN ORGANIZED HEALTH CARE EDUCATION/TRAINING PROGRAM

## 2023-04-08 PROCEDURE — 85027 COMPLETE CBC AUTOMATED: CPT | Performed by: STUDENT IN AN ORGANIZED HEALTH CARE EDUCATION/TRAINING PROGRAM

## 2023-04-08 PROCEDURE — 25000003 PHARM REV CODE 250: Performed by: INTERNAL MEDICINE

## 2023-04-08 RX ADMIN — MORPHINE SULFATE 2 MG: 2 INJECTION, SOLUTION INTRAMUSCULAR; INTRAVENOUS at 04:04

## 2023-04-08 RX ADMIN — DILTIAZEM HYDROCHLORIDE 240 MG: 240 CAPSULE, COATED, EXTENDED RELEASE ORAL at 09:04

## 2023-04-08 RX ADMIN — LACTULOSE 30 G: 20 SOLUTION ORAL at 12:04

## 2023-04-08 RX ADMIN — OXYCODONE HYDROCHLORIDE 20 MG: 20 TABLET, FILM COATED, EXTENDED RELEASE ORAL at 08:04

## 2023-04-08 RX ADMIN — AMOXICILLIN AND CLAVULANATE POTASSIUM 1 TABLET: 875; 125 TABLET, FILM COATED ORAL at 08:04

## 2023-04-08 NOTE — DISCHARGE SUMMARY
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 Pain is stabbing in nature, pleuritic, 7/10. Nonprandial, does not refer to shoulder. She says that she has had ongoing issues with constipation and initially stated her last bowel movement was on the 29th, later clarified her last BM was 2 days prior to presentation.  She says that she was tried multiple laxatives without relief, has required fleet enemas in the past. She says that she is been eating and drinking normal, but endorses early satiety. She says that she is a chronic cough but denies any recent fevers, shortness of breath, chest pain, nausea, vomiting, dysuria or new rashes. CT in ED demonstrated possible cholecystitis, large stool burden with early/mild SBO 2/2 incisional ventral hernia d/t omental debulking surgery, and known mets to liver and lungs. WBC 13. Lactate and lipase wnl. Received 2L LR, Vanc/cefepime/Zosyn in ED. Transiently febrile to 100.8, tachycardic, and tachypneic after fluids. V/Q scan w low probability of PE.  Comfortable, satting well on RA on exam.     Admitted to [facility] for mgmt of possible acute cholecystitis, SBO. Gen Surg consulted. NPO, fleet enema ordered.      * No surgery found *       Hospital Course:   Started on Zosyn for acute cholecystitis. Gen Surg evaluated, not a candidate for cholecystectomy. Hernia not requiring surgical intervention, recommended serial enemas and advancing diet as tolerated. IR consulted, underwent cholecystostomy placement. Tolerated procedure well. Converted to PO augmentin per id recs. Aggressive bowel regimen initiation. Patient tolerating diet well. Given several enemas. Patient passing flatus, had large BM. At this time the patient was deemed medically ready for discharge home with home health services for the maintenance of the cholecystostomy tube and with the medications and follow ups as listed below.        Goals of Care Treatment Preferences:  Code Status: Full Code          What is most important right now is to focus on curative/life-prolongation (regardless of treatment burdens).  Accordingly, we have decided that the best plan to meet the patient's goals includes continuing with palliative care.      Consults:   Consults (From admission, onward)        Status Ordering Provider     Inpatient consult to Infectious Diseases  Once        Provider:  (Not yet assigned)    Completed ELLI TALAVERA     Inpatient consult to Interventional Radiology  Once        Provider:  (Not yet assigned)    Completed DARINEL GHOSH     Inpatient consult to General surgery  Once        Provider:  (Not yet assigned)    Completed DARINEL GHOSH          Psychiatric  PTSD (post-traumatic stress disorder)  Continue to treat anxiety as needed. Poorly tolerated paroxetine previously, will consider psych outpatient referral.     Generalized anxiety disorder  Restarted home xanax PRN, anxiety improving.        Pulmonary  Lung mass  See malignant neoplasm of both ovaries      Cough variant asthma  Home albuterol ordered, has not required since admit  Pt prefers to use home inhaler    Cardiac/Vascular  Hypercholesterolemia  Restart statin as clinically indicated  - holding for now,  restarting at discharge    Hypertension  On home diltiazem      Supraventricular tachycardia  Telemetry monitoring  Continue home diltiazem ER 240mg qd  Troponin wnl  Curently NSR    Hematology  Suspected pulmonary embolism  Briefly tachycardic, tachypneic on arrival. Likely 2/2 anxiety and iatrogenic fluid overload.   Underwent V/Q scan given KAUSHAL on CKD which demonstrated low probability of PE.  HDS, comfortable on RA on exam.       Oncology  Malignant neoplasm of both ovaries  -Escalating home pain regimen following cholecystostomy placement  Continuing to monitor for signs of obstruction  Patient to follow up with heme onc  Patient to follow up with palliative care    GI  * Cholecystitis  Presented with severe RUQ pain, concern for acute cholecystitis on CT AP and US. WBC 13 with left shift. 7/10 stabbing pain, worsened with palpation, does not refer. Not post prandial. Alk Phos 610. Initially febrile to 100.8   Gen Surg consulted, deemed poor surgical candidate given age, malignancy. Recommend edIR consult for cholecystostomy  - initially refused but underwent cholecystostomy on 4/5 with 50cc of thick sludge drained, cultures sent.  - Discussed with ID, will swap Zosyn for 7 day Augmentin 750-125mg BID course.   - IR: Drain to remain in place for ~6 months. 1 month IR follow-up.   - pain controlled on current regimen.  - Blood cx NGTD 72 hrs  - Bile cultures pending  - gentle IVF  - Advanced to renal diet.  - tolerating diet, having BM. Discharge with augmentin course, recheck CMP 1 week for mild transaminase bump likely 2/2 Augmentin. IR follow-up 1 month. HH with drain care.    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 Recommend serial enemas, NPO and monitor for now.    - will trial CLD and ADAT pending RUQ for cholecystitis.  - Fleet enema ordered.    4/5: received fleet enema overnight with no subsequent BM. Passing flatus. Tolerating full liquid diet.   - Gen Surg: no acute surgical intervention. Will ADAT, monitor for now given lack of SBO symptomatology, and continue serial enemas as needed.  4/6: has not yet had an enema. Aggressive bowel regimen. Advanced to renal diet.  4/8: Had multiple large BM overnight. Plan to discharge with bowel regimen    Constipation  Uptitrating Miralax to bowel movement      Right upper quadrant abdominal pain  See cholecystitis    Small bowel obstruction  See "ventral hernia"      Orthopedic  Fibromyalgia  See ovarian cancer        Final Active Diagnoses:    Diagnosis Date Noted POA    PRINCIPAL PROBLEM:  Cholecystitis [K81.9] 04/04/2023 Yes    Small bowel obstruction [K56.609] 04/04/2023 Yes    Suspected pulmonary embolism [R09.89] 04/04/2023 Unknown    Right upper quadrant abdominal pain [R10.11] 04/04/2023 Yes    Constipation [K59.00] 04/04/2023 Unknown    Ventral hernia without obstruction or gangrene [K43.9] 04/04/2023 Unknown    PTSD (post-traumatic stress disorder) [F43.10] 08/15/2022 Yes    Lung mass [R91.8] 09/01/2020 Yes    Generalized anxiety disorder [F41.1] 08/07/2020 Yes    Malignant neoplasm of both ovaries [C56.3] 08/06/2020 Yes    Hypertension [I10] 10/31/2018 Yes    Hypercholesterolemia [E78.00] 10/31/2018 Yes    Fibromyalgia [M79.7] 10/31/2018 Yes    Cough variant asthma [J45.991] 10/31/2018 Yes    Supraventricular tachycardia [I47.1] 04/06/2017 Yes      Problems Resolved During this Admission:    Diagnosis Date Noted Date Resolved POA    Severe sepsis [A41.9, R65.20] 04/04/2023 04/04/2023 Unknown    Mild obesity [E66.9] 10/31/2018 04/04/2023 " Yes       Discharged Condition: stable    Disposition:     Follow Up:    Patient Instructions:      COMPREHENSIVE METABOLIC PANEL   Standing Status: Future Standing Exp. Date: 06/06/24     Ambulatory referral/consult to Gynecologic Oncology   Standing Status: Future   Referral Priority: Urgent Referral Type: Consultation   Referral Reason: Specialty Services Required   Requested Specialty: Gynecologic Oncology   Number of Visits Requested: 1     Ambulatory referral/consult to Interventional RAD   Standing Status: Future   Referral Priority: Routine Referral Type: Consultation   Number of Visits Requested: 1       Significant Diagnostic Studies: Labs:   CMP   Recent Labs   Lab 04/07/23  0531 04/08/23  0438   * 138   K 4.2 3.5   CL 98 101   CO2 22* 25   GLU 94 112*   BUN 13 14   CREATININE 1.1 0.9   CALCIUM 9.7 9.4   PROT 7.2 6.8   ALBUMIN 2.2* 2.2*   BILITOT 0.5 0.5   ALKPHOS 488* 681*   AST 57* 177*   ALT 49* 91*   ANIONGAP 14 12    and CBC   Recent Labs   Lab 04/07/23  0531 04/08/23  0438   WBC 8.88 7.42   HGB 8.5* 8.0*   HCT 28.3* 25.9*    353       Pending Diagnostic Studies:     None         Medications:  Reconciled Home Medications:      Medication List      START taking these medications    amoxicillin-clavulanate 875-125mg 875-125 mg per tablet  Commonly known as: AUGMENTIN  Take 1 tablet by mouth every 12 (twelve) hours.        CHANGE how you take these medications    fluticasone propionate 50 mcg/actuation nasal spray  Commonly known as: FLONASE  2 sprays (100 mcg total) by Each Nare route once daily.  What changed:   · how much to take  · when to take this  · reasons to take this     * oxyCODONE 10 mg Tab immediate release tablet  Commonly known as: ROXICODONE  Take 1 tablet (10 mg total) by mouth every 6 (six) hours as needed for Pain.  What changed: Another medication with the same name was changed. Make sure you understand how and when to take each.     * oxyCODONE 20 mg 12 hr  "tablet  Commonly known as: OxyCONTIN  Take 1 tablet (20 mg total) by mouth every 12 (twelve) hours.  What changed:   · when to take this  · reasons to take this     traZODone 50 MG tablet  Commonly known as: DESYREL  Take 1 tablet (50 mg total) by mouth every evening.  What changed:   · when to take this  · reasons to take this         * This list has 2 medication(s) that are the same as other medications prescribed for you. Read the directions carefully, and ask your doctor or other care provider to review them with you.            CONTINUE taking these medications    albuterol 90 mcg/actuation inhaler  Commonly known as: PROVENTIL/VENTOLIN HFA  INHALE 2 PUFFS INTO THE LUNGS EVERY 6 (SIX) HOURS AS NEEDED FOR WHEEZING.     ALPRAZolam 0.25 MG tablet  Commonly known as: XANAX  TAKE ONE TABLET BY MOUTH TWICE DAILY AS NEEDED FOR ANXIETY     b complex vitamins tablet  Take 1 tablet by mouth once daily.     BREO ELLIPTA 200-25 mcg/dose Dsdv diskus inhaler  Generic drug: fluticasone furoate-vilanteroL  Inhale 1 puff into the lungs once daily.     COLACE ORAL  Take 1 capsule by mouth daily as needed.     cycloSPORINE 0.05 % ophthalmic emulsion  Commonly known as: RESTASIS  Place 1 drop into both eyes 2 (two) times daily.     diclofenac sodium 1 % Gel  Commonly known as: VOLTAREN  Apply 2 g topically daily as needed (Pain).     EASY TOUCH INSULIN SYRINGE 1 mL 30 gauge x 1/2" Syrg  Generic drug: insulin syringe-needle U-100  USE AS DIRECTED FOR SQ INJECTIONS THREE TIMES WEEKLY     ergocalciferol 50,000 unit Cap  Commonly known as: ERGOCALCIFEROL  Take 1 capsule by mouth every 7 days.      MISTLETOE SUBQ  Inject 1 Dose into the skin twice a week.     LIDOcaine-prilocaine cream  Commonly known as: EMLA  Apply topically as needed (port access).     MIRALAX 17 gram Pwpk  Generic drug: polyethylene glycol  Take 17 g by mouth daily as needed (Constipation).     naloxone 4 mg/actuation Spry  Commonly known as: NARCAN  4mg by " nasal route as needed for opioid overdose; may repeat every 2-3 minutes in alternating nostrils until medical help arrives. Call 911     nystatin cream  Commonly known as: MYCOSTATIN  Apply 1 g topically twice a week. As needed for rash     ondansetron 8 MG tablet  Commonly known as: ZOFRAN  Take 1 tablet (8 mg total) by mouth every 8 (eight) hours as needed for Nausea.     promethazine 6.25 mg/5 mL syrup  Commonly known as: PHENERGAN  Take by mouth every 6 (six) hours as needed for Nausea.     senna 8.6 mg tablet  Commonly known as: SENOKOT  Take 4 tablets by mouth once daily.     TIADYLT  MG Cs24  Generic drug: diltiaZEM  TAKE ONE CAPSULE BY MOUTH EVERY DAY            Indwelling Lines/Drains at time of discharge:   Lines/Drains/Airways     Central Venous Catheter Line  Duration           Port A Cath Single Lumen -- days    Port A Cath Single Lumen right subclavian -- days          Drain  Duration                Biliary Tube 04/05/23 1043 VTCB biliary 8 Fr. RUQ 3 days                Time spent on the discharge of patient: 60 minutes         Madai Jacques MD  Department of Hospital Medicine  [facility] S[facility]

## 2023-04-08 NOTE — PLAN OF CARE
SW spoke with patient's daughter, Ms. Rogers. Reported that they are agreeable to  and do not have any agency preferences. SW sent referral via [facility] to [facility] for review. Will F/U with them shortly to see if they can accommodate patient.     Update: [facility] reported that they cannot accept pt. Additional referrals sent to [facility], [facility], [facility], and [facility] for review. Will F/U    DEYSI Perez, LCSW  Weekend -[facility] [place]  [facility] [phone]

## 2023-04-08 NOTE — ASSESSMENT & PLAN NOTE
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 Recommend serial enemas, NPO and monitor for now.    - will trial CLD and ADAT pending RUQ for cholecystitis.  - Fleet enema ordered.    4/5: received fleet enema overnight with no subsequent BM. Passing flatus. Tolerating full liquid diet.   - Gen Surg: no acute surgical intervention. Will ADAT, monitor for now given lack of SBO symptomatology, and continue serial enemas as needed.  4/6: has not yet had an enema. Aggressive bowel regimen. Advanced to renal diet.  4/8: Had multiple large BM overnight. Plan to discharge with bowel regimen

## 2023-04-08 NOTE — PLAN OF CARE
Pt discharged to home via wheelchair with belongings. IV removed. Discharge packet discussed with pt. All questions answered. New meds to be picked up at local pharmacy.

## 2023-04-08 NOTE — ASSESSMENT & PLAN NOTE
Presented with severe RUQ pain, concern for acute cholecystitis on CT AP and US. WBC 13 with left shift. 7/10 stabbing pain, worsened with palpation, does not refer. Not post prandial. Alk Phos 610. Initially febrile to 100.8   Gen Surg consulted, deemed poor surgical candidate given age, malignancy. Recommend edIR consult for cholecystostomy  - initially refused but underwent cholecystostomy on 4/5 with 50cc of thick sludge drained, cultures sent.  - Discussed with ID, will swap Zosyn for 7 day Augmentin 750-125mg BID course.   - IR: Drain to remain in place for ~6 months. 1 month IR follow-up.   - pain controlled on current regimen.  - Blood cx NGTD 72 hrs  - Bile cultures pending  - gentle IVF  - Advanced to renal diet.  - tolerating diet, having BM. Discharge with augmentin course, recheck CMP 1 week for mild transaminase bump likely 2/2 Augmentin. IR follow-up 1 month. HH with drain care.

## 2023-04-08 NOTE — PLAN OF CARE
Additional referrals sent to  agencies in-network with [facility]. Several agencies have declined due to staffing issues and/or coverage. Will send to [facility] as well since issues with securing HH agency.     DEYSI Perez, LCSW  [facility] [place] [phone]

## 2023-04-09 LAB
BACTERIA BLD CULT: NORMAL
BACTERIA BLD CULT: NORMAL

## 2023-04-10 ENCOUNTER — TELEPHONE (OUTPATIENT)
Dept: ADMINISTRATIVE | Facility: CLINIC | Age: 73
End: 2023-04-10
Payer: MEDICARE

## 2023-04-10 ENCOUNTER — TELEPHONE (OUTPATIENT)
Dept: GYNECOLOGIC ONCOLOGY | Facility: CLINIC | Age: 73
End: 2023-04-10
Payer: MEDICARE

## 2023-04-10 ENCOUNTER — NURSE TRIAGE (OUTPATIENT)
Dept: ADMINISTRATIVE | Facility: CLINIC | Age: 73
End: 2023-04-10
Payer: MEDICARE

## 2023-04-10 RX ORDER — OXYCODONE HYDROCHLORIDE 10 MG/1
10 TABLET ORAL EVERY 6 HOURS PRN
Qty: 120 TABLET | Refills: 0 | Status: SHIPPED | OUTPATIENT
Start: 2023-04-10 | End: 2023-05-10

## 2023-04-10 NOTE — PROGRESS NOTES
"Phoned patient in response to reply of "2" to post-discharge texting tracker. Patient states that the wound care nurse has not come out to the home yet. She has been flushing the gall bladder drainage tube herself but only has enough supplies remaining for 1 more flush. She also mentioned that the area is itchy and she's beginning to feel slightly feverish. It appears the  attempted to send HH orders to several agencies, but they were declined due to staff shortage. Offered to transfer patient to triage nurse to discuss wound care while I continued to research her chart to see if I could determine who the HH agency is. Patient accepted and I transferred call. Called patient back to advise her to call the patient services number on her insurance card to see if they could tell her which HH agency she should contact. She verbalized understanding and disconnected call as she was still speaking with triage nurse.        "

## 2023-04-10 NOTE — TELEPHONE ENCOUNTER
PD 1 pt.   Pt calling states that she needs her dressing changed and only has 1 flush left for her biliary tube. Reports she was told that HH and wound care is backed up and doesn't know when they will get to her. Reports her dressing ahsnt been changed since her procedure on 4/5/23. Reports she noticed a slight increase in temp of 99.7F. denies any symptoms besides irritation from dressing.   Spoke with Dr. Sanchez on call for IR and states pt can change with OTC Tegaderm and guaze daily or more often if she has more drainage and that he will place order for flushes for pt.   Advised pt. verbalized understanding. Denies any further questions or concerns at this time, advised to call back if they have any that come up. Advised pt to call back with any other concerns or worsening symptoms. Verbalized understanding and will route message to provider.     Reason for Disposition   Caller has URGENT question and triager unable to answer question    Additional Information   Negative: Sounds like a life-threatening emergency to the triager    Protocols used: Post-Op Symptoms and [id number]

## 2023-04-10 NOTE — TELEPHONE ENCOUNTER
----- Message from Lexy Pate RN sent at 4/10/2023  8:12 AM CDT -----  Regarding: FW: Pt admitted    ----- Message -----  From: Ghassan Treviño MD  Sent: 4/6/2023   7:25 PM CDT  To: Preeti Reese RN, [facility] Staff  Subject: RE: Pt admitted                                  Thanks Preeti.  I had not heard about her admission.  I am happy to see her at any point.  We should call her but she usually dictates the timelines upon which she likes to be seen.  I know we missed her last appointment because I was running behind.  Best to book her first appointment of the morning or first of the afternoon so I am not running behind again.  ----- Message -----  From: Preeti Reese RN  Sent: 4/6/2023   2:48 PM CDT  To: Ghassan Treviño MD, [facility] Staff  Subject: Pt admitted                                      Hi,  [facility]stit[facility]. She was not a surgical candidate for obvious reasons, but she underwent a cholecystostomy tube placement with IR. The internal med resident placed a referral for gyn/onc. I'm not sure if anyone reached out to you Dr. Treviño, but I would imagine if they wanted to consult gyn/onc they know how to properly place a inpt consult. So I think it's safe to assume they placed the referral for her to f/u with us upon discharge and I am cancelling the referral.    Dr. Treviño, when would you like to bring her back in?  Ms. Calhoun, please schedule her for a [facility] appt when Dr. Treviño responds. (We may want to wait until she is discharged to call her to schedule it)    Preeti

## 2023-04-10 NOTE — TELEPHONE ENCOUNTER
Patient called and stated she had anju episode of pain this morning that was 9/10 and she took 2 oxycodone. She is calling for advice on what to do if this happens again. Patient has been taking her long acting oxycontin sporadically. Discussed importance of taking it scheduled and not missing doses and that will prevent episodes of severe pain like she experienced today. She states that she now understands and will take scheduled with short acting as needed.

## 2023-04-11 ENCOUNTER — HOSPITAL ENCOUNTER (INPATIENT)
Facility: HOSPITAL | Age: 73
LOS: 2 days | Discharge: HOSPICE | DRG: 435 | End: 2023-04-13
Attending: EMERGENCY MEDICINE | Admitting: EMERGENCY MEDICINE
Payer: MEDICARE

## 2023-04-11 DIAGNOSIS — K83.09 CHOLANGITIS: ICD-10-CM

## 2023-04-11 DIAGNOSIS — R07.9 CHEST PAIN: ICD-10-CM

## 2023-04-11 DIAGNOSIS — I47.10 SUPRAVENTRICULAR TACHYCARDIA: ICD-10-CM

## 2023-04-11 DIAGNOSIS — K83.1 BILIARY OBSTRUCTION DUE TO MALIGNANT NEOPLASM: ICD-10-CM

## 2023-04-11 DIAGNOSIS — I10 PRIMARY HYPERTENSION: ICD-10-CM

## 2023-04-11 DIAGNOSIS — C80.1 BILIARY OBSTRUCTION DUE TO MALIGNANT NEOPLASM: ICD-10-CM

## 2023-04-11 DIAGNOSIS — D63.0 ANEMIA IN NEOPLASTIC DISEASE: ICD-10-CM

## 2023-04-11 DIAGNOSIS — K59.01 SLOW TRANSIT CONSTIPATION: ICD-10-CM

## 2023-04-11 DIAGNOSIS — C56.3 MALIGNANT NEOPLASM OF BOTH OVARIES: ICD-10-CM

## 2023-04-11 DIAGNOSIS — R74.8 ELEVATED LIVER ENZYMES: ICD-10-CM

## 2023-04-11 DIAGNOSIS — Z51.5 PALLIATIVE CARE ENCOUNTER: ICD-10-CM

## 2023-04-11 DIAGNOSIS — R00.0 TACHYCARDIA: ICD-10-CM

## 2023-04-11 DIAGNOSIS — E80.6 HYPERBILIRUBINEMIA: Primary | ICD-10-CM

## 2023-04-11 DIAGNOSIS — K83.1 BILIARY OBSTRUCTION: ICD-10-CM

## 2023-04-11 PROBLEM — D64.9 ANEMIA: Status: ACTIVE | Noted: 2023-04-11

## 2023-04-11 LAB
ABO + RH BLD: NORMAL
ALBUMIN SERPL BCP-MCNC: 2.3 G/DL (ref 3.5–5.2)
ALLENS TEST: ABNORMAL
ALP SERPL-CCNC: 818 U/L (ref 55–135)
ALT SERPL W/O P-5'-P-CCNC: 108 U/L (ref 10–44)
ANION GAP SERPL CALC-SCNC: 12 MMOL/L (ref 8–16)
AST SERPL-CCNC: 139 U/L (ref 10–40)
BACTERIA #/AREA URNS AUTO: ABNORMAL /HPF
BASOPHILS # BLD AUTO: 0 K/UL (ref 0–0.2)
BASOPHILS NFR BLD: 0 % (ref 0–1.9)
BILIRUB SERPL-MCNC: 3.5 MG/DL (ref 0.1–1)
BILIRUB UR QL STRIP: ABNORMAL
BLD GP AB SCN CELLS X3 SERPL QL: NORMAL
BUN SERPL-MCNC: 8 MG/DL (ref 8–23)
CALCIUM SERPL-MCNC: 9.5 MG/DL (ref 8.7–10.5)
CHLORIDE SERPL-SCNC: 99 MMOL/L (ref 95–110)
CLARITY UR REFRACT.AUTO: ABNORMAL
CO2 SERPL-SCNC: 23 MMOL/L (ref 23–29)
COLOR UR AUTO: YELLOW
CREAT SERPL-MCNC: 1 MG/DL (ref 0.5–1.4)
DIFFERENTIAL METHOD: ABNORMAL
EOSINOPHIL # BLD AUTO: 0 K/UL (ref 0–0.5)
EOSINOPHIL NFR BLD: 0 % (ref 0–8)
ERYTHROCYTE [DISTWIDTH] IN BLOOD BY AUTOMATED COUNT: 18.1 % (ref 11.5–14.5)
EST. GFR  (NO RACE VARIABLE): >60 ML/MIN/1.73 M^2
GLUCOSE SERPL-MCNC: 122 MG/DL (ref 70–110)
GLUCOSE UR QL STRIP: NEGATIVE
GRAN CASTS UR QL COMP ASSIST: 6 /LPF
HCO3 UR-SCNC: 23.4 MMOL/L (ref 24–28)
HCT VFR BLD AUTO: 22.6 % (ref 37–48.5)
HGB BLD-MCNC: 7.1 G/DL (ref 12–16)
HGB UR QL STRIP: NEGATIVE
HYALINE CASTS UR QL AUTO: 3 /LPF
IMM GRANULOCYTES # BLD AUTO: 0.16 K/UL (ref 0–0.04)
IMM GRANULOCYTES NFR BLD AUTO: 1.2 % (ref 0–0.5)
KETONES UR QL STRIP: ABNORMAL
LEUKOCYTE ESTERASE UR QL STRIP: ABNORMAL
LYMPHOCYTES # BLD AUTO: 1.1 K/UL (ref 1–4.8)
LYMPHOCYTES NFR BLD: 8 % (ref 18–48)
MCH RBC QN AUTO: 27.3 PG (ref 27–31)
MCHC RBC AUTO-ENTMCNC: 31.4 G/DL (ref 32–36)
MCV RBC AUTO: 87 FL (ref 82–98)
MICROSCOPIC COMMENT: ABNORMAL
MONOCYTES # BLD AUTO: 1 K/UL (ref 0.3–1)
MONOCYTES NFR BLD: 7.4 % (ref 4–15)
NEUTROPHILS # BLD AUTO: 10.9 K/UL (ref 1.8–7.7)
NEUTROPHILS NFR BLD: 83.4 % (ref 38–73)
NITRITE UR QL STRIP: NEGATIVE
NRBC BLD-RTO: 0 /100 WBC
PCO2 BLDA: 36.8 MMHG (ref 35–45)
PH SMN: 7.41 [PH] (ref 7.35–7.45)
PH UR STRIP: 6 [PH] (ref 5–8)
PLATELET # BLD AUTO: 278 K/UL (ref 150–450)
PMV BLD AUTO: 9.4 FL (ref 9.2–12.9)
PO2 BLDA: 48 MMHG (ref 40–60)
POC BE: -1 MMOL/L
POC SATURATED O2: 84 % (ref 95–100)
POC TCO2: 25 MMOL/L (ref 24–29)
POTASSIUM SERPL-SCNC: 4.2 MMOL/L (ref 3.5–5.1)
PROT SERPL-MCNC: 7.2 G/DL (ref 6–8.4)
PROT UR QL STRIP: ABNORMAL
RBC # BLD AUTO: 2.6 M/UL (ref 4–5.4)
RBC #/AREA URNS AUTO: 3 /HPF (ref 0–4)
SAMPLE: ABNORMAL
SITE: ABNORMAL
SODIUM SERPL-SCNC: 134 MMOL/L (ref 136–145)
SP GR UR STRIP: 1.02 (ref 1–1.03)
SPECIMEN OUTDATE: NORMAL
SQUAMOUS #/AREA URNS AUTO: 2 /HPF
URN SPEC COLLECT METH UR: ABNORMAL
WBC # BLD AUTO: 13.05 K/UL (ref 3.9–12.7)
WBC #/AREA URNS AUTO: 19 /HPF (ref 0–5)
WBC CLUMPS UR QL AUTO: ABNORMAL

## 2023-04-11 PROCEDURE — 99223 1ST HOSP IP/OBS HIGH 75: CPT | Mod: ,,,

## 2023-04-11 PROCEDURE — 96367 TX/PROPH/DG ADDL SEQ IV INF: CPT

## 2023-04-11 PROCEDURE — 99223 1ST HOSP IP/OBS HIGH 75: CPT | Mod: GC,,, | Performed by: HOSPITALIST

## 2023-04-11 PROCEDURE — 63600175 PHARM REV CODE 636 W HCPCS: Performed by: STUDENT IN AN ORGANIZED HEALTH CARE EDUCATION/TRAINING PROGRAM

## 2023-04-11 PROCEDURE — 93005 ELECTROCARDIOGRAM TRACING: CPT

## 2023-04-11 PROCEDURE — 12000002 HC ACUTE/MED SURGE SEMI-PRIVATE ROOM

## 2023-04-11 PROCEDURE — 85025 COMPLETE CBC W/AUTO DIFF WBC: CPT | Performed by: EMERGENCY MEDICINE

## 2023-04-11 PROCEDURE — 63600175 PHARM REV CODE 636 W HCPCS

## 2023-04-11 PROCEDURE — 99285 EMERGENCY DEPT VISIT HI MDM: CPT | Mod: 25

## 2023-04-11 PROCEDURE — 86900 BLOOD TYPING SEROLOGIC ABO: CPT | Performed by: STUDENT IN AN ORGANIZED HEALTH CARE EDUCATION/TRAINING PROGRAM

## 2023-04-11 PROCEDURE — 25000003 PHARM REV CODE 250: Performed by: STUDENT IN AN ORGANIZED HEALTH CARE EDUCATION/TRAINING PROGRAM

## 2023-04-11 PROCEDURE — 96365 THER/PROPH/DIAG IV INF INIT: CPT

## 2023-04-11 PROCEDURE — 93010 EKG 12-LEAD: ICD-10-PCS | Mod: ,,, | Performed by: INTERNAL MEDICINE

## 2023-04-11 PROCEDURE — 99285 EMERGENCY DEPT VISIT HI MDM: CPT | Mod: GC,,, | Performed by: EMERGENCY MEDICINE

## 2023-04-11 PROCEDURE — 25500020 PHARM REV CODE 255: Performed by: EMERGENCY MEDICINE

## 2023-04-11 PROCEDURE — 63600175 PHARM REV CODE 636 W HCPCS: Performed by: EMERGENCY MEDICINE

## 2023-04-11 PROCEDURE — 99222 1ST HOSP IP/OBS MODERATE 55: CPT | Mod: GC,,, | Performed by: INTERNAL MEDICINE

## 2023-04-11 PROCEDURE — 80053 COMPREHEN METABOLIC PANEL: CPT | Performed by: EMERGENCY MEDICINE

## 2023-04-11 PROCEDURE — 99223 PR INITIAL HOSPITAL CARE,LEVL III: ICD-10-PCS | Mod: GC,,, | Performed by: HOSPITALIST

## 2023-04-11 PROCEDURE — 86850 RBC ANTIBODY SCREEN: CPT | Performed by: STUDENT IN AN ORGANIZED HEALTH CARE EDUCATION/TRAINING PROGRAM

## 2023-04-11 PROCEDURE — 81001 URINALYSIS AUTO W/SCOPE: CPT | Performed by: EMERGENCY MEDICINE

## 2023-04-11 PROCEDURE — 87086 URINE CULTURE/COLONY COUNT: CPT | Performed by: EMERGENCY MEDICINE

## 2023-04-11 PROCEDURE — 99222 PR INITIAL HOSPITAL CARE,LEVL II: ICD-10-PCS | Mod: GC,,, | Performed by: INTERNAL MEDICINE

## 2023-04-11 PROCEDURE — 99223 PR INITIAL HOSPITAL CARE,LEVL III: ICD-10-PCS | Mod: ,,,

## 2023-04-11 PROCEDURE — 99285 PR EMERGENCY DEPT VISIT,LEVEL V: ICD-10-PCS | Mod: GC,,, | Performed by: EMERGENCY MEDICINE

## 2023-04-11 PROCEDURE — 86920 COMPATIBILITY TEST SPIN: CPT

## 2023-04-11 PROCEDURE — 87040 BLOOD CULTURE FOR BACTERIA: CPT | Mod: 59 | Performed by: EMERGENCY MEDICINE

## 2023-04-11 PROCEDURE — 25000003 PHARM REV CODE 250

## 2023-04-11 PROCEDURE — 96361 HYDRATE IV INFUSION ADD-ON: CPT

## 2023-04-11 PROCEDURE — 93010 ELECTROCARDIOGRAM REPORT: CPT | Mod: ,,, | Performed by: INTERNAL MEDICINE

## 2023-04-11 PROCEDURE — 96366 THER/PROPH/DIAG IV INF ADDON: CPT

## 2023-04-11 RX ORDER — IBUPROFEN 200 MG
16 TABLET ORAL
Status: DISCONTINUED | OUTPATIENT
Start: 2023-04-11 | End: 2023-04-12

## 2023-04-11 RX ORDER — MORPHINE SULFATE 2 MG/ML
2 INJECTION, SOLUTION INTRAMUSCULAR; INTRAVENOUS EVERY 6 HOURS PRN
Status: DISCONTINUED | OUTPATIENT
Start: 2023-04-11 | End: 2023-04-12

## 2023-04-11 RX ORDER — DILTIAZEM HYDROCHLORIDE 120 MG/1
240 CAPSULE, COATED, EXTENDED RELEASE ORAL DAILY
Status: DISCONTINUED | OUTPATIENT
Start: 2023-04-12 | End: 2023-04-13 | Stop reason: HOSPADM

## 2023-04-11 RX ORDER — GLUCAGON 1 MG
1 KIT INJECTION
Status: DISCONTINUED | OUTPATIENT
Start: 2023-04-11 | End: 2023-04-13 | Stop reason: HOSPADM

## 2023-04-11 RX ORDER — OXYCODONE HCL 20 MG/1
20 TABLET, FILM COATED, EXTENDED RELEASE ORAL EVERY 12 HOURS
Status: DISCONTINUED | OUTPATIENT
Start: 2023-04-11 | End: 2023-04-13 | Stop reason: HOSPADM

## 2023-04-11 RX ORDER — IBUPROFEN 200 MG
24 TABLET ORAL
Status: DISCONTINUED | OUTPATIENT
Start: 2023-04-11 | End: 2023-04-12

## 2023-04-11 RX ORDER — POLYETHYLENE GLYCOL 3350 17 G/17G
17 POWDER, FOR SOLUTION ORAL DAILY
Status: DISCONTINUED | OUTPATIENT
Start: 2023-04-12 | End: 2023-04-13 | Stop reason: HOSPADM

## 2023-04-11 RX ORDER — NALOXONE HCL 0.4 MG/ML
0.02 VIAL (ML) INJECTION
Status: DISCONTINUED | OUTPATIENT
Start: 2023-04-11 | End: 2023-04-13 | Stop reason: HOSPADM

## 2023-04-11 RX ORDER — OXYCODONE HYDROCHLORIDE 10 MG/1
10 TABLET ORAL EVERY 6 HOURS PRN
Status: DISCONTINUED | OUTPATIENT
Start: 2023-04-11 | End: 2023-04-13 | Stop reason: HOSPADM

## 2023-04-11 RX ORDER — OXYCODONE HYDROCHLORIDE 10 MG/1
10 TABLET ORAL EVERY 6 HOURS PRN
Status: CANCELLED | OUTPATIENT
Start: 2023-04-11

## 2023-04-11 RX ORDER — ACETAMINOPHEN 325 MG/1
650 TABLET ORAL EVERY 12 HOURS PRN
Status: DISCONTINUED | OUTPATIENT
Start: 2023-04-11 | End: 2023-04-13 | Stop reason: HOSPADM

## 2023-04-11 RX ORDER — SENNOSIDES 8.6 MG/1
4 TABLET ORAL DAILY
Status: DISCONTINUED | OUTPATIENT
Start: 2023-04-12 | End: 2023-04-13 | Stop reason: HOSPADM

## 2023-04-11 RX ORDER — ACETAMINOPHEN 325 MG/1
650 TABLET ORAL
Status: COMPLETED | OUTPATIENT
Start: 2023-04-11 | End: 2023-04-11

## 2023-04-11 RX ORDER — ENOXAPARIN SODIUM 100 MG/ML
40 INJECTION SUBCUTANEOUS EVERY 24 HOURS
Status: DISCONTINUED | OUTPATIENT
Start: 2023-04-11 | End: 2023-04-13 | Stop reason: HOSPADM

## 2023-04-11 RX ORDER — VANCOMYCIN HCL IN 5 % DEXTROSE 1.25 G/25
1250 PLASTIC BAG, INJECTION (ML) INTRAVENOUS
Status: DISCONTINUED | OUTPATIENT
Start: 2023-04-12 | End: 2023-04-11

## 2023-04-11 RX ORDER — SODIUM CHLORIDE 0.9 % (FLUSH) 0.9 %
10 SYRINGE (ML) INJECTION EVERY 12 HOURS PRN
Status: DISCONTINUED | OUTPATIENT
Start: 2023-04-11 | End: 2023-04-13 | Stop reason: HOSPADM

## 2023-04-11 RX ORDER — PROMETHAZINE HYDROCHLORIDE 6.25 MG/5ML
12.5 SYRUP ORAL EVERY 6 HOURS PRN
Status: DISCONTINUED | OUTPATIENT
Start: 2023-04-11 | End: 2023-04-13 | Stop reason: HOSPADM

## 2023-04-11 RX ADMIN — ACETAMINOPHEN 650 MG: 325 TABLET ORAL at 07:04

## 2023-04-11 RX ADMIN — SODIUM CHLORIDE, POTASSIUM CHLORIDE, SODIUM LACTATE AND CALCIUM CHLORIDE 1803 ML: 600; 310; 30; 20 INJECTION, SOLUTION INTRAVENOUS at 06:04

## 2023-04-11 RX ADMIN — OXYCODONE HYDROCHLORIDE 10 MG: 10 TABLET ORAL at 04:04

## 2023-04-11 RX ADMIN — ACETAMINOPHEN 650 MG: 325 TABLET ORAL at 05:04

## 2023-04-11 RX ADMIN — ENOXAPARIN SODIUM 40 MG: 40 INJECTION SUBCUTANEOUS at 05:04

## 2023-04-11 RX ADMIN — MORPHINE SULFATE 2 MG: 2 INJECTION, SOLUTION INTRAMUSCULAR; INTRAVENOUS at 09:04

## 2023-04-11 RX ADMIN — IOHEXOL 75 ML: 350 INJECTION, SOLUTION INTRAVENOUS at 10:04

## 2023-04-11 RX ADMIN — PIPERACILLIN SODIUM AND TAZOBACTAM SODIUM 4.5 G: 4; .5 INJECTION, POWDER, FOR SOLUTION INTRAVENOUS at 05:04

## 2023-04-11 RX ADMIN — OXYCODONE HYDROCHLORIDE 20 MG: 20 TABLET, FILM COATED, EXTENDED RELEASE ORAL at 08:04

## 2023-04-11 RX ADMIN — VANCOMYCIN HYDROCHLORIDE 1750 MG: 10 INJECTION, POWDER, LYOPHILIZED, FOR SOLUTION INTRAVENOUS at 07:04

## 2023-04-11 RX ADMIN — PIPERACILLIN SODIUM AND TAZOBACTAM SODIUM 4.5 G: 4; .5 INJECTION, POWDER, FOR SOLUTION INTRAVENOUS at 03:04

## 2023-04-11 NOTE — HPI
72 F w/ PMH metastatic ovarian CA with hepatic and pulmonary mets, recent admission for acute cholecystitis s/p cholecystostomy tube placement admitted with Tmax 100.3, HDS, labs notable for WBC 13, Bili 3.5, , , Cr 1.0.  CT showing mild dilation of gallbladder with surrounding tissue stranding.  Cholecystostomy tube in place.  IR initially consulted after patient reported decreased drainage.  Reported drain appeared in place.  AES consulted for further evaluation.

## 2023-04-11 NOTE — ASSESSMENT & PLAN NOTE
Patient with history of metastatic ovarian Ca with bulky liver disease and acute cholecystitis with cholecystostomy tube in place.  Now admitted with elevated liver chemistries and Tmax 100.3.  IR evaluated and flushed drain, reviewed imaging and confirmed placement.  No evidence of overt biliary dilation based on CT review.      Recommendations:  -will await f/u liver chemistries to see if IR flushing of drain will improve liver chemistries  -pending liver chemistries will consider MRCP  -trend CBC, CMP, INR  -monitor for signs of cholangitis  -continue ABx

## 2023-04-11 NOTE — ASSESSMENT & PLAN NOTE
Patient with increased WBC, scleral icterus, RUQ, and borderline fever s/p biliary intervention 2/2 to cholecystitis. Concern for infectious process   - Given vanc/zosyn in ED, will continue with zosyn   - AES consulted

## 2023-04-11 NOTE — ASSESSMENT & PLAN NOTE
Hgb 7.1 at time of admission. Patient states since chemo she has been anemic. Chart review shows drop from 11 to 9s in April 2022, last chemo with cisplatin 2/2022 per ED note  - Consider blood consent due to low Hgb close to transfusion criteria   - daily CBC

## 2023-04-11 NOTE — TELEPHONE ENCOUNTER
Pt states recent procedure gall bladder drain placed, current temp 101.2, sharp pains at site 7/10 pain on movement, doesn't seem to be draining. Pt states no home health able to come out, states only has one flush left, daughter changed dressing. Per protocol, see hcp within 4 hours. Discussed home care and advised to call back for any further questions or concerns.   Reason for Disposition   Fever > 100.4 F (38.0 C)    Additional Information   Negative: Sounds like a life-threatening emergency to the triager   Negative: [1] Widespread rash AND [2] bright red, sunburn-like   Negative: [1] SEVERE headache AND [2] after spinal (epidural) anesthesia   Negative: [1] Vomiting AND [2] persists > 4 hours   Negative: [1] Vomiting AND [2] abdomen looks much more swollen than usual   Negative: [1] Drinking very little AND [2] dehydration suspected (e.g., no urine > 12 hours, very dry mouth, very lightheaded)   Negative: Patient sounds very sick or weak to the triager   Negative: Sounds like a serious complication to the triager    Protocols used: Post-Op Symptoms and [id number]

## 2023-04-11 NOTE — ASSESSMENT & PLAN NOTE
72 year olf female with significant family hx of cx as well as person hx of ovarian cx with mets to lung and liver here <1 week post biliary cholecystotomy placement. Per IR, seems patent, and is able to be flushed, concern for infection 2/2 to obstruction potentially due to mets.   - Cont with IV zosyn  - AES consult   - Patient sees palliative care outpatient, consider in patient consult as indicated   - Daily CMP

## 2023-04-11 NOTE — ASSESSMENT & PLAN NOTE
Patient on bowel regimen at home for chronic consitpation. CT scan significant for stool burden  - Restart home bowel regimen

## 2023-04-11 NOTE — ED TRIAGE NOTES
Liat Gilmore, a 72 y.o. female presents to the ED w/ complaint of fever and pain at the site of the nephrostomy tube placement on Wednesday.      Chief Complaint   Patient presents with    Fever     Pt reports nephrostomy tube placement Wednesday and is now having fever and pain at the site     Review of patient's allergies indicates:   Allergen Reactions    Erythromycin Other (See Comments)     Stomach Cramps     Past Medical History:   Diagnosis Date    Anxiety 08/07/2020    Asthma 10/31/2018    1985: Diagnosed. cough variant    Asthma 9/26/2013 9:37:32 AM    [facility] Historical - Respiratory: Asthma-No Additional Notes    Bronchiectasis     Complications affecting other specified body systems, hypertension 9/26/2013 9:40:45 AM    [facility] Historical - Cardiovascular: Hypertension-No Additional Notes    Dizziness and giddiness 8/30/2017 3:09:28 PM    [facility] Historical - Unknown: Vertigo-No Additional Notes    Elevated cancer antigen 125 (CA-125) 09/14/2021    Fibromyalgia 10/31/2018    2006: Diagnosed.    Herpes simplex vulvovaginitis 06/09/2021    Hx of psychiatric care     Hypercholesterolemia 10/31/2018    2010: Began statin.    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 Foster Stage 3    Other and unspecified ovarian cyst 5/7/2020 4:13:20 PM    [facility] Historical - Quick Add: Ovarian cyst, left-No Additional Notes    Other genital herpes 7/6/2017 1:15:51 PM    [facility] Historical - Infectious: Herpes,  Genital-No Additional Notes    Overweight 10/31/2018    10/31/2018: Weight 75 kg. BMI 28.    Psychiatric problem     Pulmonary nodules 12/10/2020    Pure hypercholesterolemia 6/23/2017 11:01:53 AM    [facility] Historical - Endocrine/Metabolic/Immune: Hypercholesterolemia-No Additional Notes    Reactive depression 09/02/2020    Renal failure 9/9/2019 11:55:24 AM    [facility] Historical - Urology: Renal Failure-Diagnosed @ ER when she was there for stomach pains    Supraventricular tachycardia     Tachycardia     Tachycardia 9/26/2013 9:37:10 AM    [facility] Historical - Symptoms/Signs: Tachycardia-No Additional Notes    Therapy     Patient identifiers for Liat Gilmore checked and correct.    LOC: The patient is awake, alert and aware of environment with an appropriate affect, the patient is oriented x 4 and speaking appropriately.    APPEARANCE: Patient resting comfortably and in no acute distress, patient is clean and well groomed, patient's clothing is properly fastened.    SKIN: The skin is warm and dry, color consistent with ethnicity, patient has normal skin turgor and moist mucus membranes, skin intact, no breakdown or bruising noted. Pt has fever.    MUSCULOSKELETAL: Patient moving all extremities well, no obvious swelling or deformities noted.    RESPIRATORY: Airway is open and patent, respirations are spontaneous and even, patient has a normal effort and rate.    CARDIAC: Patient has a normal rate and rhythm, no periphreal edema noted, capillary refill < 3 seconds. Normal +2 pedal pulses present.    ABDOMEN: Soft and non tender to palpation, no distention noted. Patient denies any nausea, vomiting, diarrhea, or constipation. Nephrostomy tube on r side, pain at site.    NEUROLOGIC: Eyes open spontaneously, PERRL, behavior appropriate to situation, follows commands, facial expression symmetrical, bilateral hand grasp equal and even, purposeful motor response noted, normal sensation in all  extremities.    HEENT:  No abnormalities noted.    Allergies reported:   Review of patient's allergies indicates:   Allergen Reactions    Erythromycin Other (See Comments)     Stomach Cramps

## 2023-04-11 NOTE — MEDICAL/APP STUDENT
Hospital Medicine Student   History and Physical  [facility] – [place], [place] HOSP MED 2  04/11/2023  4:24 PM    SUBJECTIVE:     Chief Complaint:  Fever, RUQ abd pain    History of Present Illness:  Ms. Liat Gilmore is a 72 y.o. 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    Pt states her cholecystostomy (placed 4/5) was initially draining but stopped having an output for x2 days. Pt is coughing up sputum and states this is normal for her 2/2  asthma. Pt has had increased weakness since last admission and finds it harder to walk around. Pt reports she has been intermittently nauseous, controlled with zofran, but has not vomited. Her last BM was x2 days ago after a lactulose treatment from her recent hospitalization. Pt has an appetite and can tolerate small meals but states she is not able to drink enough water. Reports increased pain diffusely 2/2 getting off her oxycodone schedule due to being in the hospital.    Recent hospital course: pt was admitted on 4/4 for cholecystitis, was not eligible for sx and was given cholecystostmy by IR on 4/5. Pt was treated with zosyn converted to PO augmentin BID 7 days.    ED course: sepsis screen was initiated,  WBC 13.05,  ALK phos 818, AST,ALT, bili elevated  UA: (+) WBC, hyaline and granular gasts. Received vanc and zosyn.  CT AP: dilation of gallbladder and tissue stranding. Tube in place, metastatic dz evident, stool burden  CXR: pulmonary nodules, subsegmental atelectasis  EKG: sinus tach, nonspecific T wave abnormality      Review of Systems   Constitutional:  Positive for fever and malaise/fatigue. Negative for chills and weight loss.   HENT: Negative.     Eyes: Negative.    Respiratory:  Positive for cough, sputum production and shortness of breath.    Cardiovascular:   Negative for chest pain and leg swelling.   Gastrointestinal:  Positive for abdominal pain, constipation and nausea. Negative for blood in stool, diarrhea, melena and vomiting.   Genitourinary: Negative.    Musculoskeletal: Negative.    Skin: Negative.    Neurological:  Positive for weakness. Negative for dizziness.   Endo/Heme/Allergies: Negative.    Psychiatric/Behavioral: Negative.         HISTORY:     Past Medical History:   Diagnosis Date    Anxiety 08/07/2020    Asthma 10/31/2018    1985: Diagnosed. cough variant    Asthma 9/26/2013 9:37:32 AM    [facility] Historical - Respiratory: Asthma-No Additional Notes    Bronchiectasis     Complications affecting other specified body systems, hypertension 9/26/2013 9:40:45 AM    [facility] Historical - Cardiovascular: Hypertension-No Additional Notes    Dizziness and giddiness 8/30/2017 3:09:28 PM    [facility] Historical - Unknown: Vertigo-No Additional Notes    Elevated cancer antigen 125 (CA-125) 09/14/2021    Fibromyalgia 10/31/2018    2006: Diagnosed.    Herpes simplex vulvovaginitis 06/09/2021    Hx of psychiatric care     Hypercholesterolemia 10/31/2018    2010: Began statin.    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 Foster Stage 3    Other and unspecified ovarian cyst 5/7/2020 4:13:20 PM    [facility] Historical - Quick Add: Ovarian cyst, left-No Additional Notes    Other genital herpes 7/6/2017 1:15:51 PM    [facility] Historical - Infectious: Herpes, Genital-No  Additional Notes    Overweight 10/31/2018    10/31/2018: Weight 75 kg. BMI 28.    Psychiatric problem     Pulmonary nodules 12/10/2020    Pure hypercholesterolemia 2017 11:01:53 AM    [facility] Historical - Endocrine/Metabolic/Immune: Hypercholesterolemia-No Additional Notes    Reactive depression 2020    Renal failure 2019 11:55:24 AM    [facility] Historical - Urology: Renal Failure-Diagnosed @ ER when she was there for stomach pains    Supraventricular tachycardia     Tachycardia     Tachycardia 2013 9:37:10 AM    [facility] Historical - Symptoms/Signs: Tachycardia-No Additional Notes    Therapy        Past Surgical History:   Procedure Laterality Date    APPENDECTOMY       SECTION      x 1    HYSTERECTOMY      LAPAROSCOPIC SURGICAL REMOVAL OF OMENTUM      NEPHROSTOMY Right     PELVIC AND PARA-AORTIC LYMPH NODE DISSECTION      OR REMOVAL OF OVARY/TUBE(S)      TONSILLECTOMY      TRANSPHENOIDAL PITUITARY RESECTION  2000    TUMOR REMOVAL         Family History   Problem Relation Age of Onset    Angina Mother     Stroke Father     Colon cancer Sister 70    Depression Sister     Anxiety disorder Sister     Colon cancer Brother 80    Heart disease Brother     Drug abuse Brother     Breast cancer Paternal Aunt     Ovarian cancer Neg Hx     Uterine cancer Neg Hx        Social History     Socioeconomic History    Marital status:     Number of children: 1   Tobacco Use    Smoking status: Never    Smokeless tobacco: Never   Substance and Sexual Activity    Alcohol use: No     Comment: Rarely    Drug use: No    Sexual activity: Not Currently     Social Determinants of Health     Financial Resource Strain: Unknown    Difficulty of Paying Living Expenses: Patient refused   Transportation Needs: No Transportation Needs    Lack of Transportation (Medical): No    Lack of Transportation (Non-Medical): No   Physical Activity: Unknown    Days of Exercise per Week: 3 days   Social  "Connections: Unknown    Frequency of Communication with Friends and Family: More than three times a week    Frequency of Social Gatherings with Friends and Family: More than three times a week    Marital Status:    Housing Stability: Unknown    Unable to Pay for Housing in the Last Year: No    Unstable Housing in the Last Year: No       MEDICATIONS & ALLERGIES:     Current Facility-Administered Medications on File Prior to Encounter   Medication Dose Route Frequency Provider Last Rate Last Admin    heparin, porcine (PF) 100 unit/mL injection flush 500 Units  500 Units Intravenous PRN Francisco Foster MD        sodium chloride 0.9% flush 10 mL  10 mL Intravenous PRN Francisco Foster MD         Current Outpatient Medications on File Prior to Encounter   Medication Sig Dispense Refill    albuterol 90 mcg/actuation inhaler INHALE 2 PUFFS INTO THE LUNGS EVERY 6 (SIX) HOURS AS NEEDED FOR WHEEZING.      ALPRAZolam (XANAX) 0.25 MG tablet TAKE ONE TABLET BY MOUTH TWICE DAILY AS NEEDED FOR ANXIETY 30 tablet 2    amoxicillin-clavulanate 875-125mg (AUGMENTIN) 875-125 mg per tablet Take 1 tablet by mouth every 12 (twelve) hours. 12 tablet 0    b complex vitamins tablet Take 1 tablet by mouth once daily.      BREO ELLIPTA 200-25 mcg/dose DsDv diskus inhaler Inhale 1 puff into the lungs once daily.      cycloSPORINE (RESTASIS) 0.05 % ophthalmic emulsion Place 1 drop into both eyes 2 (two) times daily.       diclofenac sodium (VOLTAREN) 1 % Gel Apply 2 g topically daily as needed (Pain).      docusate sodium (COLACE ORAL) Take 1 capsule by mouth daily as needed.       EASY TOUCH INSULIN SYRINGE 1 mL 30 gauge x 1/2" Syrg USE AS DIRECTED FOR SQ INJECTIONS THREE TIMES WEEKLY      ergocalciferol (ERGOCALCIFEROL) 50,000 unit Cap Take 1 capsule by mouth every 7 days.       MISTLETOE SUBQ Inject 1 Dose into the skin twice a week.      fluticasone (FLONASE) 50 mcg/actuation nasal spray 2 sprays (100 mcg total) by Each Nare " route once daily. 1 Bottle 0    LIDOcaine-prilocaine (EMLA) cream Apply topically as needed (port access). 30 g 0    naloxone (NARCAN) 4 mg/actuation Spry 4mg by nasal route as needed for opioid overdose; may repeat every 2-3 minutes in alternating nostrils until medical help arrives. Call 911 1 each 11    nystatin (MYCOSTATIN) cream Apply 1 g topically twice a week. As needed for rash      ondansetron (ZOFRAN) 8 MG tablet Take 1 tablet (8 mg total) by mouth every 8 (eight) hours as needed for Nausea. 30 tablet 3    oxyCODONE (OXYCONTIN) 20 mg 12 hr tablet Take 1 tablet (20 mg total) by mouth every 12 (twelve) hours. 60 tablet 0    oxyCODONE (ROXICODONE) 10 mg Tab immediate release tablet Take 1 tablet (10 mg total) by mouth every 6 (six) hours as needed for Pain. 120 tablet 0    polyethylene glycol (MIRALAX) 17 gram PwPk Take 17 g by mouth daily as needed (Constipation).      promethazine (PHENERGAN) 6.25 mg/5 mL syrup Take by mouth every 6 (six) hours as needed for Nausea.      senna (SENOKOT) 8.6 mg tablet Take 4 tablets by mouth once daily.      TIADYLT  mg Cs24 TAKE ONE CAPSULE BY MOUTH EVERY DAY 90 capsule 3    traZODone (DESYREL) 50 MG tablet Take 1 tablet (50 mg total) by mouth every evening. (Patient taking differently: Take 50 mg by mouth nightly as needed for Insomnia.) 30 tablet 0       Review of patient's allergies indicates:   Allergen Reactions    Erythromycin Other (See Comments)     Stomach Cramps       OBJECTIVE:     Vital Signs Recent:  Temp: 99.4 °F (37.4 °C) (04/11/23 1330)  Pulse: 84 (04/11/23 1315)  Resp: 18 (04/11/23 1612)  BP: 115/62 (04/11/23 1315)  SpO2: 99 % (04/11/23 1315)  Oxygen Documentation:    Flow (L/min): 2           Device (Oxygen Therapy): nasal cannula         Vital Signs Range (Last 24H):  Temp:  [99.4 °F (37.4 °C)-100.3 °F (37.9 °C)]   Pulse:  []   Resp:  [16-20]   BP: (103-145)/(52-71)   SpO2:  [90 %-99 %]        Weight:  Body mass index is 27.99 kg/m².  Wt  Readings from Last 3 Encounters:   04/11/23 71.7 kg (158 lb)   04/04/23 71 kg (156 lb 9.6 oz)   03/20/23 71.7 kg (158 lb 1.1 oz)        Physical Exam  Constitutional:       General: She is not in acute distress.     Appearance: She is ill-appearing.      Interventions: Nasal cannula in place.   HENT:      Head: Normocephalic and atraumatic.   Eyes:      General: Scleral icterus present.      Extraocular Movements: Extraocular movements intact.      Pupils: Pupils are equal, round, and reactive to light.   Cardiovascular:      Rate and Rhythm: Tachycardia present.      Pulses: Normal pulses.      Heart sounds: Normal heart sounds.   Pulmonary:      Effort: Pulmonary effort is normal. No respiratory distress.   Abdominal:      General: There is distension.      Tenderness: There is abdominal tenderness.   Skin:     General: Skin is warm and dry.      Coloration: Skin is jaundiced.   Neurological:      General: No focal deficit present.      Mental Status: She is alert and oriented to person, place, and time.          Sodium (mmol/L)   Date Value   04/11/2023 134 (L)   04/08/2023 138   04/07/2023 134 (L)     Potassium (mmol/L)   Date Value   04/11/2023 4.2   04/08/2023 3.5   04/07/2023 4.2     Chloride (mmol/L)   Date Value   04/11/2023 99   04/08/2023 101   04/07/2023 98     CO2 (mmol/L)   Date Value   04/11/2023 23   04/08/2023 25   04/07/2023 22 (L)     BUN (mg/dL)   Date Value   04/11/2023 8   04/08/2023 14   04/07/2023 13     Creatinine (mg/dL)   Date Value   04/11/2023 1.0   04/08/2023 0.9   04/07/2023 1.1     Glucose (mg/dL)   Date Value   04/11/2023 122 (H)   04/08/2023 112 (H)   04/07/2023 94     Calcium (mg/dL)   Date Value   04/11/2023 9.5   04/08/2023 9.4   04/07/2023 9.7     Magnesium (mg/dL)   Date Value   04/07/2023 2.1   04/06/2023 2.1   04/05/2023 1.5 (L)     Phosphorus (mg/dL)   Date Value   04/07/2023 3.0   04/06/2023 3.3   04/05/2023 3.1     Alkaline Phosphatase (U/L)   Date Value   04/11/2023 818  (H)   04/08/2023 681 (H)   04/07/2023 488 (H)     ALT (U/L)   Date Value   04/11/2023 108 (H)   04/08/2023 91 (H)   04/07/2023 49 (H)     AST (U/L)   Date Value   04/11/2023 139 (H)   04/08/2023 177 (H)   04/07/2023 57 (H)     Albumin (g/dL)   Date Value   04/11/2023 2.3 (L)   04/08/2023 2.2 (L)   04/07/2023 2.2 (L)     Total Protein (g/dL)   Date Value   04/11/2023 7.2   04/08/2023 6.8   04/07/2023 7.2     Total Bilirubin (mg/dL)   Date Value   04/11/2023 3.5 (H)   04/08/2023 0.5   04/07/2023 0.5     INR (no units)   Date Value   04/05/2023 1.4 (H)       WBC (K/uL)   Date Value   04/11/2023 13.05 (H)   04/08/2023 7.42   04/07/2023 8.88     Hemoglobin (g/dL)   Date Value   04/11/2023 7.1 (L)   04/08/2023 8.0 (L)   04/07/2023 8.5 (L)     Platelets (K/uL)   Date Value   04/11/2023 278   04/08/2023 353   04/07/2023 398       Diagnostic Results:  Imaging Results               CT Abdomen Pelvis With Contrast (Final result)  Result time 04/11/23 11:59:31      Impression:      There is some mild dilation of the gallbladder and surrounding tissue stranding.  Cholecystostomy tube in place.  Recommend correlation with tube output.    Multiple large hepatic hypoattenuating lesions as detailed above.  These appear similar in size to 04/04/2023 noncontrast CT allowing for differences in technique.  These are significantly larger compared to February 2022 CT.  Metastatic lesions are favored.  Multiple abscesses considered unlikely.    Moderate to severe stenosis of the portal vein presumably due to mass effect from enlarging nodes.  Additional enlarged larry hepatis, mesenteric and para-aortic nodes.    Questionable nodular thickening of the small bowel abutting the bladder dome.    Scattered nodules about the lung basis in this patient with known metastatic disease in the chest.  There is a larger, partially visualized nodule at the right lung base which may be new. CT cancer staging exam would be required for pulmonary  metastatic disease evaluation.    Large colonic stool burden.  Correlate for constipation.    Additional findings in the body of the report.    This report was flagged in Epic as abnormal.    Electronically signed by resident: Adam Pimentel  Date:    04/11/2023  Time:    10:54    Electronically signed by: Brice Greco MD  Date:    04/11/2023  Time:    11:59                  X-Ray Chest AP Portable (Final result)  Result time 04/11/23 06:21:08      Impression:      Bibasilar subsegmental opacities, likely subsegmental atelectasis.  Pulmonary nodules better evaluated on recent abdominal CT.    Presumed skin fold artifact overlying the left hemithorax.      Electronically signed by: William Field MD  Date:    04/11/2023  Time:    06:21                 Results for orders placed or performed during the hospital encounter of 04/11/23   EKG 12-lead    Narrative    Test Reason : R00.0,    Vent. Rate : 125 BPM     Atrial Rate : 125 BPM     P-R Int : 132 ms          QRS Dur : 068 ms      QT Int : 290 ms       P-R-T Axes : 070 038 065 degrees     QTc Int : 418 ms    Sinus tachycardia  Nonspecific T wave abnormality  Abnormal ECG  When compared with ECG of 04-APR-2023 06:33,  Nonspecific T wave abnormality no longer evident in Anterior leads  Confirmed by Mohit PASCUAL MD ([id number]) on 4/11/2023 4:27:48 PM    Referred By: [email or web address]   SELF           Confirmed By:Mohit PASCUAL MD        ASSESSMENT -- PLAN:   Ms. Liat Gilmore is a 72 y.o. 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        Supraventricular tachycardia [I47.1]  Yes     : First episode.  : ER: SVT.          Resolved Hospital Problems   No resolved problems to display.      Biliary obstruction 2/2 malignant neoplasm  Concern for obstruction causing septicemia: fever (Tmax 101.3F), tachycardia, elevated AST, ALT, bilirubin, Alk phos >800.  IR placed cholecystostomy , no drainage for x2 days, IR assessed pt today and were able to flush line.  GI consulted: monitor liver chemistries post flush by IR, pending MRCP.  Continue zosyn, vancomycin stopped  Trend CBC, CMP, INR, lactate  2x BC  IVF with monitoring of I/O as to avoid fluid overload    Anemia:  Hbg at 7.1. pt states she has been anemia since cisplatin tx (last 2022). On 2023 hbg was 8 with   Hbg in 2022 and 2022 were 10.1 and 9.6 respectively.   Pt reports increased fatigue, can consider transfusion if symptoms worsen. Fatigue possibly 2/2 recent hospitalization. Consult PTOT  Blood and type    Constipation  Pt reports chronic constipation 2/2 to opioid use.  Continue home regimen, polyethylene glycol, senna  Consider lactulose as an add on if no BM.    Asthma  Maintain home regimen  CXR: pulmonary nodules, atelectasis    Chronic pain  Maintain home regime oxycodone 20mg Q12H, with 10mg prn q6h,    SVT/HTN  Maintain home diltiazem 240mg QD      Discharge plannin days, pending control of septicemia and susceptibility and identifying source

## 2023-04-11 NOTE — ED NOTES
Assumed care of patient. Pt remains on cardiac monitor, continuous pulse ox, and cycling blood pressures. Bed placed in low locked position, side rails up x2, call bell is within reach. Will continue to monitor.

## 2023-04-11 NOTE — H&P
[facility]  [facility]  History & Physical    Patient Name: Liat Gilmore  MRN: [id number]  Patient Class: IP- Inpatient  Admission Date: 4/11/2023  Attending Physician: Cyndi Crews MD   Primary Care Provider: Shabana Dunbar MD         Patient information was obtained from patient and ER records.     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 Last chemo was in February of 2022. She further has hx of SVT/HTN treated with CCB, CKD, asthma, here today after episode of acute cholecystitis s/p cholecystostomy tube placement with IR with a chief complaint of fever, and RUQ pain. She further endorses increasing debility as she has noticed requiring more support when ambulating in the grocery store. She takes a CCB for her SVT/HTN. She previously was on a statin but has not had it in some time.     In the ED peak temp of 100.3, ALK Phos >800, AST/ALT >100, and Hgb of 7.1. CT scan obtained showed distension of gallbladder, though tube is in place and able to be flushed.        Past Medical History:   Diagnosis Date    Anxiety 08/07/2020    Asthma 10/31/2018    1985: Diagnosed. cough variant    Asthma 9/26/2013 9:37:32 AM    [facility] Historical - Respiratory: Asthma-No Additional Notes    Bronchiectasis     Complications affecting other specified body systems, hypertension 9/26/2013 9:40:45 AM    [facility] Historical - Cardiovascular: Hypertension-No Additional Notes    Dizziness and giddiness 8/30/2017 3:09:28 PM    [facility] Historical - Unknown: Vertigo-No Additional Notes    Elevated cancer antigen 125 (CA-125) 09/14/2021    Fibromyalgia  10/31/2018    2006: Diagnosed.    Herpes simplex vulvovaginitis 2021    Hx of psychiatric care     Hypercholesterolemia 10/31/2018    2010: Began statin.    Hyperlipidemia     Hypertension     Infective arthritis, multiple sites 2017 11:02:17 AM    [facility] Historical - Musculoskeletal: Arthritis-No Additional Notes    Lung mass 2020    Laughlin syndrome     Malignant neoplasm of both ovaries 2020    Myalgia and myositis 2017 11:02:26 AM    [facility] Historical - Musculoskeletal: Fibromyalgia-No Additional Notes    Neoplasm of other genitourinary organ 2020 9:58:16 AM    [facility] Historical - Unknown: Ovarian neoplasm-finished chemo 2020- Dr. Foster Stage 3    Other and unspecified ovarian cyst 2020 4:13:20 PM    [facility] Historical - Quick Add: Ovarian cyst, left-No Additional Notes    Other genital herpes 2017 1:15:51 PM    [facility] Historical - Infectious: Herpes, Genital-No Additional Notes    Overweight 10/31/2018    10/31/2018: Weight 75 kg. BMI 28.    Psychiatric problem     Pulmonary nodules 12/10/2020    Pure hypercholesterolemia 2017 11:01:53 AM    [facility] Historical - Endocrine/Metabolic/Immune: Hypercholesterolemia-No Additional Notes    Reactive depression 2020    Renal failure 2019 11:55:24 AM    [facility] Historical - Urology: Renal Failure-Diagnosed @ ER when she was there for stomach pains    Supraventricular tachycardia     Tachycardia     Tachycardia 2013 9:37:10 AM    [facility] Historical - Symptoms/Signs: Tachycardia-No Additional Notes    Therapy        Past Surgical History:   Procedure Laterality Date    APPENDECTOMY       SECTION      x 1    HYSTERECTOMY      LAPAROSCOPIC SURGICAL REMOVAL OF OMENTUM      NEPHROSTOMY Right     PELVIC AND PARA-AORTIC LYMPH NODE DISSECTION      [place] REMOVAL OF OVARY/TUBE(S)      TONSILLECTOMY      TRANSPHENOIDAL PITUITARY RESECTION   "2000    TUMOR REMOVAL         Review of patient's allergies indicates:   Allergen Reactions    Erythromycin Other (See Comments)     Stomach Cramps       Current Facility-Administered Medications on File Prior to Encounter   Medication    heparin, porcine (PF) 100 unit/mL injection flush 500 Units    sodium chloride 0.9% flush 10 mL     Current Outpatient Medications on File Prior to Encounter   Medication Sig    albuterol 90 mcg/actuation inhaler INHALE 2 PUFFS INTO THE LUNGS EVERY 6 (SIX) HOURS AS NEEDED FOR WHEEZING.    ALPRAZolam (XANAX) 0.25 MG tablet TAKE ONE TABLET BY MOUTH TWICE DAILY AS NEEDED FOR ANXIETY    amoxicillin-clavulanate 875-125mg (AUGMENTIN) 875-125 mg per tablet Take 1 tablet by mouth every 12 (twelve) hours.    b complex vitamins tablet Take 1 tablet by mouth once daily.    BREO ELLIPTA 200-25 mcg/dose DsDv diskus inhaler Inhale 1 puff into the lungs once daily.    cycloSPORINE (RESTASIS) 0.05 % ophthalmic emulsion Place 1 drop into both eyes 2 (two) times daily.     diclofenac sodium (VOLTAREN) 1 % Gel Apply 2 g topically daily as needed (Pain).    docusate sodium (COLACE ORAL) Take 1 capsule by mouth daily as needed.     EASY TOUCH INSULIN SYRINGE 1 mL 30 gauge x 1/2" Syrg USE AS DIRECTED FOR SQ INJECTIONS THREE TIMES WEEKLY    ergocalciferol (ERGOCALCIFEROL) 50,000 unit Cap Take 1 capsule by mouth every 7 days.     MISTLETOE SUBQ Inject 1 Dose into the skin twice a week.    fluticasone (FLONASE) 50 mcg/actuation nasal spray 2 sprays (100 mcg total) by Each Nare route once daily.    LIDOcaine-prilocaine (EMLA) cream Apply topically as needed (port access).    naloxone (NARCAN) 4 mg/actuation Spry 4mg by nasal route as needed for opioid overdose; may repeat every 2-3 minutes in alternating nostrils until medical help arrives. Call 911    nystatin (MYCOSTATIN) cream Apply 1 g topically twice a week. As needed for rash    ondansetron (ZOFRAN) 8 MG tablet Take 1 " tablet (8 mg total) by mouth every 8 (eight) hours as needed for Nausea.    oxyCODONE (OXYCONTIN) 20 mg 12 hr tablet Take 1 tablet (20 mg total) by mouth every 12 (twelve) hours.    oxyCODONE (ROXICODONE) 10 mg Tab immediate release tablet Take 1 tablet (10 mg total) by mouth every 6 (six) hours as needed for Pain.    polyethylene glycol (MIRALAX) 17 gram PwPk Take 17 g by mouth daily as needed (Constipation).    promethazine (PHENERGAN) 6.25 mg/5 mL syrup Take by mouth every 6 (six) hours as needed for Nausea.    senna (SENOKOT) 8.6 mg tablet Take 4 tablets by mouth once daily.    TIADYLT  mg Cs24 TAKE ONE CAPSULE BY MOUTH EVERY DAY    traZODone (DESYREL) 50 MG tablet Take 1 tablet (50 mg total) by mouth every evening. (Patient taking differently: Take 50 mg by mouth nightly as needed for Insomnia.)     Family History       Problem Relation (Age of Onset)    Angina Mother    Anxiety disorder Sister    Breast cancer Paternal Aunt    Colon cancer Sister (70), Brother (80)    Depression Sister    Drug abuse Brother    Heart disease Brother    Stroke Father          Tobacco Use    Smoking status: Never    Smokeless tobacco: Never   Substance and Sexual Activity    Alcohol use: No     Comment: Rarely    Drug use: No    Sexual activity: Not Currently     Review of Systems   Constitutional:  Positive for fatigue and fever. Negative for chills and diaphoresis.   HENT:  Negative for congestion and sore throat.    Respiratory:  Positive for shortness of breath. Negative for wheezing.    Cardiovascular:  Negative for chest pain and palpitations.   Gastrointestinal:  Positive for abdominal distention, abdominal pain and constipation. Negative for diarrhea, nausea and vomiting.   Skin:  Positive for color change and pallor.   Neurological:  Positive for weakness. Negative for speech difficulty and light-headedness.   Hematological:  Negative for adenopathy. Does not bruise/bleed easily.    Psychiatric/Behavioral:  Negative for agitation and confusion. The patient is not nervous/anxious.    Objective:     Vital Signs (Most Recent):  Temp: 99.4 °F (37.4 °C) (04/11/23 1330)  Pulse: 84 (04/11/23 1315)  Resp: 17 (04/11/23 1315)  BP: 115/62 (04/11/23 1315)  SpO2: 99 % (04/11/23 1315) Vital Signs (24h Range):  Temp:  [99.4 °F (37.4 °C)-100.3 °F (37.9 °C)] 99.4 °F (37.4 °C)  Pulse:  [] 84  Resp:  [16-20] 17  SpO2:  [90 %-99 %] 99 %  BP: (103-145)/(52-71) 115/62     Weight: 71.7 kg (158 lb)  Body mass index is 27.99 kg/m².    Physical Exam  Constitutional:       General: She is not in acute distress.     Appearance: She is ill-appearing and toxic-appearing. She is not diaphoretic.   HENT:      Head: Normocephalic and atraumatic.   Cardiovascular:      Rate and Rhythm: Normal rate and regular rhythm.      Pulses: Normal pulses.      Heart sounds: Normal heart sounds. No murmur heard.  Pulmonary:      Effort: No respiratory distress.      Breath sounds: Normal breath sounds. No wheezing.   Abdominal:      General: There is distension.      Tenderness: There is abdominal tenderness.   Skin:     General: Skin is warm and dry.      Coloration: Skin is jaundiced and pale.   Neurological:      General: No focal deficit present.      Mental Status: She is alert and oriented to person, place, and time.   Psychiatric:         Mood and Affect: Mood normal.         Behavior: Behavior normal.           Significant Labs: All pertinent labs within the past 24 hours have been reviewed.    Significant Imaging: I have reviewed all pertinent imaging results/findings within the past 24 hours.    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 Per IR, seems patent, and is able to be flushed, concern for infection 2/2 to obstruction  potentially due to mets.   - Cont with IV zosyn  - AES consult   - Patient sees palliative care outpatient, consider in patient consult as indicated   - Daily CMP       Anemia  Hgb 7.1 at time of admission. Patient states since chemo she has been anemic. Chart review shows drop from 11 to 9s in April 2022, last chemo with cisplatin 2/2022 per ED note  - Consider blood consent due to low Hgb close to transfusion criteria   - daily CBC       Constipation  Patient on bowel regimen at home for chronic consitpation. CT scan significant for stool burden  - Restart home bowel regimen       Right upper quadrant abdominal pain  See Cholangitis       Cholangitis  Patient with increased WBC, scleral icterus, RUQ, and borderline fever s/p biliary intervention 2/2 to cholecystitis. Concern for infectious process   - Given vanc/zosyn in ED, will continue with zosyn   - AES consulted       Hypertension  See Supraventricular tachycardia       Supraventricular tachycardia  Patient with hx of SVT and HTN followed up with cardiology   - Cont home diltiazem         VTE Risk Mitigation (From admission, onward)         Ordered     enoxaparin injection 40 mg  Daily         04/11/23 1425     IP VTE HIGH RISK PATIENT  Once         04/11/23 1425     Place sequential compression device  Until discontinued         04/11/23 1425                           [place], DO  Department of Hospital Medicine  [facility] - Emergency Dept

## 2023-04-11 NOTE — SUBJECTIVE & OBJECTIVE
Past Medical History:   Diagnosis Date    Anxiety 08/07/2020    Asthma 10/31/2018    1985: Diagnosed. cough variant    Asthma 9/26/2013 9:37:32 AM    [facility] Historical - Respiratory: Asthma-No Additional Notes    Bronchiectasis     Complications affecting other specified body systems, hypertension 9/26/2013 9:40:45 AM    [facility] Historical - Cardiovascular: Hypertension-No Additional Notes    Dizziness and giddiness 8/30/2017 3:09:28 PM    [facility] Historical - Unknown: Vertigo-No Additional Notes    Elevated cancer antigen 125 (CA-125) 09/14/2021    Fibromyalgia 10/31/2018    2006: Diagnosed.    Herpes simplex vulvovaginitis 06/09/2021    Hx of psychiatric care     Hypercholesterolemia 10/31/2018    2010: Began statin.    Hyperlipidemia     Hypertension     Infective arthritis, multiple sites 6/23/2017 11:02:17 AM    [facility] Historical - Musculoskeletal: Arthritis-No Additional Notes    Lung mass 09/01/2020    Laughlin syndrome     Malignant neoplasm of both ovaries 08/06/2020    Myalgia and myositis 6/23/2017 11:02:26 AM    [facility] Historical - Musculoskeletal: Fibromyalgia-No Additional Notes    Neoplasm of other genitourinary organ 11/6/2020 9:58:16 AM    [facility] Historical - Unknown: Ovarian neoplasm-finished chemo 11/2020- Dr. Foster Stage 3    Other and unspecified ovarian cyst 5/7/2020 4:13:20 PM    [facility] Historical - Quick Add: Ovarian cyst, left-No Additional Notes    Other genital herpes 7/6/2017 1:15:51 PM    [facility] Historical - Infectious: Herpes, Genital-No Additional Notes    Overweight 10/31/2018    10/31/2018: Weight 75 kg. BMI 28.    Psychiatric problem     Pulmonary nodules 12/10/2020    Pure hypercholesterolemia 6/23/2017 11:01:53 AM    [facility] Historical - Endocrine/Metabolic/Immune: Hypercholesterolemia-No Additional Notes    Reactive depression 09/02/2020    Renal failure 9/9/2019 11:55:24 AM    [facility] Historical - Urology: Renal  "Failure-Diagnosed @ ER when she was there for stomach pains    Supraventricular tachycardia     Tachycardia     Tachycardia 2013 9:37:10 AM    [facility] Historical - Symptoms/Signs: Tachycardia-No Additional Notes    Therapy        Past Surgical History:   Procedure Laterality Date    APPENDECTOMY       SECTION      x 1    HYSTERECTOMY      LAPAROSCOPIC SURGICAL REMOVAL OF OMENTUM      NEPHROSTOMY Right     PELVIC AND PARA-AORTIC LYMPH NODE DISSECTION      [place] REMOVAL OF OVARY/TUBE(S)      TONSILLECTOMY      TRANSPHENOIDAL PITUITARY RESECTION  2000    TUMOR REMOVAL         Review of patient's allergies indicates:   Allergen Reactions    Erythromycin Other (See Comments)     Stomach Cramps       Current Facility-Administered Medications on File Prior to Encounter   Medication    heparin, porcine (PF) 100 unit/mL injection flush 500 Units    sodium chloride 0.9% flush 10 mL     Current Outpatient Medications on File Prior to Encounter   Medication Sig    albuterol 90 mcg/actuation inhaler INHALE 2 PUFFS INTO THE LUNGS EVERY 6 (SIX) HOURS AS NEEDED FOR WHEEZING.    ALPRAZolam (XANAX) 0.25 MG tablet TAKE ONE TABLET BY MOUTH TWICE DAILY AS NEEDED FOR ANXIETY    amoxicillin-clavulanate 875-125mg (AUGMENTIN) 875-125 mg per tablet Take 1 tablet by mouth every 12 (twelve) hours.    b complex vitamins tablet Take 1 tablet by mouth once daily.    BREO ELLIPTA 200-25 mcg/dose DsDv diskus inhaler Inhale 1 puff into the lungs once daily.    cycloSPORINE (RESTASIS) 0.05 % ophthalmic emulsion Place 1 drop into both eyes 2 (two) times daily.     diclofenac sodium (VOLTAREN) 1 % Gel Apply 2 g topically daily as needed (Pain).    docusate sodium (COLACE ORAL) Take 1 capsule by mouth daily as needed.     EASY TOUCH INSULIN SYRINGE 1 mL 30 gauge x 1/2" Syrg USE AS DIRECTED FOR SQ INJECTIONS THREE TIMES WEEKLY    ergocalciferol (ERGOCALCIFEROL) 50,000 unit Cap Take 1 capsule by mouth every 7 days.     MISTLETOE " SUBQ Inject 1 Dose into the skin twice a week.    fluticasone (FLONASE) 50 mcg/actuation nasal spray 2 sprays (100 mcg total) by Each Nare route once daily.    LIDOcaine-prilocaine (EMLA) cream Apply topically as needed (port access).    naloxone (NARCAN) 4 mg/actuation Spry 4mg by nasal route as needed for opioid overdose; may repeat every 2-3 minutes in alternating nostrils until medical help arrives. Call 911    nystatin (MYCOSTATIN) cream Apply 1 g topically twice a week. As needed for rash    ondansetron (ZOFRAN) 8 MG tablet Take 1 tablet (8 mg total) by mouth every 8 (eight) hours as needed for Nausea.    oxyCODONE (OXYCONTIN) 20 mg 12 hr tablet Take 1 tablet (20 mg total) by mouth every 12 (twelve) hours.    oxyCODONE (ROXICODONE) 10 mg Tab immediate release tablet Take 1 tablet (10 mg total) by mouth every 6 (six) hours as needed for Pain.    polyethylene glycol (MIRALAX) 17 gram PwPk Take 17 g by mouth daily as needed (Constipation).    promethazine (PHENERGAN) 6.25 mg/5 mL syrup Take by mouth every 6 (six) hours as needed for Nausea.    senna (SENOKOT) 8.6 mg tablet Take 4 tablets by mouth once daily.    TIADYLT  mg Cs24 TAKE ONE CAPSULE BY MOUTH EVERY DAY    traZODone (DESYREL) 50 MG tablet Take 1 tablet (50 mg total) by mouth every evening. (Patient taking differently: Take 50 mg by mouth nightly as needed for Insomnia.)     Family History       Problem Relation (Age of Onset)    Angina Mother    Anxiety disorder Sister    Breast cancer Paternal Aunt    Colon cancer Sister (70), Brother (80)    Depression Sister    Drug abuse Brother    Heart disease Brother    Stroke Father          Tobacco Use    Smoking status: Never    Smokeless tobacco: Never   Substance and Sexual Activity    Alcohol use: No     Comment: Rarely    Drug use: No    Sexual activity: Not Currently     Review of Systems   Constitutional:  Positive for fatigue and fever. Negative for chills and diaphoresis.   HENT:  Negative for  congestion and sore throat.    Respiratory:  Positive for shortness of breath. Negative for wheezing.    Cardiovascular:  Negative for chest pain and palpitations.   Gastrointestinal:  Positive for abdominal distention, abdominal pain and constipation. Negative for diarrhea, nausea and vomiting.   Skin:  Positive for color change and pallor.   Neurological:  Positive for weakness. Negative for speech difficulty and light-headedness.   Hematological:  Negative for adenopathy. Does not bruise/bleed easily.   Psychiatric/Behavioral:  Negative for agitation and confusion. The patient is not nervous/anxious.    Objective:     Vital Signs (Most Recent):  Temp: 99.4 °F (37.4 °C) (04/11/23 1330)  Pulse: 84 (04/11/23 1315)  Resp: 17 (04/11/23 1315)  BP: 115/62 (04/11/23 1315)  SpO2: 99 % (04/11/23 1315) Vital Signs (24h Range):  Temp:  [99.4 °F (37.4 °C)-100.3 °F (37.9 °C)] 99.4 °F (37.4 °C)  Pulse:  [] 84  Resp:  [16-20] 17  SpO2:  [90 %-99 %] 99 %  BP: (103-145)/(52-71) 115/62     Weight: 71.7 kg (158 lb)  Body mass index is 27.99 kg/m².    Physical Exam  Constitutional:       General: She is not in acute distress.     Appearance: She is ill-appearing and toxic-appearing. She is not diaphoretic.   HENT:      Head: Normocephalic and atraumatic.   Cardiovascular:      Rate and Rhythm: Normal rate and regular rhythm.      Pulses: Normal pulses.      Heart sounds: Normal heart sounds. No murmur heard.  Pulmonary:      Effort: No respiratory distress.      Breath sounds: Normal breath sounds. No wheezing.   Abdominal:      General: There is distension.      Tenderness: There is abdominal tenderness.   Skin:     General: Skin is warm and dry.      Coloration: Skin is jaundiced and pale.   Neurological:      General: No focal deficit present.      Mental Status: She is alert and oriented to person, place, and time.   Psychiatric:         Mood and Affect: Mood normal.         Behavior: Behavior normal.           Significant  Labs: All pertinent labs within the past 24 hours have been reviewed.    Significant Imaging: I have reviewed all pertinent imaging results/findings within the past 24 hours.

## 2023-04-11 NOTE — CONSULTS
"Consult Note  Interventional Radiology    Consult Requested By: Danica De Luna MD   Reason for Consult: "concern for biliary drain obstruction. "    SUBJECTIVE:     Chief Complaint:  fever    History of Present Illness:  Liat Gilmore is a 72 y.o. 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     Interventional Radiology has been consulted for rachid tube evaluation which was placed by IR on 4/5/23. Per patient, she has been flushing it twice a day at home. Pt had recent imaging including a CT a/p on 4/11/23 which was reviewed by Dr Francisco Gonzalez and revealed the rachid tube was in the appropriate position. The pt's WBC is 13 and the pt is afebrile. The pt is hemodynamically stable.     Review of Systems   Constitutional: Negative.    Respiratory: Negative.     Cardiovascular: Negative.    Gastrointestinal: Negative.    Musculoskeletal: Negative.    Skin: Negative.    Neurological: Negative.    Psychiatric/Behavioral: Negative.       Scheduled Meds:  Continuous Infusions:  PRN Meds:    Review of patient's allergies indicates:   Allergen Reactions    Erythromycin Other (See Comments)     Stomach Cramps       Past Medical History:   Diagnosis Date    Anxiety 08/07/2020    Asthma 10/31/2018    1985: Diagnosed. cough variant    Asthma 9/26/2013 9:37:32 AM    [facility] Historical - Respiratory: Asthma-No Additional Notes    Bronchiectasis     Complications affecting other specified body systems, hypertension 9/26/2013 9:40:45 AM    [facility] Historical - Cardiovascular: Hypertension-No Additional Notes    Dizziness and giddiness 8/30/2017 3:09:28 PM    [facility] Historical - Unknown: Vertigo-No Additional Notes    Elevated cancer antigen 125 (CA-125) 09/14/2021    Fibromyalgia 10/31/2018    2006: Diagnosed.    Herpes simplex vulvovaginitis " 2021    Hx of psychiatric care     Hypercholesterolemia 10/31/2018    2010: Began statin.    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 Foster Stage 3    Other and unspecified ovarian cyst 2020 4:13:20 PM    [facility] Historical - Quick Add: Ovarian cyst, left-No Additional Notes    Other genital herpes 2017 1:15:51 PM    [facility] Historical - Infectious: Herpes, Genital-No Additional Notes    Overweight 10/31/2018    10/31/2018: Weight 75 kg. BMI 28.    Psychiatric problem     Pulmonary nodules 12/10/2020    Pure hypercholesterolemia 2017 11:01:53 AM    [facility] Historical - Endocrine/Metabolic/Immune: Hypercholesterolemia-No Additional Notes    Reactive depression 2020    Renal failure 2019 11:55:24 AM    [facility] Historical - Urology: Renal Failure-Diagnosed @ ER when she was there for stomach pains    Supraventricular tachycardia     Tachycardia     Tachycardia 2013 9:37:10 AM    [facility] Historical - Symptoms/Signs: Tachycardia-No Additional Notes    Therapy      Past Surgical History:   Procedure Laterality Date    APPENDECTOMY       SECTION      x 1    HYSTERECTOMY      LAPAROSCOPIC SURGICAL REMOVAL OF OMENTUM      NEPHROSTOMY Right     PELVIC AND PARA-AORTIC LYMPH NODE DISSECTION      [place] REMOVAL OF OVARY/TUBE(S)      TONSILLECTOMY      TRANSPHENOIDAL PITUITARY RESECTION  2000    TUMOR REMOVAL       Family History   Problem Relation Age of Onset    Angina Mother      Stroke Father     Colon cancer Sister 70    Depression Sister     Anxiety disorder Sister     Colon cancer Brother 80    Heart disease Brother     Drug abuse Brother     Breast cancer Paternal Aunt     Ovarian cancer Neg Hx     Uterine cancer Neg Hx      Social History     Tobacco Use    Smoking status: Never    Smokeless tobacco: Never   Substance Use Topics    Alcohol use: No     Comment: Rarely    Drug use: No       OBJECTIVE:     Vital Signs (Most Recent)  Temp: 100.3 °F (37.9 °C) (04/11/23 0427)  Pulse: 88 (04/11/23 0900)  Resp: 16 (04/11/23 0900)  BP: 115/62 (04/11/23 0900)  SpO2: 98 % (04/11/23 0900)    Physical Exam:  Physical Exam  Constitutional:       Appearance: She is ill-appearing.   HENT:      Head: Normocephalic.   Cardiovascular:      Rate and Rhythm: Normal rate.   Pulmonary:      Effort: Pulmonary effort is normal.   Abdominal:      Comments: Jenae tube in place with straw colored drainage   Skin:     General: Skin is warm.   Neurological:      General: No focal deficit present.      Mental Status: She is alert.   Psychiatric:         Mood and Affect: Mood normal.       Laboratory  I have reviewed all pertinent lab results within the past 24 hours.  CBC:   Recent Labs   Lab 04/11/23 0523   WBC 13.05*   RBC 2.60*   HGB 7.1*   HCT 22.6*      MCV 87   MCH 27.3   MCHC 31.4*     CMP:   Recent Labs   Lab 04/11/23  0523   *   CALCIUM 9.5   ALBUMIN 2.3*   PROT 7.2   *   K 4.2   CO2 23   CL 99   BUN 8   CREATININE 1.0   ALKPHOS 818*   *   *   BILITOT 3.5*     LFTs:   Recent Labs   Lab 04/11/23  0523   *   *   ALKPHOS 818*   BILITOT 3.5*   PROT 7.2   ALBUMIN 2.3*     Coagulation:   Recent Labs   Lab 04/05/23  0642   LABPROT 14.3*   INR 1.4*       Imaging:  Recent imaging studies including CT a/p on 4/11/23 which was independently reviewed by Francisco Gonzalez MD.     ASSESSMENT/PLAN:     Assessment:  72 y.o. female with a past medical history of stage IV  metastatic ovarian cancer with mets to the liver, chemotherapy induced kidney disease, HTN, HLD, fibromyalgia, asthma, CLOVER, PTSD who has been referred to IR for evaluation of cholecystostomy tube which was placed by IR on 4/5/23.  Plan discussed with ordering physician    Plan:  Review of CT AP by Dr Gonzalez shows the rachid tube appears to be in adequate position.Recommend continuing to flush the tube q12 hours.   Patient has widespread metastatic disease which could be causing some biliary obstruction but no biliary dilatation noted on imaging. Placing a line would require passing through tumor.  Thank you for the consult. IR will sign off. Please contact with questions via [facility] secure chat.    Jeanne Davenport PA-C  Interventional Radiology  4/11/2023

## 2023-04-11 NOTE — ED PROVIDER NOTES
Encounter Date: 4/11/2023       History     Chief Complaint   Patient presents with    Fever     Pt reports nephrostomy tube placement Wednesday and is now having fever and pain at the site     72-year-old female with a history including Laughlin Syndrome with metastatic ovarian cancer with mets to the liver and lungs (most recently on cisplatin 2/2022), HTN, SVT, HLD, cisplatin-induced CKD, CLOVER, PTSD, asthma, chemotherapy-induced kidney disease, and recent acute cholecystitis s/p cholecystostomy tube placement via IR on 4/5/23 presents to the ED with a chief complaint of fever and worsening pain from tube site.  Patient reports developing fever to 101.2F around midnight.  She also has noted decreased drainage from her cholecystostomy tube over the past couple days.  There has also been worsening pain at the tube site as well as diffusely in her abdomen.  She states that she has been taking Augmentin since her discharge on 4/8/23.  She has experienced some transient nausea but no vomiting.  She is passing flatus and having normal bowel movements.    Note: triage not reports presence of nephrostomy tube, but pt does not have this.       Review of patient's allergies indicates:   Allergen Reactions    Erythromycin Other (See Comments)     Stomach Cramps     Past Medical History:   Diagnosis Date    Anxiety 08/07/2020    Asthma 10/31/2018    1985: Diagnosed. cough variant    Asthma 9/26/2013 9:37:32 AM    [facility] Historical - Respiratory: Asthma-No Additional Notes    Bronchiectasis     Complications affecting other specified body systems, hypertension 9/26/2013 9:40:45 AM    [facility] Historical - Cardiovascular: Hypertension-No Additional Notes    Dizziness and giddiness 8/30/2017 3:09:28 PM    [facility] Historical - Unknown: Vertigo-No Additional Notes    Elevated cancer antigen 125 (CA-125) 09/14/2021    Fibromyalgia 10/31/2018    2006: Diagnosed.    Herpes simplex vulvovaginitis 06/09/2021    Hx of  psychiatric care     Hypercholesterolemia 10/31/2018    2010: Began statin.    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 Foster Stage 3    Other and unspecified ovarian cyst 2020 4:13:20 PM    [facility] Historical - Quick Add: Ovarian cyst, left-No Additional Notes    Other genital herpes 2017 1:15:51 PM    [facility] Historical - Infectious: Herpes, Genital-No Additional Notes    Overweight 10/31/2018    10/31/2018: Weight 75 kg. BMI 28.    Psychiatric problem     Pulmonary nodules 12/10/2020    Pure hypercholesterolemia 2017 11:01:53 AM    [facility] Historical - Endocrine/Metabolic/Immune: Hypercholesterolemia-No Additional Notes    Reactive depression 2020    Renal failure 2019 11:55:24 AM    [facility] Historical - Urology: Renal Failure-Diagnosed @ ER when she was there for stomach pains    Supraventricular tachycardia     Tachycardia     Tachycardia 2013 9:37:10 AM    [facility] Historical - Symptoms/Signs: Tachycardia-No Additional Notes    Therapy      Past Surgical History:   Procedure Laterality Date    APPENDECTOMY       SECTION      x 1    HYSTERECTOMY      LAPAROSCOPIC SURGICAL REMOVAL OF OMENTUM      NEPHROSTOMY Right     PELVIC AND PARA-AORTIC LYMPH NODE DISSECTION      [place] REMOVAL OF OVARY/TUBE(S)      TONSILLECTOMY      TRANSPHENOIDAL PITUITARY RESECTION  2000    TUMOR REMOVAL       Family History   Problem Relation Age of Onset    Angina Mother     Stroke Father     Colon  cancer Sister 70    Depression Sister     Anxiety disorder Sister     Colon cancer Brother 80    Heart disease Brother     Drug abuse Brother     Breast cancer Paternal Aunt     Ovarian cancer Neg Hx     Uterine cancer Neg Hx      Social History     Tobacco Use    Smoking status: Never    Smokeless tobacco: Never   Substance Use Topics    Alcohol use: No     Comment: Rarely    Drug use: No     Review of Systems   Constitutional:  Positive for fever. Negative for chills.   HENT:  Negative for congestion and rhinorrhea.    Eyes:  Negative for pain and visual disturbance.   Respiratory:  Negative for cough and shortness of breath.    Cardiovascular:  Negative for chest pain and palpitations.   Gastrointestinal:  Positive for abdominal pain and nausea. Negative for vomiting.   Genitourinary:  Negative for difficulty urinating and dysuria.   Musculoskeletal:  Negative for gait problem and joint swelling.   Skin:  Negative for rash and wound.   Neurological:  Negative for numbness and headaches.     Physical Exam     Initial Vitals [04/11/23 0427]   BP Pulse Resp Temp SpO2   (!) 145/70 (!) 135 20 100.3 °F (37.9 °C) (!) 94 %      MAP       --         Physical Exam    Nursing note and vitals reviewed.  Constitutional: She is not diaphoretic. No distress.   HENT:   Head: Normocephalic and atraumatic.   Mouth/Throat: Oropharynx is clear and moist.   Eyes: Conjunctivae and EOM are normal.   Neck: Neck supple.   Normal range of motion.  Cardiovascular:  Normal rate, regular rhythm and normal heart sounds.           Pulmonary/Chest: Breath sounds normal. She has no wheezes. She has no rhonchi. She has no rales.   Abdominal: Abdomen is soft. She exhibits no distension. There is abdominal tenderness (Diffuse, worst in RUQ near cholecystostomy site).   Cholecystostomy tube in place with small amount of serosanguineous drainage and no surrounding induration or erythema   Musculoskeletal:         General: No edema. Normal range of  motion.      Cervical back: Normal range of motion and neck supple.     Neurological: She is alert and oriented to person, place, and time.   Skin: Skin is warm and dry. Capillary refill takes less than 2 seconds.       ED Course   Procedures  Labs Reviewed   CBC W/ AUTO DIFFERENTIAL - Abnormal; Notable for the following components:       Result Value    WBC 13.05 (*)     RBC 2.60 (*)     Hemoglobin 7.1 (*)     Hematocrit 22.6 (*)     MCHC 31.4 (*)     RDW 18.1 (*)     Immature Granulocytes 1.2 (*)     Gran # (ANC) 10.9 (*)     Immature Grans (Abs) 0.16 (*)     Gran % 83.4 (*)     Lymph % 8.0 (*)     All other components within normal limits   COMPREHENSIVE METABOLIC PANEL - Abnormal; Notable for the following components:    Sodium 134 (*)     Glucose 122 (*)     Albumin 2.3 (*)     Total Bilirubin 3.5 (*)     Alkaline Phosphatase 818 (*)      (*)      (*)     All other components within normal limits   URINALYSIS, REFLEX TO URINE CULTURE - Abnormal; Notable for the following components:    Appearance, UA Hazy (*)     Protein, UA 1+ (*)     Ketones, UA Trace (*)     Bilirubin (UA) 2+ (*)     Leukocytes, UA Trace (*)     All other components within normal limits    Narrative:     Specimen Source->Urine   URINALYSIS MICROSCOPIC - Abnormal; Notable for the following components:    WBC, UA 19 (*)     WBC Clumps, UA Occasional (*)     Hyaline Casts, UA 3 (*)     Granular Casts, UA 6 (*)     All other components within normal limits    Narrative:     Specimen Source->Urine   ISTAT PROCEDURE - Abnormal; Notable for the following components:    POC HCO3 23.4 (*)     POC SATURATED O2 84 (*)     All other components within normal limits   CULTURE, BLOOD   CULTURE, BLOOD   CULTURE, URINE   TYPE & SCREEN          Imaging Results              X-Ray Chest AP Portable (Final result)  Result time 04/11/23 06:21:08      Final result by William Field MD (04/11/23 06:21:08)                   Impression:     "  Bibasilar subsegmental opacities, likely subsegmental atelectasis.  Pulmonary nodules better evaluated on recent abdominal CT.    Presumed skin fold artifact overlying the left hemithorax.      Electronically signed by: William Field MD  Date:    04/11/2023  Time:    06:21               Narrative:    EXAMINATION:  XR CHEST AP PORTABLE    CLINICAL HISTORY:  Provided history is "Sepsis;  ".    TECHNIQUE:  One view of the chest.    COMPARISON:  04/04/2023.    FINDINGS:  Cardiac wires overlie the chest.  Right-sided port catheter is present with the tip overlying the cavoatrial junction.  Cardiac silhouette is borderline enlarged.  Lung volumes are relatively low, accentuating basilar markings.  Bibasilar subsegmental atelectasis.  Pulmonary nodule again noted overlying the right lung base measuring approximately 1.7 cm in size.  Additional pulmonary nodules better evaluated on prior CT.  Possible trace pleural effusions.  Presumed skin fold artifact overlies the left hemithorax.  Pneumothorax felt unlikely.                                       Medications   vancomycin 1.75 g in 5 % dextrose 500 mL IVPB (has no administration in time range)   lactated ringers bolus 1,803 mL (1,803 mLs Intravenous New Bag 4/11/23 0624)   acetaminophen tablet 650 mg (650 mg Oral Given 4/11/23 0536)   piperacillin-tazobactam (ZOSYN) 4.5 g in dextrose 5 % in water (D5W) 5 % 100 mL IVPB (MB+) (0 g Intravenous Stopped 4/11/23 0622)     Medical Decision Making:   History:   Old Medical Records: I decided to obtain old medical records.  Differential Diagnosis:   Cholangitis  Tube displacement  Intra-abdominal abscess  Bacteremia  Independently Interpreted Test(s):   I have ordered and independently interpreted EKG Reading(s) - see summary below       <> Summary of EKG Reading(s): Sinus tachycardia. No STEMI.   Clinical Tests:   Lab Tests: Ordered  Radiological Study: Ordered  Medical Tests: Ordered  Sepsis Perfusion Assessment: "I attest a " "sepsis perfusion exam was performed within 6 hours of sepsis, severe sepsis, or septic shock presentation, following fluid resuscitation."  ED Management:  Patient is mildly hypertensive, tachycardic and febrile.  Tylenol given.  She is satting 94% on room air and does not appear to be in any respiratory distress.  Blood cultures collected and sepsis workup initiated.  Vancomycin and Zosyn given empirically.  Labs notable for WBC 13, Hgb 7.1, T. Bili 3.5, , , . CXR demonstrates bibasilar subsegmental atelectasis.  CT of abdomen pending.  Care of this patient signed out to oncoming ED team.  Anticipate admission to general surgery vs HM/IR.          Attending Attestation:   Physician Attestation Statement for Resident:  As the supervising MD   Physician Attestation Statement: I have personally seen and examined this patient.   I agree with the above history.  -:   As the supervising MD I agree with the above PE.     As the supervising MD I agree with the above treatment, course, plan, and disposition.                               Clinical Impression:   Final diagnoses:  [R00.0] Tachycardia  [E80.6] Hyperbilirubinemia (Primary)  [R74.8] Elevated liver enzymes        ED Disposition Condition    Admit Stable                Anjelica Mahoney MD  Resident  04/11/23 [id number]       Danica Bentley MD  04/13/23 6730    "

## 2023-04-11 NOTE — CONSULTS
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  CT showing mild dilation of gallbladder with surrounding tissue stranding.  Cholecystostomy tube in place.  IR initially consulted after patient reported decreased drainage.  Reported drain appeared in place.  [facility] consulted for further evaluation.        Past Medical History:   Diagnosis Date    Anxiety 08/07/2020    Asthma 10/31/2018    1985: Diagnosed. cough variant    Asthma 9/26/2013 9:37:32 AM    [facility] Historical - Respiratory: Asthma-No Additional Notes    Bronchiectasis     Complications affecting other specified body systems, hypertension 9/26/2013 9:40:45 AM    [facility] Historical - Cardiovascular: Hypertension-No Additional Notes    Dizziness and giddiness 8/30/2017 3:09:28 PM    [facility] Historical - Unknown: Vertigo-No Additional Notes    Elevated cancer antigen 125 (CA-125) 09/14/2021    Fibromyalgia 10/31/2018    2006: Diagnosed.    Herpes simplex vulvovaginitis 06/09/2021    Hx of psychiatric care     Hypercholesterolemia 10/31/2018    2010: Began statin.    Hyperlipidemia     Hypertension     Infective arthritis, multiple sites 6/23/2017 11:02:17 AM    [facility] Historical - Musculoskeletal:  Arthritis-No Additional Notes    Lung mass 2020    Laughlin syndrome     Malignant neoplasm of both ovaries 2020    Myalgia and myositis 2017 11:02:26 AM    [facility] Historical - Musculoskeletal: Fibromyalgia-No Additional Notes    Neoplasm of other genitourinary organ 2020 9:58:16 AM    [facility] Historical - Unknown: Ovarian neoplasm-finished chemo 2020- Dr. Foster Stage 3    Other and unspecified ovarian cyst 2020 4:13:20 PM    [facility] Historical - Quick Add: Ovarian cyst, left-No Additional Notes    Other genital herpes 2017 1:15:51 PM    [facility] Historical - Infectious: Herpes, Genital-No Additional Notes    Overweight 10/31/2018    10/31/2018: Weight 75 kg. BMI 28.    Psychiatric problem     Pulmonary nodules 12/10/2020    Pure hypercholesterolemia 2017 11:01:53 AM    [facility] - Endocrine/Metabolic/Immune: Hypercholesterolemia-No Additional Notes    Reactive depression 2020    Renal failure 2019 11:55:24 AM    [facility] Historical - Urology: Renal Failure-Diagnosed @ ER when she was there for stomach pains    Supraventricular tachycardia     Tachycardia     Tachycardia 2013 9:37:10 AM    [facility] Historical - Symptoms/Signs: Tachycardia-No Additional Notes    Therapy        Past Surgical History:   Procedure Laterality Date    APPENDECTOMY       SECTION      x 1    HYSTERECTOMY      LAPAROSCOPIC SURGICAL REMOVAL OF OMENTUM      NEPHROSTOMY Right     PELVIC AND PARA-AORTIC LYMPH NODE DISSECTION      NM REMOVAL OF OVARY/TUBE(S)      TONSILLECTOMY      TRANSPHENOIDAL PITUITARY RESECTION  2000    TUMOR REMOVAL         Review of patient's allergies indicates:   Allergen Reactions    Erythromycin Other (See Comments)     Stomach Cramps     Family History       Problem Relation (Age of Onset)    Angina Mother    Anxiety disorder Sister    Breast cancer Paternal Aunt    Colon cancer Sister (70),  Brother (80)    Depression Sister    Drug abuse Brother    Heart disease Brother    Stroke Father          Tobacco Use    Smoking status: Never    Smokeless tobacco: Never   Substance and Sexual Activity    Alcohol use: No     Comment: Rarely    Drug use: No    Sexual activity: Not Currently     Review of Systems   Constitutional:  Positive for fever.   Gastrointestinal:  Negative for abdominal pain.   Skin:  Negative for color change.   Objective:     Vital Signs (Most Recent):  Temp: 99.4 °F (37.4 °C) (04/11/23 1330)  Pulse: 84 (04/11/23 1315)  Resp: 17 (04/11/23 1315)  BP: 115/62 (04/11/23 1315)  SpO2: 99 % (04/11/23 1315) Vital Signs (24h Range):  Temp:  [99.4 °F (37.4 °C)-100.3 °F (37.9 °C)] 99.4 °F (37.4 °C)  Pulse:  [] 84  Resp:  [16-20] 17  SpO2:  [90 %-99 %] 99 %  BP: (103-145)/(52-71) 115/62     Weight: 71.7 kg (158 lb) (04/11/23 0427)  Body mass index is 27.99 kg/m².      Intake/Output Summary (Last 24 hours) at 4/11/2023 1530  Last data filed at 4/11/2023 1021  Gross per 24 hour   Intake 2303 ml   Output --   Net 2303 ml       Lines/Drains/Airways       Central Venous Catheter Line  Duration             Port A Cath Single Lumen -- days    Port A Cath Single Lumen right subclavian -- days              Drain  Duration                  Biliary Tube 04/05/23 1043 VTCB biliary 8 Fr. RUQ 6 days    Female External Urinary Catheter 04/11/23 1203 <1 day              Peripheral Intravenous Line  Duration                  Peripheral IV - Single Lumen 04/11/23 0548 20 G Posterior;Right Hand <1 day         Peripheral IV - Single Lumen 04/11/23 0843 22 G Left Antecubital <1 day                    Physical Exam    Gen: NAD, lying comfortably  HENT: NCAT, oropharynx clear  Eyes: anicteric sclerae, EOMI grossly  Abd: soft, NT/ND, external biliary drain in place  Ext: no LE edema, warm, well perfused  Skin: skin intact on exposed body parts, no visible rashes, lesions  Neuro: A&Ox4, neuro exam grossly intact,  moves all extremities  Psych: appropriate mood, affect      Significant Labs:  CBC:   Recent Labs   Lab 04/11/23  0523   WBC 13.05*   HGB 7.1*   HCT 22.6*        CMP:   Recent Labs   Lab 04/11/23  0523   *   CALCIUM 9.5   ALBUMIN 2.3*   PROT 7.2   *   K 4.2   CO2 23   CL 99   BUN 8   CREATININE 1.0   ALKPHOS 818*   *   *   BILITOT 3.5*     Coagulation: No results for input(s): PT, INR, APTT in the last 48 hours.    Significant Imaging:  Imaging results within the past 24 hours have been reviewed.    Assessment/Plan:     GI  Elevated liver enzymes  Patient with history of metastatic ovarian Ca with bulky liver disease and acute cholecystitis with cholecystostomy tube in place.  Now admitted with elevated liver chemistries and Tmax 100.3.  IR evaluated and flushed drain, reviewed imaging and confirmed placement.  No evidence of overt biliary dilation based on CT review.      Recommendations:  -will await f/u liver chemistries to see if IR flushing of drain will improve liver chemistries  -pending liver chemistries will consider MRCP  -trend CBC, CMP, INR  -monitor for signs of cholangitis  -continue ABx        Thank you for your consult. I will follow-up with patient. Please contact us if you have any additional questions.    Dennys Yo MD  Gastroenterology  [facility]

## 2023-04-11 NOTE — HPI
Ms. 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 Last chemo was in February of 2022. She further has hx of SVT/HTN treated with CCB, CKD, asthma, here today after episode of acute cholecystitis s/p cholecystostomy tube placement with IR with a chief complaint of fever, and RUQ pain. She further endorses increasing debility as she has noticed requiring more support when ambulating in the grocery store. She takes a CCB for her SVT/HTN. She previously was on a statin but has not had it in some time.     In the ED peak temp of 100.3, ALK Phos >800, AST/ALT >100, and Hgb of 7.1. CT scan obtained showed distension of gallbladder, though tube is in place and able to be flushed.

## 2023-04-11 NOTE — SUBJECTIVE & OBJECTIVE
Past Medical History:   Diagnosis Date    Anxiety 08/07/2020    Asthma 10/31/2018    1985: Diagnosed. cough variant    Asthma 9/26/2013 9:37:32 AM    [facility] Historical - Respiratory: Asthma-No Additional Notes    Bronchiectasis     Complications affecting other specified body systems, hypertension 9/26/2013 9:40:45 AM    [facility] Historical - Cardiovascular: Hypertension-No Additional Notes    Dizziness and giddiness 8/30/2017 3:09:28 PM    [facility] Historical - Unknown: Vertigo-No Additional Notes    Elevated cancer antigen 125 (CA-125) 09/14/2021    Fibromyalgia 10/31/2018    2006: Diagnosed.    Herpes simplex vulvovaginitis 06/09/2021    Hx of psychiatric care     Hypercholesterolemia 10/31/2018    2010: Began statin.    Hyperlipidemia     Hypertension     Infective arthritis, multiple sites 6/23/2017 11:02:17 AM    [facility] Historical - Musculoskeletal: Arthritis-No Additional Notes    Lung mass 09/01/2020    Laughlin syndrome     Malignant neoplasm of both ovaries 08/06/2020    Myalgia and myositis 6/23/2017 11:02:26 AM    [facility] Historical - Musculoskeletal: Fibromyalgia-No Additional Notes    Neoplasm of other genitourinary organ 11/6/2020 9:58:16 AM    [facility] Historical - Unknown: Ovarian neoplasm-finished chemo 11/2020- Dr. Foster Stage 3    Other and unspecified ovarian cyst 5/7/2020 4:13:20 PM    [facility] Historical - Quick Add: Ovarian cyst, left-No Additional Notes    Other genital herpes 7/6/2017 1:15:51 PM    [facility] Historical - Infectious: Herpes, Genital-No Additional Notes    Overweight 10/31/2018    10/31/2018: Weight 75 kg. BMI 28.    Psychiatric problem     Pulmonary nodules 12/10/2020    Pure hypercholesterolemia 6/23/2017 11:01:53 AM    [facility] Historical - Endocrine/Metabolic/Immune: Hypercholesterolemia-No Additional Notes    Reactive depression 09/02/2020    Renal failure 9/9/2019 11:55:24 AM    [facility] Historical - Urology: Renal  Failure-Diagnosed @ ER when she was there for stomach pains    Supraventricular tachycardia     Tachycardia     Tachycardia 2013 9:37:10 AM    [facility] Historical - Symptoms/Signs: Tachycardia-No Additional Notes    Therapy        Past Surgical History:   Procedure Laterality Date    APPENDECTOMY       SECTION      x 1    HYSTERECTOMY      LAPAROSCOPIC SURGICAL REMOVAL OF OMENTUM      NEPHROSTOMY Right     PELVIC AND PARA-AORTIC LYMPH NODE DISSECTION      CT REMOVAL OF OVARY/TUBE(S)      TONSILLECTOMY      TRANSPHENOIDAL PITUITARY RESECTION  2000    TUMOR REMOVAL         Review of patient's allergies indicates:   Allergen Reactions    Erythromycin Other (See Comments)     Stomach Cramps     Family History       Problem Relation (Age of Onset)    Angina Mother    Anxiety disorder Sister    Breast cancer Paternal Aunt    Colon cancer Sister (70), Brother (80)    Depression Sister    Drug abuse Brother    Heart disease Brother    Stroke Father          Tobacco Use    Smoking status: Never    Smokeless tobacco: Never   Substance and Sexual Activity    Alcohol use: No     Comment: Rarely    Drug use: No    Sexual activity: Not Currently     Review of Systems   Constitutional:  Positive for fever.   Gastrointestinal:  Negative for abdominal pain.   Skin:  Negative for color change.   Objective:     Vital Signs (Most Recent):  Temp: 99.4 °F (37.4 °C) (23 1330)  Pulse: 84 (23 1315)  Resp: 17 (23 1315)  BP: 115/62 (23 1315)  SpO2: 99 % (23 1315) Vital Signs (24h Range):  Temp:  [99.4 °F (37.4 °C)-100.3 °F (37.9 °C)] 99.4 °F (37.4 °C)  Pulse:  [] 84  Resp:  [16-20] 17  SpO2:  [90 %-99 %] 99 %  BP: (103-145)/(52-71) 115/62     Weight: 71.7 kg (158 lb) (23 0427)  Body mass index is 27.99 kg/m².      Intake/Output Summary (Last 24 hours) at 2023 1530  Last data filed at 2023 1021  Gross per 24 hour   Intake 2303 ml   Output --   Net 2303 ml        Lines/Drains/Airways       Central Venous Catheter Line  Duration             Port A Cath Single Lumen -- days    Port A Cath Single Lumen right subclavian -- days              Drain  Duration                  Biliary Tube 04/05/23 1043 VTCB biliary 8 Fr. RUQ 6 days    Female External Urinary Catheter 04/11/23 1203 <1 day              Peripheral Intravenous Line  Duration                  Peripheral IV - Single Lumen 04/11/23 0548 20 G Posterior;Right Hand <1 day         Peripheral IV - Single Lumen 04/11/23 0843 22 G Left Antecubital <1 day                    Physical Exam    Gen: NAD, lying comfortably  HENT: NCAT, oropharynx clear  Eyes: anicteric sclerae, EOMI grossly  Abd: soft, NT/ND, external biliary drain in place  Ext: no LE edema, warm, well perfused  Skin: skin intact on exposed body parts, no visible rashes, lesions  Neuro: A&Ox4, neuro exam grossly intact, moves all extremities  Psych: appropriate mood, affect      Significant Labs:  CBC:   Recent Labs   Lab 04/11/23 0523   WBC 13.05*   HGB 7.1*   HCT 22.6*        CMP:   Recent Labs   Lab 04/11/23  0523   *   CALCIUM 9.5   ALBUMIN 2.3*   PROT 7.2   *   K 4.2   CO2 23   CL 99   BUN 8   CREATININE 1.0   ALKPHOS 818*   *   *   BILITOT 3.5*     Coagulation: No results for input(s): PT, INR, APTT in the last 48 hours.    Significant Imaging:  Imaging results within the past 24 hours have been reviewed.

## 2023-04-11 NOTE — PHARMACY MED REC
"Admission Medication History     The home medication history was taken by Adriana Mcclain.    You may go to "Admission" then "Reconcile Home Medications" tabs to review and/or act upon these items.     The home medication list has been updated by the Pharmacy department.   Please read ALL comments highlighted in yellow.   Please address this information as you see fit.    Feel free to contact us if you have any questions or require assistance.          Medications listed below were obtained from: Patient/family and Medical records(see med rec tech note from 4/4/23)  Current Outpatient Medications on File Prior to Encounter   Medication Sig    albuterol 90 mcg/actuation inhaler INHALE 2 PUFFS INTO THE LUNGS EVERY 6 (SIX) HOURS AS NEEDED FOR WHEEZING.    ALPRAZolam (XANAX) 0.25 MG tablet TAKE ONE TABLET BY MOUTH TWICE DAILY AS NEEDED FOR ANXIETY    amoxicillin-clavulanate 875-125mg (AUGMENTIN) 875-125 mg per tablet Take 1 tablet by mouth every 12 (twelve) hours.    b complex vitamins tablet Take 1 tablet by mouth once daily.    BREO ELLIPTA 200-25 mcg/dose DsDv diskus inhaler Inhale 1 puff into the lungs once daily.    cycloSPORINE (RESTASIS) 0.05 % ophthalmic emulsion Place 1 drop into both eyes 2 (two) times daily.     diclofenac sodium (VOLTAREN) 1 % Gel Apply 2 g topically daily as needed (Pain).    docusate sodium (COLACE ORAL) Take 1 capsule by mouth daily as needed.     EASY TOUCH INSULIN SYRINGE 1 mL 30 gauge x 1/2" Syrg USE AS DIRECTED FOR SQ INJECTIONS THREE TIMES WEEKLY    ergocalciferol (ERGOCALCIFEROL) 50,000 unit Cap Take 1 capsule by mouth every 7 days.     MISTLETOE SUBQ Inject 1 Dose into the skin twice a week.    fluticasone (FLONASE) 50 mcg/actuation nasal spray 2 sprays (100 mcg total) by Each Nare route once daily.    LIDOcaine-prilocaine (EMLA) cream Apply topically as needed (port access).    naloxone (NARCAN) 4 mg/actuation Spry 4mg by nasal route as needed for opioid overdose; may repeat " every 2-3 minutes in alternating nostrils until medical help arrives. Call 911    nystatin (MYCOSTATIN) cream Apply 1 g topically twice a week. As needed for rash    ondansetron (ZOFRAN) 8 MG tablet Take 1 tablet (8 mg total) by mouth every 8 (eight) hours as needed for Nausea.    oxyCODONE (OXYCONTIN) 20 mg 12 hr tablet Take 1 tablet (20 mg total) by mouth every 12 (twelve) hours.    oxyCODONE (ROXICODONE) 10 mg Tab immediate release tablet Take 1 tablet (10 mg total) by mouth every 6 (six) hours as needed for Pain.    polyethylene glycol (MIRALAX) 17 gram PwPk Take 17 g by mouth daily as needed (Constipation).    promethazine (PHENERGAN) 6.25 mg/5 mL syrup Take by mouth every 6 (six) hours as needed for Nausea.    senna (SENOKOT) 8.6 mg tablet Take 4 tablets by mouth once daily.    TIADYLT  mg Cs24 TAKE ONE CAPSULE BY MOUTH EVERY DAY    traZODone (DESYREL) 50 MG tablet Take 1 tablet (50 mg total) by mouth every evening. (Patient taking differently: Take 50 mg by mouth nightly as needed for Insomnia.)             Adriana Mcclain  [id number]                  .

## 2023-04-12 PROBLEM — E80.6 HYPERBILIRUBINEMIA: Status: ACTIVE | Noted: 2023-04-12

## 2023-04-12 LAB
ALBUMIN SERPL BCP-MCNC: 2 G/DL (ref 3.5–5.2)
ALP SERPL-CCNC: 713 U/L (ref 55–135)
ALT SERPL W/O P-5'-P-CCNC: 71 U/L (ref 10–44)
ANION GAP SERPL CALC-SCNC: 14 MMOL/L (ref 8–16)
AST SERPL-CCNC: 71 U/L (ref 10–40)
BACTERIA SPEC ANAEROBE CULT: NORMAL
BACTERIA UR CULT: NO GROWTH
BASOPHILS # BLD AUTO: 0.01 K/UL (ref 0–0.2)
BASOPHILS NFR BLD: 0.1 % (ref 0–1.9)
BILIRUB SERPL-MCNC: 3.6 MG/DL (ref 0.1–1)
BUN SERPL-MCNC: 6 MG/DL (ref 8–23)
CALCIUM SERPL-MCNC: 9.7 MG/DL (ref 8.7–10.5)
CHLORIDE SERPL-SCNC: 100 MMOL/L (ref 95–110)
CO2 SERPL-SCNC: 23 MMOL/L (ref 23–29)
CREAT SERPL-MCNC: 0.8 MG/DL (ref 0.5–1.4)
DIFFERENTIAL METHOD: ABNORMAL
EOSINOPHIL # BLD AUTO: 0 K/UL (ref 0–0.5)
EOSINOPHIL NFR BLD: 0 % (ref 0–8)
ERYTHROCYTE [DISTWIDTH] IN BLOOD BY AUTOMATED COUNT: 18.6 % (ref 11.5–14.5)
EST. GFR  (NO RACE VARIABLE): >60 ML/MIN/1.73 M^2
GLUCOSE SERPL-MCNC: 99 MG/DL (ref 70–110)
HCT VFR BLD AUTO: 22.8 % (ref 37–48.5)
HGB BLD-MCNC: 7.3 G/DL (ref 12–16)
IMM GRANULOCYTES # BLD AUTO: 0.13 K/UL (ref 0–0.04)
IMM GRANULOCYTES NFR BLD AUTO: 1.3 % (ref 0–0.5)
INR PPP: 1.2 (ref 0.8–1.2)
LYMPHOCYTES # BLD AUTO: 0.6 K/UL (ref 1–4.8)
LYMPHOCYTES NFR BLD: 6.2 % (ref 18–48)
MAGNESIUM SERPL-MCNC: 1.8 MG/DL (ref 1.6–2.6)
MCH RBC QN AUTO: 27.4 PG (ref 27–31)
MCHC RBC AUTO-ENTMCNC: 32 G/DL (ref 32–36)
MCV RBC AUTO: 86 FL (ref 82–98)
MONOCYTES # BLD AUTO: 0.5 K/UL (ref 0.3–1)
MONOCYTES NFR BLD: 4.8 % (ref 4–15)
NEUTROPHILS # BLD AUTO: 9.1 K/UL (ref 1.8–7.7)
NEUTROPHILS NFR BLD: 87.6 % (ref 38–73)
NRBC BLD-RTO: 0 /100 WBC
PHOSPHATE SERPL-MCNC: 2.4 MG/DL (ref 2.7–4.5)
PLATELET # BLD AUTO: 227 K/UL (ref 150–450)
PMV BLD AUTO: 9.3 FL (ref 9.2–12.9)
POTASSIUM SERPL-SCNC: 4 MMOL/L (ref 3.5–5.1)
PROT SERPL-MCNC: 7 G/DL (ref 6–8.4)
PROTHROMBIN TIME: 12.4 SEC (ref 9–12.5)
RBC # BLD AUTO: 2.66 M/UL (ref 4–5.4)
SODIUM SERPL-SCNC: 137 MMOL/L (ref 136–145)
WBC # BLD AUTO: 10.38 K/UL (ref 3.9–12.7)

## 2023-04-12 PROCEDURE — 12000002 HC ACUTE/MED SURGE SEMI-PRIVATE ROOM

## 2023-04-12 PROCEDURE — 83735 ASSAY OF MAGNESIUM: CPT

## 2023-04-12 PROCEDURE — 25000242 PHARM REV CODE 250 ALT 637 W/ HCPCS

## 2023-04-12 PROCEDURE — 97165 OT EVAL LOW COMPLEX 30 MIN: CPT

## 2023-04-12 PROCEDURE — 97535 SELF CARE MNGMENT TRAINING: CPT

## 2023-04-12 PROCEDURE — 25000003 PHARM REV CODE 250

## 2023-04-12 PROCEDURE — 94761 N-INVAS EAR/PLS OXIMETRY MLT: CPT

## 2023-04-12 PROCEDURE — 80053 COMPREHEN METABOLIC PANEL: CPT

## 2023-04-12 PROCEDURE — 99233 PR SUBSEQUENT HOSPITAL CARE,LEVL III: ICD-10-PCS | Mod: GC,,, | Performed by: HOSPITALIST

## 2023-04-12 PROCEDURE — 63600175 PHARM REV CODE 636 W HCPCS: Performed by: STUDENT IN AN ORGANIZED HEALTH CARE EDUCATION/TRAINING PROGRAM

## 2023-04-12 PROCEDURE — 99233 SBSQ HOSP IP/OBS HIGH 50: CPT | Mod: GC,,, | Performed by: HOSPITALIST

## 2023-04-12 PROCEDURE — 97116 GAIT TRAINING THERAPY: CPT

## 2023-04-12 PROCEDURE — 63600175 PHARM REV CODE 636 W HCPCS

## 2023-04-12 PROCEDURE — 36415 COLL VENOUS BLD VENIPUNCTURE: CPT

## 2023-04-12 PROCEDURE — 99900035 HC TECH TIME PER 15 MIN (STAT)

## 2023-04-12 PROCEDURE — 25000003 PHARM REV CODE 250: Performed by: STUDENT IN AN ORGANIZED HEALTH CARE EDUCATION/TRAINING PROGRAM

## 2023-04-12 PROCEDURE — 84100 ASSAY OF PHOSPHORUS: CPT

## 2023-04-12 PROCEDURE — 97162 PT EVAL MOD COMPLEX 30 MIN: CPT

## 2023-04-12 PROCEDURE — 85610 PROTHROMBIN TIME: CPT

## 2023-04-12 PROCEDURE — 85025 COMPLETE CBC W/AUTO DIFF WBC: CPT

## 2023-04-12 PROCEDURE — 27000221 HC OXYGEN, UP TO 24 HOURS

## 2023-04-12 PROCEDURE — 21400001 HC TELEMETRY ROOM

## 2023-04-12 RX ORDER — LACTULOSE 10 G/15ML
15 SOLUTION ORAL 2 TIMES DAILY PRN
Status: CANCELLED | OUTPATIENT
Start: 2023-04-12

## 2023-04-12 RX ORDER — ALBUTEROL SULFATE 90 UG/1
2 AEROSOL, METERED RESPIRATORY (INHALATION) EVERY 6 HOURS PRN
Status: DISCONTINUED | OUTPATIENT
Start: 2023-04-12 | End: 2023-04-13 | Stop reason: HOSPADM

## 2023-04-12 RX ORDER — MORPHINE SULFATE 2 MG/ML
2 INJECTION, SOLUTION INTRAMUSCULAR; INTRAVENOUS EVERY 4 HOURS PRN
Status: DISCONTINUED | OUTPATIENT
Start: 2023-04-12 | End: 2023-04-13 | Stop reason: HOSPADM

## 2023-04-12 RX ORDER — FLUTICASONE FUROATE AND VILANTEROL 200; 25 UG/1; UG/1
1 POWDER RESPIRATORY (INHALATION) DAILY
Status: DISCONTINUED | OUTPATIENT
Start: 2023-04-12 | End: 2023-04-13 | Stop reason: HOSPADM

## 2023-04-12 RX ORDER — ZAFIRLUKAST 20 MG/1
20 TABLET, FILM COATED ORAL 2 TIMES DAILY
COMMUNITY

## 2023-04-12 RX ORDER — DEXTROSE 40 %
15 GEL (GRAM) ORAL
Status: DISCONTINUED | OUTPATIENT
Start: 2023-04-12 | End: 2023-04-13 | Stop reason: HOSPADM

## 2023-04-12 RX ADMIN — OXYCODONE HYDROCHLORIDE 10 MG: 10 TABLET ORAL at 06:04

## 2023-04-12 RX ADMIN — FLUTICASONE FUROATE AND VILANTEROL TRIFENATATE 1 PUFF: 200; 25 POWDER RESPIRATORY (INHALATION) at 12:04

## 2023-04-12 RX ADMIN — ENOXAPARIN SODIUM 40 MG: 40 INJECTION SUBCUTANEOUS at 04:04

## 2023-04-12 RX ADMIN — SENNOSIDES 4 TABLET: 8.6 TABLET, FILM COATED ORAL at 09:04

## 2023-04-12 RX ADMIN — OXYCODONE HYDROCHLORIDE 20 MG: 20 TABLET, FILM COATED, EXTENDED RELEASE ORAL at 12:04

## 2023-04-12 RX ADMIN — ACETAMINOPHEN 650 MG: 325 TABLET ORAL at 09:04

## 2023-04-12 RX ADMIN — PIPERACILLIN SODIUM AND TAZOBACTAM SODIUM 4.5 G: 4; .5 INJECTION, POWDER, FOR SOLUTION INTRAVENOUS at 12:04

## 2023-04-12 RX ADMIN — OXYCODONE HYDROCHLORIDE 20 MG: 20 TABLET, FILM COATED, EXTENDED RELEASE ORAL at 08:04

## 2023-04-12 RX ADMIN — OXYCODONE HYDROCHLORIDE 10 MG: 10 TABLET ORAL at 07:04

## 2023-04-12 RX ADMIN — POLYETHYLENE GLYCOL 3350 17 G: 17 POWDER, FOR SOLUTION ORAL at 09:04

## 2023-04-12 RX ADMIN — PIPERACILLIN SODIUM AND TAZOBACTAM SODIUM 4.5 G: 4; .5 INJECTION, POWDER, FOR SOLUTION INTRAVENOUS at 06:04

## 2023-04-12 RX ADMIN — MORPHINE SULFATE 2 MG: 2 INJECTION, SOLUTION INTRAMUSCULAR; INTRAVENOUS at 09:04

## 2023-04-12 RX ADMIN — PIPERACILLIN SODIUM AND TAZOBACTAM SODIUM 4.5 G: 4; .5 INJECTION, POWDER, FOR SOLUTION INTRAVENOUS at 10:04

## 2023-04-12 RX ADMIN — DILTIAZEM HYDROCHLORIDE 240 MG: 120 CAPSULE, COATED, EXTENDED RELEASE ORAL at 09:04

## 2023-04-12 RX ADMIN — MORPHINE SULFATE 2 MG: 2 INJECTION, SOLUTION INTRAMUSCULAR; INTRAVENOUS at 03:04

## 2023-04-12 NOTE — NURSING
Pt received from ED via stretcher. Pt with spontaneous non-labored breathing, awake, alert, oriented, conscious and coherent. Pt on o2 @ 2lpm via NC. Pt hooked to continuous pulse ox as ordered. Pt coughs when lying flat on bed or turning. Pt with biliary tube to drainage bag, dressing reinforced. Pt with 20g on R hand and 22g on L AC. Pt oriented to room, call light/remote, and other safety measures. All needs attended.

## 2023-04-12 NOTE — PT/OT/SLP EVAL
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  She presents with the following performance deficits affecting function: impaired endurance, impaired functional mobility, impaired self-care skills, weakness.    Rehab Prognosis: Fair; patient would benefit from acute skilled OT services to address these deficits and reach maximum level of function.       Plan:     Patient to be seen   to address the above listed problems via    Plan of Care Expires:    Plan of Care Reviewed with:      Subjective     Chief Complaint:   Patient/Family Comments/goals:     Occupational Profile:  Living Environment: Patient's daughter's  home set-up:SSH,0STE, walk-in shower with shower chair  Previous level of function: Mod I with rolling walker  Roles and Routines: Enjoys time with family  Equipment Used at Home:  walker, rolling, chair, shower  Assistance upon Discharge: Daughter    Pain/Comfort:       Patients cultural, spiritual, Congregational conflicts given the current situation:      Objective:     Communicated with: Nursing prior to session.  Patient found supine with   upon OT entry to room.    General Precautions: Standard  Respiratory Status: Nasal cannula, flow 2 L/min    Occupational Performance:    Bed Mobility:    Patient completed Rolling/Turning to Left with  minimum assistance  Patient completed Scooting/Bridging with contact guard assistance  Patient completed Supine to Sit with minimum assistance    Functional Mobility/Transfers:  Patient completed Sit <> Stand Transfer with contact guard assistance   with  rolling walker   Patient completed Bed <> Chair Transfer using Step Transfer technique with contact guard assistance with rolling walker  Patient completed Toilet Transfer Step Transfer technique with contact guard assistance with  rolling walker  Functional Mobility: CGA with rolling walker within room and into bathroom    Activities of Daily Living:  Grooming: contact guard assistance standing in bathroom  Upper Body Dressing: contact guard assistance seated edge of bed  Lower Body Dressing: contact guard assistance seated edge of bed  Toileting: contact guard assistance from toilet in bathroom    Cognitive/Visual Perceptual:  Cognitive/Psychosocial Skills:     -       Oriented to: Person, Place, Time, and Situation   -       Follows Commands/attention:Follows multistep  commands  -       Safety awareness/insight to disability: intact   -       Mood/Affect/Coping skills/emotional control: Cooperative    Physical Exam:  Postural examination/scapula alignment:    -       No postural abnormalities identified  Upper Extremity Range of Motion:     -       Right Upper Extremity: WFL  -       Left Upper Extremity: WFL  Upper Extremity Strength:    -       Right Upper Extremity: WFL  -       Left Upper Extremity: WFL   Strength:    -       Right Upper Extremity: WFL  -       Left Upper Extremity: WFL    AMPAC 6 Click ADL:  AMPAC Total Score:      Treatment & Education:  -Evaluation completed, plan of care established.  -Patient and family educated on roles/goals of OT and POC.  -White board updated.  -Therapist provided time for questions and answered within scope of practice.  -Patient educated on importance of EOB/OOB activity to maximize recovery.     Patient left  seated on bedside couch  with all lines intact and call button in reach    GOALS:   Multidisciplinary Problems       Occupational Therapy Goals          Problem: Occupational Therapy    Goal Priority Disciplines Outcome Interventions    Occupational Therapy Goal     OT, PT/OT Ongoing, Progressing    Description: Goals to be met by: 5/12/23     Patient will increase functional independence with ADLs by performing:    UE Dressing with Modified [place].  LE Dressing with Modified [place].  Grooming while standing at sink with Modified [place].  Toileting from toilet with Modified [place] for hygiene and clothing management.   Supine to sit with Modified [place].  Step transfer with Modified [place]  Toilet transfer to toilet with Modified [place].                         History:     Past Medical History:   Diagnosis Date    Anxiety 08/07/2020    Asthma 10/31/2018    1985: Diagnosed. cough variant    Asthma 9/26/2013 9:37:32 AM    [facility] Historical - Respiratory: Asthma-No Additional Notes    Bronchiectasis     Complications affecting other specified body systems, hypertension 9/26/2013 9:40:45 AM    [facility] Historical - Cardiovascular: Hypertension-No Additional Notes    Dizziness and giddiness 8/30/2017 3:09:28 PM    [facility] Historical - Unknown: Vertigo-No Additional Notes    Elevated cancer antigen 125 (CA-125) 09/14/2021    Fibromyalgia 10/31/2018    2006: Diagnosed.    Herpes simplex vulvovaginitis 06/09/2021    Hx of psychiatric care     Hypercholesterolemia 10/31/2018    2010: Began statin.    Hyperlipidemia     Hypertension     Infective arthritis, multiple sites 6/23/2017 11:02:17 AM    [facility] Historical - Musculoskeletal: Arthritis-No Additional Notes    Lung mass 09/01/2020    Laughlin syndrome     Malignant neoplasm of both ovaries 08/06/2020    Myalgia and myositis 6/23/2017 11:02:26 AM    [facility] Historical - Musculoskeletal: Fibromyalgia-No Additional Notes    Neoplasm of other genitourinary organ 11/6/2020 9:58:16 AM    [facility] Historical - Unknown: Ovarian neoplasm-finished chemo 11/2020- Dr. Foster Stage 3    Other and unspecified ovarian cyst 5/7/2020 4:13:20 PM     [facility] Historical - Quick Add: Ovarian cyst, left-No Additional Notes    Other genital herpes 2017 1:15:51 PM    [facility] Historical - Infectious: Herpes, Genital-No Additional Notes    Overweight 10/31/2018    10/31/2018: Weight 75 kg. BMI 28.    Psychiatric problem     Pulmonary nodules 12/10/2020    Pure hypercholesterolemia 2017 11:01:53 AM    [facility] Historical - Endocrine/Metabolic/Immune: Hypercholesterolemia-No Additional Notes    Reactive depression 2020    Renal failure 2019 11:55:24 AM    [facility] Historical - Urology: Renal Failure-Diagnosed @ ER when she was there for stomach pains    Supraventricular tachycardia     Tachycardia     Tachycardia 2013 9:37:10 AM    [facility] Historical - Symptoms/Signs: Tachycardia-No Additional Notes    Therapy          Past Surgical History:   Procedure Laterality Date    APPENDECTOMY       SECTION      x 1    HYSTERECTOMY      LAPAROSCOPIC SURGICAL REMOVAL OF OMENTUM      NEPHROSTOMY Right     PELVIC AND PARA-AORTIC LYMPH NODE DISSECTION      KS REMOVAL OF OVARY/TUBE(S)      TONSILLECTOMY      TRANSPHENOIDAL PITUITARY RESECTION      TUMOR REMOVAL         Time Tracking:     OT Date of Treatment:  23  OT Start Time:  1440  OT Stop Time:  1520  OT Total Time (min):  40 min    Billable Minutes:Evaluation 15  Self Care/Home Management 25    2023

## 2023-04-12 NOTE — PLAN OF CARE
Evaluation completed, plan of care established.    Problem: Occupational Therapy  Goal: Occupational Therapy Goal  Description: Goals to be met by: 5/12/23     Patient will increase functional independence with ADLs by performing:    UE Dressing with Modified [place].  LE Dressing with Modified [place].  Grooming while standing at sink with Modified [place].  Toileting from toilet with Modified [place] for hygiene and clothing management.   Supine to sit with Modified [place].  Step transfer with Modified [place]  Toilet transfer to toilet with Modified [place].    Outcome: Ongoing, Progressing

## 2023-04-12 NOTE — PLAN OF CARE
Problem: Adult Inpatient Plan of Care  Goal: Plan of Care Review  Outcome: Ongoing, Not Progressing  Goal: Optimal Comfort and Wellbeing  Outcome: Ongoing, Not Progressing     Problem: Infection  Goal: Absence of Infection Signs and Symptoms  Outcome: Ongoing, Not Progressing     AAOx4. DNR status. Resumes pain management and abx. Biliary drain to right flank; no output during shift.  Temp 100.8 during shift; tylenol given currently 97.7

## 2023-04-12 NOTE — PLAN OF CARE
[facility] ([place] [id number])  Initial Discharge Assessment       Primary Care Provider: Shabana Dunbar MD    Admission Diagnosis: Hyperbilirubinemia [E80.6]  Tachycardia [R00.0]  Biliary obstruction [K83.1]  Elevated liver enzymes [R74.8]  Chest pain [R07.9]    Admission Date: 4/11/2023  Expected Discharge Date: 4/15/2023    Discharge Barriers Identified: (P) None    Payor: HU[employer] MANAGED MEDICARE / Plan: HU[employer] MEDICARE PPO / Product Type: Medicare Advantage /     Extended Emergency Contact Information  Primary Emergency Contact: Sue Avalos           [facility]  Mobile Phone: [phone]  Relation: Daughter    Discharge Plan A: (P) Home  Discharge Plan B: (P) Home with family      [facility] - [place] - [address]  [address]  [place] [address]  Phone: [phone] Fax: [phone]      Initial Assessment (most recent)       Adult Discharge Assessment - 04/12/23 1422          Discharge Assessment    Assessment Type Discharge Planning Assessment (P)      Confirmed/corrected address, phone number and insurance Yes (P)      Confirmed Demographics Correct on Facesheet (P)      Source of Information patient (P)      Communicated HORACE with patient/caregiver No (P)      Reason For Admission Biliary obstruction (P)      People in Home alone (P)      Facility Arrived From: home (P)      Do you expect to return to your current living situation? Yes (P)      Do you have help at home or someone to help you manage your care at home? Yes (P)      Who are your caregiver(s) and their phone number(s)? Sue Avalos dtr [phone] (P)      Prior to hospitilization cognitive status: Unable to Assess (P)      Current cognitive status: Alert/Oriented (P)      Dressing/Bathing bathing difficulty, assistance 1 person;bathing difficulty, requires equipment (P)      Dressing/Bathing Management Daughter helps, uses shower bench (P)       Home Layout Able to live on 1st floor (P)      Equipment Currently Used at Home walker, standard;bath bench (P)      Readmission within 30 days? Yes (P)      Do you currently have service(s) that help you manage your care at home? No (P)      Who is going to help you get home at discharge? Sue Avalos dtr [phone] (P)      How do you get to doctors appointments? family or friend will provide (P)      Are you on dialysis? No (P)      Do you take coumadin? No (P)      Discharge Plan A Home (P)      Discharge Plan B Home with family (P)      DME Needed Upon Discharge  wheelchair (P)      Discharge Plan discussed with: Patient (P)      Discharge Barriers Identified None (P)         Physical Activity    On average, how many days per week do you engage in moderate to strenuous exercise (like a brisk walk)? 0 days (P)      On average, how many minutes do you engage in exercise at this level? 0 min (P)         Financial Resource Strain    How hard is it for you to pay for the very basics like food, housing, medical care, and heating? Not hard at all (P)         Transportation Needs    In the past 12 months, has lack of transportation kept you from medical appointments or from getting medications? No (P)      In the past 12 months, has lack of transportation kept you from meetings, work, or from getting things needed for daily living? No (P)         Food Insecurity    Within the past 12 months, you worried that your food would run out before you got the money to buy more. Never true (P)      Within the past 12 months, the food you bought just didn't last and you didn't have money to get more. Never true (P)         Stress    Do you feel stress - tense, restless, nervous, or anxious, or unable to sleep at night because your mind is troubled all the time - these days? To some extent (P)         Social Connections    In a typical week, how many times do you talk on the phone with family, friends, or neighbors? More than  three times a week (P)      How often do you get together with friends or relatives? More than three times a week (P)      How often do you attend Moravian or Yazidi services? Never (P)      Do you belong to any clubs or organizations such as Moravian groups, unions, fraternal or athletic groups, or school groups? No (P)      How often do you attend meetings of the clubs or organizations you belong to? Never (P)      Are you , , , , never , or living with a partner?  (P)         Alcohol Use    Q1: How often do you have a drink containing alcohol? Never (P)      Q2: How many drinks containing alcohol do you have on a typical day when you are drinking? Patient does not drink (P)      Q3: How often do you have six or more drinks on one occasion? Never (P)                  Spoke with pt in room.  She lives alone, but states her daughter may move in with her d/t her declining health.  Dtr takes to MD appointments and will  from hospital.  She needs help of 1 person and shower bench for bathing.  She states she's interested in transitioning to home hospice, has spoken with daughter and sister and Katya at palliative care.  She has not spoken with MD.  Inst in hospice process (services, needs to be ordered by MD).    3:18 PM  Provided pt with list of in-network hospice providers; pt states she has no preference, wants to speak with Katya about this.  Sent referral to [facility] via CP.    KATELYN Mccoy, BS, RN, CCM

## 2023-04-12 NOTE — PLAN OF CARE
Problem: Adult Inpatient Plan of Care  Goal: Plan of Care Review  4/12/2023 0354 by Lori Somers RN  Outcome: Ongoing, Progressing  4/12/2023 0354 by Lori Somers RN  Outcome: Ongoing, Progressing  Goal: Optimal Comfort and Wellbeing  4/12/2023 0354 by Lori Somers RN  Outcome: Ongoing, Progressing  4/12/2023 0354 by Lori Somers RN  Outcome: Ongoing, Progressing     Problem: Infection  Goal: Absence of Infection Signs and Symptoms  Outcome: Ongoing, Progressing     Continue antibiotics.

## 2023-04-12 NOTE — ASSESSMENT & PLAN NOTE
Patient with increased WBC, scleral icterus, RUQ, and borderline fever s/p biliary intervention 2/2 to cholecystitis. Concern for infectious process   - Given vanc/zosyn in ED, will continue with zosyn   - AES consulted - MRCP pending GOC discussion

## 2023-04-12 NOTE — ACP (ADVANCE CARE PLANNING)
Advance Care Planning     Date: 04/12/2023    Code Status  In light of the patients advanced and life limiting illness,I engaged the the patient and family in a conversation about the patient's preferences for care  at the very end of life. The patient wishes to have a natural, peaceful death.  Along those lines, the patient does not wish to have CPR or other invasive treatments performed when her heart and/or breathing stops. I communicated to the patient that a DNR order would be placed in her medical record to reflect this preference.  I spent a total of 30 minutes engaging the patient in this advance care planning discussion.       [facility]  I engaged the patient and family in a conversation about advance care planning and we specifically addressed what the goals of care would be moving forward, in light of the patient's change in clinical status, specifically metastatic disease and ongoing hepatic dysfunction. Patient repeatedly voiced her preference to not undergo any further procedures. We discussed diagnostic studies such as MRCP, she refused as she doen't want to intervene on any findings. Her goal is to spend the rest of her time at home with pain controlled as much as possible and be surrounded by family.   We did specifically address the patient's likely prognosis, which is poor.  We explored the patient's values and preferences for future care.  The patient and family endorses that what is most important right now is to focus on spending time at home, avoiding the hospital, symptom/pain control, and comfort and QOL     Accordingly, we have decided that the best plan to meet the patient's goals includes enrolling in hospice care    I did explain the role for hospice care at this stage of the patient's illness, including its ability to help the patient live with the best quality of life possible.  We will be making a hospice referral.    I spent a total of 40 minutes engaging the patient in this advance care  planning discussion.

## 2023-04-12 NOTE — HOSPITAL COURSE
72 year old with singificant hx of metastatic ovarian cancer to liver and lungs admitted for increased abdominal  tenderness, and worsening fatigue, fever, and increased WBC. On zosyn. AES consulted. Elevated liver enzymes, but now decreased from on admission. Offered MRCP, patient would like to have GOC discussion prior to further treatment options. Placed back on home pain regimen. Patient has brought up to providers regarding a desire to go home with hospice. Member of primary team had GOC conversation with patient regarding wishes and her desire to not want further intervention but to be pain free and comfortable. Katya, from palliative care who is familiar with patient aided in coordinating with hospice company who saw patient at bedside. Will switch from IV abx to PO cipro for a total 14 days of abx coverage from infection concerning bile ducks. They do state it is now draining as well.  Patient to d/c home on oral cipro once hospice has been established at her home.

## 2023-04-12 NOTE — SUBJECTIVE & OBJECTIVE
Interval History: Patient fevered last night. On zosyn. Downtrending liver labs. MRCP for today however pending [facility] discussion with patient.     Review of Systems   Constitutional:  Positive for fatigue and fever. Negative for chills and diaphoresis.   HENT:  Negative for congestion and sore throat.    Respiratory:  Positive for shortness of breath (2/2 to abdominal pain). Negative for wheezing.    Cardiovascular:  Negative for chest pain and palpitations.   Gastrointestinal:  Positive for abdominal distention, abdominal pain and constipation. Negative for diarrhea, nausea and vomiting.   Skin:  Positive for color change and pallor.   Neurological:  Positive for weakness. Negative for speech difficulty and light-headedness.   Hematological:  Negative for adenopathy. Does not bruise/bleed easily.   Psychiatric/Behavioral:  Negative for agitation and confusion. The patient is not nervous/anxious.    Objective:     Vital Signs (Most Recent):  Temp: (!) 100.8 °F (38.2 °C) (04/12/23 0813)  Pulse: 107 (04/12/23 1203)  Resp: 17 (04/12/23 1203)  BP: 126/60 (04/12/23 0813)  SpO2: 96 % (04/12/23 0351)   Vital Signs (24h Range):  Temp:  [98.1 °F (36.7 °C)-101.1 °F (38.4 °C)] 100.8 °F (38.2 °C)  Pulse:  [] 107  Resp:  [16-20] 17  SpO2:  [96 %-99 %] 96 %  BP: (112-141)/(56-77) 126/60     Weight: 74 kg (163 lb 2.3 oz)  Body mass index is 26.53 kg/m².    Intake/Output Summary (Last 24 hours) at 4/12/2023 1257  Last data filed at 4/12/2023 0550  Gross per 24 hour   Intake 250 ml   Output --   Net 250 ml      Physical Exam  Constitutional:       General: She is not in acute distress.     Appearance: She is ill-appearing and toxic-appearing. She is not diaphoretic.   HENT:      Head: Normocephalic and atraumatic.   Cardiovascular:      Rate and Rhythm: Normal rate and regular rhythm.      Pulses: Normal pulses.      Heart sounds: Normal heart sounds. No murmur heard.  Pulmonary:      Effort: No respiratory distress.      Breath  sounds: Normal breath sounds. No wheezing.   Abdominal:      General: There is distension.      Tenderness: There is abdominal tenderness.   Skin:     General: Skin is warm and dry.      Coloration: Skin is jaundiced and pale.   Neurological:      General: No focal deficit present.      Mental Status: She is alert and oriented to person, place, and time.   Psychiatric:         Mood and Affect: Mood normal.         Behavior: Behavior normal.       Significant Labs: All pertinent labs within the past 24 hours have been reviewed.    Significant Imaging: I have reviewed all pertinent imaging results/findings within the past 24 hours.

## 2023-04-12 NOTE — ASSESSMENT & PLAN NOTE
72 year olf female with significant family hx of cx as well as person hx of ovarian cx with mets to lung and liver here <1 week post biliary cholecystotomy placement. Per IR, seems patent, and is able to be flushed, concern for infection 2/2 to obstruction potentially due to mets.   - Cont with IV zosyn  - AES consult   - Patient sees palliative care outpatient will discuss with Katya who she sees outpatient   - Daily CMP

## 2023-04-12 NOTE — PROGRESS NOTES
[facility] - [facility] ([address] Medicine  Progress Note    Patient Name: Liat Gilmore  MRN: [id number]  Patient Class: IP- Inpatient   Admission Date: 4/11/2023  Length of Stay: 1 days  Attending Physician: Cyndi Crews MD  Primary Care Provider: Shabana Dunbar MD        Subjective:     Principal Problem:Biliary obstruction due to malignant neoplasm        HPI:  Ms. 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 Last chemo was in February of 2022. She further has hx of SVT/HTN treated with CCB, CKD, asthma, here today after episode of acute cholecystitis s/p cholecystostomy tube placement with IR with a chief complaint of fever, and RUQ pain. She further endorses increasing debility as she has noticed requiring more support when ambulating in the grocery store. She takes a CCB for her SVT/HTN. She previously was on a statin but has not had it in some time.     In the ED peak temp of 100.3, ALK Phos >800, AST/ALT >100, and Hgb of 7.1. CT scan obtained showed distension of gallbladder, though tube is in place and able to be flushed.        Overview/Hospital Course:  72 year old with singificant hx of metastatic ovarian cancer to liver and lungs admitted for increased abdominal  tenderness, and worsening fatigue, fever, and increased WBC. On zosyn. AES consulted. Elevated liver enzymes, but now decreased from on admission. Offered MRCP, patient would like to have GOC discussion prior to further treatment options. Placed back on home pain regimen.       Interval History: Patient fevered last night. On zosyn. Downtrending liver labs. MRCP for today however pending GOC discussion with patient.     Review of Systems   Constitutional:  Positive for fatigue and fever. Negative for chills and diaphoresis.   HENT:  Negative for congestion and sore throat.    Respiratory:  Positive for shortness of breath (2/2 to abdominal pain).  Negative for wheezing.    Cardiovascular:  Negative for chest pain and palpitations.   Gastrointestinal:  Positive for abdominal distention, abdominal pain and constipation. Negative for diarrhea, nausea and vomiting.   Skin:  Positive for color change and pallor.   Neurological:  Positive for weakness. Negative for speech difficulty and light-headedness.   Hematological:  Negative for adenopathy. Does not bruise/bleed easily.   Psychiatric/Behavioral:  Negative for agitation and confusion. The patient is not nervous/anxious.    Objective:     Vital Signs (Most Recent):  Temp: (!) 100.8 °F (38.2 °C) (04/12/23 0813)  Pulse: 107 (04/12/23 1203)  Resp: 17 (04/12/23 1203)  BP: 126/60 (04/12/23 0813)  SpO2: 96 % (04/12/23 0351)   Vital Signs (24h Range):  Temp:  [98.1 °F (36.7 °C)-101.1 °F (38.4 °C)] 100.8 °F (38.2 °C)  Pulse:  [] 107  Resp:  [16-20] 17  SpO2:  [96 %-99 %] 96 %  BP: (112-141)/(56-77) 126/60     Weight: 74 kg (163 lb 2.3 oz)  Body mass index is 26.53 kg/m².    Intake/Output Summary (Last 24 hours) at 4/12/2023 1257  Last data filed at 4/12/2023 0550  Gross per 24 hour   Intake 250 ml   Output --   Net 250 ml      Physical Exam  Constitutional:       General: She is not in acute distress.     Appearance: She is ill-appearing and toxic-appearing. She is not diaphoretic.   HENT:      Head: Normocephalic and atraumatic.   Cardiovascular:      Rate and Rhythm: Normal rate and regular rhythm.      Pulses: Normal pulses.      Heart sounds: Normal heart sounds. No murmur heard.  Pulmonary:      Effort: No respiratory distress.      Breath sounds: Normal breath sounds. No wheezing.   Abdominal:      General: There is distension.      Tenderness: There is abdominal tenderness.   Skin:     General: Skin is warm and dry.      Coloration: Skin is jaundiced and pale.   Neurological:      General: No focal deficit present.      Mental Status: She is alert and oriented to person, place, and time.   Psychiatric:          Mood and Affect: Mood normal.         Behavior: Behavior normal.       Significant Labs: All pertinent labs within the past 24 hours have been reviewed.    Significant Imaging: I have reviewed all pertinent imaging results/findings within the past 24 hours.      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 Per IR, seems patent, and is able to be flushed, concern for infection 2/2 to obstruction potentially due to mets.   - Cont with IV zosyn  - AES consult   - Patient sees palliative care outpatient will discuss with Katya who she sees outpatient   - Daily CMP       Anemia  Hgb 7.1 at time of admission. Patient states since chemo she has been anemic. Chart review shows drop from 11 to 9s in April 2022, last chemo with cisplatin 2/2022 per ED note  - Consider blood consent due to low Hgb close to transfusion criteria   - daily CBC       Constipation  Patient on bowel regimen at home for chronic consitpation. CT scan significant for stool burden  - Restart home bowel regimen       Right upper quadrant abdominal pain  See Cholangitis       Cholangitis  Patient with increased WBC, scleral icterus, RUQ, and borderline fever s/p biliary intervention 2/2 to cholecystitis. Concern for infectious process   - Given vanc/zosyn in ED, will continue with zosyn   - AES consulted - MRCP pending [facility] discussion        Hypertension  See Supraventricular tachycardia       Supraventricular tachycardia  Patient with hx of SVT and HTN followed up with cardiology   - Cont home diltiazem         VTE Risk Mitigation (From admission, onward)         Ordered     enoxaparin injection 40 mg  Daily         04/11/23 1425     IP VTE HIGH RISK PATIENT  Once         04/11/23 1425     Place sequential compression device  Until discontinued         04/11/23  1425                Discharge Planning   HORACE: 4/15/2023     Code Status: Full Code   Is the patient medically ready for discharge?: No    Reason for patient still in hospital (select all that apply): Patient trending condition, Laboratory test, Treatment, Imaging and Consult recommendations                     [place], DO  Department of Hospital Medicine   [facility] - Int[facility] ([place]

## 2023-04-12 NOTE — PLAN OF CARE
Visited with patient known to me from Palliative clinic. She reports that the GI physicians want to do a procedure but she is considering her options. She has talked with her family about hospice. Discussed services that hospice provides and offered to help with anything she needs.

## 2023-04-12 NOTE — PT/OT/SLP EVAL
"Physical Therapy Co-Evaluation    Patient Name:  Liat Gilmore   MRN:  [id number]    Recommendations:     Discharge Recommendations: other (see comments)   Discharge Equipment Recommendations: wheelchair, hospital bed   Barriers to discharge: None    Assessment:     Liat Gilmore is a 72 y.o. 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  She presents with the following impairments/functional limitations: weakness, impaired endurance, impaired cardiopulmonary response to activity.    Pleasant and cooperative, intermittently tearful. Pt presented with pain limiting mobility, thus requiring additional time for pain to subside. Pt amb within room RW with slow but steady gait. Pt stated she would like to continue therapy services while in-house. Pt will benefit from skilled PT services while in house in order to address the aforementioned deficits.      Rehab Prognosis: Good; patient would benefit from acute skilled PT services to address these deficits and reach maximum level of function.    Recent Surgery: * No surgery found *      Plan:     During this hospitalization, patient to be seen 3 x/week to address the identified rehab impairments via gait training, therapeutic activities, therapeutic exercises, neuromuscular re-education and progress toward the following goals:    Plan of Care Expires:  05/12/23    Subjective     "I would like to try to use the toilet"    Pain/Comfort:  Pain Rating 1: 8/10  Location - Orientation 1: generalized  Location 1: flank    Patients cultural, spiritual, Methodist conflicts given the current situation: no    Living Environment:  Per pt, pt will be moving in with her daughter who lives in [facility] with no JOSETTE. Walk-in shower with shower chair in place.  Prior to admission, patients level of function was mod I RW.  Equipment used at home: walker, rolling, shower chair.  DME owned (not currently used): none.  Upon discharge, patient will have " assistance from daughter.    Objective:     Communicated with RN prior to session.  Patient found HOB elevated with oxygen, telemetry, pulse ox (continuous) (biliary drain)  upon PT entry to room.    General Precautions: Standard, fall  Orthopedic Precautions:N/A   Braces: N/A  Respiratory Status: Nasal cannula, flow 2 L/min    Exams:  RLE ROM: WFL  RLE Strength: WFL  LLE ROM: WFL  LLE Strength: WFL    Functional Mobility:  Bed Mobility:     Scooting: contact guard assistance  Supine to Sit: minimum assistance  Transfers:     Sit to Stand:  contact guard assistance with rolling walker  Bed to Chair: contact guard assistance with  rolling walker  using  Step Transfer  Toilet Transfer: contact guard assistance with  rolling walker  using  Step Transfer  Gait: Pt amb 10' + 10' RW CGA with toilet transfer in between bouts. Pt presented with slight trunk flexion, reciprocal gait, B shortened step, decreased david  Balance:   Good sitting balance  Fair standing balance      AM-PAC 6 CLICK MOBILITY  Total Score:17       Treatment & Education:  Discussed sitting upright in chair >1hr, pt agreeable  Discussed amb to restroom with RW and RN/staff outside of therapy services    Educated pt on PT role/POC  Educated pt on importance of OOB activity and daily ambulation   Pt educated on proper body mechanics, safety techniques, and energy conservation with PT facilitation and cueing throughout session   Pt verbalized understanding      Patient left up in chair with all lines intact, call button in reach, RN notified, and sister and OT present.    GOALS:   Multidisciplinary Problems       Physical Therapy Goals          Problem: Physical Therapy    Goal Priority Disciplines Outcome Goal Variances Interventions   Physical Therapy Goal     PT, PT/OT Ongoing, Progressing     Description: Goals to be met by: 2023     Patient will increase functional independence with mobility by performin. Supine to sit with  [place]  2. Sit to supine with [place]  3. Sit to stand transfer with Supervision  4. Bed to chair transfer with Supervision using LRAD  5. Gait  x 150 feet with Supervision using LRAD.                          History:     Past Medical History:   Diagnosis Date    Anxiety 08/07/2020    Asthma 10/31/2018    1985: Diagnosed. cough variant    Asthma 9/26/2013 9:37:32 AM    [facility] Historical - Respiratory: Asthma-No Additional Notes    Bronchiectasis     Complications affecting other specified body systems, hypertension 9/26/2013 9:40:45 AM    [facility] Historical - Cardiovascular: Hypertension-No Additional Notes    Dizziness and giddiness 8/30/2017 3:09:28 PM    [facility] Historical - Unknown: Vertigo-No Additional Notes    Elevated cancer antigen 125 (CA-125) 09/14/2021    Fibromyalgia 10/31/2018    2006: Diagnosed.    Herpes simplex vulvovaginitis 06/09/2021    Hx of psychiatric care     Hypercholesterolemia 10/31/2018    2010: Began statin.    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 Foster Stage 3    Other and unspecified ovarian cyst 5/7/2020 4:13:20 PM    [facility] Historical - Quick Add: Ovarian cyst, left-No Additional Notes    Other genital herpes 7/6/2017 1:15:51 PM    [facility] Historical - Infectious: Herpes, Genital-No Additional Notes    Overweight 10/31/2018    10/31/2018: Weight 75 kg. BMI 28.    Psychiatric problem     Pulmonary nodules 12/10/2020    Pure hypercholesterolemia 6/23/2017  11:01:53 AM    [facility] Historical - Endocrine/Metabolic/Immune: Hypercholesterolemia-No Additional Notes    Reactive depression 2020    Renal failure 2019 11:55:24 AM    [facility] Historical - Urology: Renal Failure-Diagnosed @ ER when she was there for stomach pains    Supraventricular tachycardia     Tachycardia     Tachycardia 2013 9:37:10 AM    [facility] Historical - Symptoms/Signs: Tachycardia-No Additional Notes    Therapy        Past Surgical History:   Procedure Laterality Date    APPENDECTOMY       SECTION      x 1    HYSTERECTOMY      LAPAROSCOPIC SURGICAL REMOVAL OF OMENTUM      NEPHROSTOMY Right     PELVIC AND PARA-AORTIC LYMPH NODE DISSECTION      OH REMOVAL OF OVARY/TUBE(S)      TONSILLECTOMY      TRANSPHENOIDAL PITUITARY RESECTION  2000    TUMOR REMOVAL         Time Tracking:     PT Received On: 23  PT Start Time: 1440     PT Stop Time: 1520  PT Total Time (min): 40 min     Billable Minutes: Evaluation 15 and Gait Training 25    Co-treatment performed due to patient's multiple deficits requiring two skilled therapists to appropriately and safely assess patient's strength and endurance while facilitating functional tasks in addition to accommodating for patient's activity tolerance.       2023

## 2023-04-12 NOTE — PROGRESS NOTES
Pharmacokinetic Initial Assessment: IV Vancomycin    Assessment/Plan:    Initiate intravenous vancomycin with loading dose of 1750 mg once, done in ED, followed by a maintenance dose of vancomycin 1250 mg IV every 24 hours.  Desired empiric serum trough concentration is 15 to 20 mcg/mL.  Draw vancomycin trough level 60 min prior to third dose on 04/13/2023 at 0630.  Pharmacy will continue to follow and monitor vancomycin.      Please contact pharmacy at extension [phone] with any questions regarding this assessment.     Thank you for the consult,   Zion Bowen       Patient brief summary:  Liat Gilmore is a 72 y.o. female initiated on antimicrobial therapy with IV Vancomycin for treatment of suspected sepsis.    Drug Allergies:   Review of patient's allergies indicates:   Allergen Reactions    Erythromycin Other (See Comments)     Stomach Cramps       Actual Body Weight:   71.7 kg    Renal Function:   Estimated Creatinine Clearance: 49 mL/min (based on SCr of 1 mg/dL).     CBC (last 72 hours):  Recent Labs   Lab Result Units 04/11/23  0523   WBC K/uL 13.05*   Hemoglobin g/dL 7.1*   Hematocrit % 22.6*   Platelets K/uL 278   Gran % % 83.4*   Lymph % % 8.0*   Mono % % 7.4   Eosinophil % % 0.0   Basophil % % 0.0   Differential Method  Automated       Metabolic Panel (last 72 hours):  Recent Labs   Lab Result Units 04/11/23  0523 04/11/23  0532   Sodium mmol/L 134*  --    Potassium mmol/L 4.2  --    Chloride mmol/L 99  --    CO2 mmol/L 23  --    Glucose mg/dL 122*  --    Glucose, UA   --  Negative   BUN mg/dL 8  --    Creatinine mg/dL 1.0  --    Albumin g/dL 2.3*  --    Total Bilirubin mg/dL 3.5*  --    Alkaline Phosphatase U/L 818*  --    AST U/L 139*  --    ALT U/L 108*  --        Drug levels (last 3 results):  No results for input(s): VANCOMYCINRA, VANCORANDOM, VANCOMYCINPE, VANCOPEAK, VANCOMYCINTR, VANCOTROUGH in the last 72 hours.    Microbiologic Results:  Microbiology Results (last 7 days)       Procedure  Component Value Units Date/Time    Blood culture x two cultures. Draw prior to antibiotics. [[id number]] Collected: 04/11/23 0523    Order Status: Completed Specimen: Blood from Peripheral, Antecubital, Left Updated: 04/11/23 1315     Blood Culture, Routine No Growth to date    Narrative:      Aerobic and anaerobic    Blood culture x two cultures. Draw prior to antibiotics. [[id number]] Collected: 04/11/23 0523    Order Status: Completed Specimen: Blood from Peripheral, Antecubital, Left Updated: 04/11/23 1315     Blood Culture, Routine No Growth to date    Narrative:      Aerobic and anaerobic    Urine culture [[id number]] Collected: 04/11/23 0532    Order Status: No result Specimen: Urine Updated: 04/11/23 0615

## 2023-04-12 NOTE — NURSING
Nurses Note -- 4 Eyes      4/12/2023   3:15 AM      Skin assessed during: Admit      [x] No Pressure Injuries Present    [x]Prevention Measures Documented      [] Yes- Altered Skin Integrity Present or Discovered   [] LDA Added if Not in Epic (Describe Wound)   [] New Altered Skin Integrity was Present on Admit and Documented in LDA   [] Wound Image Taken    Wound Care Consulted? No    Attending Nurse:  Lori Somers RN     Second RN/Staff Member:  Lori Travis RN

## 2023-04-13 ENCOUNTER — TELEPHONE (OUTPATIENT)
Dept: PSYCHIATRY | Facility: CLINIC | Age: 73
End: 2023-04-13
Payer: MEDICARE

## 2023-04-13 ENCOUNTER — TELEPHONE (OUTPATIENT)
Dept: INTERVENTIONAL RADIOLOGY/VASCULAR | Facility: CLINIC | Age: 73
End: 2023-04-13
Payer: MEDICARE

## 2023-04-13 VITALS
HEIGHT: 66 IN | BODY MASS INDEX: 26.22 KG/M2 | DIASTOLIC BLOOD PRESSURE: 57 MMHG | OXYGEN SATURATION: 96 % | SYSTOLIC BLOOD PRESSURE: 114 MMHG | TEMPERATURE: 99 F | WEIGHT: 163.13 LBS | RESPIRATION RATE: 18 BRPM | HEART RATE: 85 BPM

## 2023-04-13 DIAGNOSIS — K83.09 CHOLANGITIS: Primary | ICD-10-CM

## 2023-04-13 PROBLEM — Z51.5 PALLIATIVE CARE ENCOUNTER: Status: ACTIVE | Noted: 2023-04-13

## 2023-04-13 PROBLEM — K59.01 SLOW TRANSIT CONSTIPATION: Status: ACTIVE | Noted: 2023-04-04

## 2023-04-13 PROBLEM — J47.9 BRONCHIECTASIS WITHOUT COMPLICATION: Status: ACTIVE | Noted: 2021-11-29

## 2023-04-13 PROBLEM — R10.11 RIGHT UPPER QUADRANT ABDOMINAL PAIN: Status: RESOLVED | Noted: 2023-04-04 | Resolved: 2023-04-13

## 2023-04-13 PROBLEM — D63.0 ANEMIA IN NEOPLASTIC DISEASE: Status: ACTIVE | Noted: 2023-04-11

## 2023-04-13 LAB
ALBUMIN SERPL BCP-MCNC: 1.8 G/DL (ref 3.5–5.2)
ALP SERPL-CCNC: 665 U/L (ref 55–135)
ALT SERPL W/O P-5'-P-CCNC: 56 U/L (ref 10–44)
ANION GAP SERPL CALC-SCNC: 13 MMOL/L (ref 8–16)
AST SERPL-CCNC: 76 U/L (ref 10–40)
BASOPHILS # BLD AUTO: 0.01 K/UL (ref 0–0.2)
BASOPHILS NFR BLD: 0.1 % (ref 0–1.9)
BILIRUB SERPL-MCNC: 3.2 MG/DL (ref 0.1–1)
BLD PROD TYP BPU: NORMAL
BLOOD UNIT EXPIRATION DATE: NORMAL
BLOOD UNIT TYPE CODE: 6200
BLOOD UNIT TYPE: NORMAL
BUN SERPL-MCNC: 9 MG/DL (ref 8–23)
CALCIUM SERPL-MCNC: 9.1 MG/DL (ref 8.7–10.5)
CHLORIDE SERPL-SCNC: 99 MMOL/L (ref 95–110)
CO2 SERPL-SCNC: 23 MMOL/L (ref 23–29)
CODING SYSTEM: NORMAL
CREAT SERPL-MCNC: 0.8 MG/DL (ref 0.5–1.4)
CROSSMATCH INTERPRETATION: NORMAL
DIFFERENTIAL METHOD: ABNORMAL
DISPENSE STATUS: NORMAL
EOSINOPHIL # BLD AUTO: 0 K/UL (ref 0–0.5)
EOSINOPHIL NFR BLD: 0.1 % (ref 0–8)
ERYTHROCYTE [DISTWIDTH] IN BLOOD BY AUTOMATED COUNT: 18.6 % (ref 11.5–14.5)
EST. GFR  (NO RACE VARIABLE): >60 ML/MIN/1.73 M^2
GLUCOSE SERPL-MCNC: 110 MG/DL (ref 70–110)
HCT VFR BLD AUTO: 21.1 % (ref 37–48.5)
HGB BLD-MCNC: 6.5 G/DL (ref 12–16)
IMM GRANULOCYTES # BLD AUTO: 0.1 K/UL (ref 0–0.04)
IMM GRANULOCYTES NFR BLD AUTO: 1 % (ref 0–0.5)
LYMPHOCYTES # BLD AUTO: 0.9 K/UL (ref 1–4.8)
LYMPHOCYTES NFR BLD: 8.2 % (ref 18–48)
MAGNESIUM SERPL-MCNC: 2 MG/DL (ref 1.6–2.6)
MCH RBC QN AUTO: 26.4 PG (ref 27–31)
MCHC RBC AUTO-ENTMCNC: 30.8 G/DL (ref 32–36)
MCV RBC AUTO: 86 FL (ref 82–98)
MONOCYTES # BLD AUTO: 0.5 K/UL (ref 0.3–1)
MONOCYTES NFR BLD: 4.4 % (ref 4–15)
NEUTROPHILS # BLD AUTO: 9 K/UL (ref 1.8–7.7)
NEUTROPHILS NFR BLD: 86.2 % (ref 38–73)
NRBC BLD-RTO: 0 /100 WBC
NUM UNITS TRANS PACKED RBC: NORMAL
PHOSPHATE SERPL-MCNC: 2.8 MG/DL (ref 2.7–4.5)
PLATELET # BLD AUTO: 258 K/UL (ref 150–450)
PMV BLD AUTO: 10.3 FL (ref 9.2–12.9)
POTASSIUM SERPL-SCNC: 3.8 MMOL/L (ref 3.5–5.1)
PROT SERPL-MCNC: 6.4 G/DL (ref 6–8.4)
RBC # BLD AUTO: 2.46 M/UL (ref 4–5.4)
SODIUM SERPL-SCNC: 135 MMOL/L (ref 136–145)
WBC # BLD AUTO: 10.43 K/UL (ref 3.9–12.7)

## 2023-04-13 PROCEDURE — 80053 COMPREHEN METABOLIC PANEL: CPT

## 2023-04-13 PROCEDURE — 99900035 HC TECH TIME PER 15 MIN (STAT)

## 2023-04-13 PROCEDURE — P9016 RBC LEUKOCYTES REDUCED: HCPCS

## 2023-04-13 PROCEDURE — 84100 ASSAY OF PHOSPHORUS: CPT

## 2023-04-13 PROCEDURE — 63600175 PHARM REV CODE 636 W HCPCS

## 2023-04-13 PROCEDURE — 1111F PR DISCHARGE MEDS RECONCILED W/ CURRENT OUTPATIENT MED LIST: ICD-10-PCS | Mod: CPTII,GC,, | Performed by: INTERNAL MEDICINE

## 2023-04-13 PROCEDURE — 1111F DSCHRG MED/CURRENT MED MERGE: CPT | Mod: CPTII,GC,, | Performed by: INTERNAL MEDICINE

## 2023-04-13 PROCEDURE — 63600175 PHARM REV CODE 636 W HCPCS: Performed by: STUDENT IN AN ORGANIZED HEALTH CARE EDUCATION/TRAINING PROGRAM

## 2023-04-13 PROCEDURE — 25000003 PHARM REV CODE 250

## 2023-04-13 PROCEDURE — 99239 HOSP IP/OBS DSCHRG MGMT >30: CPT | Mod: GC,,, | Performed by: INTERNAL MEDICINE

## 2023-04-13 PROCEDURE — 83735 ASSAY OF MAGNESIUM: CPT

## 2023-04-13 PROCEDURE — 36430 TRANSFUSION BLD/BLD COMPNT: CPT

## 2023-04-13 PROCEDURE — 94640 AIRWAY INHALATION TREATMENT: CPT

## 2023-04-13 PROCEDURE — 25000003 PHARM REV CODE 250: Performed by: STUDENT IN AN ORGANIZED HEALTH CARE EDUCATION/TRAINING PROGRAM

## 2023-04-13 PROCEDURE — 85025 COMPLETE CBC W/AUTO DIFF WBC: CPT

## 2023-04-13 PROCEDURE — 27000221 HC OXYGEN, UP TO 24 HOURS

## 2023-04-13 PROCEDURE — 36415 COLL VENOUS BLD VENIPUNCTURE: CPT

## 2023-04-13 PROCEDURE — 94761 N-INVAS EAR/PLS OXIMETRY MLT: CPT

## 2023-04-13 PROCEDURE — 99239 PR HOSPITAL DISCHARGE DAY,>30 MIN: ICD-10-PCS | Mod: GC,,, | Performed by: INTERNAL MEDICINE

## 2023-04-13 RX ORDER — METRONIDAZOLE 500 MG/1
500 TABLET ORAL EVERY 8 HOURS
Status: DISCONTINUED | OUTPATIENT
Start: 2023-04-13 | End: 2023-04-13

## 2023-04-13 RX ORDER — LACTULOSE 10 G/15ML
10 SOLUTION ORAL 2 TIMES DAILY
Status: DISCONTINUED | OUTPATIENT
Start: 2023-04-13 | End: 2023-04-13

## 2023-04-13 RX ORDER — CIPROFLOXACIN 750 MG/1
750 TABLET, FILM COATED ORAL EVERY 12 HOURS
Status: DISCONTINUED | OUTPATIENT
Start: 2023-04-13 | End: 2023-04-13 | Stop reason: HOSPADM

## 2023-04-13 RX ORDER — CIPROFLOXACIN 750 MG/1
750 TABLET, FILM COATED ORAL EVERY 12 HOURS
Qty: 24 TABLET | Refills: 0 | Status: SHIPPED | OUTPATIENT
Start: 2023-04-13 | End: 2023-04-25

## 2023-04-13 RX ORDER — HYDROCODONE BITARTRATE AND ACETAMINOPHEN 500; 5 MG/1; MG/1
TABLET ORAL
Status: DISCONTINUED | OUTPATIENT
Start: 2023-04-13 | End: 2023-04-13 | Stop reason: HOSPADM

## 2023-04-13 RX ADMIN — SENNOSIDES 4 TABLET: 8.6 TABLET, FILM COATED ORAL at 08:04

## 2023-04-13 RX ADMIN — CIPROFLOXACIN 750 MG: 750 TABLET, FILM COATED ORAL at 11:04

## 2023-04-13 RX ADMIN — DILTIAZEM HYDROCHLORIDE 240 MG: 120 CAPSULE, COATED, EXTENDED RELEASE ORAL at 08:04

## 2023-04-13 RX ADMIN — ENOXAPARIN SODIUM 40 MG: 40 INJECTION SUBCUTANEOUS at 05:04

## 2023-04-13 RX ADMIN — POLYETHYLENE GLYCOL 3350 17 G: 17 POWDER, FOR SOLUTION ORAL at 08:04

## 2023-04-13 RX ADMIN — OXYCODONE HYDROCHLORIDE 10 MG: 10 TABLET ORAL at 05:04

## 2023-04-13 RX ADMIN — MORPHINE SULFATE 2 MG: 2 INJECTION, SOLUTION INTRAMUSCULAR; INTRAVENOUS at 05:04

## 2023-04-13 RX ADMIN — FLUTICASONE FUROATE AND VILANTEROL TRIFENATATE 1 PUFF: 200; 25 POWDER RESPIRATORY (INHALATION) at 09:04

## 2023-04-13 RX ADMIN — OXYCODONE HYDROCHLORIDE 20 MG: 20 TABLET, FILM COATED, EXTENDED RELEASE ORAL at 08:04

## 2023-04-13 RX ADMIN — PIPERACILLIN SODIUM AND TAZOBACTAM SODIUM 4.5 G: 4; .5 INJECTION, POWDER, FOR SOLUTION INTRAVENOUS at 02:04

## 2023-04-13 RX ADMIN — PIPERACILLIN SODIUM AND TAZOBACTAM SODIUM 4.5 G: 4; .5 INJECTION, POWDER, FOR SOLUTION INTRAVENOUS at 10:04

## 2023-04-13 NOTE — PLAN OF CARE
Met with patient and she voices that she would like me to set up hospice for her with an agency that I am familiar with. Informed her that I will have rep from [facility] come to bedside to meet with her and explain services. Informed her that I would coordinate with her  and physician.Referral sent to Bell with [facility].

## 2023-04-13 NOTE — PROGRESS NOTES
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 Last chemo was in February of 2022. She further has hx of SVT/HTN treated with CCB, CKD, asthma, here today after episode of acute cholecystitis s/p cholecystostomy tube placement with IR with a chief complaint of fever, and RUQ pain. She further endorses increasing debility as she has noticed requiring more support when ambulating in the grocery store. She takes a CCB for her SVT/HTN. She previously was on a statin but has not had it in some time.     In the ED peak temp of 100.3, ALK Phos >800, AST/ALT >100, and Hgb of 7.1. CT scan obtained showed distension of gallbladder, though tube is in place and able to be flushed.        Overview/Hospital Course:  72 year old with singificant hx of metastatic ovarian cancer to liver and lungs admitted for increased abdominal  tenderness, and worsening fatigue, fever, and increased WBC. On zosyn. AES consulted. Elevated liver enzymes, but now decreased from on admission. Offered MRCP, patient would like to have GOC discussion prior to further treatment options. Placed back on home pain regimen. Patient has brought up to providers regarding a desire to go home with hospice. Member of primary team had GOC conversation with patient regarding wishes and her desire to not want further intervention but to be pain free and comfortable. Katya, from palliative care who is familiar with patient aided in coordinating with hospice company who saw patient at bedside. Will switch from IV  abx to PO cipro for a total 14 days of abx coverage from infection concerning bile ducks. They do state it is now draining as well.  Patient to d/c home on oral cipro once hospice has been established at her home.       Interval History: No acute events overnight Patient has discussed regarding wishes to be comfortable and to go home on hospice. Hgb 6.5, will give 1x unit RBC. Plan for 12 days of cpiro regarding potential infection concerning bile duct. Will coordinate with hospice and case.     Review of Systems   Constitutional:  Positive for fatigue and fever. Negative for chills and diaphoresis.   HENT:  Negative for congestion and sore throat.    Respiratory:  Positive for shortness of breath (2/2 to abdominal pain). Negative for wheezing.    Cardiovascular:  Negative for chest pain and palpitations.   Gastrointestinal:  Positive for abdominal distention, abdominal pain and constipation. Negative for diarrhea, nausea and vomiting.   Skin:  Positive for color change and pallor.   Neurological:  Positive for dizziness and weakness. Negative for syncope, speech difficulty and light-headedness.   Hematological:  Negative for adenopathy. Does not bruise/bleed easily.   Psychiatric/Behavioral:  Negative for agitation and confusion. The patient is not nervous/anxious.    Objective:     Vital Signs (Most Recent):  Temp: 98.4 °F (36.9 °C) (04/13/23 1259)  Pulse: 93 (04/13/23 1259)  Resp: 18 (04/13/23 1259)  BP: 123/62 (04/13/23 1259)  SpO2: 98 % (04/13/23 1259) Vital Signs (24h Range):  Temp:  [97.1 °F (36.2 °C)-99 °F (37.2 °C)] 98.4 °F (36.9 °C)  Pulse:  [] 93  Resp:  [14-20] 18  SpO2:  [90 %-100 %] 98 %  BP: (107-138)/(55-70) 123/62     Weight: 74 kg (163 lb 2.3 oz)  Body mass index is 26.53 kg/m².    Intake/Output Summary (Last 24 hours) at 4/13/2023 1349  Last data filed at 4/13/2023 0614  Gross per 24 hour   Intake 440 ml   Output --   Net 440 ml      Physical Exam  Constitutional:       General: She is not  in acute distress.     Appearance: She is ill-appearing and toxic-appearing. She is not diaphoretic.   HENT:      Head: Normocephalic and atraumatic.   Cardiovascular:      Rate and Rhythm: Normal rate and regular rhythm.      Pulses: Normal pulses.      Heart sounds: Normal heart sounds. No murmur heard.  Pulmonary:      Effort: No respiratory distress.      Breath sounds: Normal breath sounds. No wheezing.   Abdominal:      General: There is distension.      Tenderness: There is abdominal tenderness.   Skin:     General: Skin is warm and dry.      Coloration: Skin is jaundiced and pale.   Neurological:      General: No focal deficit present.      Mental Status: She is alert and oriented to person, place, and time.   Psychiatric:         Mood and Affect: Mood normal.         Behavior: Behavior normal.       Significant Labs: All pertinent labs within the past 24 hours have been reviewed.    Significant Imaging: I have reviewed all pertinent imaging results/findings within the past 24 hours.      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 Per IR, seems patent, and is able to be flushed, concern for infection 2/2 to obstruction potentially due to mets.   - Cont with IV zosyn  - AES consult   - Patient sees palliative care outpatient will discuss with Katya who she sees outpatient   - Daily CMP   - GOC with patient and primary, patient has elected or hospice care. Katya, from palliative has communicated with hospice company who was seen at bedside with patient. She has elected for home hospice where she can be comfortable     Anemia in neoplastic disease  Hgb 7.1 at time of admission. Patient states since chemo she has been anemic. Chart review shows drop from 11 to 9s in April 2022, last chemo with cisplatin 2/2022 per ED  note  - Consider blood consent due to low Hgb close to transfusion criteria   - daily CBC       Slow transit constipation  Patient on bowel regimen at home for chronic consitpation. CT scan significant for stool burden  - Restart home bowel regimen       Cholangitis  Patient with increased WBC, scleral icterus, RUQ, and borderline fever s/p biliary intervention 2/2 to cholecystitis. Concern for infectious process   - Given vanc/zosyn in ED, will continue with zosyn   - AES consulted - No further intervention after GOC discussion  - Will cont on PO ciprofloxacin for next 2 weeks.       Primary hypertension  See Supraventricular tachycardia       Supraventricular tachycardia  Patient with hx of SVT and HTN followed up with cardiology   - Cont home diltiazem         VTE Risk Mitigation (From admission, onward)         Ordered     enoxaparin injection 40 mg  Daily         04/11/23 1425     IP VTE HIGH RISK PATIENT  Once         04/11/23 1425     Place sequential compression device  Until discontinued         04/11/23 1425                Discharge Planning   HORACE: 4/13/2023     Code Status: DNR   Is the patient medically ready for discharge?: No    Reason for patient still in hospital (select all that apply): Laboratory test and Pending disposition  Discharge Plan A: Hospice/home   Discharge Delays: None known at this time              Chisago West, DO  Department of Hospital Medicine   [facility] ([place]

## 2023-04-13 NOTE — SUBJECTIVE & OBJECTIVE
Interval History: No acute events overnight Patient has discussed regarding wishes to be comfortable and to go home on hospice. Hgb 6.5, will give 1x unit RBC. Plan for 12 days of cpiro regarding potential infection concerning bile duct. Will coordinate with hospice and case.     Review of Systems   Constitutional:  Positive for fatigue and fever. Negative for chills and diaphoresis.   HENT:  Negative for congestion and sore throat.    Respiratory:  Positive for shortness of breath (2/2 to abdominal pain). Negative for wheezing.    Cardiovascular:  Negative for chest pain and palpitations.   Gastrointestinal:  Positive for abdominal distention, abdominal pain and constipation. Negative for diarrhea, nausea and vomiting.   Skin:  Positive for color change and pallor.   Neurological:  Positive for dizziness and weakness. Negative for syncope, speech difficulty and light-headedness.   Hematological:  Negative for adenopathy. Does not bruise/bleed easily.   Psychiatric/Behavioral:  Negative for agitation and confusion. The patient is not nervous/anxious.    Objective:     Vital Signs (Most Recent):  Temp: 98.4 °F (36.9 °C) (04/13/23 1259)  Pulse: 93 (04/13/23 1259)  Resp: 18 (04/13/23 1259)  BP: 123/62 (04/13/23 1259)  SpO2: 98 % (04/13/23 1259) Vital Signs (24h Range):  Temp:  [97.1 °F (36.2 °C)-99 °F (37.2 °C)] 98.4 °F (36.9 °C)  Pulse:  [] 93  Resp:  [14-20] 18  SpO2:  [90 %-100 %] 98 %  BP: (107-138)/(55-70) 123/62     Weight: 74 kg (163 lb 2.3 oz)  Body mass index is 26.53 kg/m².    Intake/Output Summary (Last 24 hours) at 4/13/2023 1349  Last data filed at 4/13/2023 0614  Gross per 24 hour   Intake 440 ml   Output --   Net 440 ml      Physical Exam  Constitutional:       General: She is not in acute distress.     Appearance: She is ill-appearing and toxic-appearing. She is not diaphoretic.   HENT:      Head: Normocephalic and atraumatic.   Cardiovascular:      Rate and Rhythm: Normal rate and regular rhythm.       Pulses: Normal pulses.      Heart sounds: Normal heart sounds. No murmur heard.  Pulmonary:      Effort: No respiratory distress.      Breath sounds: Normal breath sounds. No wheezing.   Abdominal:      General: There is distension.      Tenderness: There is abdominal tenderness.   Skin:     General: Skin is warm and dry.      Coloration: Skin is jaundiced and pale.   Neurological:      General: No focal deficit present.      Mental Status: She is alert and oriented to person, place, and time.   Psychiatric:         Mood and Affect: Mood normal.         Behavior: Behavior normal.       Significant Labs: All pertinent labs within the past 24 hours have been reviewed.    Significant Imaging: I have reviewed all pertinent imaging results/findings within the past 24 hours.

## 2023-04-13 NOTE — PLAN OF CARE
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        Supraventricular tachycardia [I47.1]  Yes     1966: First episode.  2003: ER: SVT.          Resolved Hospital Problems   No resolved problems to display.       Hospice Qualifying Diagnoses:         Patient has a life expectancy < 6 months due to:  Primary Hospice Diagnosis; Metastatic cancer   Comorbid Conditions Contributing to Decline: Biliary obstruction, Anemia, HTN, SVT.     Vital Signs: Routine per Hospice Protocol.    Code Status: DNR     Allergies:   Review of patient's allergies indicates:   Allergen Reactions    Erythromycin Other (See Comments)     Stomach Cramps       Diet: Regular Diet     Activities: As tolerated    Goals of Care Treatment Preferences:  Code Status: DNR          What is most important right now is to focus on spending time at home, avoiding the hospital, symptom/pain control, comfort and QOL .  Accordingly, we have decided that the best plan to meet the patient's goals includes enrolling in hospice care.      Nursing: Per Hospice Routine.         Oxygen: 2 L NC          Medications:        Medication List        START taking these medications      ciprofloxacin HCl 750 MG tablet  Commonly known as: CIPRO  Take 1 tablet (750 mg total) by mouth every 12 (twelve) hours. for 12 days            CONTINUE taking these medications      albuterol 90  "mcg/actuation inhaler  Commonly known as: PROVENTIL/VENTOLIN HFA  INHALE 2 PUFFS INTO THE LUNGS EVERY 6 (SIX) HOURS AS NEEDED FOR WHEEZING.     BREO ELLIPTA 200-25 mcg/dose Dsdv diskus inhaler  Generic drug: fluticasone furoate-vilanteroL  Inhale 1 puff into the lungs once daily.     ergocalciferol 50,000 unit Cap  Commonly known as: ERGOCALCIFEROL  Take 1 capsule by mouth every 7 days.     naloxone 4 mg/actuation Spry  Commonly known as: NARCAN  4mg by nasal route as needed for opioid overdose; may repeat every 2-3 minutes in alternating nostrils until medical help arrives. Call 911     * oxyCODONE 20 mg 12 hr tablet  Commonly known as: OxyCONTIN  Take 1 tablet (20 mg total) by mouth every 12 (twelve) hours.     * oxyCODONE 10 mg Tab immediate release tablet  Commonly known as: ROXICODONE  Take 1 tablet (10 mg total) by mouth every 6 (six) hours as needed for Pain.     TIADYLT  MG Cs24  Generic drug: diltiaZEM  TAKE ONE CAPSULE BY MOUTH EVERY DAY     zafirlukast 20 MG tablet  Commonly known as: ACCOLATE  Take 20 mg by mouth 2 (two) times daily.           * This list has 2 medication(s) that are the same as other medications prescribed for you. Read the directions carefully, and ask your doctor or other care provider to review them with you.                STOP taking these medications      amoxicillin-clavulanate 875-125mg 875-125 mg per tablet  Commonly known as: AUGMENTIN     EASY TOUCH INSULIN SYRINGE 1 mL 30 gauge x 1/2" Syrg  Generic drug: insulin syringe-needle U-100                Future Orders:  Hospice Medical Director may dictate new orders for comfortable care measures & sign death certificate.        _________________________________  Narcisa Martinez MD  04/13/2023      "

## 2023-04-13 NOTE — PLAN OF CARE
CHW met with patient/family at bedside. Patient experience rounding completed and reviewed the following.     Do you know your discharge plan? Yes  If yes, what is the plan? (Home )    If you are discharging home, do you have help at home?  No  Do you think you will need help at home at discharge? Yes     Have you discussed your needs and preferences with your SW/CM? Yes     Assigned SW/CM notified of any patient/family needs or concerns. Patient stated she wants to get help[ at home with bathing. CHW informed Dahiana of patients concern and needs

## 2023-04-13 NOTE — ASSESSMENT & PLAN NOTE
72 year olf female with significant family hx of cx as well as person hx of ovarian cx with mets to lung and liver here <1 week post biliary cholecystotomy placement. Per IR, seems patent, and is able to be flushed, concern for infection 2/2 to obstruction potentially due to mets.   - Cont with IV zosyn  - AES consult   - Patient sees palliative care outpatient will discuss with Katya who she sees outpatient   - Daily CMP   - GOC with patient and primary, patient has elected or hospice care. Katya, from palliative has communicated with hospice company who was seen at bedside with patient. She has elected for home hospice where she can be comfortable

## 2023-04-13 NOTE — TELEPHONE ENCOUNTER
Patient currently inpatient.  Schedule patient for IR rachid tube check and possible capping trail on 5/10/2023 at 830 am.  Arrival time 7am. Patient due the week of 5/8 per radiologist. Please forward call at [phone].

## 2023-04-13 NOTE — ASSESSMENT & PLAN NOTE
Patient with increased WBC, scleral icterus, RUQ, and borderline fever s/p biliary intervention 2/2 to cholecystitis. Concern for infectious process   - Given vanc/zosyn in ED, will continue with zosyn   - AES consulted - No further intervention after GOC discussion  - Will cont on PO ciprofloxacin for 14 days total

## 2023-04-13 NOTE — PLAN OF CARE
Problem: Adult Inpatient Plan of Care  Goal: Plan of Care Review  Outcome: Ongoing, Progressing  Goal: Optimal Comfort and Wellbeing  Outcome: Ongoing, Progressing     Problem: Infection  Goal: Absence of Infection Signs and Symptoms  Outcome: Ongoing, Progressing     Problem: Fall Injury Risk  Goal: Absence of Fall and Fall-Related Injury  Outcome: Ongoing, Progressing     Problem: Breathing Pattern Ineffective  Goal: Effective Breathing Pattern  Outcome: Ongoing, Progressing     Encourage DBE. Monitor temperature. Reinforce safety precautions. Promote comfort.

## 2023-04-13 NOTE — TELEPHONE ENCOUNTER
Spoke with patient regarding decision to enter hospice. Provided encouragement and informed patient if she needed anything to reach out.

## 2023-04-13 NOTE — NURSING
Pt iv was taken out. Pt left with all her belongings. Pt left with daughter and sister in a wheelchair. Tele was also removed.

## 2023-04-13 NOTE — MEDICAL/APP STUDENT
[facility] Medicine Student   Progress Note  [facility]OS[facility] MED 2    Admit Date: 4/11/2023  Hospital Day: 04/13/2023  12:15 PM    SUBJECTIVE:   Ms. Liat Gilmore is a 72 y.o. 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    Interval history: no acute changes overnight. Pt requested to be established on hospice and and ACP was established. Pt does not want any procedures and is now DNR. Pts pain is being controlled oxycodone and morphine. She has not had a BM yet. Reports her cholecystostomy is due to be flushed and the dressing changed.      Review of Systems   Constitutional:  Negative for chills, fever and weight loss.   HENT: Negative.     Eyes: Negative.    Respiratory:  Positive for cough. Negative for shortness of breath.    Cardiovascular:  Negative for chest pain and leg swelling.   Gastrointestinal:  Positive for abdominal pain and constipation. Negative for diarrhea, nausea and vomiting.   Genitourinary: Negative.  Negative for dysuria.   Musculoskeletal: Negative.    Skin:         jaundice   Neurological: Negative.  Negative for dizziness and weakness.   Endo/Heme/Allergies: Negative.    Psychiatric/Behavioral: Negative.       Please refer to the H&P for past medical, family, and social history.    OBJECTIVE:     Vital Signs Recent:  Temp: 98.8 °F (37.1 °C) (04/13/23 1213)  Pulse: 108 (04/13/23 1213)  Resp: 18 (04/13/23 1213)  BP: 119/61 (04/13/23 1213)  SpO2: (!) 90 % (04/13/23 1213)  Oxygen Documentation:    Flow (L/min): 2           Device (Oxygen Therapy): nasal cannula  $ Is the patient on Low Flow Oxygen?: Yes      Vital Signs Range (Last 24H):  Temp:  [97.1 °F (36.2 °C)-99 °F (37.2 °C)]   Pulse:  []   Resp:  [14-20]   BP: (107-138)/(55-65)   SpO2:  [90 %-100 %]        I & O (Last 24H):  Intake/Output  Summary (Last 24 hours) at 4/13/2023 1215  Last data filed at 4/13/2023 0614  Gross per 24 hour   Intake 440 ml   Output --   Net 440 ml        Physical Exam:  Physical Exam  Constitutional:       General: She is not in acute distress.     Appearance: She is ill-appearing.   HENT:      Head: Normocephalic and atraumatic.   Eyes:      Extraocular Movements: Extraocular movements intact.   Cardiovascular:      Rate and Rhythm: Normal rate and regular rhythm.      Pulses: Normal pulses.      Heart sounds: Normal heart sounds.   Pulmonary:      Effort: Pulmonary effort is normal. No respiratory distress.      Breath sounds: Normal breath sounds.   Abdominal:      Palpations: Abdomen is soft.      Tenderness: There is abdominal tenderness.   Musculoskeletal:      Right lower leg: Edema present.      Left lower leg: Edema present.   Skin:     General: Skin is warm and dry.      Coloration: Skin is jaundiced.   Neurological:      General: No focal deficit present.      Mental Status: She is alert and oriented to person, place, and time.   Psychiatric:         Mood and Affect: Mood normal.         Behavior: Behavior normal.       Labs:   Recent Labs   Lab 04/11/23 0523 04/12/23 0927 04/13/23 0220   * 137 135*   K 4.2 4.0 3.8   CL 99 100 99   CO2 23 23 23   BUN 8 6* 9   CREATININE 1.0 0.8 0.8   * 99 110   CALCIUM 9.5 9.7 9.1   MG  --  1.8 2.0   PHOS  --  2.4* 2.8     Recent Labs   Lab 04/11/23 0523 04/12/23 0927 04/13/23 0220   ALKPHOS 818* 713* 665*   * 71* 56*   * 71* 76*   ALBUMIN 2.3* 2.0* 1.8*   PROT 7.2 7.0 6.4   BILITOT 3.5* 3.6* 3.2*   INR  --  1.2  --      Recent Labs   Lab 04/11/23 0523 04/12/23 0927 04/13/23 0220   WBC 13.05* 10.38 10.43   HGB 7.1* 7.3* 6.5*   HCT 22.6* 22.8* 21.1*    227 258       Diagnostic Results:  2x BC NGTD    Scheduled Meds:   ciprofloxacin HCl  750 mg Oral Q12H    diltiaZEM  240 mg Oral Daily    enoxaparin  40 mg Subcutaneous Daily    fluticasone  furoate-vilanteroL  1 puff Inhalation Daily    oxyCODONE  20 mg Oral Q12H    polyethylene glycol  17 g Oral Daily    senna  4 tablet Oral Daily     Continuous Infusions:  PRN Meds:sodium chloride, acetaminophen, albuterol, dextrose 10%, dextrose 10%, dextrose, glucagon (human recombinant), morphine, naloxone, oxyCODONE, promethazine, sodium chloride 0.9%    ASSESSMENT/PLAN:   Ms. Liat Gilmore is a 72 y.o. 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        Supraventricular tachycardia [I47.1]  Yes     : First episode.  2003: ER: SVT.          Resolved Hospital Problems   No resolved problems to display.     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 LFTs trending down, ALK phos 665 from 713.  - stop zosyn, start ciprofloxacin (PO) for x2 weeks  - Pt is now DNR and does not want any procedures.  - Pt requesting hospice, consult  and discuss with Katya, her palliative care coordinator     Anemia  Hb.5 from 7.3  Pt was type and screened yesterday. Transfuse today     Constipation  No BM for x4 days. Will send patient home with lactulose as this helped initiate a BM for her before.    Supraventricular tachycardia  Patient with hx of SVT and HTN followed up with cardiology   - Cont home diltiazem     VTE Risk Mitigation (From admission, onward)           Ordered     enoxaparin  injection 40 mg  Daily         04/11/23 1425     IP VTE HIGH RISK PATIENT  Once         04/11/23 1425     Place sequential compression device  Until discontinued         04/11/23 1425                   Discharge planning:   Today, pending establishment of home hospice

## 2023-04-13 NOTE — PLAN OF CARE
Problem: Adult Inpatient Plan of Care  Goal: Plan of Care Review  Outcome: Adequate for Care Transition  Goal: Patient-Specific Goal (Individualized)  Outcome: Adequate for Care Transition  Goal: Absence of Hospital-Acquired Illness or Injury  Outcome: Adequate for Care Transition  Goal: Optimal Comfort and Wellbeing  Outcome: Adequate for Care Transition  Goal: Readiness for Transition of Care  Outcome: Adequate for Care Transition     Problem: Infection  Goal: Absence of Infection Signs and Symptoms  Outcome: Adequate for Care Transition     Problem: Skin Injury Risk Increased  Goal: Skin Health and Integrity  Outcome: Adequate for Care Transition     Problem: Fall Injury Risk  Goal: Absence of Fall and Fall-Related Injury  Outcome: Adequate for Care Transition     Problem: Breathing Pattern Ineffective  Goal: Effective Breathing Pattern  Outcome: Adequate for Care Transition

## 2023-04-13 NOTE — PLAN OF CARE
[facility] - Intensive Care ([facility]-16)  Discharge Reassessment    Primary Care Provider: Shabana Dunbar MD    Expected Discharge Date: 4/13/2023    Reassessment (most recent)       Discharge Reassessment - 04/13/23 1226          Discharge Reassessment    Assessment Type Discharge Planning Reassessment (P)      Did the patient's condition or plan change since previous assessment? No (P)      Discharge Plan discussed with: Patient (P)      Communicated HORACE with patient/caregiver No (P)      Discharge Plan A Hospice/home (P)      Discharge Plan B Inpatient Hospice (P)      DME Needed Upon Discharge  none (P)      Discharge Barriers Identified None (P)      Why the patient remains in the hospital Requires continued medical care (P)         Post-Acute Status    Post-Acute Authorization Placement (P)      Post-Acute Placement Status Referrals Sent (P)      Discharge Delays None known at this time (P)                  Saw pt in room - she confirms that d/c plan is to go to home hospice with [facility].    CHAVA MccoyN, BS, RN, CCM

## 2023-04-13 NOTE — DISCHARGE SUMMARY
[facility] - Intensive Care ([place] [address])  [facility] Medicine  Discharge Summary      Patient Name: Liat Gilmore  MRN: [id number]  MARGUERITE: [id number]  Patient Class: IP- Inpatient  Admission Date: 4/11/2023  Hospital Length of Stay: 2 days  Discharge Date and Time:  04/13/2023 3:12 PM  Attending Physician: Regan Cooper MD   Discharging Provider: Tc White DO  Primary Care Provider: Shabana Dunbar MD  [facility] Medicine Team: [facility] HOSP MED 2 Tcarchana White DO  Primary Care Team: [facility] 2    HPI:   Ms. 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 Last chemo was in February of 2022. She further has hx of SVT/HTN treated with CCB, CKD, asthma, here today after episode of acute cholecystitis s/p cholecystostomy tube placement with IR with a chief complaint of fever, and RUQ pain. She further endorses increasing debility as she has noticed requiring more support when ambulating in the grocery store. She takes a CCB for her SVT/HTN. She previously was on a statin but has not had it in some time.     In the ED peak temp of 100.3, ALK Phos >800, AST/ALT >100, and Hgb of 7.1. CT scan obtained showed distension of gallbladder, though tube is in place and able to be flushed.        * No surgery found *      Hospital Course:   72 year old with singificant hx of metastatic ovarian cancer to liver and lungs admitted for increased abdominal  tenderness, and worsening fatigue, fever, and increased WBC. On zosyn. AES consulted. Elevated liver enzymes, but now decreased from on admission. Offered MRCP, patient would like to have GOC discussion prior to further treatment options. Placed back on home pain regimen. Patient has brought up to providers regarding a desire to go home with hospice. Member of primary team had GOC conversation with patient regarding wishes and her desire to not want further intervention but to be pain free and comfortable.  Katya, from palliative care who is familiar with patient aided in coordinating with hospice company who saw patient at bedside. Will switch from IV abx to PO cipro for a total 14 days of abx coverage from infection concerning bile ducts. They do state her tubeit is draining well.  Patient to d/c home on oral cipro once hospice has been established at her home.        Goals of Care Treatment Preferences:  Code Status: DNR          What is most important right now is to focus on spending time at home, avoiding the hospital, symptom/pain control, comfort and QOL .  Accordingly, we have decided that the best plan to meet the patient's goals includes enrolling in hospice care.      Consults:   Consults (From admission, onward)        Status Ordering Provider     Inpatient consult to [facility] ([facility])  Once        Provider:  (Not yet assigned)    Completed RACHELL MENSAH     Inpatient consult to Interventional Radiology  Once        Provider:  (Not yet assigned)    Completed RACHELL MENSAH          Cardiac/Vascular  Primary hypertension  See Supraventricular tachycardia       Supraventricular tachycardia  Patient with hx of SVT and HTN followed up with cardiology   - Cont home diltiazem       ID  Cholangitis  Patient with increased WBC, scleral icterus, RUQ, and borderline fever s/p biliary intervention 2/2 to cholecystitis. Concern for infectious process   - Given vanc/zosyn in ED, will continue with zosyn   - AES consulted - No further intervention after GOC discussion  - Will cont on PO ciprofloxacin for 14 days total       Oncology  Anemia in neoplastic disease  Hgb 7.1 at time of admission. Patient states since chemo she has been anemic. Chart review shows drop from 11 to 9s in April 2022, last chemo with cisplatin 2/2022 per ED note  - Consider blood consent due to low Hgb close to transfusion criteria   - daily CBC       GI  * Biliary obstruction due to malignant neoplasm  72 year olf female  with significant family hx of cx as well as person hx of ovarian cx with mets to lung and liver here <1 week post biliary cholecystotomy placement. Per IR, seems patent, and is able to be flushed, concern for infection 2/2 to obstruction potentially due to mets.   - Cont with IV zosyn  - AES consult   - Patient sees palliative care outpatient will discuss with Katya who she sees outpatient   - Daily CMP   - GOC with patient and primary, patient has elected or hospice care. Katya, from palliative has communicated with hospice company who was seen at bedside with patient. She has elected for home hospice where she can be comfortable     Slow transit constipation  Patient on bowel regimen at home for chronic consitpation. 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 [K83.1, C80.1] 04/11/2023 Yes    Palliative care encounter [Z51.5] 04/13/2023 Not Applicable    Hyperbilirubinemia [E80.6] 04/12/2023 Yes    Anemia in neoplastic disease [D63.0] 04/11/2023 Yes    Elevated liver enzymes [R74.8] 04/11/2023 Yes    Cholangitis [K83.09] 04/04/2023 Yes    Slow transit constipation [K59.01] 04/04/2023 Yes    Malignant neoplasm of both ovaries [C56.3] 08/06/2020 Yes    Primary hypertension [I10] 10/31/2018 Yes    Supraventricular tachycardia [I47.1] 04/06/2017 Yes      Problems Resolved During this Admission:    Diagnosis Date Noted Date Resolved POA    Right upper quadrant abdominal pain [R10.11] 04/04/2023 04/13/2023 Yes       Discharged Condition: stable    Disposition: Hospice/Home    Follow Up:    Patient Instructions:   No discharge procedures on file.    Significant Diagnostic Studies: N/A    Pending Diagnostic Studies:     None         Medications:  Reconciled Home Medications:      Medication List      START taking these medications    ciprofloxacin HCl 750 MG  "tablet  Commonly known as: CIPRO  Take 1 tablet (750 mg total) by mouth every 12 (twelve) hours. for 12 days        CONTINUE taking these medications    albuterol 90 mcg/actuation inhaler  Commonly known as: PROVENTIL/VENTOLIN HFA  INHALE 2 PUFFS INTO THE LUNGS EVERY 6 (SIX) HOURS AS NEEDED FOR WHEEZING.     BREO ELLIPTA 200-25 mcg/dose Dsdv diskus inhaler  Generic drug: fluticasone furoate-vilanteroL  Inhale 1 puff into the lungs once daily.     ergocalciferol 50,000 unit Cap  Commonly known as: ERGOCALCIFEROL  Take 1 capsule by mouth every 7 days.     naloxone 4 mg/actuation Spry  Commonly known as: NARCAN  4mg by nasal route as needed for opioid overdose; may repeat every 2-3 minutes in alternating nostrils until medical help arrives. Call 911     * oxyCODONE 20 mg 12 hr tablet  Commonly known as: OxyCONTIN  Take 1 tablet (20 mg total) by mouth every 12 (twelve) hours.     * oxyCODONE 10 mg Tab immediate release tablet  Commonly known as: ROXICODONE  Take 1 tablet (10 mg total) by mouth every 6 (six) hours as needed for Pain.     TIADYLT  MG Cs24  Generic drug: diltiaZEM  TAKE ONE CAPSULE BY MOUTH EVERY DAY     zafirlukast 20 MG tablet  Commonly known as: ACCOLATE  Take 20 mg by mouth 2 (two) times daily.         * This list has 2 medication(s) that are the same as other medications prescribed for you. Read the directions carefully, and ask your doctor or other care provider to review them with you.            STOP taking these medications    amoxicillin-clavulanate 875-125mg 875-125 mg per tablet  Commonly known as: AUGMENTIN     EASY TOUCH INSULIN SYRINGE 1 mL 30 gauge x 1/2" Syrg  Generic drug: insulin syringe-needle U-100            Indwelling Lines/Drains at time of discharge:   Lines/Drains/Airways     Central Venous Catheter Line  Duration           Port A Cath Single Lumen -- days    Port A Cath Single Lumen right subclavian -- days          Drain  Duration                Biliary Tube 04/05/23 1043 " VTCB biliary 8 Fr. RUQ 8 days                Time spent on the discharge of patient: 45 minutes         Tc White DO  Department of Hospital Medicine  [facility] - Intensive Care ([place]-16)

## 2023-04-13 NOTE — ASSESSMENT & PLAN NOTE
Patient with increased WBC, scleral icterus, RUQ, and borderline fever s/p biliary intervention 2/2 to cholecystitis. Concern for infectious process   - Given vanc/zosyn in ED, will continue with zosyn   - AES consulted - No further intervention after GOC discussion  - Will cont on PO ciprofloxacin for next 2 weeks.

## 2023-04-14 NOTE — PLAN OF CARE
[facility] - Intensive [facility] ([facility]-16)  Discharge Final Note    Primary Care Provider: Shabana Dunbar MD    Expected Discharge Date: 4/13/2023    Final Discharge Note (most recent)       Final Note - 04/14/23 0845          Final Note    Assessment Type Final Discharge Note (P)      Anticipated Discharge Disposition Hospice/Home (P)                  Pt d/c'd to home hospice.    CHAVA MccoyN, BS, RN, CCM        Important Message from Medicare  Important Message from Medicare regarding Discharge Appeal Rights: Given to patient/caregiver, Explained to patient/caregiver, Signed/date by patient/caregiver     Date IMM was signed: 04/13/23  Time IMM was signed: [id number]

## 2023-04-16 LAB
BACTERIA BLD CULT: NORMAL
BACTERIA BLD CULT: NORMAL

## 2023-04-17 NOTE — PHYSICIAN QUERY
PT Name: Liat Gilmore  MR #: [id number]     DOCUMENTATION CLARIFICATION     CDS: Brandy Capley, RN  Email: [email or web address]     This form is a permanent document in the medical record.     Query Date: April 17, 2023    By submitting this query, we are merely seeking further clarification of documentation.  Please utilize your independent clinical judgment when addressing the question(s) below.  The Medical Record contains the following:  Indicators Supporting Clinical Findings Location in Medical Record   X HR         RR          BP        Temp Initial Vitals [04/04/23 0536]   BP Pulse Resp Temp SpO2   126/74 (!) 129 20 (!) 100.8 °F (38.2 °C) 95 %     Vital Signs (24h Range):  Temp:  [98.4 °F (36.9 °C)-100.8 °F (38.2 °C)] 98.8 °F (37.1 °C)  Pulse:  [] 74  Resp:  [15-22] 15  SpO2:  [93 %-95 %] 95 %  BP: ()/(53-74) 103/53   ED provider notes 4/4, filed 4/5            H&P 4/4   X Lactic Acid          Procalcitonin Lactate, Jose F: 1.8   Labs 4/4 1601   X WBC           Bands          CRP     04/04/23 06:06 04/05/23 04:54 04/06/23 05:34   WBC 13.12 (H) 13.45 (H) 14.97 (H)        Labs 4/4-4/6   X Culture(s) No growth after 5 days    AFB Culture & Smear Culture in progress   Aerobic Bacterial Culture No growth   Fungus (Mycology) Culture Culture in progress   No WBC's, epithelial cells or organisms seen   Specimen Type: Body Fluid  Specimen Source: Bile   Blood cultures 4/4 and 4/11    Cultures 4/5    AMS, Confusion, LOC, etc.     X Organ Dysfunction/Failure KAUSHAL     Elevated Liver Enzymes- unclear etiology   H&P 4/4   X Bacteremia or Sepsis / Septic Post- resuscitation assessment Yes Perfusion exam was performed within 6 hours of septic shock presentation after bolus shows Adequate tissue perfusion assessed by non-invasive monitoring      Will Not start Pressors- Levophed for MAP of 65  Source control achieved by: IV abx    #Sepsis/Acute Cholecystitis/Leukocytosis/Fever   ED provider notes 4/4, filed  4/5              H&P 4/4   X Known or Suspected Source of Infection documented Concern for acute cholecystitis on CT AP.  WBC 13 with left shift.    Patient febrile overnight, concerning for interval development of cholangitis.  Would continue antibiotics for suspected cholangitis coverage   H&P 4/4    General surgery care update 4/5    (Failed) Outpatient Treatment     X Medication Continue Zosyn    lactated ringers bolus 1,000 mL  Dose: 1,000 mL  Freq: Once Route: IV  Start: 04/04/23 1900 End: 04/04/23 2246    lactated ringers bolus 1,000 mL  Dose: 1,000 mL  Freq: ED 1 Time Route: IV  Start: 04/04/23 0600 End: 04/04/23 0837    lactated ringers bolus 1,000 mL  Dose: 1,000 mL  Freq: ED 1 Time Route: IV  Start: 04/04/23 0600 End: 04/04/23 0928    vancomycin (VANCOCIN) 1,000 mg in dextrose 5 % (D5W) 250 mL IVPB (Vial-Mate)  Dose: 1,000 mg  Freq: ED 1 Time Route: IV  Indications of Use: Sepsis of Unknown Source  Start: 04/04/23 0600 End: 04/04/23 0928    ceFEPIme (MAXIPIME) 1 g in dextrose 5 % in water (D5W) 5 % 50 mL IVPB (MB+)  Dose: 1 g  Freq: Every 24 hours (non-standard times) Route: IV  Indications of Use: bacteremia  Start: 04/04/23 1100 End: 04/04/23 1516    metronidazole IVPB 500 mg  Dose: 500 mg  Freq: Every 8 hours (non-standard times) Route: IV  Indications of Use: Intra-abdominal  Start: 04/04/23 1630 End: 04/04/23 1527    piperacillin-tazobactam (ZOSYN) 4.5 g in dextrose 5 % in water (D5W) 5 % 100 mL IVPB (MB+)  Dose: 4.5 g  Freq: Every 8 hours (non-standard times) Route: IV  Indications of Use: bacteremia,Intra-abdominal  Start: 04/04/23 1630 End: 04/04/23 1527   H&P 4/4    MAR 4/4   X Treatment Fluid challenge Other- Patient to receive less volume other than 30cc/kg due to Volume overload due to- h/o renal failure      Date of Procedure: 4/5/2023  Procedure: Cholecystostomy  Specimen Removed: 40cc bilious fluid  Pre-Procedure Diagnosis: Acute cholecystitis  Post-Procedure Diagnosis: Same   ED provider  notes 4/4, filed 4/5      IR procedures 4/5    Other              Please clarify/confirm the Emergency Medicine diagnosis of __shock__?     [   ] Diagnosis ruled in   [   ] Diagnosis ruled out   [   ] Other diagnosis (please specify): _____________   [ X ] Clinically undetermined       Please document in your progress notes daily for the duration of treatment until resolved and include in your discharge summary.

## 2023-05-01 LAB — FUNGUS SPEC CULT: NORMAL

## 2023-05-24 LAB
ACID FAST MOD KINY STN SPEC: NORMAL
MYCOBACTERIUM SPEC QL CULT: NORMAL

## 2024-06-20 NOTE — MR AVS SNAPSHOT
[facility]  [address]  [place] [address]  Phone: [phone]  Fax: [phone]                  Liat Gilmore   2017 1:00 PM   Office Visit    Description:  Female : 1950   Provider:  Neo Brown MD   Department:  [facility]           Reason for Visit     SVT           Diagnoses this Visit        Comments    SVT (supraventricular tachycardia)         Chest pain, unspecified type                To Do List           Goals (5 Years of Data)     None      Follow-Up and Disposition     Return in about 1 year (around 2018).       These Medications        Disp Refills Start End    LANOXIN 125 mcg tablet 30 tablet 11 2017     Take 1 tablet (0.125 mg total) by mouth once daily. - Oral    Pharmacy: [facility] [id number]  CHRISSY SWAIN [address] #: [phone]         [facility] On Call     [facility] On Call Nurse Care Line -  Assistance  Unless otherwise directed by your provider, please contact [facility], our nurse care line that is available for  assistance.     Registered nurses in the [facility] On Call Center provide: appointment scheduling, clinical advisement, health education, and other advisory services.  Call: [phone] (toll free)               Medications           Message regarding Medications     Verify the changes and/or additions to your medication regime listed below are the same as discussed with your clinician today.  If any of these changes or additions are incorrect, please notify your healthcare provider.        CHANGE how you are taking these medications     Start Taking Instead of    LANOXIN 125 mcg tablet LANOXIN 125 mcg tablet    Dosage:  Take 1 tablet (0.125 mg total) by mouth once daily. Dosage:  TAKE 1 TABLET BY MOUTH EVERY DAY    Reason for Change:  Reorder       STOP taking these medications     cyclobenzaprine (FLEXERIL) 10 MG tablet Take one  Care Management Initial Assessment       RUR: 5%  Readmission? No  1st IM letter given? Yes -   1st  letter given: No     11:00  Patient is d/c home today without any needs or concerns.    CM completed assessment w/pt at bedside.  No history of HH or DME use.  Patient is independent w/ADL to include driving.  Patient uses [facility] on [address].  Support system includes, wife, in-laws & two son's.  No needs or concerns identified at this time.  Sister in-law will transport at d/c        06/20/24 0911   Service Assessment   Patient Orientation Alert and Oriented   Cognition Alert   History Provided By Patient   Primary Caregiver Self   Support Systems Spouse/Significant Other;Family Members  (wife, in-laws. two son's)   PCP Verified by CM Yes   Last Visit to PCP Within last year   Prior Functional Level Independent in ADLs/IADLs   Current Functional Level Independent in ADLs/IADLs   Can patient return to prior living arrangement Yes   Family able to assist with home care needs: Yes   Would you like for me to discuss the discharge plan with any other family members/significant others, and if so, who? No   Financial Resources Medicare   Community Resources None   Social/Functional History   Lives With Spouse   Type of Home House  (two story home w/4-5 EDILMA)   Home Equipment None   Active  Yes     Maci [place]  Ext 7766   tablet at night.    esomeprazole (NEXIUM) 40 MG capsule Take 40 mg by mouth before breakfast.    hydrocodone-acetaminophen 5-325mg (NORCO) 5-325 mg per tablet Take 1 tablet by mouth.    hydrocodone-acetaminophen 5-325mg (NORCO) 5-325 mg per tablet Take 1 tablet by mouth.    hydrocodone-acetaminophen (VICODIN) 5-500 mg per tablet Take 1 tablet by mouth every 6 (six) hours as needed.    simvastatin (ZOCOR) 20 MG tablet Take 20 mg by mouth every evening.    predniSONE (DELTASONE) 20 MG tablet One tablet once a day for 5 days           Verify that the below list of medications is an accurate representation of the medications you are currently taking.  If none reported, the list may be blank. If incorrect, please contact your healthcare provider. Carry this list with you in case of emergency.           Current Medications     albuterol 90 mcg/actuation inhaler INHALE 2 PUFFS INTO THE LUNGS EVERY 6 (SIX) HOURS AS NEEDED FOR WHEEZING.    ALBUTEROL INHL Inhale into the lungs.    alprazolam (XANAX) 0.25 MG tablet Take 0.25 mg by mouth 2 (two) times daily.    amlodipine (NORVASC) 10 MG tablet Take 1 tablet (10 mg total) by mouth once daily.    ascorbic acid (VITAMIN C) 1000 MG tablet Take 1,000 mg by mouth.    b complex vitamins capsule Take 1 capsule by mouth.    calcium-vitamin D3 (CALCIUM 500 + D) 500 mg(1,250mg) -200 unit per tablet Take 1 tablet by mouth.    clotrimazole-betamethasone 1-0.05% (LOTRISONE) cream     cycloSPORINE (RESTASIS) 0.05 % ophthalmic emulsion     fish oil-omega-3 fatty acids 300-1,000 mg capsule Take 2 g by mouth.    flunisolide 80 mcg/actuation HFAA Inhale 160 mcg into the lungs.    fluticasone (FLONASE) 50 mcg/actuation nasal spray 1 SPRAY BY NASAL ROUTE DAILY.    fluticasone (FLOVENT HFA) 220 mcg/actuation inhaler Inhale 2 puffs into the lungs.    hydrochlorothiazide (HYDRODIURIL) 25 MG tablet Take 25 mg by mouth once daily.    hydrocodone-acetaminophen 10-325mg (NORCO)  mg Tab      "hydrocortisone valerate (WESTCORT) 0.2 % ointment Apply topically.    LANOXIN 125 mcg tablet Take 1 tablet (0.125 mg total) by mouth once daily.    lisinopril 10 MG tablet Take 10 mg by mouth once daily.    metoprolol succinate (TOPROL-XL) 50 MG 24 hr tablet Take 50 mg by mouth once daily. Takes one-half tablet by mouth once a day as needed    paroxetine (PAXIL) 10 mg/5 mL Susp Take by mouth once daily.    pirbuterol (MAXAIR) 200 mcg/Inhalation inhaler Inhale 2 puffs into the lungs.    pitavastatin (LIVALO) 2 mg Tab tablet Take 2 mg by mouth.    valacyclovir (VALTREX) 1000 MG tablet 1,000 mg once daily.     vitamin E 1000 UNIT capsule Take 1,000 Units by mouth.    zafirlukast (ACCOLATE) 20 MG tablet Take 20 mg by mouth once daily.            Clinical Reference Information           Your Vitals Were     BP Pulse Height Weight BMI    128/74 89 5' 4" (1.626 m) 75.1 kg (165 lb 9.1 oz) 28.42 kg/m2      Blood Pressure          Most Recent Value    Right Arm BP - Sitting  128/74    Left Arm BP - Sitting  124/84    BP  128/74      Allergies as of 4/6/2017     Erythromycin      Immunizations Administered on Date of Encounter - 4/6/2017     None      [facility] Sign-Up     Activating your [facility] account is as easy as 1-2-3!     1) Visit [email or web address], select Sign Up Now, enter this activation code and your date of birth, then select Next.  [email or web address]  Expires: 5/21/2017  2:19 PM      2) Create a username and password to use when you visit [facility] in the future and select a security question in case you lose your password and select Next.    3) Enter your e-mail address and click Sign Up!    Additional Information  If you have questions, please e-mail [email or web address] or call [phone] to talk to our [facility] staff. Remember, [facility] is NOT to be used for urgent needs. For medical emergencies, dial 911.         Smoking Cessation     If you would like to quit smoking:   You may be eligible for free " services if you are a [place] resident and started smoking cigarettes before September 1, 1988.  Call the Smoking Cessation Trust (SCT) toll free at ([phone] 066-3414 or (052) 419-7424.   Call 1-800-QUIT-NOW if you do not meet the above criteria.   Contact us via email: [email or web address]   View our website for more information: [email or web address]        Language Assistance Services     ATTENTION: Language assistance services are available, free of charge. Please call [phone].      ATENCIÓN: Si habla español, tiene a collins disposición servicios gratuitos de asistencia lingüística. Llame al [phone].     CHÚ Ý: N?u b?n nói Ti?ng Vi?t, có các d?ch v? h? tr? ngôn ng? mi?n phí dành cho b?n. G?i s? [phone].         [facility] complies with applicable Federal civil rights laws and does not discriminate on the basis of race, color, national origin, age, disability, or sex.